# Patient Record
Sex: FEMALE | Race: BLACK OR AFRICAN AMERICAN | NOT HISPANIC OR LATINO | Employment: PART TIME | URBAN - METROPOLITAN AREA
[De-identification: names, ages, dates, MRNs, and addresses within clinical notes are randomized per-mention and may not be internally consistent; named-entity substitution may affect disease eponyms.]

---

## 2017-01-23 ENCOUNTER — APPOINTMENT (EMERGENCY)
Dept: RADIOLOGY | Facility: HOSPITAL | Age: 27
End: 2017-01-23

## 2017-01-23 ENCOUNTER — HOSPITAL ENCOUNTER (EMERGENCY)
Facility: HOSPITAL | Age: 27
Discharge: HOME | End: 2017-01-23
Attending: EMERGENCY MEDICINE | Admitting: EMERGENCY MEDICINE

## 2017-01-23 VITALS
BODY MASS INDEX: 42.68 KG/M2 | HEART RATE: 121 BPM | OXYGEN SATURATION: 98 % | WEIGHT: 217.37 LBS | SYSTOLIC BLOOD PRESSURE: 128 MMHG | TEMPERATURE: 98.3 F | RESPIRATION RATE: 18 BRPM | DIASTOLIC BLOOD PRESSURE: 74 MMHG | HEIGHT: 60 IN

## 2017-01-23 DIAGNOSIS — R07.9 CHEST PAIN, UNSPECIFIED TYPE: Primary | ICD-10-CM

## 2017-01-23 DIAGNOSIS — K62.5 BRIGHT RED BLOOD PER RECTUM: ICD-10-CM

## 2017-01-23 DIAGNOSIS — R00.0 SINUS TACHYCARDIA: ICD-10-CM

## 2017-01-23 LAB
ALBUMIN SERPL BCP-MCNC: 4.3 G/DL (ref 3.5–5)
ALP SERPL-CCNC: 93 U/L (ref 46–116)
ALT SERPL W P-5'-P-CCNC: 18 U/L (ref 12–78)
ANION GAP SERPL CALCULATED.3IONS-SCNC: 10 MMOL/L (ref 4–13)
AST SERPL W P-5'-P-CCNC: 28 U/L (ref 5–45)
BASOPHILS # BLD AUTO: 0.02 THOUSANDS/ΜL (ref 0–0.1)
BASOPHILS NFR BLD AUTO: 0 % (ref 0–1)
BILIRUB DIRECT SERPL-MCNC: 0.01 MG/DL (ref 0–0.2)
BILIRUB SERPL-MCNC: 0.3 MG/DL (ref 0.2–1)
BUN SERPL-MCNC: 12 MG/DL (ref 5–25)
CALCIUM SERPL-MCNC: 9.5 MG/DL (ref 8.3–10.1)
CHLORIDE SERPL-SCNC: 101 MMOL/L (ref 100–108)
CLARITY, POC: CLEAR
CO2 SERPL-SCNC: 27 MMOL/L (ref 21–32)
COLOR, POC: YELLOW
CREAT SERPL-MCNC: 0.65 MG/DL (ref 0.6–1.3)
DEPRECATED D DIMER PPP: 280 NG/ML (FEU) (ref 0–424)
EOSINOPHIL # BLD AUTO: 0.08 THOUSAND/ΜL (ref 0–0.61)
EOSINOPHIL NFR BLD AUTO: 1 % (ref 0–6)
ERYTHROCYTE [DISTWIDTH] IN BLOOD BY AUTOMATED COUNT: 13.6 % (ref 11.6–15.1)
EXT BILIRUBIN, UA: NEGATIVE
EXT BLOOD URINE: ABNORMAL
EXT GLUCOSE, UA: NEGATIVE
EXT KETONES: NEGATIVE
EXT NITRITE, UA: NEGATIVE
EXT PH, UA: 6
EXT PROTEIN, UA: NEGATIVE
EXT SPECIFIC GRAVITY, UA: 1.02
EXT UROBILINOGEN: 0.2
GFR SERPL CREATININE-BSD FRML MDRD: >60 ML/MIN/1.73SQ M
GLUCOSE SERPL-MCNC: 62 MG/DL (ref 65–140)
HCG UR QL: NEGATIVE
HCT VFR BLD AUTO: 38.6 % (ref 34.8–46.1)
HGB BLD-MCNC: 12.4 G/DL (ref 11.5–15.4)
LIPASE SERPL-CCNC: 214 U/L (ref 73–393)
LYMPHOCYTES # BLD AUTO: 1.46 THOUSANDS/ΜL (ref 0.6–4.47)
LYMPHOCYTES NFR BLD AUTO: 13 % (ref 14–44)
MAGNESIUM SERPL-MCNC: 1.7 MG/DL (ref 1.6–2.6)
MCH RBC QN AUTO: 28.4 PG (ref 26.8–34.3)
MCHC RBC AUTO-ENTMCNC: 32.1 G/DL (ref 31.4–37.4)
MCV RBC AUTO: 88 FL (ref 82–98)
MONOCYTES # BLD AUTO: 0.62 THOUSAND/ΜL (ref 0.17–1.22)
MONOCYTES NFR BLD AUTO: 5 % (ref 4–12)
NEUTROPHILS # BLD AUTO: 9.45 THOUSANDS/ΜL (ref 1.85–7.62)
NEUTS SEG NFR BLD AUTO: 81 % (ref 43–75)
NRBC BLD AUTO-RTO: 0 /100 WBCS
PLATELET # BLD AUTO: 325 THOUSANDS/UL (ref 149–390)
PMV BLD AUTO: 11.1 FL (ref 8.9–12.7)
POTASSIUM SERPL-SCNC: 4.4 MMOL/L (ref 3.5–5.3)
PROT SERPL-MCNC: 8.6 G/DL (ref 6.4–8.2)
RBC # BLD AUTO: 4.37 MILLION/UL (ref 3.81–5.12)
SODIUM SERPL-SCNC: 138 MMOL/L (ref 136–145)
TROPONIN I SERPL-MCNC: <0.02 NG/ML
TSH SERPL DL<=0.05 MIU/L-ACNC: 1.69 UIU/ML (ref 0.36–3.74)
WBC # BLD AUTO: 11.68 THOUSAND/UL (ref 4.31–10.16)
WBC # BLD EST: NEGATIVE 10*3/UL

## 2017-01-23 PROCEDURE — 93005 ELECTROCARDIOGRAM TRACING: CPT | Performed by: EMERGENCY MEDICINE

## 2017-01-23 PROCEDURE — C9113 INJ PANTOPRAZOLE SODIUM, VIA: HCPCS | Performed by: EMERGENCY MEDICINE

## 2017-01-23 PROCEDURE — 85025 COMPLETE CBC W/AUTO DIFF WBC: CPT | Performed by: EMERGENCY MEDICINE

## 2017-01-23 PROCEDURE — 96375 TX/PRO/DX INJ NEW DRUG ADDON: CPT

## 2017-01-23 PROCEDURE — 99285 EMERGENCY DEPT VISIT HI MDM: CPT

## 2017-01-23 PROCEDURE — 84484 ASSAY OF TROPONIN QUANT: CPT | Performed by: EMERGENCY MEDICINE

## 2017-01-23 PROCEDURE — 83690 ASSAY OF LIPASE: CPT | Performed by: EMERGENCY MEDICINE

## 2017-01-23 PROCEDURE — 80048 BASIC METABOLIC PNL TOTAL CA: CPT | Performed by: EMERGENCY MEDICINE

## 2017-01-23 PROCEDURE — 83735 ASSAY OF MAGNESIUM: CPT | Performed by: EMERGENCY MEDICINE

## 2017-01-23 PROCEDURE — 81025 URINE PREGNANCY TEST: CPT | Performed by: EMERGENCY MEDICINE

## 2017-01-23 PROCEDURE — 85379 FIBRIN DEGRADATION QUANT: CPT | Performed by: EMERGENCY MEDICINE

## 2017-01-23 PROCEDURE — 96374 THER/PROPH/DIAG INJ IV PUSH: CPT

## 2017-01-23 PROCEDURE — 81002 URINALYSIS NONAUTO W/O SCOPE: CPT | Performed by: EMERGENCY MEDICINE

## 2017-01-23 PROCEDURE — 71020 HB CHEST X-RAY 2VW FRONTAL&LATL: CPT

## 2017-01-23 PROCEDURE — 96361 HYDRATE IV INFUSION ADD-ON: CPT

## 2017-01-23 PROCEDURE — 36415 COLL VENOUS BLD VENIPUNCTURE: CPT | Performed by: EMERGENCY MEDICINE

## 2017-01-23 PROCEDURE — 80076 HEPATIC FUNCTION PANEL: CPT | Performed by: EMERGENCY MEDICINE

## 2017-01-23 PROCEDURE — 84443 ASSAY THYROID STIM HORMONE: CPT | Performed by: EMERGENCY MEDICINE

## 2017-01-23 RX ORDER — KETOROLAC TROMETHAMINE 30 MG/ML
30 INJECTION, SOLUTION INTRAMUSCULAR; INTRAVENOUS ONCE
Status: COMPLETED | OUTPATIENT
Start: 2017-01-23 | End: 2017-01-23

## 2017-01-23 RX ORDER — ONDANSETRON 2 MG/ML
4 INJECTION INTRAMUSCULAR; INTRAVENOUS ONCE
Status: COMPLETED | OUTPATIENT
Start: 2017-01-23 | End: 2017-01-23

## 2017-01-23 RX ORDER — PANTOPRAZOLE SODIUM 40 MG/1
40 INJECTION, POWDER, FOR SOLUTION INTRAVENOUS ONCE
Status: COMPLETED | OUTPATIENT
Start: 2017-01-23 | End: 2017-01-23

## 2017-01-23 RX ORDER — ACETAMINOPHEN 325 MG/1
650 TABLET ORAL ONCE
Status: COMPLETED | OUTPATIENT
Start: 2017-01-23 | End: 2017-01-23

## 2017-01-23 RX ORDER — OMEPRAZOLE 40 MG/1
40 CAPSULE, DELAYED RELEASE ORAL DAILY
Qty: 30 CAPSULE | Refills: 0 | Status: SHIPPED | OUTPATIENT
Start: 2017-01-23 | End: 2017-07-05

## 2017-01-23 RX ADMIN — SODIUM CHLORIDE 1000 ML: 0.9 INJECTION, SOLUTION INTRAVENOUS at 19:36

## 2017-01-23 RX ADMIN — ONDANSETRON 4 MG: 2 INJECTION INTRAMUSCULAR; INTRAVENOUS at 19:40

## 2017-01-23 RX ADMIN — ACETAMINOPHEN 650 MG: 325 TABLET ORAL at 22:00

## 2017-01-23 RX ADMIN — KETOROLAC TROMETHAMINE 30 MG: 30 INJECTION, SOLUTION INTRAMUSCULAR at 19:40

## 2017-01-23 RX ADMIN — PANTOPRAZOLE SODIUM 40 MG: 40 INJECTION, POWDER, FOR SOLUTION INTRAVENOUS at 19:43

## 2017-01-24 LAB
ATRIAL RATE: 123 BPM
P AXIS: 49 DEGREES
PR INTERVAL: 160 MS
QRS AXIS: 39 DEGREES
QRSD INTERVAL: 72 MS
QT INTERVAL: 286 MS
QTC INTERVAL: 409 MS
T WAVE AXIS: 45 DEGREES
VENTRICULAR RATE: 123 BPM

## 2017-07-05 ENCOUNTER — LAB CONVERSION - ENCOUNTER (OUTPATIENT)
Dept: OTHER | Facility: OTHER | Age: 27
End: 2017-07-05

## 2017-07-05 ENCOUNTER — HOSPITAL ENCOUNTER (EMERGENCY)
Facility: HOSPITAL | Age: 27
Discharge: HOME | End: 2017-07-05
Attending: EMERGENCY MEDICINE | Admitting: EMERGENCY MEDICINE
Payer: COMMERCIAL

## 2017-07-05 VITALS
TEMPERATURE: 98.7 F | HEART RATE: 103 BPM | BODY MASS INDEX: 37.15 KG/M2 | RESPIRATION RATE: 18 BRPM | DIASTOLIC BLOOD PRESSURE: 83 MMHG | WEIGHT: 189.2 LBS | OXYGEN SATURATION: 98 % | HEIGHT: 60 IN | SYSTOLIC BLOOD PRESSURE: 127 MMHG

## 2017-07-05 DIAGNOSIS — B34.9 VIRAL SYNDROME: Primary | ICD-10-CM

## 2017-07-05 LAB
ANION GAP SERPL CALCULATED.3IONS-SCNC: 11 MMOL/L (ref 4–13)
BASOPHILS # BLD AUTO: 0.03 THOUSANDS/ΜL (ref 0–0.1)
BASOPHILS NFR BLD AUTO: 0 % (ref 0–1)
BUN SERPL-MCNC: 15 MG/DL (ref 5–25)
CALCIUM SERPL-MCNC: 9.6 MG/DL (ref 8.3–10.1)
CHLORIDE SERPL-SCNC: 104 MMOL/L (ref 100–108)
CO2 SERPL-SCNC: 27 MMOL/L (ref 21–32)
CREAT SERPL-MCNC: 0.8 MG/DL (ref 0.6–1.3)
EOSINOPHIL # BLD AUTO: 0.05 THOUSAND/ΜL (ref 0–0.61)
EOSINOPHIL NFR BLD AUTO: 1 % (ref 0–6)
ERYTHROCYTE [DISTWIDTH] IN BLOOD BY AUTOMATED COUNT: 13.8 % (ref 11.6–15.1)
GFR SERPL CREATININE-BSD FRML MDRD: >60 ML/MIN/1.73SQ M
GLUCOSE SERPL-MCNC: 98 MG/DL (ref 65–140)
HCG SERPL QL: NEGATIVE
HCT VFR BLD AUTO: 38.4 % (ref 34.8–46.1)
HGB BLD-MCNC: 12.4 G/DL (ref 11.5–15.4)
LYMPHOCYTES # BLD AUTO: 2.22 THOUSANDS/ΜL (ref 0.6–4.47)
LYMPHOCYTES NFR BLD AUTO: 25 % (ref 14–44)
MCH RBC QN AUTO: 28.6 PG (ref 26.8–34.3)
MCHC RBC AUTO-ENTMCNC: 32.3 G/DL (ref 31.4–37.4)
MCV RBC AUTO: 89 FL (ref 82–98)
MONOCYTES # BLD AUTO: 0.55 THOUSAND/ΜL (ref 0.17–1.22)
MONOCYTES NFR BLD AUTO: 6 % (ref 4–12)
NEUTROPHILS # BLD AUTO: 6.04 THOUSANDS/ΜL (ref 1.85–7.62)
NEUTS SEG NFR BLD AUTO: 68 % (ref 43–75)
NRBC BLD AUTO-RTO: 0 /100 WBCS
PLATELET # BLD AUTO: 305 THOUSANDS/UL (ref 149–390)
PMV BLD AUTO: 9.7 FL (ref 8.9–12.7)
POTASSIUM SERPL-SCNC: 4 MMOL/L (ref 3.5–5.3)
RBC # BLD AUTO: 4.33 MILLION/UL (ref 3.81–5.12)
SODIUM SERPL-SCNC: 142 MMOL/L (ref 136–145)
WBC # BLD AUTO: 8.92 THOUSAND/UL (ref 4.31–10.16)

## 2017-07-05 PROCEDURE — 84703 CHORIONIC GONADOTROPIN ASSAY: CPT | Performed by: EMERGENCY MEDICINE

## 2017-07-05 PROCEDURE — 80048 BASIC METABOLIC PNL TOTAL CA: CPT | Performed by: EMERGENCY MEDICINE

## 2017-07-05 PROCEDURE — 85025 COMPLETE CBC W/AUTO DIFF WBC: CPT | Performed by: EMERGENCY MEDICINE

## 2017-07-05 PROCEDURE — 36415 COLL VENOUS BLD VENIPUNCTURE: CPT | Performed by: EMERGENCY MEDICINE

## 2017-07-05 PROCEDURE — 86308 HETEROPHILE ANTIBODY SCREEN: CPT | Performed by: EMERGENCY MEDICINE

## 2017-07-05 PROCEDURE — 99283 EMERGENCY DEPT VISIT LOW MDM: CPT

## 2017-07-05 RX ORDER — MULTIVITAMIN
1 TABLET ORAL DAILY
COMMUNITY

## 2017-07-06 LAB — HETEROPH AB SER QL: NEGATIVE

## 2017-10-22 ENCOUNTER — HOSPITAL ENCOUNTER (EMERGENCY)
Facility: OTHER | Age: 27
Discharge: HOME | End: 2017-10-22
Attending: EMERGENCY MEDICINE
Payer: MEDICAID

## 2017-10-22 VITALS
BODY MASS INDEX: 37.3 KG/M2 | RESPIRATION RATE: 18 BRPM | DIASTOLIC BLOOD PRESSURE: 84 MMHG | HEIGHT: 60 IN | WEIGHT: 190 LBS | HEART RATE: 100 BPM | OXYGEN SATURATION: 97 % | SYSTOLIC BLOOD PRESSURE: 129 MMHG | TEMPERATURE: 98 F

## 2017-10-22 DIAGNOSIS — H92.01 RIGHT EAR PAIN: Primary | ICD-10-CM

## 2017-10-22 DIAGNOSIS — J06.9 UPPER RESPIRATORY TRACT INFECTION, UNSPECIFIED TYPE: ICD-10-CM

## 2017-10-22 LAB
B-HCG UR QL: NEGATIVE
CTP QC/QA: YES

## 2017-10-22 PROCEDURE — 96372 THER/PROPH/DIAG INJ SC/IM: CPT

## 2017-10-22 PROCEDURE — 63600175 PHARM REV CODE 636 W HCPCS: Performed by: EMERGENCY MEDICINE

## 2017-10-22 PROCEDURE — 81025 URINE PREGNANCY TEST: CPT | Performed by: EMERGENCY MEDICINE

## 2017-10-22 PROCEDURE — 99283 EMERGENCY DEPT VISIT LOW MDM: CPT | Mod: 25

## 2017-10-22 RX ORDER — NEOMYCIN SULFATE, POLYMYXIN B SULFATE AND HYDROCORTISONE 10; 3.5; 1 MG/ML; MG/ML; [USP'U]/ML
3 SUSPENSION/ DROPS AURICULAR (OTIC) 3 TIMES DAILY
Qty: 10 ML | Status: SHIPPED | OUTPATIENT
Start: 2017-10-22 | End: 2019-05-16

## 2017-10-22 RX ORDER — DEXAMETHASONE SODIUM PHOSPHATE 4 MG/ML
8 INJECTION, SOLUTION INTRA-ARTICULAR; INTRALESIONAL; INTRAMUSCULAR; INTRAVENOUS; SOFT TISSUE
Status: COMPLETED | OUTPATIENT
Start: 2017-10-22 | End: 2017-10-22

## 2017-10-22 RX ADMIN — DEXAMETHASONE SODIUM PHOSPHATE 8 MG: 4 INJECTION, SOLUTION INTRAMUSCULAR; INTRAVENOUS at 03:10

## 2017-10-22 NOTE — ED PROVIDER NOTES
"Encounter Date: 10/22/2017       History     Chief Complaint   Patient presents with    Otalgia     Pt states," My right ear has been hurting for two weeks."     Chief complaint: Right ear pain  27-year-old had intermittent pain to her right ear for the last 2 weeks.  She also reports URI symptoms including nasal congestion, sneezing, postnasal drip and a nonproductive cough.  No fever.  She had clear drainage coming from her right ear yesterday.  She sees multiple over-the-counter medicines without relief.  She admits to using Q-tips in her ear and a regular basis.  No shortness of breath or wheezing.          Review of patient's allergies indicates:   Allergen Reactions    Grass pollen-june grass standard      Past Medical History:   Diagnosis Date    Hay fever     Oral herpes      Past Surgical History:   Procedure Laterality Date    DILATION AND CURETTAGE OF UTERUS       Family History   Problem Relation Age of Onset    Breast cancer Maternal Grandmother     Ovarian cancer Neg Hx     Colon cancer Neg Hx      Social History   Substance Use Topics    Smoking status: Never Smoker    Smokeless tobacco: Never Used    Alcohol use No     Review of Systems   Constitutional: Negative for fever.   HENT: Positive for congestion, ear discharge, ear pain, postnasal drip, rhinorrhea, sneezing and sore throat.    Respiratory: Positive for cough. Negative for shortness of breath.    Musculoskeletal: Negative for neck stiffness.   Neurological: Negative for headaches.       Physical Exam     Initial Vitals [10/22/17 1501]   BP Pulse Resp Temp SpO2   (!) 139/90 110 20 98.1 °F (36.7 °C) 97 %      MAP       106.33         Physical Exam    Nursing note and vitals reviewed.  Constitutional: No distress.   HENT:   Right Ear: Tympanic membrane normal. There is drainage (SLIGHT, CLEAR).   Left Ear: Tympanic membrane normal.   Mouth/Throat: Oropharynx is clear and moist.   Eyes: Conjunctivae are normal.   Neck: Normal range of " motion. Neck supple.   Cardiovascular: Normal rate.   Pulmonary/Chest: Breath sounds normal.   Neurological: She is alert and oriented to person, place, and time. She has normal strength.   Psychiatric: She has a normal mood and affect.         ED Course   Procedures  Labs Reviewed   POCT URINE PREGNANCY             Medical Decision Making:   Initial Assessment:   27-year-old who presents with right ear pain and URI symptoms.  On exam patient has very scant clear drainage from the right ear without swelling to the canal.  Tympanic membrane is normal.  Lungs are clear  ED Management:  Patient was advised to use Zyrtec-D and Flonase over-the-counter for the URI type symptoms.  She'll be treated Decadron IM here and discharged on Corticosporin otic.                   ED Course      Clinical Impression:   The primary encounter diagnosis was Right ear pain. A diagnosis of Upper respiratory tract infection, unspecified type was also pertinent to this visit.                           Sona Madrigal MD  10/22/17 1525

## 2017-11-16 ENCOUNTER — HOSPITAL ENCOUNTER (EMERGENCY)
Facility: OTHER | Age: 27
Discharge: HOME | End: 2017-11-16
Attending: EMERGENCY MEDICINE
Payer: MEDICAID

## 2017-11-16 VITALS
HEIGHT: 60 IN | WEIGHT: 200 LBS | OXYGEN SATURATION: 98 % | BODY MASS INDEX: 39.27 KG/M2 | DIASTOLIC BLOOD PRESSURE: 55 MMHG | SYSTOLIC BLOOD PRESSURE: 115 MMHG | TEMPERATURE: 98 F | HEART RATE: 97 BPM | RESPIRATION RATE: 16 BRPM

## 2017-11-16 DIAGNOSIS — H92.03 OTALGIA OF BOTH EARS: Primary | ICD-10-CM

## 2017-11-16 LAB
B-HCG UR QL: NEGATIVE
CTP QC/QA: YES

## 2017-11-16 PROCEDURE — 99283 EMERGENCY DEPT VISIT LOW MDM: CPT | Mod: 25

## 2017-11-16 PROCEDURE — 81025 URINE PREGNANCY TEST: CPT | Performed by: EMERGENCY MEDICINE

## 2017-11-16 RX ORDER — CETIRIZINE HYDROCHLORIDE, PSEUDOEPHEDRINE HYDROCHLORIDE 5; 120 MG/1; MG/1
1 TABLET, FILM COATED, EXTENDED RELEASE ORAL DAILY
Qty: 10 TABLET | Refills: 0 | Status: SHIPPED | OUTPATIENT
Start: 2017-11-16 | End: 2017-11-26

## 2017-11-17 NOTE — ED PROVIDER NOTES
"Encounter Date: 11/16/2017       History     Chief Complaint   Patient presents with    Otalgia     bilat ear pain/drainage "for a while", no relief with rx ear gtts      No  was used.   Otalgia   This is a chronic (Ace lupeap reports having earaches with intermittent drainage for the past 2 [place] 3 months.  Patient has been treated with antibiotic eardrops for the same condition without relief.) problem. There is pain in both ears. The problem occurs intermittently. The problem has been waxing and waning. The pain is at a severity of 3/10. Associated symptoms include ear discharge (Intermittent). Pertinent negatives include no headaches, no hearing loss, no rhinorrhea, no sore throat, no abdominal pain, no diarrhea, no vomiting, no neck pain, no cough and no rash.     Review of patient's allergies indicates:   Allergen Reactions    Grass pollen-joey grass standard      Past Medical History:   Diagnosis Date    Hay fever     Oral herpes      Past Surgical History:   Procedure Laterality Date    DILATION AND CURETTAGE OF UTERUS       Family History   Problem Relation Age of Onset    Breast cancer Maternal Grandmother     Ovarian cancer Neg Hx     Colon cancer Neg Hx      Social History   Substance Use Topics    Smoking status: Never Smoker    Smokeless tobacco: Never Used    Alcohol use No     Review of Systems   Constitutional: Negative.  Negative for fever.   HENT: Positive for congestion, ear discharge (Intermittent) and ear pain. Negative for hearing loss, rhinorrhea and sore throat.    Eyes: Negative.    Respiratory: Negative.  Negative for cough and shortness of breath.    Cardiovascular: Negative.  Negative for chest pain.   Gastrointestinal: Negative.  Negative for abdominal pain, diarrhea, nausea and vomiting.   Genitourinary: Negative.  Negative for dysuria.   Musculoskeletal: Negative.  Negative for back pain and neck pain.   Skin: Negative.  Negative for rash. "   Allergic/Immunologic: Negative.    Neurological: Negative.  Negative for weakness and headaches.   Hematological: Does not bruise/bleed easily.   Psychiatric/Behavioral: Negative.    All other systems reviewed and are negative.      Physical Exam     Initial Vitals [11/16/17 1650]   BP Pulse Resp Temp SpO2   (!) 115/55 97 16 97.5 °F (36.4 °C) 98 %      MAP       75         Physical Exam    Nursing note and vitals reviewed.  Constitutional: She appears well-developed and well-nourished. She is not diaphoretic.  Non-toxic appearance. She does not appear ill. No distress.   HENT:   Head: Normocephalic and atraumatic.   Right Ear: Hearing, tympanic membrane, external ear and ear canal normal.   Left Ear: Hearing, tympanic membrane, external ear and ear canal normal.   Nose: Mucosal edema present.   Mouth/Throat: Uvula is midline, oropharynx is clear and moist and mucous membranes are normal. No oropharyngeal exudate, posterior oropharyngeal edema, posterior oropharyngeal erythema or tonsillar abscesses.   Eyes: Conjunctivae are normal. Right eye exhibits no discharge. Left eye exhibits no discharge.   Neck: Normal range of motion.   Cardiovascular: Normal rate, regular rhythm, normal heart sounds and intact distal pulses. Exam reveals no gallop and no friction rub.    No murmur heard.  Pulmonary/Chest: Breath sounds normal. No respiratory distress. She has no wheezes. She has no rhonchi. She has no rales. She exhibits no tenderness.   Musculoskeletal: Normal range of motion.   Neurological: She is alert and oriented to person, place, and time.   Skin: Skin is warm and dry. No rash noted.   Psychiatric: She has a normal mood and affect. Her behavior is normal. Judgment and thought content normal.         ED Course   Procedures  Labs Reviewed   POCT URINE PREGNANCY             Medical Decision Making:   Initial Assessment:   Otalgia  Differential Diagnosis:   Foreign-body, otitis, ear effusion  ED Management:  The  patient will be discharged home on Zyrtec-D due to the fact that she does have mild nasal congestion.  The patient is also instructed to follow with illness to ENT for further evaluation.  The patient is instructed to return to the ER as needed if symptoms worsen or fail to improve.  The patient verbalized an understanding of discharge instructions and treatment plan.              Attending Attestation:     Physician Attestation Statement for NP/PA:   I discussed this assessment and plan of this patient with the NP/PA, but I did not personally examine the patient. The face to face encounter was performed by the NP/PA.                  ED Course      Clinical Impression:   The encounter diagnosis was Otalgia of both ears.                           Toussaint Battley III, FESTUS  11/16/17 1816       Sona Madrigal MD  11/19/17 2164

## 2018-03-05 ENCOUNTER — HOSPITAL ENCOUNTER (EMERGENCY)
Facility: OTHER | Age: 28
Discharge: HOME | End: 2018-03-05
Attending: EMERGENCY MEDICINE
Payer: MEDICAID

## 2018-03-05 VITALS
TEMPERATURE: 98 F | HEIGHT: 60 IN | DIASTOLIC BLOOD PRESSURE: 87 MMHG | WEIGHT: 185 LBS | BODY MASS INDEX: 36.32 KG/M2 | RESPIRATION RATE: 18 BRPM | SYSTOLIC BLOOD PRESSURE: 125 MMHG | HEART RATE: 87 BPM | OXYGEN SATURATION: 99 %
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
## 2018-03-05 LAB
B-HCG UR QL: NEGATIVE
CTP QC/QA: YES

## 2018-03-05 PROCEDURE — 99283 EMERGENCY DEPT VISIT LOW MDM: CPT | Mod: 25

## 2018-03-05 PROCEDURE — 81025 URINE PREGNANCY TEST: CPT | Performed by: EMERGENCY MEDICINE

## 2018-03-05 RX ORDER — PREDNISONE 10 MG/1
10 TABLET ORAL DAILY
Qty: 5 TABLET | Refills: 0 | Status: SHIPPED | OUTPATIENT
Start: 2018-03-05 | End: 2018-03-10

## 2018-03-06 NOTE — ED PROVIDER NOTES
"Encounter Date: 3/5/2018       History     Chief Complaint   Patient presents with    Rash     pt has a slight rash on her right hand that itches x2 dyas. States, " I think it is a ringworm."     The history is provided by the patient.   Rash    This is a new problem. The current episode started today. The problem has been unchanged. The problem is associated with an insect bite/sting. The rash is present on the left hand. The pain is at a severity of 2/10. Associated symptoms include itching. She has tried nothing for the symptoms.     Review of patient's allergies indicates:   Allergen Reactions    Grass pollen- grass standard      Past Medical History:   Diagnosis Date    Hay fever     Oral herpes      Past Surgical History:   Procedure Laterality Date     SECTION      DILATION AND CURETTAGE OF UTERUS       Family History   Problem Relation Age of Onset    Breast cancer Maternal Grandmother     Ovarian cancer Neg Hx     Colon cancer Neg Hx      Social History   Substance Use Topics    Smoking status: Never Smoker    Smokeless tobacco: Never Used    Alcohol use No     Review of Systems   Constitutional: Negative.    HENT: Negative.    Eyes: Negative.    Respiratory: Negative.    Cardiovascular: Negative.    Gastrointestinal: Negative.    Endocrine: Negative.    Genitourinary: Negative.    Musculoskeletal: Negative.    Skin: Positive for itching and rash.   Allergic/Immunologic: Negative.    Neurological: Negative.    Hematological: Negative.    Psychiatric/Behavioral: Negative.    All other systems reviewed and are negative.      Physical Exam     Initial Vitals [18 2312]   BP Pulse Resp Temp SpO2   121/89 89 17 98.1 °F (36.7 °C) 97 %      MAP       99.67         Physical Exam    Nursing note and vitals reviewed.  Constitutional: Vital signs are normal. She appears well-developed. She is active and cooperative.   HENT:   Head: Normocephalic and atraumatic.   Eyes: Conjunctivae, EOM " and lids are normal. Pupils are equal, round, and reactive to light.   Neck: Trachea normal and full passive range of motion without pain. Neck supple. No thyroid mass present.   Cardiovascular: Normal rate, regular rhythm, S1 normal, S2 normal, normal heart sounds, intact distal pulses and normal pulses.   Abdominal: Soft. Normal appearance, normal aorta and bowel sounds are normal.   Musculoskeletal: Normal range of motion.   Lymphadenopathy:     She has no axillary adenopathy.   Neurological: She is alert and oriented to person, place, and time.   Skin: Skin is warm, dry and intact.        Psychiatric: She has a normal mood and affect. Her speech is normal and behavior is normal. Judgment and thought content normal. Cognition and memory are normal.         ED Course   Procedures  Labs Reviewed   POCT URINE PREGNANCY                                  Clinical Impression:   The encounter diagnosis was Insect bite, initial encounter.                           Sergio Chamberlain MD  03/05/18 8020

## 2018-05-21 ENCOUNTER — HOSPITAL ENCOUNTER (EMERGENCY)
Facility: OTHER | Age: 28
Discharge: HOME | End: 2018-05-21
Attending: EMERGENCY MEDICINE
Payer: MEDICAID

## 2018-05-21 VITALS
WEIGHT: 180 LBS | BODY MASS INDEX: 35.34 KG/M2 | TEMPERATURE: 98 F | DIASTOLIC BLOOD PRESSURE: 75 MMHG | OXYGEN SATURATION: 100 % | HEIGHT: 60 IN | HEART RATE: 105 BPM | RESPIRATION RATE: 14 BRPM | SYSTOLIC BLOOD PRESSURE: 116 MMHG

## 2018-05-21 DIAGNOSIS — R21 SKIN RASH: Primary | ICD-10-CM

## 2018-05-21 LAB
B-HCG UR QL: NEGATIVE
CTP QC/QA: YES

## 2018-05-21 PROCEDURE — 81025 URINE PREGNANCY TEST: CPT | Performed by: EMERGENCY MEDICINE

## 2018-05-21 PROCEDURE — 99283 EMERGENCY DEPT VISIT LOW MDM: CPT | Mod: 25

## 2018-05-21 PROCEDURE — 25000003 PHARM REV CODE 250: Performed by: PHYSICIAN ASSISTANT

## 2018-05-21 RX ORDER — HYDROXYZINE PAMOATE 25 MG/1
25 CAPSULE ORAL
Status: COMPLETED | OUTPATIENT
Start: 2018-05-21 | End: 2018-05-21

## 2018-05-21 RX ORDER — HYDROXYZINE HYDROCHLORIDE 25 MG/1
25 TABLET, FILM COATED ORAL EVERY 6 HOURS
Qty: 15 TABLET | Refills: 0 | Status: SHIPPED | OUTPATIENT
Start: 2018-05-21 | End: 2019-05-16

## 2018-05-21 RX ORDER — HYDROCORTISONE 1 %
CREAM (GRAM) TOPICAL
Qty: 30 G | Refills: 0 | Status: SHIPPED | OUTPATIENT
Start: 2018-05-21 | End: 2019-09-10

## 2018-05-21 RX ADMIN — HYDROXYZINE PAMOATE 25 MG: 25 CAPSULE ORAL at 10:05

## 2018-05-22 NOTE — ED PROVIDER NOTES
Encounter Date: 2018       History     Chief Complaint   Patient presents with    Rash     on and off for about a month, to left upper arm X a week, but it keeps popping up in different spots on arms, itches really bad.     Patient is a 27 y.o. female presenting to the emergency department with complaints of a rash. The patient states that she initially noticed it started 1 month ago on her right hand. She states that she was seen by dermatology and told that she was likely bitten by something. She states that since then, the area has spread up her right arm and now to her left arm. She denies pain but states that it is very itchy, more so over the past 1 week. She denies other new exposures. She states she has been applying an unknown topical steroid cream from her grandmother that does help, but states that she does not know the cause and she does not want to continue to self treat. She denies taking any oral medication for her symptoms. She denies involvement of her oral mucosa. This is the extent of the patient's complaints at this time.         The history is provided by the patient.     Review of patient's allergies indicates:   Allergen Reactions    Grass pollen-joey grass standard      Past Medical History:   Diagnosis Date    Hay fever     Oral herpes      Past Surgical History:   Procedure Laterality Date     SECTION      DILATION AND CURETTAGE OF UTERUS       Family History   Problem Relation Age of Onset    Breast cancer Maternal Grandmother     Ovarian cancer Neg Hx     Colon cancer Neg Hx      Social History   Substance Use Topics    Smoking status: Never Smoker    Smokeless tobacco: Never Used    Alcohol use No     Review of Systems   Constitutional: Negative for activity change, appetite change, chills, fatigue and fever.   HENT: Negative for congestion, rhinorrhea and sore throat.    Eyes: Negative for photophobia and visual disturbance.   Respiratory: Negative for cough,  shortness of breath and wheezing.    Cardiovascular: Negative for chest pain.   Gastrointestinal: Negative for abdominal pain, diarrhea, nausea and vomiting.   Genitourinary: Negative for dysuria, hematuria and urgency.   Musculoskeletal: Negative for back pain, myalgias and neck pain.   Skin: Positive for rash. Negative for color change and wound.   Neurological: Negative for weakness and headaches.   Psychiatric/Behavioral: Negative for agitation and confusion.       Physical Exam     Initial Vitals [05/21/18 2115]   BP Pulse Resp Temp SpO2   (!) 141/62 97 14 98.1 °F (36.7 °C) 99 %      MAP       88.33         Physical Exam    Nursing note and vitals reviewed.  Constitutional: She appears well-developed and well-nourished. She is not diaphoretic. She is cooperative.  Non-toxic appearance. She does not have a sickly appearance. She does not appear ill. No distress.   Well appearing,  female, unaccompanied in the ED. Speaking in clear and full sentences, moving all extremities without difficulty. No acute distress.    HENT:   Head: Normocephalic and atraumatic.   Right Ear: External ear normal.   Left Ear: External ear normal.   Nose: Nose normal.   Mouth/Throat: Oropharynx is clear and moist.   Eyes: Conjunctivae and EOM are normal.   Neck: Normal range of motion. Neck supple.   Musculoskeletal: Normal range of motion.   Neurological: She is alert and oriented to person, place, and time.   Skin: Skin is warm.   Fine papular rash noted to the dorsal aspect of the bilateral upper extremities. No associated erythema, skin sloughing, ulceration, purulence, or drainage. No involvement of the mucosal membrane.    Psychiatric: She has a normal mood and affect. Her behavior is normal. Judgment and thought content normal.         ED Course   Procedures  Labs Reviewed   POCT URINE PREGNANCY             Medical Decision Making:   Initial Assessment:   Urgent evaluation of a 27 y.o. female presenting to the  emergency department complaining of rash to the bilateral upper extremities. Patient is afebrile, nontoxic appearing and hemodynamically stable. Physical exam reveals evidence of a fine papular rash noted to the bilateral upper extremities with no skin sloughing, erythema, or drainage. No involvement of the mucosal membrane.  Clinical Tests:   Lab Tests: Ordered and Reviewed  The following lab test(s) were unremarkable: UPT  ED Management:  Signs and symptoms are not consistent with acute bacterial infection, no indication for antibiotic treatment.  No concern for SJS.  Will plan to treat with Atarax as well as topical steroids.  I had a discussion with the patient and recommended that she obtain follow-up with Dermatology for further evaluation. The patient was instructed to follow up with a primary care provider in 2 days or to return to the emergency department for worsening symptoms. The treatment plan was discussed with the patient who demonstrated understanding and comfort with plan. The patient's history, physical exam, and plan of care was discussed with and agreed upon with my supervising physician.    Other:   I have discussed this case with another health care provider.       <> Summary of the Discussion: Dr. Ramachandran  This note was created using [employer] Fluency Direct. There may be typographical errors secondary to dictation.                       Clinical Impression:     1. Skin rash         Disposition:   Disposition: Discharged  Condition: Stable                        Ml Tilley PA-C  05/21/18 2575

## 2018-05-22 NOTE — ED TRIAGE NOTES
"Pt presents to the ED w/ c/o rash to bilateral arms. X 1 week. Pt states "it started as a small bump and spread to both arms. Pt states that rash "itches really bad". Pt states "I put cream on my arm that I got from my grandmother" with slight relief. Pt denies SOB and fever.  Pt is AAOX4. Pt is in NAD.   "

## 2018-09-16 ENCOUNTER — NURSE TRIAGE (OUTPATIENT)
Dept: ADMINISTRATIVE | Facility: CLINIC | Age: 28
End: 2018-09-16

## 2018-09-17 ENCOUNTER — HOSPITAL ENCOUNTER (EMERGENCY)
Facility: OTHER | Age: 28
Discharge: HOME | End: 2018-09-17
Attending: EMERGENCY MEDICINE
Payer: MEDICAID

## 2018-09-17 VITALS
HEART RATE: 98 BPM | TEMPERATURE: 99 F | DIASTOLIC BLOOD PRESSURE: 76 MMHG | SYSTOLIC BLOOD PRESSURE: 130 MMHG | RESPIRATION RATE: 17 BRPM | HEIGHT: 60 IN | WEIGHT: 190 LBS | BODY MASS INDEX: 37.3 KG/M2 | OXYGEN SATURATION: 98 %

## 2018-09-17 DIAGNOSIS — L30.9 DERMATITIS: Primary | ICD-10-CM

## 2018-09-17 LAB
B-HCG UR QL: NEGATIVE
CTP QC/QA: YES

## 2018-09-17 PROCEDURE — 81025 URINE PREGNANCY TEST: CPT | Performed by: EMERGENCY MEDICINE

## 2018-09-17 PROCEDURE — 99283 EMERGENCY DEPT VISIT LOW MDM: CPT

## 2018-09-17 NOTE — ED NOTES
Patient Identifiers for Brock Alfaro checked and correct  LOC: The patient is awake, alert and aware of environment with an appropriate affect, the patient is oriented x 3 and speaking appropriate.  APPEARANCE: Patient resting comfortably and in no acute distress. The patient is clean and well groomed. The patient's clothing is properly fastened.  SKIN: The skin is warm and dry. The patient has normal skin turgor and moist mucus membranes. Bumps to anus.   Musculoskeletal :  Normal range of motion noted. Moves all extremities well, No swelling or tenderness noted  RESPIRATORY: Airway is open and patent, respirations are spontaneous, patient has a normal effort and rate. Breath sounds are clear & equal, bilaterally.  CARDIAC: Patient has a normal rate and rhythm, no peripheral edema noted, capillary refill < 3 seconds.   PULSES: 2+ radial & pedal pulses, symmetrical in all extremities.  NEUROLOGIC: PERRL, Pupils 3mm and reacts briskly to light. Motor strength 5/5 all extremities.  The pt's facial expression is symmetrical, patient moving all extremities, normal sensation in all extremities when touched with a finger.The patient is awake, alert and cooperative with a calm affect, patient is aware of environment.      Will continue to monitor

## 2018-09-17 NOTE — ED PROVIDER NOTES
"Encounter Date: 2018       History     Chief Complaint   Patient presents with    Rash     "bumpy, red" rash around anus noted last night. denies itching or discomfort.     Patient is 28 year old female who presents with complaints of painless pumps to the right side of her buttocks that she noticed for the first time yesterday. She reports that these bumps are painless with no itching, bleeding, purulent. She reports no recent sexual activty. She reports a history of oral fever blisters but denies a history of genital herpes in the past. She has no associated fever, chills, nausea, vomiting, chest pain or SOB. She is currently unaccompanied in the ER.           Review of patient's allergies indicates:   Allergen Reactions    Grass pollen- grass standard      Past Medical History:   Diagnosis Date    Hay fever     Oral herpes      Past Surgical History:   Procedure Laterality Date     SECTION       SECTION N/A 2014    Performed by Merrill Kirby MD at [facility]    DILATION AND CURETTAGE OF UTERUS       Family History   Problem Relation Age of Onset    Breast cancer Maternal Grandmother     Ovarian cancer Neg Hx     Colon cancer Neg Hx      Social History     Tobacco Use    Smoking status: Never Smoker    Smokeless tobacco: Never Used   Substance Use Topics    Alcohol use: No    Drug use: No     Review of Systems   Constitutional: Negative for fever.   HENT: Negative for sore throat.    Respiratory: Negative for shortness of breath.    Cardiovascular: Negative for chest pain.   Gastrointestinal: Negative for nausea.   Genitourinary: Negative for dysuria.   Musculoskeletal: Negative for back pain.   Skin: Negative for rash.        Bumps to lower right buttocks.    Neurological: Negative for weakness.   Hematological: Does not bruise/bleed easily.       Physical Exam     Initial Vitals [18 1316]   BP Pulse Resp Temp SpO2   122/81 106 16 98.8 °F (37.1 °C) 99 %      MAP  "      --         Physical Exam    Nursing note and vitals reviewed.  Constitutional: She appears well-developed and well-nourished. She is not diaphoretic. No distress.   Healthy appearing female in NAD or apparent pain. She makes good eye contact, speaks in clear full sentences and ambulates with ease.    HENT:   Head: Normocephalic and atraumatic.   Eyes: Conjunctivae and EOM are normal. Pupils are equal, round, and reactive to light. Right eye exhibits no discharge. Left eye exhibits no discharge. No scleral icterus.   Neck: Normal range of motion.   Cardiovascular: Normal rate, regular rhythm, normal heart sounds and intact distal pulses. Exam reveals no gallop and no friction rub.    No murmur heard.  Pulmonary/Chest: Breath sounds normal. She has no wheezes. She has no rhonchi. She has no rales.   Abdominal: Soft. Bowel sounds are normal. There is no tenderness. There is no rebound and no guarding.   Genitourinary:         Musculoskeletal: Normal range of motion. She exhibits no edema or tenderness.   Lymphadenopathy:     She has no cervical adenopathy.   Neurological: She is alert and oriented to person, place, and time. She has normal strength. No cranial nerve deficit or sensory deficit.   Skin: Skin is warm. Capillary refill takes less than 2 seconds. No rash and no abscess noted. No erythema.   Psychiatric: She has a normal mood and affect. Her behavior is normal. Thought content normal.         ED Course   Procedures  Labs Reviewed   POCT URINE PREGNANCY          Imaging Results    None          Medical Decision Making:   ED Management:  Urgent evaluation a 28-year-old female who presents with complaints of nonspecific rash who lower gluteal region as outlined above.  She is afebrile, nontoxic appearing, hemodynamically stable. Physical exam outlined above and reveals erythematous papules in nonspecific dermatomal pattern.  These lesions do not appear to be vesicular in nature and do not appear to have  bacterial etiology.  I do not feel antibiotic or antiviral treatment is warranted at this time.  I do have strong suspicion that this dermatitis is related to friction between glue and I educate patient supportive symptom management and encourage her to follow up with her primary provider in the outpatient setting.  She is educated on ED return precautions and verbalizes understanding.  She is amenable to plan.  She is stable for discharge.                      Clinical Impression:   The encounter diagnosis was Dermatitis.                             Jumana Pineda PA-C  09/17/18 [id number]

## 2018-09-17 NOTE — ED TRIAGE NOTES
Pt c/o bumps that she noticed to her rectum since yesterday.She denies any pain or itching. Denies anal sex.  She denies any bumps to the vagina.

## 2018-09-17 NOTE — TELEPHONE ENCOUNTER
"Pt called re red rash on bottom of R buttock spreading to thigh and inner thigh. No itch. No pain. Pt denies sexual activity     Reason for Disposition   Mild widespread rash   Hives suspected    Answer Assessment - Initial Assessment Questions  1. APPEARANCE of RASH: "Describe the rash. What color it is?" (e.g., spots, blisters, raised areas, skin peeling, scaly)     Raised bumps - mosquito bites, raised whelps, no itch, no pain.   2. SIZE: "How big are the spots?"    Smaller than dime - little mosquito bite   3. LOCATION: "Where is the rash located?"     As above   4. ONSET: "When did the rash begin?"     930pm   5. FEVER: "Do you have a fever?" If so, ask: "What is your temperature, how was it measured, and when did it start?"     afeb   6. ITCHING: "Does the rash itch?" If so, ask: "How bad is the itch?" (Scale 1-10; or mild, moderate, severe)     No   7. CAUSE: "What do you think is causing the rash?"     Taking valtrex for fever blisters   Heat rash  8. MEDICATION FACTORS: "Have you started any new medications within the last 2 weeks?" (e.g., antibiotics)      Was taking advil for sinus   9. OTHER SYMPTOMS: "Do you have any other symptoms?" (e.g., dizziness, headache, sore throat, joint pain)     No   10. PREGNANCY: "Is there any chance you are pregnant?" "When was your last menstrual period?"     No    Protocols used:  RASH - WIDESPREAD AND CAUSE UNKNOWN[facility]. Offered home care. Call back with questions     "

## 2018-12-16 ENCOUNTER — HOSPITAL ENCOUNTER (EMERGENCY)
Facility: OTHER | Age: 28
Discharge: HOME | End: 2018-12-16
Attending: EMERGENCY MEDICINE
Payer: MEDICAID

## 2018-12-16 VITALS
HEIGHT: 60 IN | DIASTOLIC BLOOD PRESSURE: 95 MMHG | RESPIRATION RATE: 16 BRPM | TEMPERATURE: 98 F | SYSTOLIC BLOOD PRESSURE: 118 MMHG | OXYGEN SATURATION: 100 % | WEIGHT: 192 LBS | BODY MASS INDEX: 37.69 KG/M2 | HEART RATE: 96 BPM

## 2018-12-16 DIAGNOSIS — K62.5 RECTAL BLEEDING: Primary | ICD-10-CM

## 2018-12-16 LAB
B-HCG UR QL: NEGATIVE
BASOPHILS # BLD AUTO: 0.02 K/UL
BASOPHILS NFR BLD: 0.3 %
CTP QC/QA: YES
DIFFERENTIAL METHOD: NORMAL
EOSINOPHIL # BLD AUTO: 0.1 K/UL
EOSINOPHIL NFR BLD: 0.8 %
ERYTHROCYTE [DISTWIDTH] IN BLOOD BY AUTOMATED COUNT: 13.1 %
HCT VFR BLD AUTO: 38.9 %
HGB BLD-MCNC: 12.8 G/DL
LYMPHOCYTES # BLD AUTO: 1.5 K/UL
LYMPHOCYTES NFR BLD: 23.5 %
MCH RBC QN AUTO: 29.8 PG
MCHC RBC AUTO-ENTMCNC: 32.9 G/DL
MCV RBC AUTO: 91 FL
MONOCYTES # BLD AUTO: 0.4 K/UL
MONOCYTES NFR BLD: 6.2 %
NEUTROPHILS # BLD AUTO: 4.3 K/UL
NEUTROPHILS NFR BLD: 69 %
PLATELET # BLD AUTO: 296 K/UL
PMV BLD AUTO: 9.2 FL
RBC # BLD AUTO: 4.29 M/UL
WBC # BLD AUTO: 6.29 K/UL

## 2018-12-16 PROCEDURE — 81025 URINE PREGNANCY TEST: CPT | Performed by: EMERGENCY MEDICINE

## 2018-12-16 PROCEDURE — 99283 EMERGENCY DEPT VISIT LOW MDM: CPT

## 2018-12-16 PROCEDURE — 85025 COMPLETE CBC W/AUTO DIFF WBC: CPT

## 2018-12-16 RX ORDER — DOCUSATE SODIUM 100 MG/1
100 CAPSULE, LIQUID FILLED ORAL 2 TIMES DAILY
Qty: 30 CAPSULE | Refills: 0 | Status: SHIPPED | OUTPATIENT
Start: 2018-12-16 | End: 2019-05-16

## 2018-12-16 NOTE — ED PROVIDER NOTES
Encounter Date: 2018    SCRIBE #1 NOTE: Yesi PAK, vida scribing for, and in the presence of, Dr. Peoples.       History     Chief Complaint   Patient presents with    Rectal Bleeding     Pt c/o 1 episode of bright red blood after having a BM this morning.  Pt denies PMH of hemorrhoids. Pt does reports mildly straining while have the BM this morning.    Rash     Pt c/o rash along the right side of gluteal fold which she noticed last night & has worsened during the night.     Time seen by provider: 9:48 AM    This is a 28 y.o. female who presents with complaint of rectal bleeding this morning. There was bright red blood in the toilet after she had a bowel movement. She reports mildly straining with the bowel movement this morning. She has had rectal bleeding on tissue due to straining with bowel movements in the past, but denies passing this amount of blood before. She also reports left upper buttock rash that she noticed today. She is not on her menstrual cycle. She denies history of hemorrhoids or anal fissures.  She is not constipated usually having 1-2 cm per day. She notes taking ibuprofen and valtrex for fever blister two days ago, but no regular NSAID use      The history is provided by the patient.     Review of patient's allergies indicates:   Allergen Reactions    Grass pollen-joey grass standard      Past Medical History:   Diagnosis Date    Hay fever     Oral herpes      Past Surgical History:   Procedure Laterality Date     SECTION       SECTION N/A 2014    Performed by [facility] at [facility]    DILATION AND CURETTAGE OF UTERUS       Family History   Problem Relation Age of Onset    Breast cancer Maternal Grandmother     Ovarian cancer Neg Hx     Colon cancer Neg Hx      Social History     Tobacco Use    Smoking status: Never Smoker    Smokeless tobacco: Never Used   Substance Use Topics    Alcohol use: No    Drug use: No     Review of Systems    Constitutional: Negative for fever.   HENT: Negative for sore throat.    Respiratory: Negative for shortness of breath.    Cardiovascular: Negative for chest pain.   Gastrointestinal: Positive for anal bleeding. Negative for abdominal pain, blood in stool, constipation, diarrhea, nausea and vomiting.   Genitourinary: Negative for dysuria, menstrual problem and vaginal bleeding.   Musculoskeletal: Negative for back pain.   Skin: Positive for rash.   Neurological: Negative for weakness.   Hematological: Does not bruise/bleed easily.       Physical Exam     Initial Vitals [12/16/18 0853]   BP Pulse Resp Temp SpO2   129/77 110 18 98.8 °F (37.1 °C) 99 %      MAP       --         Physical Exam    Nursing note and vitals reviewed.  Constitutional: She appears well-developed and well-nourished. She is not diaphoretic. No distress.   HENT:   Head: Normocephalic and atraumatic.   Eyes: EOM are normal. No scleral icterus.   Neck: Normal range of motion. Neck supple.   Cardiovascular: Normal rate, regular rhythm and normal heart sounds. Exam reveals no gallop and no friction rub.    No murmur heard.  Pulmonary/Chest: Breath sounds normal. No respiratory distress. She has no wheezes. She has no rhonchi. She has no rales.   Abdominal: Soft. There is no tenderness. There is no rebound and no guarding.   Genitourinary: Rectal exam shows no external hemorrhoid and no fissure.   Genitourinary Comments: No palpable internal hemorrhoid or anal fissure   Musculoskeletal: Normal range of motion. She exhibits no edema or tenderness.   Lymphadenopathy:     She has no cervical adenopathy.   Neurological: She is alert and oriented to person, place, and time.   Skin: Skin is warm and dry. No rash noted.   No sign of cellulitis or HSV in area of patient's left upper buttock pain.   Psychiatric: She has a normal mood and affect. Her behavior is normal. Judgment and thought content normal.         ED Course   Procedures  Labs Reviewed   CBC W/  AUTO DIFFERENTIAL   POCT URINE PREGNANCY          Imaging Results    None          Medical Decision Making:   Initial Assessment:       28-year-old female presents after episode of rectal bleeding after stool earlier today.  She has had blood with wiping before but never this amount of blood before.  No known history of hemorrhoids or fissure, and she denies any constipation, making BM 1 to 2 times a day.  She does admit to poor hydration at baseline and straining a little to make BM today.  No associated abdominal pain or other complaints.   Rectal exam with no sign of internal or external hemorrhoid or anal fissure, no active bleeding.  She was observed in the ED with no subsequent episodes of bleeding.  She reports history of anemia so CBC checked with hemoglobin 12.8.   I suspect blood was due to straining today, possible small internal hemorrhoid or fissure that I was not able to appreciate on rectal exam.  Very low suspicion for upper or lower GI bleed since patient only has had bleeding with normal BMs. Since she has some straining hard stools, will Rx stool softeners and advised increase p.o. hydration and fiber.  She will return to the ED for any worsening or more frequent episodes, and follow up with GI or CRS if persistent.      Clinical Tests:   Lab Tests: Ordered and Reviewed            Scribe Attestation:   Scribe #1: I performed the above scribed service and the documentation accurately describes the services I performed. I attest to the accuracy of the note.    Attending Attestation:           Physician Attestation for Scribe:  Physician Attestation Statement for Scribe #1: I, Dr. Peoples, reviewed documentation, as scribed by Yesi Freed in my presence, and it is both accurate and complete.                    Clinical Impression:     1. Rectal bleeding                                 Rene Peoples MD  12/16/18 8051

## 2018-12-16 NOTE — ED TRIAGE NOTES
Pt presents to ED with c/o rectal bleeding after straining to have a BM this morning, states there was bright red blood in toilet after BM. Pt denies previous Hx of hemorrhoids. Pt also reports rash to right side of gluteal fold noticed last night and has worsened over night. Pt anxious. Pt denies SOB, N/V/D, dizziness, fatigue, fever, chills.

## 2019-01-12 ENCOUNTER — HOSPITAL ENCOUNTER (EMERGENCY)
Facility: OTHER | Age: 29
Discharge: HOME | End: 2019-01-12
Attending: EMERGENCY MEDICINE
Payer: MEDICAID

## 2019-01-12 VITALS
SYSTOLIC BLOOD PRESSURE: 120 MMHG | DIASTOLIC BLOOD PRESSURE: 73 MMHG | HEIGHT: 60 IN | TEMPERATURE: 98 F | WEIGHT: 192 LBS | BODY MASS INDEX: 37.69 KG/M2 | HEART RATE: 102 BPM | OXYGEN SATURATION: 97 % | RESPIRATION RATE: 24 BRPM
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
## 2019-01-12 DIAGNOSIS — S00.03XA CONTUSION OF SCALP, INITIAL ENCOUNTER: ICD-10-CM

## 2019-01-12 DIAGNOSIS — T14.90XA TRAUMA: ICD-10-CM

## 2019-01-12 LAB
B-HCG UR QL: NEGATIVE
CTP QC/QA: YES

## 2019-01-12 PROCEDURE — 99285 EMERGENCY DEPT VISIT HI MDM: CPT | Mod: 25

## 2019-01-12 PROCEDURE — 63600175 PHARM REV CODE 636 W HCPCS: Performed by: EMERGENCY MEDICINE

## 2019-01-12 PROCEDURE — 25000003 PHARM REV CODE 250: Performed by: EMERGENCY MEDICINE

## 2019-01-12 PROCEDURE — 90471 IMMUNIZATION ADMIN: CPT | Performed by: EMERGENCY MEDICINE

## 2019-01-12 PROCEDURE — 90715 TDAP VACCINE 7 YRS/> IM: CPT | Performed by: EMERGENCY MEDICINE

## 2019-01-12 PROCEDURE — 81025 URINE PREGNANCY TEST: CPT | Performed by: EMERGENCY MEDICINE

## 2019-01-12 RX ORDER — ACETAMINOPHEN 500 MG
1000 TABLET ORAL
Status: COMPLETED | OUTPATIENT
Start: 2019-01-12 | End: 2019-01-12

## 2019-01-12 RX ADMIN — ACETAMINOPHEN 1000 MG: 500 TABLET ORAL at 09:01

## 2019-01-12 RX ADMIN — CLOSTRIDIUM TETANI TOXOID ANTIGEN (FORMALDEHYDE INACTIVATED), CORYNEBACTERIUM DIPHTHERIAE TOXOID ANTIGEN (FORMALDEHYDE INACTIVATED), BORDETELLA PERTUSSIS TOXOID ANTIGEN (GLUTARALDEHYDE INACTIVATED), BORDETELLA PERTUSSIS FILAMENTOUS HEMAGGLUTININ ANTIGEN (FORMALDEHYDE INACTIVATED), BORDETELLA PERTUSSIS PERTACTIN ANTIGEN, AND BORDETELLA PERTUSSIS FIMBRIAE 2/3 ANTIGEN 0.5 ML: 5; 2; 2.5; 5; 3; 5 INJECTION, SUSPENSION INTRAMUSCULAR at 09:01
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
## 2019-01-17 DIAGNOSIS — M25.552 ACUTE PAIN OF LEFT HIP: ICD-10-CM

## 2019-01-17 DIAGNOSIS — M54.9 ACUTE MIDLINE BACK PAIN, UNSPECIFIED BACK LOCATION: ICD-10-CM
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
## 2019-01-17 DIAGNOSIS — M25.512 ACUTE SHOULDER PAIN DUE TO TRAUMA, LEFT: ICD-10-CM

## 2019-01-17 DIAGNOSIS — G89.11 ACUTE SHOULDER PAIN DUE TO TRAUMA, LEFT: ICD-10-CM

## 2019-01-21 ENCOUNTER — HOSPITAL ENCOUNTER (OUTPATIENT)
Dept: RADIOLOGY | Facility: OTHER | Age: 29
Discharge: HOME | End: 2019-01-21
Attending: INTERNAL MEDICINE
Payer: MEDICAID

## 2019-01-21 DIAGNOSIS — M54.9 ACUTE MIDLINE BACK PAIN, UNSPECIFIED BACK LOCATION: ICD-10-CM
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
## 2019-01-21 DIAGNOSIS — M25.512 ACUTE PAIN OF LEFT SHOULDER: ICD-10-CM

## 2019-01-21 PROCEDURE — 73030 X-RAY EXAM OF SHOULDER: CPT | Mod: TC,FY,LT

## 2019-01-21 PROCEDURE — 73030 X-RAY EXAM OF SHOULDER: CPT | Mod: 26,LT,, | Performed by: RADIOLOGY

## 2019-01-21 PROCEDURE — 73060 X-RAY EXAM OF HUMERUS: CPT | Mod: TC,FY,LT

## 2019-01-21 PROCEDURE — 73552 X-RAY EXAM OF FEMUR 2/>: CPT | Mod: TC,FY,LT

## 2019-01-21 PROCEDURE — 73030 XR SHOULDER COMPLETE 2 OR MORE VIEWS LEFT: ICD-10-PCS | Mod: 26,LT,, | Performed by: RADIOLOGY

## 2019-01-21 PROCEDURE — 73502 XR HIP 2 VIEW LEFT: ICD-10-PCS | Mod: 26,LT,, | Performed by: RADIOLOGY

## 2019-01-21 PROCEDURE — 73552 X-RAY EXAM OF FEMUR 2/>: CPT | Mod: 26,LT,, | Performed by: RADIOLOGY

## 2019-01-21 PROCEDURE — 72110 X-RAY EXAM L-2 SPINE 4/>VWS: CPT | Mod: TC,FY

## 2019-01-21 PROCEDURE — 72110 X-RAY EXAM L-2 SPINE 4/>VWS: CPT | Mod: 26,,, | Performed by: RADIOLOGY

## 2019-01-21 PROCEDURE — 73060 XR HUMERUS 2 VIEW LEFT: ICD-10-PCS | Mod: 26,LT,, | Performed by: RADIOLOGY

## 2019-01-21 PROCEDURE — 73090 X-RAY EXAM OF FOREARM: CPT | Mod: 26,LT,, | Performed by: RADIOLOGY

## 2019-01-21 PROCEDURE — 73502 X-RAY EXAM HIP UNI 2-3 VIEWS: CPT | Mod: 26,LT,, | Performed by: RADIOLOGY

## 2019-01-21 PROCEDURE — 73502 X-RAY EXAM HIP UNI 2-3 VIEWS: CPT | Mod: TC,FY,LT

## 2019-01-21 PROCEDURE — 72070 X-RAY EXAM THORAC SPINE 2VWS: CPT | Mod: 26,,, | Performed by: RADIOLOGY

## 2019-01-21 PROCEDURE — 73090 X-RAY EXAM OF FOREARM: CPT | Mod: TC,FY,LT

## 2019-01-21 PROCEDURE — 73090 XR FOREARM LEFT: ICD-10-PCS | Mod: 26,LT,, | Performed by: RADIOLOGY

## 2019-01-21 PROCEDURE — 73060 X-RAY EXAM OF HUMERUS: CPT | Mod: 26,LT,, | Performed by: RADIOLOGY

## 2019-01-21 PROCEDURE — 73552 XR FEMUR 2 VIEW LEFT: ICD-10-PCS | Mod: 26,LT,, | Performed by: RADIOLOGY

## 2019-01-21 PROCEDURE — 72110 XR LUMBAR SPINE COMPLETE 5 VIEW: ICD-10-PCS | Mod: 26,,, | Performed by: RADIOLOGY

## 2019-01-21 PROCEDURE — 72070 XR THORACIC SPINE AP LATERAL: ICD-10-PCS | Mod: 26,,, | Performed by: RADIOLOGY

## 2019-01-21 PROCEDURE — 72070 X-RAY EXAM THORAC SPINE 2VWS: CPT | Mod: TC,FY

## 2019-03-18 ENCOUNTER — HOSPITAL ENCOUNTER (EMERGENCY)
Facility: OTHER | Age: 29
Discharge: HOME | End: 2019-03-18
Attending: EMERGENCY MEDICINE
Payer: MEDICAID

## 2019-03-18 VITALS
DIASTOLIC BLOOD PRESSURE: 71 MMHG | TEMPERATURE: 98 F | SYSTOLIC BLOOD PRESSURE: 116 MMHG | OXYGEN SATURATION: 99 % | WEIGHT: 195.13 LBS | BODY MASS INDEX: 39.34 KG/M2 | HEIGHT: 59 IN | HEART RATE: 92 BPM | RESPIRATION RATE: 18 BRPM

## 2019-03-18 DIAGNOSIS — N89.8 VAGINAL IRRITATION: Primary | ICD-10-CM

## 2019-03-18 LAB
B-HCG UR QL: NEGATIVE
BACTERIA #/AREA URNS HPF: ABNORMAL /HPF
BACTERIA GENITAL QL WET PREP: ABNORMAL
BILIRUB UR QL STRIP: NEGATIVE
CLARITY UR: ABNORMAL
CLUE CELLS VAG QL WET PREP: ABNORMAL
COLOR UR: YELLOW
CTP QC/QA: YES
FILAMENT FUNGI VAG WET PREP-#/AREA: ABNORMAL
GLUCOSE UR QL STRIP: NEGATIVE
HGB UR QL STRIP: ABNORMAL
KETONES UR QL STRIP: NEGATIVE
LEUKOCYTE ESTERASE UR QL STRIP: NEGATIVE
MICROSCOPIC COMMENT: ABNORMAL
NITRITE UR QL STRIP: NEGATIVE
PH UR STRIP: 6 [PH] (ref 5–8)
PROT UR QL STRIP: NEGATIVE
RBC #/AREA URNS HPF: 2 /HPF (ref 0–4)
SP GR UR STRIP: >=1.03 (ref 1–1.03)
SPECIMEN SOURCE: ABNORMAL
T VAGINALIS GENITAL QL WET PREP: ABNORMAL
URN SPEC COLLECT METH UR: ABNORMAL
UROBILINOGEN UR STRIP-ACNC: NEGATIVE EU/DL
WBC #/AREA VAG WET PREP: ABNORMAL
YEAST GENITAL QL WET PREP: ABNORMAL

## 2019-03-18 PROCEDURE — 99284 EMERGENCY DEPT VISIT MOD MDM: CPT

## 2019-03-18 PROCEDURE — 87491 CHLMYD TRACH DNA AMP PROBE: CPT

## 2019-03-18 PROCEDURE — 81025 URINE PREGNANCY TEST: CPT | Performed by: EMERGENCY MEDICINE

## 2019-03-18 PROCEDURE — 87210 SMEAR WET MOUNT SALINE/INK: CPT

## 2019-03-18 PROCEDURE — 81000 URINALYSIS NONAUTO W/SCOPE: CPT

## 2019-03-19 LAB
C TRACH DNA SPEC QL NAA+PROBE: NOT DETECTED
N GONORRHOEA DNA SPEC QL NAA+PROBE: NOT DETECTED

## 2019-03-19 NOTE — ED PROVIDER NOTES
Encounter Date: 3/18/2019    SCRIBE #1 NOTE: I, Yesi Freed, am scribing for, and in the presence of, Dr. Gutierrez.       History     Chief Complaint   Patient presents with    vaginal irritation     since before , says she used the wrong soap powder     Time seen by provider: 10:06 PM    This is a 28 y.o. female who presents with complaint of vaginal irritation for 10 days. The irritation began 1 day prior to her LMP on 3/08. The irritation resolved while on her cycle, but recurred afterwards. She notes using Gain detergent for the fist time prior to onset of irritation. She has applied OTC cream and taken Epson salt baths with no relief. She also reports itching throat and nausea. She has no other complaints.      The history is provided by the patient.     Review of patient's allergies indicates:   Allergen Reactions    Grass pollen- grass standard      Past Medical History:   Diagnosis Date    Hay fever     Oral herpes      Past Surgical History:   Procedure Laterality Date     SECTION       SECTION N/A 2014    Performed by [facility] at [facility]D [facility]    DILATION AND CURETTAGE OF UTERUS       Family History   Problem Relation Age of Onset    Breast cancer Maternal Grandmother     Ovarian cancer Neg Hx     Colon cancer Neg Hx      Social History     Tobacco Use    Smoking status: Never Smoker    Smokeless tobacco: Never Used   Substance Use Topics    Alcohol use: No    Drug use: No     Review of Systems   Constitutional: Negative for fever.   HENT: Positive for sore throat (itching). Negative for trouble swallowing.    Respiratory: Negative for shortness of breath.    Cardiovascular: Negative for chest pain.   Gastrointestinal: Positive for nausea.   Genitourinary: Positive for vaginal pain (irritation). Negative for dysuria.   Musculoskeletal: Negative for back pain.   Skin: Negative for rash.   Neurological: Negative for weakness.   Hematological: Does not  bruise/bleed easily.       Physical Exam     Initial Vitals [03/18/19 2129]   BP Pulse Resp Temp SpO2   123/77 102 14 98.5 °F (36.9 °C) 97 %      MAP       --         Physical Exam    Nursing note and vitals reviewed.  Constitutional: She appears well-developed and well-nourished. She is not diaphoretic. No distress.   HENT:   Head: Normocephalic and atraumatic.   Mouth/Throat: Oropharynx is clear and moist.   Eyes: EOM are normal. No scleral icterus.   Neck: Normal range of motion. Neck supple.   Cardiovascular: Normal rate, regular rhythm and normal heart sounds. Exam reveals no gallop and no friction rub.    No murmur heard.  Pulmonary/Chest: Breath sounds normal. No respiratory distress. She has no wheezes. She has no rhonchi. She has no rales.   Abdominal: Soft. There is no tenderness.   Genitourinary: Vagina normal. There is no rash or lesion on the right labia. There is no rash or lesion on the left labia. Cervix exhibits no motion tenderness and no discharge. No vaginal discharge found.   Musculoskeletal: Normal range of motion. She exhibits no edema or tenderness.   Neurological: She is alert and oriented to person, place, and time.   Skin: Skin is warm and dry.   Psychiatric: She has a normal mood and affect. Her behavior is normal. Judgment and thought content normal.         ED Course   Procedures  Labs Reviewed   URINALYSIS, REFLEX TO URINE CULTURE - Abnormal; Notable for the following components:       Result Value    Appearance, UA Hazy (*)     Specific Gravity, UA >=1.030 (*)     Occult Blood UA 1+ (*)     All other components within normal limits    Narrative:     Preferred Collection Type->Urine, Clean Catch   VAGINAL SCREEN - Abnormal; Notable for the following components:    WBC - Vaginal Screen Rare (*)     Bacteria - Vaginal Screen Few (*)     All other components within normal limits   URINALYSIS MICROSCOPIC - Abnormal; Notable for the following components:    Bacteria, UA Moderate (*)     All  other components within normal limits    Narrative:     Preferred Collection Type->Urine, Clean Catch   C. TRACHOMATIS/N. GONORRHOEAE BY AMP DNA    Narrative:     Resulting Location->[facility]   POCT URINE PREGNANCY          Imaging Results    None          Medical Decision Making:   History:   Old Medical Records: I decided to obtain old medical records.  Initial Assessment:   Urgent evaluation of a 28-year-old female here today with vaginal pain.  Exam with pain around the clitoris with no evidence of excoriation, irritation or discharge.   Differential Diagnosis:   Vaginitis, yeast infection, BV  Clinical Tests:   Lab Tests: Ordered and Reviewed  ED Management:  - vaginal screen with rare bacteria, no yeast, no clue cells.     - recommended avoiding detergents or vaginal  as it can cause irritation.  Follow up with GYN if symptoms get worse            Scribe Attestation:   Scribe #1: I performed the above scribed service and the documentation accurately describes the services I performed. I attest to the accuracy of the note.    Attending Attestation:           Physician Attestation for Scribe:  Physician Attestation Statement for Scribe #1: I, Dr. Gutierrez, reviewed documentation, as scribed by Yesi Freed in my presence, and it is both accurate and complete.     Comments: I, Dr. Chelsea Gutierrez, personally performed the services described in this documentation. All medical record entries made by the scribe were at my direction and in my presence.  I have reviewed the chart and agree that the record reflects my personal performance and is accurate and complete. Chelsea Gutierrez MD.                 Clinical Impression:     1. Vaginal irritation          Disposition:   Disposition: Discharged  Condition: Stable                        Chelsea Gutierrez MD  03/21/19 3010

## 2019-04-29 ENCOUNTER — HOSPITAL ENCOUNTER (EMERGENCY)
Facility: OTHER | Age: 29
Discharge: HOME | End: 2019-04-29
Attending: EMERGENCY MEDICINE
Payer: MEDICAID

## 2019-04-29 VITALS
RESPIRATION RATE: 18 BRPM | HEART RATE: 99 BPM | TEMPERATURE: 98 F | HEIGHT: 60 IN | SYSTOLIC BLOOD PRESSURE: 132 MMHG | DIASTOLIC BLOOD PRESSURE: 84 MMHG | BODY MASS INDEX: 39.27 KG/M2 | WEIGHT: 200 LBS | OXYGEN SATURATION: 98 %

## 2019-04-29 DIAGNOSIS — N76.1 SUBACUTE VAGINITIS: ICD-10-CM

## 2019-04-29 DIAGNOSIS — J02.9 ACUTE VIRAL PHARYNGITIS: Primary | ICD-10-CM

## 2019-04-29 LAB
BACTERIA GENITAL QL WET PREP: ABNORMAL
CLUE CELLS VAG QL WET PREP: ABNORMAL
DEPRECATED S PYO AG THROAT QL EIA: NEGATIVE
FILAMENT FUNGI VAG WET PREP-#/AREA: ABNORMAL
SPECIMEN SOURCE: ABNORMAL
T VAGINALIS GENITAL QL WET PREP: ABNORMAL
WBC #/AREA VAG WET PREP: ABNORMAL
YEAST GENITAL QL WET PREP: ABNORMAL

## 2019-04-29 PROCEDURE — 87081 CULTURE SCREEN ONLY: CPT

## 2019-04-29 PROCEDURE — 87491 CHLMYD TRACH DNA AMP PROBE: CPT

## 2019-04-29 PROCEDURE — 87210 SMEAR WET MOUNT SALINE/INK: CPT

## 2019-04-29 PROCEDURE — 87880 STREP A ASSAY W/OPTIC: CPT

## 2019-04-29 PROCEDURE — 99284 EMERGENCY DEPT VISIT MOD MDM: CPT | Mod: 25

## 2019-04-29 RX ORDER — FLUCONAZOLE 150 MG/1
150 TABLET ORAL DAILY
Qty: 1 TABLET | Refills: 0 | Status: SHIPPED | OUTPATIENT
Start: 2019-04-29 | End: 2019-04-30

## 2019-04-30 NOTE — ED TRIAGE NOTES
"Pt reports to ED with c/o sore throat x4days, pt reports bilateral ear pain. Pt denies cough, fever.  Pt c/o of vaginal pain [id number].  Pt denies discharge, itching, burning.  Pt reports "I just finished my period I'm not sure if that has anything to do with it." Pt reports self treating with over the counter yeast creams.  Pt reports going to OBGYN and states they couldn't do anything "I'm still hurting."   "

## 2019-04-30 NOTE — ED PROVIDER NOTES
"Encounter Date: 2019    SCRIBE #1 NOTE: I, Oly Lawler, am scribing for, and in the presence of, Dr. Tejeda.       History     Chief Complaint   Patient presents with    Sore Throat     x4 days pta, denies cough or congestion. also c/o vaginal irrirtation, denies urinary symptoms.      Time seen by provider: 9:08 PM    This is a 28 y.o. female who presents with multiple complaints. Pt reports bilateral sore throat for four days, progressively worsening. She states that it initially felt like a "ticking" sensation to the throat. She reports associated bilateral ear aches. She has taken throat lozenges, drank hot tea without relief. She has a four year old, but no sick contacts. She reports no fever, chills, trouble swallowing, ear discharge, nasal congestion, or cough.    Pt also c/o vaginal pain and irritation for one month. Pt states that she was seen in this ED on 3/18 with same complaints, constant since. Pt states that she took seven day course of Metronidazole without relief, which she had old prescription for which she never filled. Pt tried Epsom salt baths with lavender without relief. Pt was seen by OB/GYN. She reports no urinary sx, abdominal pain, or back pain.    The history is provided by the patient.     Review of patient's allergies indicates:   Allergen Reactions    Grass pollen- grass standard     Msg [glutamic acid hcl]      Past Medical History:   Diagnosis Date    Hay fever     Oral herpes      Past Surgical History:   Procedure Laterality Date     SECTION       SECTION N/A 2014    [facility] at [facility]D [facility]    DILATION AND CURETTAGE OF UTERUS       Family History   Problem Relation Age of Onset    Breast cancer Maternal Grandmother     Ovarian cancer Neg Hx     Colon cancer Neg Hx      Social History     Tobacco Use    Smoking status: Never Smoker    Smokeless tobacco: Never Used   Substance Use Topics    Alcohol use: No    Drug " use: No     Review of Systems   Constitutional: Negative for chills and fever.   HENT: Positive for ear pain and sore throat. Negative for congestion, ear discharge and rhinorrhea.    Respiratory: Negative for cough and shortness of breath.    Cardiovascular: Negative for chest pain.   Gastrointestinal: Negative for abdominal pain, diarrhea, nausea and vomiting.   Endocrine: Negative for polyuria.   Genitourinary: Positive for vaginal pain. Negative for decreased urine volume, difficulty urinating, dysuria and frequency.   Musculoskeletal: Negative for back pain.   Skin: Negative for rash.   Allergic/Immunologic: Negative for immunocompromised state.   Neurological: Negative for dizziness, weakness and light-headedness.   Hematological: Does not bruise/bleed easily.   Psychiatric/Behavioral: Negative for confusion.       Physical Exam     Initial Vitals [04/29/19 2015]   BP Pulse Resp Temp SpO2   132/84 99 18 98.4 °F (36.9 °C) 98 %      MAP       --         Physical Exam    Nursing note and vitals reviewed.  Constitutional: She appears well-developed and well-nourished. She is not diaphoretic. No distress.   HENT:   Head: Normocephalic and atraumatic.   Right Ear: External ear normal.   Left Ear: External ear normal.   Mouth/Throat: No oropharyngeal exudate, posterior oropharyngeal edema, posterior oropharyngeal erythema or tonsillar abscesses.   Eyes: Right eye exhibits no discharge. Left eye exhibits no discharge.   Neck: Normal range of motion. Neck supple.   Cardiovascular: Normal rate, regular rhythm and normal heart sounds. Exam reveals no gallop and no friction rub.    No murmur heard.  Pulmonary/Chest: Breath sounds normal. No respiratory distress. She has no wheezes. She has no rhonchi. She has no rales.   Abdominal: Soft. Bowel sounds are normal. She exhibits no distension. There is no tenderness. There is no rebound and no guarding.   Genitourinary:   Genitourinary Comments: Pelvic exam reveals: Trace  amounts of clumpy white discharge. No CMT or adnexal tenderness. Minimal erythema of the labia on the right, no lesions. No palpable mass.   Chaperoned by RN.   Musculoskeletal: Normal range of motion. She exhibits no edema or tenderness.   Lymphadenopathy:     She has no cervical adenopathy.   Neurological: She is alert and oriented to person, place, and time. She has normal strength. No sensory deficit.   Skin: Skin is warm and dry. No rash noted. No erythema.   Psychiatric: She has a normal mood and affect. Her behavior is normal.         ED Course   Procedures  Labs Reviewed   VAGINAL SCREEN - Abnormal; Notable for the following components:       Result Value    WBC - Vaginal Screen Occasional (*)     Bacteria - Vaginal Screen Many (*)     All other components within normal limits   THROAT SCREEN, RAPID   C. TRACHOMATIS/N. GONORRHOEAE BY AMP DNA   CULTURE, STREP A,  THROAT             Medical Decision Making:   Clinical Tests:   Lab Tests: Ordered and Reviewed    Additional MDM:   Comments: 28-year-old female presents with complaint of both sore throat x3 days as well as over 1 month of vaginal discomfort.    Sore throat - head and neck exam unremarkable. Rapid strep obtained and negative. Patient counseled supportive care for home.    Vaginal irritation - pelvic exam remarkable only for minimal erythema to the labia on the right as well as scant amount of white thick discharge most consistent with a yeast infection.  Wet prep was positive for many bacteria but no yeast.  However, given her exam she will be treated with fluconazole.  Prescription for fluconazole given.  Patient struck to follow up with her gyn within the next 3-5 days if symptoms do not resolve..          Scribe Attestation:   Scribe #1: I performed the above scribed service and the documentation accurately describes the services I performed. I attest to the accuracy of the note.    Attending Attestation:           Physician Attestation for  Scribe:  Physician Attestation Statement for Scribe #1: I, Dr. Tejeda, reviewed documentation, as scribed by Oly Lawler in my presence, and it is both accurate and complete.                    Clinical Impression:     1. Acute viral pharyngitis    2. Subacute vaginitis                                 Carina Tejeda MD  04/29/19 0149

## 2019-05-01 LAB
C TRACH DNA SPEC QL NAA+PROBE: NOT DETECTED
N GONORRHOEA DNA SPEC QL NAA+PROBE: NOT DETECTED

## 2019-05-02 LAB — BACTERIA THROAT CULT: NORMAL

## 2019-05-15 ENCOUNTER — HOSPITAL ENCOUNTER (EMERGENCY)
Facility: HOSPITAL | Age: 29
Discharge: HOME | End: 2019-05-15
Attending: EMERGENCY MEDICINE
Payer: MEDICAID

## 2019-05-15 VITALS
TEMPERATURE: 98 F | BODY MASS INDEX: 40.52 KG/M2 | RESPIRATION RATE: 20 BRPM | WEIGHT: 201 LBS | DIASTOLIC BLOOD PRESSURE: 71 MMHG | OXYGEN SATURATION: 10 % | HEART RATE: 89 BPM | HEIGHT: 59 IN | SYSTOLIC BLOOD PRESSURE: 118 MMHG

## 2019-05-15 DIAGNOSIS — K14.0 GLOSSITIS: Primary | ICD-10-CM

## 2019-05-15 LAB
B-HCG UR QL: NEGATIVE
CTP QC/QA: YES

## 2019-05-15 PROCEDURE — 99282 EMERGENCY DEPT VISIT SF MDM: CPT | Mod: ER

## 2019-05-15 PROCEDURE — 81025 URINE PREGNANCY TEST: CPT | Mod: ER | Performed by: EMERGENCY MEDICINE

## 2019-05-16 ENCOUNTER — HOSPITAL ENCOUNTER (EMERGENCY)
Facility: HOSPITAL | Age: 29
Discharge: HOME | End: 2019-05-16
Attending: EMERGENCY MEDICINE
Payer: MEDICAID

## 2019-05-16 VITALS
RESPIRATION RATE: 18 BRPM | OXYGEN SATURATION: 100 % | TEMPERATURE: 99 F | HEART RATE: 99 BPM | SYSTOLIC BLOOD PRESSURE: 124 MMHG | DIASTOLIC BLOOD PRESSURE: 73 MMHG

## 2019-05-16 DIAGNOSIS — R55 SYNCOPE: Primary | ICD-10-CM

## 2019-05-16 DIAGNOSIS — R07.9 CHEST PAIN, UNSPECIFIED TYPE: ICD-10-CM

## 2019-05-16 DIAGNOSIS — R11.2 INTRACTABLE VOMITING WITH NAUSEA, UNSPECIFIED VOMITING TYPE: ICD-10-CM

## 2019-05-16 LAB
ALBUMIN SERPL BCP-MCNC: 4.1 G/DL (ref 3.5–5.2)
ALP SERPL-CCNC: 88 U/L (ref 55–135)
ALT SERPL W/O P-5'-P-CCNC: 16 U/L (ref 10–44)
ANION GAP SERPL CALC-SCNC: 14 MMOL/L (ref 8–16)
AST SERPL-CCNC: 28 U/L (ref 10–40)
B-HCG UR QL: NEGATIVE
BASOPHILS # BLD AUTO: 0.05 K/UL (ref 0–0.2)
BASOPHILS NFR BLD: 0.5 % (ref 0–1.9)
BILIRUB SERPL-MCNC: 0.3 MG/DL (ref 0.1–1)
BILIRUB UR QL STRIP: NEGATIVE
BUN SERPL-MCNC: 9 MG/DL (ref 6–20)
CALCIUM SERPL-MCNC: 9.4 MG/DL (ref 8.7–10.5)
CHLORIDE SERPL-SCNC: 107 MMOL/L (ref 95–110)
CLARITY UR REFRACT.AUTO: ABNORMAL
CO2 SERPL-SCNC: 18 MMOL/L (ref 23–29)
COLOR UR AUTO: YELLOW
CREAT SERPL-MCNC: 0.8 MG/DL (ref 0.5–1.4)
CTP QC/QA: YES
D DIMER PPP IA.FEU-MCNC: 1.03 MG/L FEU
D DIMER PPP IA.FEU-MCNC: NORMAL MG/L FEU
DIFFERENTIAL METHOD: ABNORMAL
EOSINOPHIL # BLD AUTO: 0 K/UL (ref 0–0.5)
EOSINOPHIL NFR BLD: 0 % (ref 0–8)
ERYTHROCYTE [DISTWIDTH] IN BLOOD BY AUTOMATED COUNT: 14.7 % (ref 11.5–14.5)
EST. GFR  (AFRICAN AMERICAN): >60 ML/MIN/1.73 M^2
EST. GFR  (NON AFRICAN AMERICAN): >60 ML/MIN/1.73 M^2
GLUCOSE SERPL-MCNC: 109 MG/DL (ref 70–110)
GLUCOSE UR QL STRIP: NEGATIVE
HCG INTACT+B SERPL-ACNC: <1.2 MIU/ML
HCT VFR BLD AUTO: 41.1 % (ref 37–48.5)
HGB BLD-MCNC: 12.8 G/DL (ref 12–16)
HGB UR QL STRIP: NEGATIVE
HYALINE CASTS UR QL AUTO: 2 /LPF
IMM GRANULOCYTES # BLD AUTO: 0.06 K/UL (ref 0–0.04)
IMM GRANULOCYTES NFR BLD AUTO: 0.6 % (ref 0–0.5)
KETONES UR QL STRIP: ABNORMAL
LEUKOCYTE ESTERASE UR QL STRIP: NEGATIVE
LIPASE SERPL-CCNC: 21 U/L (ref 4–60)
LYMPHOCYTES # BLD AUTO: 0.7 K/UL (ref 1–4.8)
LYMPHOCYTES NFR BLD: 7.1 % (ref 18–48)
MCH RBC QN AUTO: 28.6 PG (ref 27–31)
MCHC RBC AUTO-ENTMCNC: 31.1 G/DL (ref 32–36)
MCV RBC AUTO: 92 FL (ref 82–98)
MICROSCOPIC COMMENT: ABNORMAL
MONOCYTES # BLD AUTO: 0.2 K/UL (ref 0.3–1)
MONOCYTES NFR BLD: 2.4 % (ref 4–15)
NEUTROPHILS # BLD AUTO: 8.8 K/UL (ref 1.8–7.7)
NEUTROPHILS NFR BLD: 89.4 % (ref 38–73)
NITRITE UR QL STRIP: NEGATIVE
NRBC BLD-RTO: 0 /100 WBC
PH UR STRIP: 6 [PH] (ref 5–8)
PLATELET # BLD AUTO: 254 K/UL (ref 150–350)
PMV BLD AUTO: 11.3 FL (ref 9.2–12.9)
POTASSIUM SERPL-SCNC: 4.9 MMOL/L (ref 3.5–5.1)
PROT SERPL-MCNC: 8 G/DL (ref 6–8.4)
PROT UR QL STRIP: NEGATIVE
RBC # BLD AUTO: 4.47 M/UL (ref 4–5.4)
RBC #/AREA URNS AUTO: 16 /HPF (ref 0–4)
SODIUM SERPL-SCNC: 139 MMOL/L (ref 136–145)
SP GR UR STRIP: 1.02 (ref 1–1.03)
SQUAMOUS #/AREA URNS AUTO: 11 /HPF
TROPONIN I SERPL DL<=0.01 NG/ML-MCNC: <0.006 NG/ML (ref 0–0.03)
TSH SERPL DL<=0.005 MIU/L-ACNC: 0.5 UIU/ML (ref 0.4–4)
URN SPEC COLLECT METH UR: ABNORMAL
WBC # BLD AUTO: 9.83 K/UL (ref 3.9–12.7)
WBC #/AREA URNS AUTO: 3 /HPF (ref 0–5)

## 2019-05-16 PROCEDURE — 81001 URINALYSIS AUTO W/SCOPE: CPT

## 2019-05-16 PROCEDURE — 85025 COMPLETE CBC W/AUTO DIFF WBC: CPT

## 2019-05-16 PROCEDURE — 84443 ASSAY THYROID STIM HORMONE: CPT

## 2019-05-16 PROCEDURE — 99284 EMERGENCY DEPT VISIT MOD MDM: CPT | Mod: ,,, | Performed by: EMERGENCY MEDICINE

## 2019-05-16 PROCEDURE — 96361 HYDRATE IV INFUSION ADD-ON: CPT

## 2019-05-16 PROCEDURE — 84702 CHORIONIC GONADOTROPIN TEST: CPT

## 2019-05-16 PROCEDURE — 93010 ELECTROCARDIOGRAM REPORT: CPT | Mod: ,,, | Performed by: INTERNAL MEDICINE

## 2019-05-16 PROCEDURE — 85379 FIBRIN DEGRADATION QUANT: CPT

## 2019-05-16 PROCEDURE — 25500020 PHARM REV CODE 255: Performed by: EMERGENCY MEDICINE

## 2019-05-16 PROCEDURE — 93010 EKG 12-LEAD: ICD-10-PCS | Mod: ,,, | Performed by: INTERNAL MEDICINE

## 2019-05-16 PROCEDURE — 63600175 PHARM REV CODE 636 W HCPCS: Performed by: EMERGENCY MEDICINE

## 2019-05-16 PROCEDURE — 25000003 PHARM REV CODE 250: Performed by: EMERGENCY MEDICINE

## 2019-05-16 PROCEDURE — 84484 ASSAY OF TROPONIN QUANT: CPT

## 2019-05-16 PROCEDURE — 83690 ASSAY OF LIPASE: CPT

## 2019-05-16 PROCEDURE — 80053 COMPREHEN METABOLIC PANEL: CPT

## 2019-05-16 PROCEDURE — 99284 PR EMERGENCY DEPT VISIT,LEVEL IV: ICD-10-PCS | Mod: ,,, | Performed by: EMERGENCY MEDICINE

## 2019-05-16 PROCEDURE — 93005 ELECTROCARDIOGRAM TRACING: CPT

## 2019-05-16 PROCEDURE — 96374 THER/PROPH/DIAG INJ IV PUSH: CPT

## 2019-05-16 PROCEDURE — 81025 URINE PREGNANCY TEST: CPT | Performed by: EMERGENCY MEDICINE

## 2019-05-16 PROCEDURE — 99285 EMERGENCY DEPT VISIT HI MDM: CPT | Mod: 25

## 2019-05-16 RX ORDER — ONDANSETRON 2 MG/ML
4 INJECTION INTRAMUSCULAR; INTRAVENOUS ONCE
Status: COMPLETED | OUTPATIENT
Start: 2019-05-16 | End: 2019-05-16

## 2019-05-16 RX ORDER — ONDANSETRON 4 MG/1
4 TABLET, FILM COATED ORAL EVERY 6 HOURS PRN
Qty: 12 TABLET | Refills: 0 | Status: SHIPPED | OUTPATIENT
Start: 2019-05-16 | End: 2019-05-19

## 2019-05-16 RX ADMIN — ONDANSETRON 4 MG: 2 INJECTION INTRAMUSCULAR; INTRAVENOUS at 03:05

## 2019-05-16 RX ADMIN — SODIUM CHLORIDE 1000 ML: 0.9 INJECTION, SOLUTION INTRAVENOUS at 03:05

## 2019-05-16 RX ADMIN — IOHEXOL 100 ML: 350 INJECTION, SOLUTION INTRAVENOUS at 07:05

## 2019-05-16 NOTE — ED PROVIDER NOTES
"Encounter Date: 5/15/2019    SCRIBE #1 NOTE: I, oJaquim Conte, am scribing for, and in the presence of,  Dr. Chamberlain. I have scribed the following portions of the note - Other sections scribed: HPI, ROS, PE.       History     Chief Complaint   Patient presents with    tongue irratation     pt c/o "rash to mouth" since last pm     CC:   Inflammation to the Tongue  HPI:  This is a 28 y.o. female who presents to the ED with a chief complaint of inflammation and pain to the tongue that began yesterday.  Pt states that she has been eating sour foods.  Pt endorses sore throat.  She has had sick contact with her child who has a sore throat.  She denies trouble breathing, coughing, or SOB.      The history is provided by the patient.     Review of patient's allergies indicates:   Allergen Reactions    Grass pollen- grass standard     Msg [glutamic acid hcl]      Past Medical History:   Diagnosis Date    Hay fever     Oral herpes      Past Surgical History:   Procedure Laterality Date     SECTION       SECTION N/A 2014    Performed by Merrill Kirby MD at [facility] L&D OR    DILATION AND CURETTAGE OF UTERUS       Family History   Problem Relation Age of Onset    Breast cancer Maternal Grandmother     Ovarian cancer Neg Hx     Colon cancer Neg Hx      Social History     Tobacco Use    Smoking status: Never Smoker    Smokeless tobacco: Never Used   Substance Use Topics    Alcohol use: No    Drug use: No     Review of Systems   Constitutional: Negative.  Negative for chills and fever.   HENT: Positive for sore throat. Negative for trouble swallowing and voice change.    Eyes: Negative.    Respiratory: Negative.  Negative for shortness of breath.    Cardiovascular: Negative.  Negative for chest pain.   Gastrointestinal: Negative.  Negative for nausea and vomiting.   Endocrine: Negative.    Genitourinary: Negative.  Negative for dysuria.   Musculoskeletal: Negative.  Negative for myalgias. "   Skin: Negative.  Negative for rash.   Allergic/Immunologic: Negative.    Neurological: Negative.  Negative for headaches.   Hematological: Negative.  Negative for adenopathy.   Psychiatric/Behavioral: Negative.  Negative for behavioral problems.   All other systems reviewed and are negative.      Physical Exam     Initial Vitals [05/15/19 2056]   BP Pulse Resp Temp SpO2   113/66 96 18 98.1 °F (36.7 °C) 99 %      MAP       --         Physical Exam    Nursing note and vitals reviewed.  Constitutional: She appears well-developed and well-nourished.   HENT:   Head: Normocephalic and atraumatic.   Right Ear: External ear normal.   Left Ear: External ear normal.   Nose: Nose normal.   Mouth/Throat: Uvula is midline, oropharynx is clear and moist and mucous membranes are normal. No oropharyngeal exudate, posterior oropharyngeal edema, posterior oropharyngeal erythema or tonsillar abscesses.   Eyes: Conjunctivae are normal.   Neck: Normal range of motion. Neck supple.   Cardiovascular: Normal rate and intact distal pulses.   Pulmonary/Chest: Effort normal. No respiratory distress.   Abdominal: Soft. There is no tenderness.   Musculoskeletal: Normal range of motion.   Neurological: She is alert and oriented to person, place, and time.   Skin: Skin is warm and dry. Capillary refill takes less than 2 seconds. No rash noted.   Psychiatric: She has a normal mood and affect. Her behavior is normal.         ED Course   Procedures  Labs Reviewed   POCT URINE PREGNANCY          Imaging Results    None          Medical Decision Making:   Clinical Tests:   Lab Tests: Ordered and Reviewed            Scribe Attestation:   Scribe #1: I performed the above scribed service and the documentation accurately describes the services I performed. I attest to the accuracy of the note.    This document was produced by a scribe under my direction and in my presence. I agree with the content of the note and have made any necessary edits.      Sergio Chamberlain MD    05/15/2019 10:22 PM           Clinical Impression:     1. Glossitis                                  Sergio Chamberlain MD  05/15/19 1265

## 2019-05-16 NOTE — PROVIDER PROGRESS NOTES - EMERGENCY DEPT.
Encounter Date: 5/16/2019    ED Physician Progress Notes         EKG - STEMI Decision  Initial Reading: No STEMI present.    Sinus tach at rate of 103    I,Caleb Yanes, am scribing for, and in the presence of, Dr. Vera. I performed the above scribed service and the documentation accurately describes the services I performed. I attest to the accuracy of the note.

## 2019-05-16 NOTE — ED PROVIDER NOTES
"SCRIBE #1 NOTE: I, Son Farzana, am scribing for, and in the presence of,  Dr. Vera . I have scribed the following portions of the note - Other sections scribed: LISSET ROS .       CC: Panic Attack (EMS reports that patient had anxiety attack and "passed out" in coffee shop. Patient did not hit head. )      History provided by: Patient     HPI: Brock Alfaro is a 28 y.o. year old female who presents to the ED complaining of chest pain.   Pt with history of hay fever and oral herpes presenting with 12/10 sharp non radiating left-sided chest pain that began 3 hours ago while at work. She states that the pain occurs intermittently with no alleviating or excerebrating factors. She reports of similar pain in the past but "was not diagnosis with anything." Patient states that yesterday she began to feel sick where she to had multiple episodes of non bloody emesis. However, she continues to vomit today with mild blood. She also notes that she had an episode where she had "passed out" after vomiting. She also reports of epigastric abdominal pain earlier today while at work. [facility] 2019. She reports of vaginal discharge, but notes that she is currently being treated for a yeast infection. She was prescribed diflucan and is compliant. She denies fever, trouble swallowing, cough, SOB, or any recent illnesses.   While ago while at work standing up she felt lightheaded and she lost consciousness.  No urine or stool incontinence no tongue biting.  No confusion when she woke up.  She has been reporting lightheadedness with standing up      Past Medical History:   Diagnosis Date    Hay fever     Oral herpes      Past Surgical History:   Procedure Laterality Date     SECTION       SECTION N/A 2014    Performed by Merrill Kirby MD at [facility] L&D OR    DILATION AND CURETTAGE OF UTERUS       Family History   Problem Relation Age of Onset    Breast cancer Maternal Grandmother     Ovarian cancer Neg Hx     " Colon cancer Neg Hx      No current facility-administered medications on file prior to encounter.      Current Outpatient Medications on File Prior to Encounter   Medication Sig Dispense Refill    docusate sodium (COLACE) 100 MG capsule Take 1 capsule (100 mg total) by mouth 2 (two) times daily. 30 capsule 0    fluticasone (FLONASE) 50 mcg/actuation nasal spray fluticasone 50 mcg/actuation nasal spray,suspension   Spray 2 sprays every day by intranasal route.      hydrocortisone 1 % cream Apply to affected area 2 times daily 30 g 0    hydrOXYzine HCl (ATARAX) 25 MG tablet Take 1 tablet (25 mg total) by mouth every 6 (six) hours. 15 tablet 0    neomycin-polymyxin-hydrocortisone (CORTISPORIN) 3.5-10,000-1 mg/mL-unit/mL-% otic suspension Place 3 drops into the right ear 3 (three) times daily. 10 mL ml    sennosides (SENNA) 8.6 mg Cap Take 1 capsule by mouth nightly. 30 each 0    valACYclovir (VALTREX) 1000 MG tablet Take 1 tablet (1,000 mg total) by mouth every 12 (twelve) hours. for 7 days 14 tablet 0     Grass pollen-june grass standard and Msg [glutamic acid hcl]  Social History     Socioeconomic History    Marital status: Single     Spouse name: Not on file    Number of children: Not on file    Years of education: Not on file    Highest education level: Not on file   Occupational History    Not on file   Social Needs    Financial resource strain: Not on file    Food insecurity:     Worry: Not on file     Inability: Not on file    Transportation needs:     Medical: Not on file     Non-medical: Not on file   Tobacco Use    Smoking status: Never Smoker    Smokeless tobacco: Never Used   Substance and Sexual Activity    Alcohol use: No    Drug use: No    Sexual activity: Yes     Partners: Male     Birth control/protection: None   Lifestyle    Physical activity:     Days per week: Not on file     Minutes per session: Not on file    Stress: Not on file   Relationships    Social connections:      Talks on phone: Not on file     Gets together: Not on file     Attends Rastafarian service: Not on file     Active member of club or organization: Not on file     Attends meetings of clubs or organizations: Not on file     Relationship status: Not on file   Other Topics Concern    Not on file   Social History Narrative    Not on file       ROS:     Constitutional : neg  HEENT neg  Resp neg  Cardiac  positive for lightheadedness and syncope  GI positive for vomiting   neg  Neuro neg  Heme/Immune: neg  Endo neg  Skin neg    PHYSICAL EXAM:  Vitals:    05/16/19 1415   BP: 124/72   Pulse: 102   Resp: 16   Temp: 98.4 °F (36.9 °C)         PHYSICAL EXAM:   general: comfortable, in no acute distress  VS: triage VS reviewed  HEENT: NC/AT, PERRL, EOMI  Neck: trachea midline  CV: RRR, no m/r/g, no LE pitting edema  Resp: CTAB  ABD:  ND, + normal BS, NT  Renal: No CVAT  Neuro: AAO x 3, 5/5 muscle strength in upper and lower extremities, sensation grossly intact, face symmetric, speech normal  Ext: no deformity, +2 symmetrical peripheral pulses          DATA & INTERVENTIONS:    LABS reviewed:  Labs Reviewed - No data to display    RADIOLOGY reviewed:  Imaging Results    None         MEDICATIONS/FLUIDS:  Medications - No data to display      MDM:  Brock Alfaro is a 28 y.o. year old female who presents to the ED complaining of vomiting, chest pain, epigastric pain, syncope, lightheadedness  Orthostatic vitals was heart rate 95 to 107 from lying to standing  IV fluids, Zofran    DDX includes but not limited to:   dehydration versus ACS versus PE versus pancreatitis versus cholecystitis.    Labs ordered and interpreted:   CMP with decreased bicarbonate at 18, anion gap of 14  CBC with normal white count hemoglobin and platelets  Normal lipase normal troponin  Normal TSH  Elevated D-dimer at 1.14    CXR (ordered and independently reviewed):  No infiltrate  CT PE  EKG (independantly reviewed):  Heart rate 103, sinus  tachycardia, no ischemic changes    4:30 PM: feels well, nausea resolved.    Patient was signed-out to Dr. Mccormick    at the change of shift with plan for:  Patient presented with vomiting chest pain epigastric pain lightheadedness and syncope.  Mildly tachycardic on arrival.  Vomited once in intake.  Received IV fluids and Zofran.  EKG nonischemic.  Troponin negative. Elevated D-dimer, however, blood was clotted  Pending repeat a D-dimer, UA, urine pregnancy test, and reassessment.            Chasidy Vera MD  05/16/19 2031

## 2019-05-16 NOTE — ED TRIAGE NOTES
"Vomiting onset last night, epigastric pain, no diarrhea, white vaginal discharge pt states " I have a yeast infection clearing up." pt reports while at work I was told I "passed out" today,I had some dizziness earlier, sharp left sided chest pain today, no SOB.  "

## 2019-05-16 NOTE — ED NOTES
"Patient identifiers for Brock Alfaro 28 y.o. female checked and correct.  Chief Complaint   Patient presents with    Panic Attack     EMS reports that patient had anxiety attack and "passed out" in coffee shop. Patient did not hit head.      Past Medical History:   Diagnosis Date    Hay fever     Oral herpes      Allergies reported:   Review of patient's allergies indicates:   Allergen Reactions    Grass pollen-june grass standard     Msg [glutamic acid hcl]          LOC: Patient is awake, alert, and aware of environment with an appropriate affect. Patient is oriented x 3 and speaking appropriately.  APPEARANCE: Patient resting comfortably and in no acute distress. Patient is clean and well groomed, patient's clothing is properly fastened.  SKIN: The skin is pale, warm and dry. Patient has normal skin turgor and moist mucus membranes.   MUSKULOSKELETAL: Patient is moving all extremities well.  RESPIRATORY: Airway is open and patent. Respirations are spontaneous and non-labored with normal effort and rate.  ABDOMEN: No distention noted, obese abd, Soft, tenderness to epigastric area upon palpation.    "

## 2019-06-02 ENCOUNTER — HOSPITAL ENCOUNTER (EMERGENCY)
Facility: OTHER | Age: 29
Discharge: HOME | End: 2019-06-02
Attending: EMERGENCY MEDICINE
Payer: MEDICAID

## 2019-06-02 VITALS
DIASTOLIC BLOOD PRESSURE: 76 MMHG | RESPIRATION RATE: 16 BRPM | OXYGEN SATURATION: 99 % | HEART RATE: 100 BPM | BODY MASS INDEX: 39.27 KG/M2 | HEIGHT: 60 IN | SYSTOLIC BLOOD PRESSURE: 128 MMHG | TEMPERATURE: 98 F | WEIGHT: 200 LBS

## 2019-06-02 DIAGNOSIS — N94.6 MENSTRUAL CRAMPS: Primary | ICD-10-CM

## 2019-06-02 LAB
B-HCG UR QL: NEGATIVE
CTP QC/QA: YES

## 2019-06-02 PROCEDURE — 81025 URINE PREGNANCY TEST: CPT | Performed by: EMERGENCY MEDICINE

## 2019-06-02 PROCEDURE — 99282 EMERGENCY DEPT VISIT SF MDM: CPT

## 2019-06-02 NOTE — DISCHARGE INSTRUCTIONS
1) PLEASE FOLLOW UP WITH YOUR PRIMARY CARE PROVIDER IN 3 DAYS IF YOU ARE NOT SIGNIFICANTLY IMPROVED  YOUR PREGNANCY TEST TODAY IS NEGATIVE. YOUR BLEEDING TODAY LIKELY REPRESENTS YOUR PEROID  2) PLEASE TAKE YOUR MEDICATIONS AS PRESCRIBED  3) RETURN TO THE ED FOR WORSENING SYMPTOMS

## 2019-06-02 NOTE — ED PROVIDER NOTES
"I, Oly Lawler, scribed for, and in the presence of, Dr. Dawkins. I performed the scribed service and the documentation accurately describes the services I performed. I attest to the accuracy of the note.     CHIEF COMPLAINT  Chief Complaint   Patient presents with    Vaginal Bleeding     Pt states LMP was May 4, reports she started bleeding today, the amount of a menstrual period.  Also reports she had a positive pregnancy test today.  States this would be her second pregnancy, "I have a daughter at home, and I bled for 4 months when I was pregnant with her."  Denies any pain.       HPI  Time seen by provider: 4:14 PM    Brock Alfaro is a 28 y.o. female who presents with vaginal bleeding today. Pt states that she was due for her menstrual cycle on 5/30, but has been late. She states that her LNMP was on 5/4. She took a home UPT today, states that she started bleeding about four hour after what should thought looked like a positive test. She reports having had four months of vaginal bleeding during pregnancy with daughter. She does not want to be pregnant, and is fearful that she may be. She states that her appetite is "decent." Pt denies any fever, N/V, abdominal pain, dysuria, vaginal pain, and vaginal discharge. She has no hx of STI. Pt has one monogamous partner at the moment. She does admit to be under increased stress lately.    This is the extent of the patient's complaints at this time.       PAST MEDICAL HISTORY  Past Medical History:   Diagnosis Date    Hay fever     Oral herpes        CURRENT MEDICATIONS    No current facility-administered medications for this encounter.     Current Outpatient Medications:     hydrocortisone 1 % cream, Apply to affected area 2 times daily, Disp: 30 g, Rfl: 0    valACYclovir (VALTREX) 1000 MG tablet, Take 1 tablet (1,000 mg total) by mouth every 12 (twelve) hours. for 7 days, Disp: 14 tablet, Rfl: 0    ALLERGIES    Review of patient's allergies indicates: "   Allergen Reactions    Grass pollen-joey grass standard     Msg [glutamic acid hcl]        SURGICAL HISTORY    Past Surgical History:   Procedure Laterality Date     SECTION       SECTION N/A 2014    [facility]    DILATION AND CURETTAGE OF UTERUS         SOCIAL HISTORY    Social History     Socioeconomic History    Marital status: Single     Spouse name: Not on file    Number of children: Not on file    Years of education: Not on file    Highest education level: Not on file   Occupational History    Not on file   Social Needs    Financial resource strain: Not on file    Food insecurity:     Worry: Not on file     Inability: Not on file    Transportation needs:     Medical: Not on file     Non-medical: Not on file   Tobacco Use    Smoking status: Never Smoker    Smokeless tobacco: Never Used   Substance and Sexual Activity    Alcohol use: Yes     Comment: rare    Drug use: No    Sexual activity: Yes     Partners: Male     Birth control/protection: None   Lifestyle    Physical activity:     Days per week: Not on file     Minutes per session: Not on file    Stress: Not on file   Relationships    Social connections:     Talks on phone: Not on file     Gets together: Not on file     Attends Islam service: Not on file     Active member of club or organization: Not on file     Attends meetings of clubs or organizations: Not on file     Relationship status: Not on file   Other Topics Concern    Not on file   Social History Narrative    Not on file       FAMILY HISTORY    Family History   Problem Relation Age of Onset    Breast cancer Maternal Grandmother     Ovarian cancer Neg Hx     Colon cancer Neg Hx        REVIEW OF SYSTEMS   Constitutional:  No fever or weakness.   Eyes:  No redness, pain, or discharge.   HENT:  No ear pain, no sudden onset headache, no throat pain.  Respiratory:  No wheezing, cough, or shortness of breath.    Cardiovascular:  No chest pain or palpitations.  GI:  No abdominal pain, nausea, vomiting, or diarrhea.   : No dysuria or discharge. No vaginal pain. Positive for vaginal bleeding.  Musculoskeletal:  No injury; full range of motion.   Skin:  No rash, abscess, or laceration.  Psychiatric: No suicidal ideations, homicidal ideations, auditory or visual hallucinations  Neurologic:  No focal weakness or sensory changes.   All Systems otherwise negative except as noted in the Review of Systems and History of Present Illness.    PHYSICAL EXAM    VITAL SIGNS: /76 (BP Location: Left arm, Patient Position: Sitting)   Pulse 100   Temp 98.3 °F (36.8 °C) (Oral)   Resp 16   Ht 5' (1.524 m)   Wt 90.7 kg (200 lb)   LMP 05/04/2019   SpO2 99%   BMI 39.06 kg/m²    Constitutional:  No acute distress.  Well developed, well nourished, alert & oriented x 3, non-toxic appearance.   HENT:  Normocephalic, atraumatic.  Normal ears, nose, and throat.  Eyes:  PERRL, EOMI, conjunctiva normal.  Neck: Normal range of motion, no tenderness, supple.  Respiratory:  Nonlabored breathing with normal breath sounds; no respiratory distress.  Cardiovascular:  RRR with no pulse deficit.  GI:  Soft, nontender, no rebound or guarding. Jumps up and down without difficulty.   Musculoskeletal: Normal ROM, no tenderness, injury, edema.  Integument:  Warm, dry skin without infection or injury.  Neurologic:  Normal motor, sensation with no focal deficit.  Psychiatric:  Affect normal, Judgment normal, Mood normal. No SI, HI and not gravely disabled.    LABS  Pertinent labs reviewed. (See chart for details)   Labs Reviewed   PREGNANCY TEST, URINE RAPID       PROCEDURES    Procedures    Medications - No data to display    ED COURSE & MEDICAL DECISION MAKING      Pertinent & Imaging studies reviewed. (See chart for details)    Differential Diagnosis: vaginal bleeding and NEGATIVE pregnancy test- likely her menstrual cycle (LMP 5/4)            Discontinued Medications    No medications on file       New Prescriptions    No medications on file         OVERALL IMPRESSION:   27 yo  here with fear of positive pregnancy test because she thought her period was due for May 30th, had bleeding today and was concerned she was miscarrying. UPT here is negative for pregnancy. Explained this is likely her period coming. Given close follow up and return precautions.      FINAL DIAGNOSIS  1. Menstrual cramps        DISPOSITION  Patient discharged in stable condition.    I discussed with patient and/or family/caretaker that this evaluation in the ED does not suggest any emergent or life threatening condition medical condition requiring admission or immediate intervention beyond what was provided in the ED.  Regardless, an unremarkable evaluation in the ED does not preclude the development or presence of a serious or life threatening condition. As such, patient was instructed to return immediately for any worsening or change in current symptoms.     Veronica Dawkins MD  Emergency Medicine  [facility]  2019 4:11 PM    I have reviewed the notes, assessments, and/or procedures performed by Oly lowe, and agree with documentation of this patient    Please pardon typos or dictation errors, as this note was transcribed using M*Modal fluency direct dictation software.         Veronica Dawkins MD  [phone]

## 2019-07-14 ENCOUNTER — HOSPITAL ENCOUNTER (EMERGENCY)
Facility: OTHER | Age: 29
Discharge: HOME | End: 2019-07-14
Attending: EMERGENCY MEDICINE
Payer: COMMERCIAL

## 2019-07-14 VITALS
BODY MASS INDEX: 39.27 KG/M2 | SYSTOLIC BLOOD PRESSURE: 126 MMHG | HEIGHT: 60 IN | TEMPERATURE: 98 F | HEART RATE: 97 BPM | RESPIRATION RATE: 18 BRPM | OXYGEN SATURATION: 100 % | DIASTOLIC BLOOD PRESSURE: 81 MMHG | WEIGHT: 200 LBS

## 2019-07-14 DIAGNOSIS — S29.011A MUSCLE STRAIN OF CHEST WALL, INITIAL ENCOUNTER: ICD-10-CM

## 2019-07-14 DIAGNOSIS — R07.9 CHEST PAIN: ICD-10-CM

## 2019-07-14 DIAGNOSIS — R06.02 SHORTNESS OF BREATH: Primary | ICD-10-CM

## 2019-07-14 LAB
ALBUMIN SERPL BCP-MCNC: 4.3 G/DL (ref 3.5–5.2)
ALP SERPL-CCNC: 74 U/L (ref 55–135)
ALT SERPL W/O P-5'-P-CCNC: 11 U/L (ref 10–44)
ANION GAP SERPL CALC-SCNC: 11 MMOL/L (ref 8–16)
AST SERPL-CCNC: 15 U/L (ref 10–40)
B-HCG UR QL: NEGATIVE
BASOPHILS # BLD AUTO: 0.04 K/UL (ref 0–0.2)
BASOPHILS NFR BLD: 0.4 % (ref 0–1.9)
BILIRUB SERPL-MCNC: 0.4 MG/DL (ref 0.1–1)
BUN SERPL-MCNC: 11 MG/DL (ref 6–20)
CALCIUM SERPL-MCNC: 9.9 MG/DL (ref 8.7–10.5)
CHLORIDE SERPL-SCNC: 104 MMOL/L (ref 95–110)
CO2 SERPL-SCNC: 24 MMOL/L (ref 23–29)
CREAT SERPL-MCNC: 0.8 MG/DL (ref 0.5–1.4)
CTP QC/QA: YES
D DIMER PPP IA.FEU-MCNC: 0.48 MG/L FEU
DIFFERENTIAL METHOD: NORMAL
EOSINOPHIL # BLD AUTO: 0.1 K/UL (ref 0–0.5)
EOSINOPHIL NFR BLD: 1.1 % (ref 0–8)
ERYTHROCYTE [DISTWIDTH] IN BLOOD BY AUTOMATED COUNT: 14.2 % (ref 11.5–14.5)
EST. GFR  (AFRICAN AMERICAN): >60 ML/MIN/1.73 M^2
EST. GFR  (NON AFRICAN AMERICAN): >60 ML/MIN/1.73 M^2
GLUCOSE SERPL-MCNC: 86 MG/DL (ref 70–110)
HCT VFR BLD AUTO: 43 % (ref 37–48.5)
HGB BLD-MCNC: 13.9 G/DL (ref 12–16)
IMM GRANULOCYTES # BLD AUTO: 0.04 K/UL (ref 0–0.04)
IMM GRANULOCYTES NFR BLD AUTO: 0.4 % (ref 0–0.5)
LYMPHOCYTES # BLD AUTO: 2.9 K/UL (ref 1–4.8)
LYMPHOCYTES NFR BLD: 28.6 % (ref 18–48)
MCH RBC QN AUTO: 28.8 PG (ref 27–31)
MCHC RBC AUTO-ENTMCNC: 32.3 G/DL (ref 32–36)
MCV RBC AUTO: 89 FL (ref 82–98)
MONOCYTES # BLD AUTO: 0.7 K/UL (ref 0.3–1)
MONOCYTES NFR BLD: 7.2 % (ref 4–15)
NEUTROPHILS # BLD AUTO: 6.3 K/UL (ref 1.8–7.7)
NEUTROPHILS NFR BLD: 62.3 % (ref 38–73)
NRBC BLD-RTO: 0 /100 WBC
PLATELET # BLD AUTO: 317 K/UL (ref 150–350)
PMV BLD AUTO: 9.4 FL (ref 9.2–12.9)
POTASSIUM SERPL-SCNC: 3.9 MMOL/L (ref 3.5–5.1)
PROT SERPL-MCNC: 8 G/DL (ref 6–8.4)
RBC # BLD AUTO: 4.82 M/UL (ref 4–5.4)
SODIUM SERPL-SCNC: 139 MMOL/L (ref 136–145)
TROPONIN I SERPL DL<=0.01 NG/ML-MCNC: <0.006 NG/ML (ref 0–0.03)
WBC # BLD AUTO: 10.06 K/UL (ref 3.9–12.7)

## 2019-07-14 PROCEDURE — 99285 EMERGENCY DEPT VISIT HI MDM: CPT | Mod: 25

## 2019-07-14 PROCEDURE — 84484 ASSAY OF TROPONIN QUANT: CPT

## 2019-07-14 PROCEDURE — 81025 URINE PREGNANCY TEST: CPT | Performed by: EMERGENCY MEDICINE

## 2019-07-14 PROCEDURE — 96374 THER/PROPH/DIAG INJ IV PUSH: CPT

## 2019-07-14 PROCEDURE — 85379 FIBRIN DEGRADATION QUANT: CPT

## 2019-07-14 PROCEDURE — 63600175 PHARM REV CODE 636 W HCPCS: Performed by: PHYSICIAN ASSISTANT

## 2019-07-14 PROCEDURE — 93005 ELECTROCARDIOGRAM TRACING: CPT

## 2019-07-14 PROCEDURE — 85025 COMPLETE CBC W/AUTO DIFF WBC: CPT

## 2019-07-14 PROCEDURE — 93010 ELECTROCARDIOGRAM REPORT: CPT | Mod: ,,, | Performed by: INTERNAL MEDICINE

## 2019-07-14 PROCEDURE — 80053 COMPREHEN METABOLIC PANEL: CPT

## 2019-07-14 PROCEDURE — 93010 EKG 12-LEAD: ICD-10-PCS | Mod: ,,, | Performed by: INTERNAL MEDICINE

## 2019-07-14 RX ORDER — KETOROLAC TROMETHAMINE 30 MG/ML
15 INJECTION, SOLUTION INTRAMUSCULAR; INTRAVENOUS
Status: COMPLETED | OUTPATIENT
Start: 2019-07-14 | End: 2019-07-14

## 2019-07-14 RX ORDER — METHOCARBAMOL 500 MG/1
1000 TABLET, FILM COATED ORAL 3 TIMES DAILY PRN
Qty: 15 TABLET | Refills: 0 | Status: SHIPPED | OUTPATIENT
Start: 2019-07-14 | End: 2019-09-10

## 2019-07-14 RX ADMIN — KETOROLAC TROMETHAMINE 15 MG: 30 INJECTION, SOLUTION INTRAMUSCULAR; INTRAVENOUS at 07:07

## 2019-07-14 NOTE — ED PROVIDER NOTES
Encounter Date: 2019       History     Chief Complaint   Patient presents with    Shortness of Breath     chest pain and SOB x 3 days. reports cough and L arm tingling.      This is a 27 y/o F with c/o 3 days of shortness of breath, L sided chest pain and L arm tingling and discomfort that radiates up her neck at times.  Patient denies PE/DVT risk factors.  She feels the symptoms have worsened over the past day.  Mother reports increased anxiety recently.     The history is provided by the patient.     Review of patient's allergies indicates:   Allergen Reactions    Unclassified drug Anaphylaxis     toothpaste    Grass pollen- grass standard     Msg [glutamic acid hcl]      Past Medical History:   Diagnosis Date    Hay fever     Oral herpes      Past Surgical History:   Procedure Laterality Date     SECTION       SECTION N/A 2014    Performed by Merr[facility] at [facility] L[facility]D OR    DILATION AND CURETTAGE OF UTERUS       Family History   Problem Relation Age of Onset    Breast cancer Maternal Grandmother     Ovarian cancer Neg Hx     Colon cancer Neg Hx      Social History     Tobacco Use    Smoking status: Never Smoker    Smokeless tobacco: Never Used   Substance Use Topics    Alcohol use: Yes     Comment: rare    Drug use: No     Review of Systems   Reason unable to perform ROS: Patient eloped prior to completion        Physical Exam     Initial Vitals [19 1702]   BP Pulse Resp Temp SpO2   130/86 108 18 98.2 °F (36.8 °C) 99 %      MAP       --         Physical Exam    Constitutional: She appears well-developed and well-nourished.   Pulmonary/Chest:   NO distress         ED Course   Procedures  Labs Reviewed   CBC W/ AUTO DIFFERENTIAL   COMPREHENSIVE METABOLIC PANEL   TROPONIN I   D DIMER, QUANTITATIVE   POCT URINE PREGNANCY          Imaging Results          X-Ray Chest PA And Lateral (No Result on File)  Result time 19 18:29:25                 Medical Decision  Making:   Initial Assessment:   27 y/o F who I briefly evaluated in the lobby while ED busy with many critical patients.  I explained to the patient we would try to expedite labs and CXR, arranged for UPT to be done and asked radiology tech to get CXR done while patient waiting for a room.  A few minutes later, radiology tech was not able to find patient, I called the patient's cell phone and left a message.                        Clinical Impression:       ICD-10-CM ICD-9-CM   1. Shortness of breath R06.02 786.05   2. Chest pain R07.9 786.50                                Sona Longo MD  07/14/19 [id number]

## 2019-07-15 NOTE — ED NOTES
Pt to Room 13 with c/o left anterior chest pain that began 3 days ago. Pt states the chest pain is accompanied by intermittent SOB. Pt rates pain as 10/10, described as sharp and radiating to left arm and leg. Pt denies any nausea, vomiting, diarrhea, constipation, headache, weakness, or fever. Pt is AAO x 4. Respirations even and unlabored, lung sounds clear and equal bilaterally. Abdomen soft and non-distended, no guarding or tenderness noted. BS present x 4. Skin warm and dry to touch, no swelling noted. Pt has full ROM and able to ambulate independently. VSS. No acute distress noted. Cardiac monitor, pulse ox and BP cuff applied to patient. Bed in lowest position, call bell within reach and side rails up x 2. Will continue to monitor closely.

## 2019-07-15 NOTE — ED PROVIDER NOTES
Encounter Date: 2019       History     Chief Complaint   Patient presents with    Shortness of Breath     chest pain and SOB x 3 days. reports cough and L arm tingling.      Patient is a 28-year-old female who presents to the ED for shortness of breath, left-sided chest pain and left arm tingling and discomfort for the past 3 days.  She states the pain intermittently radiates to the left side of her neck.  She also reports a mild cough and history of postnasal drip.  She states her symptoms have worsened over the past day.  Patient states she has been feeling anxious lately.  She denies PE or DVT risk factors.  She denies fever, nausea, emesis or headache. She states she takes Flonase intermittently.  She denies injury.        Review of patient's allergies indicates:   Allergen Reactions    Unclassified drug Anaphylaxis     toothpaste    Grass pollen- grass standard     Msg [glutamic acid hcl]      Past Medical History:   Diagnosis Date    Hay fever     Oral herpes      Past Surgical History:   Procedure Laterality Date     SECTION       SECTION N/A 2014    [facility] L[facility]D [facility]    DILATION AND CURETTAGE OF UTERUS       Family History   Problem Relation Age of Onset    Breast cancer Maternal Grandmother     Ovarian cancer Neg Hx     Colon cancer Neg Hx      Social History     Tobacco Use    Smoking status: Never Smoker    Smokeless tobacco: Never Used   Substance Use Topics    Alcohol use: Yes     Comment: rare    Drug use: No     Review of Systems   Constitutional: Negative for chills and fever.   HENT: Positive for congestion. Negative for sore throat.    Respiratory: Positive for cough and shortness of breath.    Cardiovascular: Positive for chest pain. Negative for palpitations.   Gastrointestinal: Negative for abdominal pain, diarrhea, nausea and vomiting.   Genitourinary: Negative for dysuria.   Musculoskeletal: Positive for neck pain.  Negative for arthralgias.   Neurological: Negative for dizziness and headaches.       Physical Exam     Initial Vitals [07/14/19 1702]   BP Pulse Resp Temp SpO2   130/86 108 18 98.2 °F (36.8 °C) 99 %      MAP       --         Physical Exam    Constitutional: Vital signs are normal. She is cooperative. No distress.   Speaking in clear in full sentences   HENT:   Head: Normocephalic and atraumatic.   Eyes: EOM are normal. Pupils are equal, round, and reactive to light.   Neck: Normal range of motion. Neck supple.   Left lateral neck tenderness to palpation   Cardiovascular: Normal rate, regular rhythm and intact distal pulses.   Pulmonary/Chest: Breath sounds normal. She has no wheezes. She has no rhonchi. She has no rales.       Abdominal: Soft. Bowel sounds are normal. There is no tenderness.   Musculoskeletal: Normal range of motion. She exhibits no edema.   Chest pain and left shoulder pain reproduced with movement.  No bony deformity or bony tenderness.  Left trapezius muscle pain and tightness noted   Neurological: She is alert and oriented to person, place, and time. GCS eye subscore is 4. GCS verbal subscore is 5. GCS motor subscore is 6.   Skin: Skin is warm and dry. No rash noted.   Psychiatric:   She appears anxious         ED Course   Procedures  Labs Reviewed   CBC W/ AUTO DIFFERENTIAL   COMPREHENSIVE METABOLIC PANEL   TROPONIN I   D DIMER, QUANTITATIVE   POCT URINE PREGNANCY     EKG Readings: (Independently Interpreted)   Sinus tachycardia rate of 103 beats per minute. No STEMI.  No ischemic changes.       Imaging Results          X-Ray Chest PA And Lateral (Final result)  Result time 07/14/19 19:37:57    Final result by Mervin Dumont MD (07/14/19 19:37:57)                 Impression:      No acute process.      Electronically signed by: Mervin Dumont MD  Date:    07/14/2019  Time:    19:37             Narrative:    EXAMINATION:  XR CHEST PA AND LATERAL    CLINICAL HISTORY:  Chest pain,  unspecified    TECHNIQUE:  PA and lateral views of the chest were performed.    COMPARISON:  CT scan of the chest dated 05/16/2019 and chest x-ray dated 05/16/2019.    FINDINGS:  The trachea is unremarkable.  The cardiomediastinal silhouette is within normal limits.  The hilar structures are unremarkable.  The hemidiaphragms are within normal limits.  There is no evidence of free air beneath the hemidiaphragms.  There are no pleural effusions.  There is no evidence of a pneumothorax.  There is no evidence of pneumomediastinum.  No airspace opacity is present.  The osseous structures are unremarkable.                                 Medical Decision Making:   Initial Assessment:   Urgent evaluation of a 28 y.o. Female presenting to the emergency department complaining of left-sided chest pain, left neck pain and left arm tingling Patient is afebrile, nontoxic appearing and hemodynamically stable.  No risk factors for DVT but patient is mildly tachycardic.  No risk factors for ACS.  Patient's grandmother told her she is having a heart attack.  Patient is anxious about this.    Patient was initially evaluated by Dr. Culver in [facility], chest x-ray, D-dimer, troponin lip basic labs ordered.  ED Management:  Patient given Toradol.  CBC, CMP, troponin and D-dimer are all normal.  Chest x-ray has no acute cardiopulmonary process.  Pain is likely musculoskeletal.  Advised on symptomatic care given muscle relaxer.  Urged to follow up with primary care.  Other:   I have discussed this case with another health care provider.                      Clinical Impression:     1. Shortness of breath    2. Chest pain    3. Muscle strain of chest wall, initial encounter                               Chauncey Platt PA-C  07/14/19 2030

## 2019-07-15 NOTE — ED NOTES
Pt provided discharge instructions, follow up information and medication education. Pt verbalizes understanding. Pt stable and ambulatory upon discharge. No acute distress noted.

## 2019-08-08 ENCOUNTER — TELEPHONE (OUTPATIENT)
Dept: INTERNAL MEDICINE | Facility: CLINIC | Age: 29
End: 2019-08-08

## 2019-08-08 NOTE — TELEPHONE ENCOUNTER
Spoke with patient and offered an appointment for tomorrow. Patient refused due to other obligations. Patient offered other appointments for the next available times. Patient refused due to wanting a sooner appointment. Patient booking to be seen for an annual check up. However patient stated she is having chest pain and pains in her neck. Patient informed to go to ER or urgent care per Dr. Davila.

## 2019-09-10 ENCOUNTER — OFFICE VISIT (OUTPATIENT)
Dept: URGENT CARE | Facility: CLINIC | Age: 29
End: 2019-09-10
Payer: COMMERCIAL

## 2019-09-10 VITALS
OXYGEN SATURATION: 98 % | SYSTOLIC BLOOD PRESSURE: 112 MMHG | TEMPERATURE: 98 F | DIASTOLIC BLOOD PRESSURE: 77 MMHG | BODY MASS INDEX: 38.09 KG/M2 | WEIGHT: 194 LBS | RESPIRATION RATE: 16 BRPM | HEIGHT: 60 IN | HEART RATE: 94 BPM

## 2019-09-10 DIAGNOSIS — L30.9 ECZEMA, UNSPECIFIED TYPE: Primary | ICD-10-CM

## 2019-09-10 PROCEDURE — 99214 OFFICE O/P EST MOD 30 MIN: CPT | Mod: 25,S$GLB,, | Performed by: EMERGENCY MEDICINE

## 2019-09-10 PROCEDURE — 96372 PR INJECTION,THERAP/PROPH/DIAG2ST, IM OR SUBCUT: ICD-10-PCS | Mod: S$GLB,,, | Performed by: EMERGENCY MEDICINE

## 2019-09-10 PROCEDURE — 3008F BODY MASS INDEX DOCD: CPT | Mod: CPTII,S$GLB,, | Performed by: EMERGENCY MEDICINE

## 2019-09-10 PROCEDURE — 99214 PR OFFICE/OUTPT VISIT, EST, LEVL IV, 30-39 MIN: ICD-10-PCS | Mod: 25,S$GLB,, | Performed by: EMERGENCY MEDICINE

## 2019-09-10 PROCEDURE — 3008F PR BODY MASS INDEX (BMI) DOCUMENTED: ICD-10-PCS | Mod: CPTII,S$GLB,, | Performed by: EMERGENCY MEDICINE

## 2019-09-10 PROCEDURE — 96372 THER/PROPH/DIAG INJ SC/IM: CPT | Mod: S$GLB,,, | Performed by: EMERGENCY MEDICINE

## 2019-09-10 RX ORDER — BETAMETHASONE SODIUM PHOSPHATE AND BETAMETHASONE ACETATE 3; 3 MG/ML; MG/ML
9 INJECTION, SUSPENSION INTRA-ARTICULAR; INTRALESIONAL; INTRAMUSCULAR; SOFT TISSUE
Status: COMPLETED | OUTPATIENT
Start: 2019-09-10 | End: 2019-09-10

## 2019-09-10 RX ADMIN — BETAMETHASONE SODIUM PHOSPHATE AND BETAMETHASONE ACETATE 9 MG: 3; 3 INJECTION, SUSPENSION INTRA-ARTICULAR; INTRALESIONAL; INTRAMUSCULAR; SOFT TISSUE at 06:09

## 2019-09-10 NOTE — PROGRESS NOTES
Subjective:       Patient ID: Brock Alfaro is a 29 y.o. female.    Vitals:  height is 5' (1.524 m) and weight is 88 kg (194 lb). Her tympanic temperature is 98.2 °F (36.8 °C). Her blood pressure is 112/77 and her pulse is 94. Her respiration is 16 and oxygen saturation is 98%.     Chief Complaint: Rash    Patient presents rash located on bilateral arm & chest that occurred over a month ago.  She has a history of eczema.  Last flare was when she was a child.  She has tried mupirocin for her rash, and states no relief.  States is not pregnant.    Rash   This is a new problem. The current episode started more than 1 month ago. The problem has been gradually worsening since onset. The affected locations include the left arm, right arm and chest. The rash is characterized by itchiness, pain, burning and scaling. She was exposed to nothing. Pertinent negatives include no anorexia, congestion, cough, diarrhea, eye pain, facial edema, fatigue, fever, joint pain, nail changes, rhinorrhea, shortness of breath, sore throat or vomiting. Past treatments include nothing. Her past medical history is significant for eczema. There is no history of allergies, asthma or varicella.       Constitution: Negative for chills, fatigue and fever.   HENT: Negative for facial swelling, congestion and sore throat.    Neck: Negative for painful lymph nodes.   Eyes: Negative for eye itching, eye pain and eyelid swelling.   Respiratory: Negative for cough and shortness of breath.    Gastrointestinal: Negative for vomiting and diarrhea.   Musculoskeletal: Negative for joint pain and joint swelling.   Skin: Positive for rash. Negative for color change, pale, wound, abrasion, laceration, lesion, skin thickening/induration, puncture wound, erythema, bruising, abscess, avulsion and hives.   Allergic/Immunologic: Positive for itching. Negative for environmental allergies, immunocompromised state and hives.   Hematologic/Lymphatic: Negative for  swollen lymph nodes.       Objective:      Physical Exam   Constitutional: She is oriented to person, place, and time.   Overweight   HENT:   Head: Normocephalic and atraumatic.   Cardiovascular: Normal rate, regular rhythm and normal heart sounds.   Pulmonary/Chest: Breath sounds normal.   Musculoskeletal: Normal range of motion.   Neurological: She is alert and oriented to person, place, and time.   Skin: No erythema.   Dark blotchy skin patches bilat elbow antecubital creases and anterior upper chest, no vesicles/pustules/drainage, itchy   Psychiatric: She has a normal mood and affect. Her behavior is normal.       Assessment:       1. Eczema, unspecified type        Plan:         Eczema, unspecified type  -     Ambulatory referral to Dermatology  -     betamethasone acetate-betamethasone sodium phosphate injection 9 mg        Merrill Reeves MD  Go to the Emergency Department for any problems  Call your PCP for follow up next available.

## 2019-09-10 NOTE — PATIENT INSTRUCTIONS
Call your PCP tomorrow for Dermatology referrals.    Merrill Reeves MD  Go to the Emergency Department for any problems  Call your PCP for follow up next available.    Atopic Dermatitis (Adult)  Atopic dermatitis is a dry, itchy, red rash. Its also called eczema. The rash is chronic, or ongoing. It can come and go over time. The disease is often passed down in families. It causes a problem with the skin barrier that makes the skin more sensitive to the environment and other factors. The increased skin sensitivity causes an itch, which causes scratching. Scratching can worsen the itching or also break the skin. This can put the skin at risk of infection.  The condition is most common in people with asthma, hay fever, hives, or dry or sensitive skin. The rash may be caused by extreme heat or heavy sweating. Skin irritants can cause the rash to flare up. These can include wool or silk clothing, grease, oils, some medicines, and harsh soaps and detergents. Emotional stress can also be a trigger.  Treatment is done to relieve the itching and inflammation of the skin.  Home care  Follow these tips to care for your condition:  · Keep the areas of rash clean by bathing at least every other day. Use lukewarm water to bathe. Dont use hot water, which can dry out the skin.  · Dont use soaps with strong detergents. Use mild soaps made for sensitive skin.  · Apply a cream or ointment to damp skin right after bathing.  · Avoid things that irritate your skin. Wear absorbent, soft fabrics next to the skin rather than rough or scratchy materials.  · Use mild laundry soap free of scents and perfumes. Make sure to rinse all the soap out of your clothes.  · Treat any skin infection as directed.  · Use oral diphenhydramine to help reduce itching. This is an antihistamine you can buy at drug and grocery stores. It can make you sleepy, so use lower doses during the daytime. Or you can use loratadine. This is an antihistamine that will  not make you sleepy. Do not use diphenhydramine if you have glaucoma or have trouble urinating due to an enlarged prostate.  Follow-up care  See your healthcare provider, or as advised. If your symptoms dont get better or if they get worse in the next 7 days, make an appointment with your healthcare provider.  When to seek medical advice  Call your healthcare provider right away  if any of these occur:  · Increasing area of redness or pain in the skin  · Yellow crusts or wet drainage from the rash  · Fever of 100.4°F (38°C) or higher, or as directed by your healthcare provider  Date Last Reviewed: 9/1/2016  © 0889-1292 [employer]. [address] [place] [address], [place] [address]. All rights reserved. This information is not intended as a substitute for professional medical care. Always follow your healthcare professional's instructions.

## 2019-09-13 ENCOUNTER — TELEPHONE (OUTPATIENT)
Dept: URGENT CARE | Facility: CLINIC | Age: 29
End: 2019-09-13

## 2019-09-25 ENCOUNTER — PATIENT MESSAGE (OUTPATIENT)
Dept: INTERNAL MEDICINE | Facility: CLINIC | Age: 29
End: 2019-09-25

## 2019-09-27 ENCOUNTER — OFFICE VISIT (OUTPATIENT)
Dept: INTERNAL MEDICINE | Facility: CLINIC | Age: 29
End: 2019-09-27
Payer: COMMERCIAL

## 2019-09-27 VITALS
TEMPERATURE: 99 F | BODY MASS INDEX: 37.49 KG/M2 | HEIGHT: 60 IN | WEIGHT: 190.94 LBS | RESPIRATION RATE: 18 BRPM | DIASTOLIC BLOOD PRESSURE: 60 MMHG | SYSTOLIC BLOOD PRESSURE: 100 MMHG | HEART RATE: 103 BPM

## 2019-09-27 DIAGNOSIS — R63.1 EXCESSIVE THIRST: ICD-10-CM

## 2019-09-27 DIAGNOSIS — F43.9 STRESS: ICD-10-CM

## 2019-09-27 DIAGNOSIS — R53.83 FATIGUE, UNSPECIFIED TYPE: ICD-10-CM

## 2019-09-27 DIAGNOSIS — R21 SKIN RASH: ICD-10-CM

## 2019-09-27 DIAGNOSIS — M25.50 ARTHRALGIA, UNSPECIFIED JOINT: Primary | ICD-10-CM

## 2019-09-27 PROCEDURE — 3008F BODY MASS INDEX DOCD: CPT | Mod: CPTII,S$GLB,, | Performed by: INTERNAL MEDICINE

## 2019-09-27 PROCEDURE — 99999 PR PBB SHADOW E&M-EST. PATIENT-LVL III: ICD-10-PCS | Mod: PBBFAC,,, | Performed by: INTERNAL MEDICINE

## 2019-09-27 PROCEDURE — 99203 OFFICE O/P NEW LOW 30 MIN: CPT | Mod: S$GLB,,, | Performed by: INTERNAL MEDICINE

## 2019-09-27 PROCEDURE — 99999 PR PBB SHADOW E&M-EST. PATIENT-LVL III: CPT | Mod: PBBFAC,,, | Performed by: INTERNAL MEDICINE

## 2019-09-27 PROCEDURE — 3008F PR BODY MASS INDEX (BMI) DOCUMENTED: ICD-10-PCS | Mod: CPTII,S$GLB,, | Performed by: INTERNAL MEDICINE

## 2019-09-27 PROCEDURE — 99203 PR OFFICE/OUTPT VISIT, NEW, LEVL III, 30-44 MIN: ICD-10-PCS | Mod: S$GLB,,, | Performed by: INTERNAL MEDICINE

## 2019-09-27 RX ORDER — CLINDAMYCIN HYDROCHLORIDE 150 MG/1
150 CAPSULE ORAL
COMMUNITY
End: 2020-04-10

## 2019-09-27 RX ORDER — METRONIDAZOLE 500 MG/1
TABLET ORAL
COMMUNITY
End: 2020-04-10

## 2019-09-27 RX ORDER — VALACYCLOVIR HYDROCHLORIDE 1 G/1
1000 TABLET, FILM COATED ORAL DAILY PRN
COMMUNITY
End: 2021-08-27

## 2019-09-27 RX ORDER — IBUPROFEN 800 MG/1
800 TABLET ORAL
COMMUNITY
End: 2020-09-26

## 2019-09-27 RX ORDER — FLUTICASONE PROPIONATE 50 MCG
SPRAY, SUSPENSION (ML) NASAL
COMMUNITY
End: 2021-08-27

## 2019-09-27 NOTE — LETTER
October 13, 2019      Merrill Reeves MD  [address]  [place] [address]           [place] [facility]                                                                                                                                                       [address] [facility].  [place] [address]  Phone: [phone]  Fax: [phone]          Patient: Brock Alfaro   MR Number: [id number]   YOB: 1990   Date of Visit: 9/27/2019       Dear Dr. Merrill Reeves:    Thank you for referring Brock Alfaro to me for evaluation. Attached you will find relevant portions of my assessment and plan of care.    If you have questions, please do not hesitate to call me. I look forward to following Brock Alfaro along with you.    Sincerely,    Jignesh Medina MD    Enclosure  CC:  No Recipients    If you would like to receive this communication electronically, please contact [email or web address] or [phone] to request more information on "[employer]" Link access.    For providers and/or their staff who would like to refer a patient to [facility], please contact us through our one-stop-shop provider referral line, [facility], at [phone].    If you feel you have received this communication in error or would no longer like to receive these types of communications, please e-mail [email or web address]

## 2019-09-28 ENCOUNTER — HOSPITAL ENCOUNTER (OUTPATIENT)
Dept: RADIOLOGY | Facility: HOSPITAL | Age: 29
Discharge: HOME | End: 2019-09-28
Attending: INTERNAL MEDICINE
Payer: COMMERCIAL

## 2019-09-28 DIAGNOSIS — M25.50 ARTHRALGIA, UNSPECIFIED JOINT: ICD-10-CM

## 2019-09-28 PROCEDURE — 73130 XR HAND COMPLETE 3 VIEWS BILATERAL: ICD-10-PCS | Mod: 26,RT,, | Performed by: RADIOLOGY

## 2019-09-28 PROCEDURE — 73130 X-RAY EXAM OF HAND: CPT | Mod: 50,TC,PO

## 2019-09-28 PROCEDURE — 73130 X-RAY EXAM OF HAND: CPT | Mod: 26,RT,, | Performed by: RADIOLOGY

## 2019-09-28 PROCEDURE — 73130 X-RAY EXAM OF HAND: CPT | Mod: 26,59,LT, | Performed by: RADIOLOGY

## 2019-09-30 ENCOUNTER — LAB VISIT (OUTPATIENT)
Dept: LAB | Facility: OTHER | Age: 29
End: 2019-09-30
Payer: COMMERCIAL

## 2019-09-30 ENCOUNTER — PATIENT MESSAGE (OUTPATIENT)
Dept: INTERNAL MEDICINE | Facility: CLINIC | Age: 29
End: 2019-09-30

## 2019-09-30 DIAGNOSIS — R63.1 EXCESSIVE THIRST: ICD-10-CM

## 2019-09-30 DIAGNOSIS — R53.83 FATIGUE, UNSPECIFIED TYPE: ICD-10-CM

## 2019-09-30 DIAGNOSIS — R21 SKIN RASH: ICD-10-CM

## 2019-09-30 DIAGNOSIS — M25.50 ARTHRALGIA, UNSPECIFIED JOINT: ICD-10-CM

## 2019-09-30 LAB
ALBUMIN SERPL BCP-MCNC: 4 G/DL (ref 3.5–5.2)
ALP SERPL-CCNC: 55 U/L (ref 55–135)
ALT SERPL W/O P-5'-P-CCNC: 15 U/L (ref 10–44)
ANION GAP SERPL CALC-SCNC: 8 MMOL/L (ref 8–16)
AST SERPL-CCNC: 14 U/L (ref 10–40)
BASOPHILS # BLD AUTO: 0.03 K/UL (ref 0–0.2)
BASOPHILS NFR BLD: 0.3 % (ref 0–1.9)
BILIRUB SERPL-MCNC: 0.3 MG/DL (ref 0.1–1)
BUN SERPL-MCNC: 14 MG/DL (ref 6–20)
CALCIUM SERPL-MCNC: 9.3 MG/DL (ref 8.7–10.5)
CHLORIDE SERPL-SCNC: 105 MMOL/L (ref 95–110)
CHOLEST SERPL-MCNC: 179 MG/DL (ref 120–199)
CHOLEST/HDLC SERPL: 3.6 {RATIO} (ref 2–5)
CO2 SERPL-SCNC: 26 MMOL/L (ref 23–29)
CREAT SERPL-MCNC: 0.8 MG/DL (ref 0.5–1.4)
CRP SERPL-MCNC: 1.2 MG/L (ref 0–8.2)
DIFFERENTIAL METHOD: ABNORMAL
EOSINOPHIL # BLD AUTO: 0.1 K/UL (ref 0–0.5)
EOSINOPHIL NFR BLD: 0.7 % (ref 0–8)
ERYTHROCYTE [DISTWIDTH] IN BLOOD BY AUTOMATED COUNT: 13.8 % (ref 11.5–14.5)
ERYTHROCYTE [SEDIMENTATION RATE] IN BLOOD: 8 MM/HR (ref 0–20)
EST. GFR  (AFRICAN AMERICAN): >60 ML/MIN/1.73 M^2
EST. GFR  (NON AFRICAN AMERICAN): >60 ML/MIN/1.73 M^2
ESTIMATED AVG GLUCOSE: 97 MG/DL (ref 68–131)
GLUCOSE SERPL-MCNC: 87 MG/DL (ref 70–110)
HBA1C MFR BLD HPLC: 5 % (ref 4–5.6)
HCT VFR BLD AUTO: 35.6 % (ref 37–48.5)
HDLC SERPL-MCNC: 50 MG/DL (ref 40–75)
HDLC SERPL: 27.9 % (ref 20–50)
HGB BLD-MCNC: 11.6 G/DL (ref 12–16)
IMM GRANULOCYTES # BLD AUTO: 0.03 K/UL (ref 0–0.04)
IMM GRANULOCYTES NFR BLD AUTO: 0.3 % (ref 0–0.5)
LDLC SERPL CALC-MCNC: 120.2 MG/DL (ref 63–159)
LYMPHOCYTES # BLD AUTO: 1.4 K/UL (ref 1–4.8)
LYMPHOCYTES NFR BLD: 12.4 % (ref 18–48)
MCH RBC QN AUTO: 28.9 PG (ref 27–31)
MCHC RBC AUTO-ENTMCNC: 32.6 G/DL (ref 32–36)
MCV RBC AUTO: 89 FL (ref 82–98)
MONOCYTES # BLD AUTO: 0.8 K/UL (ref 0.3–1)
MONOCYTES NFR BLD: 7.3 % (ref 4–15)
NEUTROPHILS # BLD AUTO: 8.8 K/UL (ref 1.8–7.7)
NEUTROPHILS NFR BLD: 79 % (ref 38–73)
NONHDLC SERPL-MCNC: 129 MG/DL
NRBC BLD-RTO: 0 /100 WBC
PLATELET # BLD AUTO: 289 K/UL (ref 150–350)
PMV BLD AUTO: 10.2 FL (ref 9.2–12.9)
POTASSIUM SERPL-SCNC: 4.4 MMOL/L (ref 3.5–5.1)
PROT SERPL-MCNC: 7.2 G/DL (ref 6–8.4)
RBC # BLD AUTO: 4.02 M/UL (ref 4–5.4)
RHEUMATOID FACT SERPL-ACNC: <10 IU/ML (ref 0–15)
SODIUM SERPL-SCNC: 139 MMOL/L (ref 136–145)
TRIGL SERPL-MCNC: 44 MG/DL (ref 30–150)
TSH SERPL DL<=0.005 MIU/L-ACNC: 2.52 UIU/ML (ref 0.4–4)
WBC # BLD AUTO: 11.16 K/UL (ref 3.9–12.7)

## 2019-09-30 PROCEDURE — 80061 LIPID PANEL: CPT

## 2019-09-30 PROCEDURE — 86038 ANTINUCLEAR ANTIBODIES: CPT

## 2019-09-30 PROCEDURE — 84443 ASSAY THYROID STIM HORMONE: CPT

## 2019-09-30 PROCEDURE — 83036 HEMOGLOBIN GLYCOSYLATED A1C: CPT

## 2019-09-30 PROCEDURE — 86431 RHEUMATOID FACTOR QUANT: CPT

## 2019-09-30 PROCEDURE — 36415 COLL VENOUS BLD VENIPUNCTURE: CPT

## 2019-09-30 PROCEDURE — 86140 C-REACTIVE PROTEIN: CPT

## 2019-09-30 PROCEDURE — 85651 RBC SED RATE NONAUTOMATED: CPT

## 2019-09-30 PROCEDURE — 86039 ANTINUCLEAR ANTIBODIES (ANA): CPT

## 2019-09-30 PROCEDURE — 86235 NUCLEAR ANTIGEN ANTIBODY: CPT | Mod: 59

## 2019-09-30 PROCEDURE — 80053 COMPREHEN METABOLIC PANEL: CPT

## 2019-09-30 PROCEDURE — 85025 COMPLETE CBC W/AUTO DIFF WBC: CPT

## 2019-10-01 ENCOUNTER — TELEPHONE (OUTPATIENT)
Dept: INTERNAL MEDICINE | Facility: CLINIC | Age: 29
End: 2019-10-01

## 2019-10-01 DIAGNOSIS — M25.50 ARTHRALGIA, UNSPECIFIED JOINT: Primary | ICD-10-CM

## 2019-10-01 DIAGNOSIS — R76.8 ANA POSITIVE: ICD-10-CM

## 2019-10-01 LAB
ANA SER QL IF: POSITIVE
ANA TITR SER IF: NORMAL {TITER}

## 2019-10-01 NOTE — TELEPHONE ENCOUNTER
The REINALDO test was positive.  The rheumatoid factor test was negative.  In view of the patient's multiple joint pains I  will recommend consultation with Rheumatology for further evaluation.

## 2019-10-02 LAB
ANTI SM ANTIBODY: 0.45 EU (ref 0–19.99)
ANTI SM/RNP ANTIBODY: 1.26 EU (ref 0–19.99)
ANTI-SM INTERPRETATION: NEGATIVE
ANTI-SM/RNP INTERPRETATION: NEGATIVE
ANTI-SSA ANTIBODY: 0.66 EU (ref 0–19.99)
ANTI-SSA INTERPRETATION: NEGATIVE
ANTI-SSB ANTIBODY: 0.64 EU (ref 0–19.99)
ANTI-SSB INTERPRETATION: NEGATIVE
DSDNA AB SER-ACNC: NORMAL [IU]/ML

## 2019-10-03 ENCOUNTER — TELEPHONE (OUTPATIENT)
Dept: DERMATOLOGY | Facility: CLINIC | Age: 29
End: 2019-10-03

## 2019-10-03 NOTE — TELEPHONE ENCOUNTER
----- Message from Nicolasa Layton sent at 10/3/2019 10:45 AM CDT -----  Contact: Patient   Patient Requesting Sooner Appointment.     Reason for sooner appt.: Patient states that she would like for her appt to be on 11/19 w/ any provider to line up w/ her rheumatology appt. Please contact pt to assist w/ scheduling.     When is the first available appointment?  Pt currently scheduled for 10/28                                                                                                                                                                                        Communication Preference: [phone]

## 2019-10-17 ENCOUNTER — HOSPITAL ENCOUNTER (EMERGENCY)
Facility: OTHER | Age: 29
Discharge: HOME | End: 2019-10-17
Attending: EMERGENCY MEDICINE
Payer: COMMERCIAL

## 2019-10-17 VITALS
TEMPERATURE: 98 F | HEART RATE: 69 BPM | SYSTOLIC BLOOD PRESSURE: 112 MMHG | RESPIRATION RATE: 18 BRPM | BODY MASS INDEX: 36.12 KG/M2 | WEIGHT: 184 LBS | HEIGHT: 60 IN | DIASTOLIC BLOOD PRESSURE: 69 MMHG | OXYGEN SATURATION: 99 %

## 2019-10-17 DIAGNOSIS — R11.2 NON-INTRACTABLE VOMITING WITH NAUSEA, UNSPECIFIED VOMITING TYPE: ICD-10-CM

## 2019-10-17 DIAGNOSIS — R07.89 STERNAL PAIN: ICD-10-CM

## 2019-10-17 DIAGNOSIS — R55 SYNCOPE, UNSPECIFIED SYNCOPE TYPE: Primary | ICD-10-CM

## 2019-10-17 DIAGNOSIS — R55 VASOVAGAL REACTION: ICD-10-CM

## 2019-10-17 LAB
ANION GAP SERPL CALC-SCNC: 11 MMOL/L (ref 8–16)
B-HCG UR QL: NEGATIVE
BASOPHILS # BLD AUTO: 0.02 K/UL (ref 0–0.2)
BASOPHILS NFR BLD: 0.3 % (ref 0–1.9)
BUN SERPL-MCNC: 8 MG/DL (ref 6–20)
CALCIUM SERPL-MCNC: 9.6 MG/DL (ref 8.7–10.5)
CHLORIDE SERPL-SCNC: 105 MMOL/L (ref 95–110)
CO2 SERPL-SCNC: 25 MMOL/L (ref 23–29)
CREAT SERPL-MCNC: 0.8 MG/DL (ref 0.5–1.4)
CTP QC/QA: YES
DIFFERENTIAL METHOD: ABNORMAL
EOSINOPHIL # BLD AUTO: 0 K/UL (ref 0–0.5)
EOSINOPHIL NFR BLD: 0.6 % (ref 0–8)
ERYTHROCYTE [DISTWIDTH] IN BLOOD BY AUTOMATED COUNT: 13.9 % (ref 11.5–14.5)
EST. GFR  (AFRICAN AMERICAN): >60 ML/MIN/1.73 M^2
EST. GFR  (NON AFRICAN AMERICAN): >60 ML/MIN/1.73 M^2
GLUCOSE SERPL-MCNC: 94 MG/DL (ref 70–110)
HCT VFR BLD AUTO: 36.9 % (ref 37–48.5)
HGB BLD-MCNC: 12 G/DL (ref 12–16)
IMM GRANULOCYTES # BLD AUTO: 0.04 K/UL (ref 0–0.04)
IMM GRANULOCYTES NFR BLD AUTO: 0.6 % (ref 0–0.5)
LYMPHOCYTES # BLD AUTO: 1.1 K/UL (ref 1–4.8)
LYMPHOCYTES NFR BLD: 17 % (ref 18–48)
MCH RBC QN AUTO: 28.9 PG (ref 27–31)
MCHC RBC AUTO-ENTMCNC: 32.5 G/DL (ref 32–36)
MCV RBC AUTO: 89 FL (ref 82–98)
MONOCYTES # BLD AUTO: 0.4 K/UL (ref 0.3–1)
MONOCYTES NFR BLD: 6.1 % (ref 4–15)
NEUTROPHILS # BLD AUTO: 5.1 K/UL (ref 1.8–7.7)
NEUTROPHILS NFR BLD: 75.4 % (ref 38–73)
NRBC BLD-RTO: 0 /100 WBC
PLATELET # BLD AUTO: 283 K/UL (ref 150–350)
PMV BLD AUTO: 9.8 FL (ref 9.2–12.9)
POTASSIUM SERPL-SCNC: 4.1 MMOL/L (ref 3.5–5.1)
RBC # BLD AUTO: 4.15 M/UL (ref 4–5.4)
SODIUM SERPL-SCNC: 141 MMOL/L (ref 136–145)
TROPONIN I SERPL DL<=0.01 NG/ML-MCNC: <0.006 NG/ML (ref 0–0.03)
WBC # BLD AUTO: 6.71 K/UL (ref 3.9–12.7)

## 2019-10-17 PROCEDURE — 99285 EMERGENCY DEPT VISIT HI MDM: CPT | Mod: 25

## 2019-10-17 PROCEDURE — 85025 COMPLETE CBC W/AUTO DIFF WBC: CPT

## 2019-10-17 PROCEDURE — 93005 ELECTROCARDIOGRAM TRACING: CPT

## 2019-10-17 PROCEDURE — 96361 HYDRATE IV INFUSION ADD-ON: CPT

## 2019-10-17 PROCEDURE — 84484 ASSAY OF TROPONIN QUANT: CPT

## 2019-10-17 PROCEDURE — 81025 URINE PREGNANCY TEST: CPT | Performed by: EMERGENCY MEDICINE

## 2019-10-17 PROCEDURE — 96374 THER/PROPH/DIAG INJ IV PUSH: CPT

## 2019-10-17 PROCEDURE — 93010 ELECTROCARDIOGRAM REPORT: CPT | Mod: ,,, | Performed by: INTERNAL MEDICINE

## 2019-10-17 PROCEDURE — 80048 BASIC METABOLIC PNL TOTAL CA: CPT

## 2019-10-17 PROCEDURE — 63600175 PHARM REV CODE 636 W HCPCS: Performed by: PHYSICIAN ASSISTANT

## 2019-10-17 PROCEDURE — 93010 EKG 12-LEAD: ICD-10-PCS | Mod: ,,, | Performed by: INTERNAL MEDICINE

## 2019-10-17 PROCEDURE — 96375 TX/PRO/DX INJ NEW DRUG ADDON: CPT

## 2019-10-17 PROCEDURE — 36415 COLL VENOUS BLD VENIPUNCTURE: CPT

## 2019-10-17 RX ORDER — ONDANSETRON 2 MG/ML
4 INJECTION INTRAMUSCULAR; INTRAVENOUS
Status: COMPLETED | OUTPATIENT
Start: 2019-10-17 | End: 2019-10-17

## 2019-10-17 RX ORDER — ONDANSETRON 4 MG/1
4 TABLET, ORALLY DISINTEGRATING ORAL EVERY 6 HOURS PRN
Qty: 12 TABLET | Refills: 0 | OUTPATIENT
Start: 2019-10-17 | End: 2022-01-08

## 2019-10-17 RX ORDER — HYDROCODONE BITARTRATE AND ACETAMINOPHEN 7.5; 325 MG/1; MG/1
1 TABLET ORAL EVERY 6 HOURS PRN
COMMUNITY
End: 2021-08-27

## 2019-10-17 RX ORDER — KETOROLAC TROMETHAMINE 30 MG/ML
15 INJECTION, SOLUTION INTRAMUSCULAR; INTRAVENOUS
Status: COMPLETED | OUTPATIENT
Start: 2019-10-17 | End: 2019-10-17

## 2019-10-17 RX ADMIN — KETOROLAC TROMETHAMINE 15 MG: 30 INJECTION, SOLUTION INTRAMUSCULAR; INTRAVENOUS at 10:10

## 2019-10-17 RX ADMIN — SODIUM CHLORIDE 1000 ML: 0.9 INJECTION, SOLUTION INTRAVENOUS at 10:10

## 2019-10-17 RX ADMIN — ONDANSETRON 4 MG: 2 INJECTION INTRAMUSCULAR; INTRAVENOUS at 10:10

## 2019-10-17 NOTE — ED PROVIDER NOTES
Encounter Date: 10/17/2019       History     Chief Complaint   Patient presents with    Loss of Consciousness     EMS reports pt w/ syncopal episode after vomiting this AM.      Patient is a 29-year-old female who presents to the emergency department via EMS for evaluation of a syncopal event.  Patient reports waking up this morning and experiencing pain from her root canal procedure a couple of days ago.  She states she took a Percocet 10 this morning and then brought her daughter to school.  She states she did not eat breakfast.  She states while in the car she began to feel shaky .  She states she remembers walking her daughter into school and then becoming very nauseous and vomiting.  She states she then or was waking up in the ambulance.  Per EMS, a bystander performed CPR on the patient briefly after they called 911.  They reported she was unresponsive.  Once  personal showed up, CPR was discontinued.  Patient was alert and awake and answering questions appropriately.  When EMS arrived, they reported she was anxious and tachypneic.  Patient had no signs of trauma from fall.  Patient is currently reporting nausea, shakiness, sternal pain and a chronic headache daily headache for the past few weeks.  Patient denies preceding chest pain or shortness of breath prior to syncopal event.  She does report prior syncopal events in which she was diagnosed with anxiety.    The history is provided by the patient.     Review of patient's allergies indicates:   Allergen Reactions    Unclassified drug Anaphylaxis     toothpaste    Grass pollen- grass standard     Msg [glutamic acid hcl]      Past Medical History:   Diagnosis Date    Hay fever     Oral herpes      Past Surgical History:   Procedure Laterality Date     SECTION      DILATION AND CURETTAGE OF UTERUS       Family History   Problem Relation Age of Onset    Breast cancer Maternal Grandmother     Ovarian cancer Neg Hx     Colon  cancer Neg Hx      Social History     Tobacco Use    Smoking status: Never Smoker    Smokeless tobacco: Never Used   Substance Use Topics    Alcohol use: Yes     Comment: rare    Drug use: No     Review of Systems   Constitutional: Negative for chills and fever.   HENT: Negative for congestion and sore throat.    Eyes: Negative for photophobia and visual disturbance.   Respiratory: Negative for shortness of breath.    Cardiovascular:        Sternal pain (after CPR was performed)   Gastrointestinal: Positive for nausea and vomiting. Negative for abdominal pain and diarrhea.   Genitourinary: Negative for dysuria.   Musculoskeletal: Negative for back pain.   Skin: Negative for rash.   Allergic/Immunologic: Negative for immunocompromised state.   Neurological: Positive for syncope and headaches.       Physical Exam     Initial Vitals [10/17/19 0907]   BP Pulse Resp Temp SpO2   116/71 87 18 97.9 °F (36.6 °C) 96 %      MAP       --         Physical Exam    Constitutional: Vital signs are normal. She is cooperative. No distress.   HENT:   Head: Normocephalic and atraumatic.   Mouth/Throat: Oropharynx is clear and moist.   Eyes: Conjunctivae and EOM are normal. Pupils are equal, round, and reactive to light.   Neck: Normal range of motion. Neck supple.   No carotid bruits   Cardiovascular: Normal rate, regular rhythm, normal heart sounds and intact distal pulses.   Pulmonary/Chest: Breath sounds normal. No respiratory distress. She has no wheezes. She has no rhonchi. She has no rales. She exhibits tenderness (Lower sternal pain and over the xiphoid process).   Abdominal: Soft. Bowel sounds are normal. There is no tenderness.   Musculoskeletal: Normal range of motion. She exhibits no edema.   No cervical, thoracic, or lumbar midline spinal tenderness   Neurological: She is alert and oriented to person, place, and time. No cranial nerve deficit. GCS score is 15. GCS eye subscore is 4. GCS verbal subscore is 5. GCS motor  subscore is 6.   Skin: Skin is warm and dry. No rash noted.         ED Course   Procedures  Labs Reviewed   CBC W/ AUTO DIFFERENTIAL - Abnormal; Notable for the following components:       Result Value    Hematocrit 36.9 (*)     Immature Granulocytes 0.6 (*)     Gran% 75.4 (*)     Lymph% 17.0 (*)     All other components within normal limits   BASIC METABOLIC PANEL   TROPONIN I   POCT URINE PREGNANCY     EKG Readings: (Independently Interpreted)   Normal sinus rhythm at a rate of 73 beats per minute. No STEMI.  No ischemic changes.        Imaging Results          X-Ray Chest PA And Lateral (Final result)  Result time 10/17/19 10:03:32    Final result by Darwin Reyes MD (10/17/19 10:03:32)                 Impression:      No evidence of acute cardiopulmonary disease.      Electronically signed by: Darwin Reyes MD  Date:    10/17/2019  Time:    10:03             Narrative:    EXAMINATION:  XR CHEST PA AND LATERAL    CLINICAL HISTORY:  Other chest pain    TECHNIQUE:  PA and lateral views of the chest were performed.    COMPARISON:  07/14/2019    FINDINGS:  Multiple overlying cardiac monitoring leads. The cardiomediastinal silhouette is normal in size and midline. Pulmonary vascularity appears within normal limits.    The lungs appear clear without confluent pulmonary parenchymal opacity. No pleural fluid.    Osseous structures appear intact.                                 Medical Decision Making:   Initial Assessment:   Emergent evaluation of a 29 y.o. female presenting to the emergency department via EMS complaining of syncopal event after vomiting. Patient is afebrile, nontoxic appearing and hemodynamically stable.  Patient was given CPR briefly by bystander who believed she was unresponsive.  Once emergency personnel arrived, patient was awake, alert and oriented.  History and physical are suggestive of vasovagal event.   Patient's only current complaints or mild nausea, shakiness, and headache. No signs of  head trauma on exam.  EKG reveals no ischemic  ED Management:  Blood work reveals no acute metabolic disturbance.  No severe anemia.  Troponin is negative.  Chest x-ray reveals no osseous abnormality.  After patient received IV fluids and antiemetic, she is feeling better.  She had normal vital signs and was discharged in stable condition.  Patient had no other complaints today and was stable at discharge.  Other:   I have discussed this case with another health care provider.                      Clinical Impression:     1. Syncope, unspecified syncope type    2. Sternal pain    3. Vasovagal reaction    4. Non-intractable vomiting with nausea, unspecified vomiting type                               Chauncey Platt PA-C  10/17/19 1214       Chauncey Platt PA-C  10/17/19 1217

## 2019-11-11 ENCOUNTER — HOSPITAL ENCOUNTER (EMERGENCY)
Facility: HOSPITAL | Age: 29
Discharge: HOME | End: 2019-11-12
Attending: EMERGENCY MEDICINE
Payer: MEDICAID

## 2019-11-11 DIAGNOSIS — R40.20 LOC (LOSS OF CONSCIOUSNESS): ICD-10-CM

## 2019-11-11 DIAGNOSIS — R55 SYNCOPE: Primary | ICD-10-CM

## 2019-11-11 DIAGNOSIS — R07.9 CHEST PAIN: ICD-10-CM

## 2019-11-11 LAB
ABO + RH BLD: NORMAL
ALBUMIN SERPL BCP-MCNC: 4.2 G/DL (ref 3.5–5.2)
ALP SERPL-CCNC: 76 U/L (ref 55–135)
ALT SERPL W/O P-5'-P-CCNC: 12 U/L (ref 10–44)
ANION GAP SERPL CALC-SCNC: 8 MMOL/L (ref 8–16)
APTT BLDCRRT: 28.5 SEC (ref 21–32)
AST SERPL-CCNC: 13 U/L (ref 10–40)
B-HCG UR QL: NEGATIVE
BACTERIA #/AREA URNS HPF: NORMAL /HPF
BASOPHILS # BLD AUTO: 0.02 K/UL (ref 0–0.2)
BASOPHILS NFR BLD: 0.2 % (ref 0–1.9)
BILIRUB SERPL-MCNC: 0.5 MG/DL (ref 0.1–1)
BILIRUB UR QL STRIP: NEGATIVE
BLD GP AB SCN CELLS X3 SERPL QL: NORMAL
BNP SERPL-MCNC: <10 PG/ML (ref 0–99)
BUN SERPL-MCNC: 8 MG/DL (ref 6–20)
CALCIUM SERPL-MCNC: 9.3 MG/DL (ref 8.7–10.5)
CHLORIDE SERPL-SCNC: 105 MMOL/L (ref 95–110)
CLARITY UR: CLEAR
CO2 SERPL-SCNC: 25 MMOL/L (ref 23–29)
COLOR UR: YELLOW
CREAT SERPL-MCNC: 0.8 MG/DL (ref 0.5–1.4)
CTP QC/QA: YES
D DIMER PPP IA.FEU-MCNC: 0.44 MG/L FEU
DIFFERENTIAL METHOD: ABNORMAL
EOSINOPHIL # BLD AUTO: 0 K/UL (ref 0–0.5)
EOSINOPHIL NFR BLD: 0.4 % (ref 0–8)
ERYTHROCYTE [DISTWIDTH] IN BLOOD BY AUTOMATED COUNT: 14 % (ref 11.5–14.5)
EST. GFR  (AFRICAN AMERICAN): >60 ML/MIN/1.73 M^2
EST. GFR  (NON AFRICAN AMERICAN): >60 ML/MIN/1.73 M^2
GLUCOSE SERPL-MCNC: 105 MG/DL (ref 70–110)
GLUCOSE UR QL STRIP: NEGATIVE
HCT VFR BLD AUTO: 37.6 % (ref 37–48.5)
HGB BLD-MCNC: 12 G/DL (ref 12–16)
HGB UR QL STRIP: ABNORMAL
IMM GRANULOCYTES # BLD AUTO: 0.03 K/UL (ref 0–0.04)
IMM GRANULOCYTES NFR BLD AUTO: 0.3 % (ref 0–0.5)
INR PPP: 1.1 (ref 0.8–1.2)
KETONES UR QL STRIP: NEGATIVE
LEUKOCYTE ESTERASE UR QL STRIP: NEGATIVE
LYMPHOCYTES # BLD AUTO: 0.9 K/UL (ref 1–4.8)
LYMPHOCYTES NFR BLD: 8.6 % (ref 18–48)
MAGNESIUM SERPL-MCNC: 1.8 MG/DL (ref 1.6–2.6)
MCH RBC QN AUTO: 29.1 PG (ref 27–31)
MCHC RBC AUTO-ENTMCNC: 31.9 G/DL (ref 32–36)
MCV RBC AUTO: 91 FL (ref 82–98)
MICROSCOPIC COMMENT: NORMAL
MONOCYTES # BLD AUTO: 0.6 K/UL (ref 0.3–1)
MONOCYTES NFR BLD: 5.7 % (ref 4–15)
NEUTROPHILS # BLD AUTO: 8.5 K/UL (ref 1.8–7.7)
NEUTROPHILS NFR BLD: 84.8 % (ref 38–73)
NITRITE UR QL STRIP: NEGATIVE
NRBC BLD-RTO: 0 /100 WBC
PH UR STRIP: 7 [PH] (ref 5–8)
PLATELET # BLD AUTO: 286 K/UL (ref 150–350)
PMV BLD AUTO: 9.6 FL (ref 9.2–12.9)
POTASSIUM SERPL-SCNC: 3.7 MMOL/L (ref 3.5–5.1)
PROT SERPL-MCNC: 7.3 G/DL (ref 6–8.4)
PROT UR QL STRIP: NEGATIVE
PROTHROMBIN TIME: 11 SEC (ref 9–12.5)
RBC # BLD AUTO: 4.12 M/UL (ref 4–5.4)
RBC #/AREA URNS HPF: 2 /HPF (ref 0–4)
SODIUM SERPL-SCNC: 138 MMOL/L (ref 136–145)
SP GR UR STRIP: 1.02 (ref 1–1.03)
SQUAMOUS #/AREA URNS HPF: 3 /HPF
TROPONIN I SERPL DL<=0.01 NG/ML-MCNC: <0.006 NG/ML (ref 0–0.03)
URN SPEC COLLECT METH UR: ABNORMAL
UROBILINOGEN UR STRIP-ACNC: NEGATIVE EU/DL
WBC # BLD AUTO: 10.02 K/UL (ref 3.9–12.7)

## 2019-11-11 PROCEDURE — 63600175 PHARM REV CODE 636 W HCPCS: Performed by: EMERGENCY MEDICINE

## 2019-11-11 PROCEDURE — 96375 TX/PRO/DX INJ NEW DRUG ADDON: CPT

## 2019-11-11 PROCEDURE — 93005 ELECTROCARDIOGRAM TRACING: CPT

## 2019-11-11 PROCEDURE — 81000 URINALYSIS NONAUTO W/SCOPE: CPT

## 2019-11-11 PROCEDURE — 85379 FIBRIN DEGRADATION QUANT: CPT

## 2019-11-11 PROCEDURE — 85610 PROTHROMBIN TIME: CPT

## 2019-11-11 PROCEDURE — 99285 EMERGENCY DEPT VISIT HI MDM: CPT | Mod: 25

## 2019-11-11 PROCEDURE — 85025 COMPLETE CBC W/AUTO DIFF WBC: CPT

## 2019-11-11 PROCEDURE — 83735 ASSAY OF MAGNESIUM: CPT

## 2019-11-11 PROCEDURE — 84484 ASSAY OF TROPONIN QUANT: CPT

## 2019-11-11 PROCEDURE — 83880 ASSAY OF NATRIURETIC PEPTIDE: CPT

## 2019-11-11 PROCEDURE — 96374 THER/PROPH/DIAG INJ IV PUSH: CPT

## 2019-11-11 PROCEDURE — 86850 RBC ANTIBODY SCREEN: CPT

## 2019-11-11 PROCEDURE — 93010 EKG 12-LEAD: ICD-10-PCS | Mod: ,,, | Performed by: INTERNAL MEDICINE

## 2019-11-11 PROCEDURE — 96361 HYDRATE IV INFUSION ADD-ON: CPT

## 2019-11-11 PROCEDURE — 93010 ELECTROCARDIOGRAM REPORT: CPT | Mod: ,,, | Performed by: INTERNAL MEDICINE

## 2019-11-11 PROCEDURE — 85730 THROMBOPLASTIN TIME PARTIAL: CPT

## 2019-11-11 PROCEDURE — 80053 COMPREHEN METABOLIC PANEL: CPT

## 2019-11-11 PROCEDURE — 81025 URINE PREGNANCY TEST: CPT | Performed by: EMERGENCY MEDICINE

## 2019-11-11 RX ORDER — BUTALBITAL, ACETAMINOPHEN AND CAFFEINE 50; 325; 40 MG/1; MG/1; MG/1
1 TABLET ORAL EVERY 4 HOURS PRN
Qty: 13 TABLET | Refills: 0 | Status: SHIPPED | OUTPATIENT
Start: 2019-11-11 | End: 2019-12-11

## 2019-11-11 RX ORDER — ONDANSETRON 4 MG/1
4 TABLET, FILM COATED ORAL EVERY 6 HOURS
Qty: 12 TABLET | Refills: 0 | Status: SHIPPED | OUTPATIENT
Start: 2019-11-11 | End: 2021-08-27

## 2019-11-11 RX ORDER — ONDANSETRON 2 MG/ML
4 INJECTION INTRAMUSCULAR; INTRAVENOUS
Status: COMPLETED | OUTPATIENT
Start: 2019-11-11 | End: 2019-11-11

## 2019-11-11 RX ORDER — DIPHENHYDRAMINE HYDROCHLORIDE 50 MG/ML
12.5 INJECTION INTRAMUSCULAR; INTRAVENOUS
Status: COMPLETED | OUTPATIENT
Start: 2019-11-11 | End: 2019-11-11

## 2019-11-11 RX ORDER — PREDNISONE 5 MG/1
10 TABLET ORAL
Status: COMPLETED | OUTPATIENT
Start: 2019-11-11 | End: 2019-11-11

## 2019-11-11 RX ORDER — METOCLOPRAMIDE HYDROCHLORIDE 5 MG/ML
10 INJECTION INTRAMUSCULAR; INTRAVENOUS
Status: COMPLETED | OUTPATIENT
Start: 2019-11-11 | End: 2019-11-11

## 2019-11-11 RX ADMIN — PREDNISONE 10 MG: 5 TABLET ORAL at 09:11

## 2019-11-11 RX ADMIN — ONDANSETRON HYDROCHLORIDE 4 MG: 2 SOLUTION INTRAMUSCULAR; INTRAVENOUS at 11:11

## 2019-11-11 RX ADMIN — SODIUM CHLORIDE 1000 ML: 0.9 INJECTION, SOLUTION INTRAVENOUS at 09:11

## 2019-11-11 RX ADMIN — METOCLOPRAMIDE 10 MG: 5 INJECTION, SOLUTION INTRAMUSCULAR; INTRAVENOUS at 09:11

## 2019-11-11 RX ADMIN — DIPHENHYDRAMINE HYDROCHLORIDE 12.5 MG: 50 INJECTION INTRAMUSCULAR; INTRAVENOUS at 09:11

## 2019-11-12 VITALS
TEMPERATURE: 99 F | WEIGHT: 183 LBS | HEIGHT: 60 IN | RESPIRATION RATE: 18 BRPM | OXYGEN SATURATION: 98 % | BODY MASS INDEX: 35.93 KG/M2 | DIASTOLIC BLOOD PRESSURE: 61 MMHG | SYSTOLIC BLOOD PRESSURE: 108 MMHG | HEART RATE: 84 BPM

## 2019-11-12 LAB — TROPONIN I SERPL DL<=0.01 NG/ML-MCNC: 0.01 NG/ML (ref 0–0.03)

## 2019-11-12 NOTE — ED TRIAGE NOTES
"Pt presents to ED via EMS with c/o headache 10/10 and chest pain 2/10 that began at 0530 this morning. Per EMS pt had a witnessed syncope episode with (-) trauma to head. Pt states that "I do not remember passing out but this has happened before at my daughters school after having my wisdom tooth pulled."  "

## 2019-11-12 NOTE — ED PROVIDER NOTES
"Encounter Date: 2019    SCRIBE #1 NOTE: I, Faby Montero, am scribing for, and in the presence of,  Jovany Kirby MD. I have scribed the following portions of the note - Other sections scribed: LISSET, JEFFREY.       History     Chief Complaint   Patient presents with    Loss of Consciousness     pt states she was at work, did not feel well but did not fell sick. states felt like pre menstrual symptoms. then just "fell out". on arrival a & o.         29 year old female patient presents to the ED complaining of a severe (10/10) headache status post syncope that occurred 1 hour prior to arrival. She reports that she felt lightheaded and weak before collapsing, but does not remember passing out. She also complains of mild (2/10) chest pain that began this morning. She denies any fever, chills, rhinorrhea, congestion, burning on urination, or increased urine frequency. She reports that her menstrual period had just ended and that for the past 2 months she has been experiencing similar headaches and nausea at the end of her cycle, similar to her premenstrual syndrome. She compares the symptoms to her previous pregnancy. She also reports a lighter flow. She reports one prior episode of syncope. She denies any recent travel. She denies smoking, alcohol use, or drug use. She reports a past surgical history of . She states that she is currently taking Valtrex.  Patient has had minimal food and will fluids today.    The history is provided by the patient.     Review of patient's allergies indicates:   Allergen Reactions    Unclassified drug Anaphylaxis     toothpaste    Grass pollen- grass standard     Msg [glutamic acid hcl]      Past Medical History:   Diagnosis Date    Hay fever     Oral herpes      Past Surgical History:   Procedure Laterality Date     SECTION      DILATION AND CURETTAGE OF UTERUS       Family History   Problem Relation Age of Onset    Breast cancer Maternal Grandmother     " Ovarian cancer Neg Hx     Colon cancer Neg Hx      Social History     Tobacco Use    Smoking status: Never Smoker    Smokeless tobacco: Never Used   Substance Use Topics    Alcohol use: Yes     Comment: rare    Drug use: No     Review of Systems   Constitutional: Negative for chills, diaphoresis and fever.   HENT: Negative for congestion, ear pain, rhinorrhea and sore throat.    Eyes: Negative for visual disturbance.   Respiratory: Negative for cough and shortness of breath.    Cardiovascular: Positive for chest pain.   Gastrointestinal: Positive for nausea. Negative for abdominal pain, diarrhea and vomiting.   Genitourinary: Negative for dysuria and frequency.   Musculoskeletal: Negative for arthralgias and myalgias.   Skin: Negative for rash.   Neurological: Positive for dizziness (Prior to syncope), syncope, weakness (Prior to syncope) and headaches.   All other systems reviewed and are negative.      Physical Exam     Initial Vitals [11/11/19 1958]   BP Pulse Resp Temp SpO2   128/76 88 20 98.9 °F (37.2 °C) 97 %      MAP       --         Physical Exam    Nursing note and vitals reviewed.  Constitutional: She appears well-developed and well-nourished.   HENT:   Head: Normocephalic and atraumatic.   Eyes: EOM are normal. Pupils are equal, round, and reactive to light.   Neck: Normal range of motion.   Cardiovascular: Normal rate and regular rhythm.   Pulmonary/Chest: Breath sounds normal. No respiratory distress. She has no wheezes.   Abdominal: Soft. Bowel sounds are normal.   Musculoskeletal: Normal range of motion.   Neurological: She is alert and oriented to person, place, and time. She has normal strength. GCS score is 15. GCS eye subscore is 4. GCS verbal subscore is 5. GCS motor subscore is 6.   Skin: Skin is warm and dry. No erythema.   Psychiatric: She has a normal mood and affect. Thought content normal.         ED Course   Procedures  Labs Reviewed   CBC W/ AUTO DIFFERENTIAL - Abnormal; Notable for  the following components:       Result Value    Mean Corpuscular Hemoglobin Conc 31.9 (*)     Gran # (ANC) 8.5 (*)     Lymph # 0.9 (*)     Gran% 84.8 (*)     Lymph% 8.6 (*)     All other components within normal limits   URINALYSIS, REFLEX TO URINE CULTURE - Abnormal; Notable for the following components:    Occult Blood UA 1+ (*)     All other components within normal limits    Narrative:     Preferred Collection Type->Urine, Clean Catch   APTT   B-TYPE NATRIURETIC PEPTIDE   COMPREHENSIVE METABOLIC PANEL   D DIMER, QUANTITATIVE   MAGNESIUM   PROTIME-INR   TROPONIN I   URINALYSIS MICROSCOPIC    Narrative:     Preferred Collection Type->Urine, Clean Catch   TROPONIN I   POCT URINE PREGNANCY   TYPE & SCREEN     EKG Readings: (Independently Interpreted)   EKG done 1952 showing normal sinus rhythm rate of 79.  No ST elevation T-wave abnormality.  Normal axis and QRS.  Normal EKG.     ECG Results          EKG 12-lead (In process)  Result time 11/12/19 06:17:56    In process by Interface, Lab In [facility] (11/12/19 06:17:56)                 Narrative:    Test Reason : R55,    Vent. Rate : 079 BPM     Atrial Rate : 079 BPM     P-R Int : 170 ms          QRS Dur : 080 ms      QT Int : 358 ms       P-R-T Axes : 057 049 053 degrees     QTc Int : 410 ms    Normal sinus rhythm  Normal ECG  When compared with ECG of 17-OCT-2019 09:25,  No significant change was found    Referred By: [email or web address]   SELF           Confirmed By:                   In process by Interface, Lab In [facility] (11/12/19 06:17:43)                 Narrative:    Test Reason : R55,    Vent. Rate : 079 BPM     Atrial Rate : 079 BPM     P-R Int : 170 ms          QRS Dur : 080 ms      QT Int : 358 ms       P-R-T Axes : 057 049 053 degrees     QTc Int : 410 ms    Normal sinus rhythm  Normal ECG  When compared with ECG of 17-OCT-2019 09:25,  No significant change was found    Referred By: AAAREFERR   SELF           Confirmed By:                   In process by Interface,  Lab In [facility] (11/12/19 06:14:57)                 Narrative:    Test Reason : R55,    Vent. Rate : 079 BPM     Atrial Rate : 079 BPM     P-R Int : 170 ms          QRS Dur : 080 ms      QT Int : 358 ms       P-R-T Axes : 057 049 053 degrees     QTc Int : 410 ms    Normal sinus rhythm  Normal ECG  When compared with ECG of 17-OCT-2019 09:25,  No significant change was found    Referred By: AAAREFERR   SELF           Confirmed By:                             Imaging Results          X-Ray Chest PA And Lateral (Final result)  Result time 11/11/19 20:49:36    Final result by Mati Gray MD (11/11/19 20:49:36)                 Impression:      No acute cardiopulmonary process.      Electronically signed by: Mati Gray MD  Date:    11/11/2019  Time:    20:49             Narrative:    EXAMINATION:  XR CHEST PA AND LATERAL    CLINICAL HISTORY:  Chest pain, unspecified    TECHNIQUE:  PA and lateral views of the chest were performed.    COMPARISON:  October 17, 2018.    FINDINGS:  There is no consolidation, effusion, or pneumothorax.    Cardiomediastinal silhouette is unremarkable.    Regional osseous structures are unremarkable.                                 Medical Decision Making:   Initial Assessment:   29-year-old female presenting secondary to vasovagal syncope but is likely related to her menstrual cycle.  Labs, imaging, EKG ordered on the patient.  Exam is nonfocal reassuring.    Dx:   Seizure: no witnessed seizure activity, incontinence or h/o seizures to suggest seizure today  Hypoxia and hypoglycemia less likely in this patient with normal sats and BG of 105  Cardiac issue: electrical (dysarrhythmias, brugada, WPW, long QT).  Less likely given no evidence of drop attack, no palpitations, no EKG findings to predispose to arrhythmias. No CP, no STEMI on EKG; NSTEMI unlikely to cause brief cardiogenic shock in absence of other significant findings, so likely does not need full cardiac rule out with  troponins and stress.  Mechanical: outflow obstruction like HOCM or aortic stenosis, tamponade-less likely as no murmur, non-exertional, no hypertrophy on EKG.   Vessels: PE, dissection, AAA.  Clinical picture inconsistent, no abd pain, no pulsatile mass, symmetric pulses, no risk factors of PE  Volume issue: dehydration from vomiting/diarrhea/decreased PO, sepsis, GI bleed, ruptured ectopic or bleeding AAA. no signs of recent illness to suggest infection, no e/o anemia by history or PE, neg preg test neg so unlikely ectopic, orthostatics normal the patient received some fluid  Neuro: No HA, no personal or family h/o cerebral aneurysm, nml neuro exam, so this is unlikely ICH or sentinel bleed  Also: vasovagal, drugs/meds, autonomic insufficiency    [place] Syncope Rule    1. History of CHF? no   2. Hematocrit <30? no   3. Normal ECG? yes   4. History of dyspnea? no   5. SBP <90 at triage? No    Labs reassuring on the patient.  First troponin negative. D-dimer negative.  Urinalysis negative. Urine pregnancy negative.  Imaging reassuring.  Getting 2nd troponin and if negative patient to be discharged home.    11:05 PM  Discussed all the results with the patient she would like to go home after 2nd troponin. Patient's heart score is 1. Don't believe this is pna, pneumothorax, arrythmia, dissection, esophageal rupture. D dimer negative for PE    Patient signed out to oncoming physician for repeat assessment and repeat troponin.  Discharge paperwork written for the patient.  The 2nd troponin is negative patient should be able to go home.  Zofran and Fioricet for patient.    Repeat troponin showed nothing acute.      I discussed with the patient the diagnosis, treatment plan, indications for return to the emergency department, and for expected follow-up. The patient verbalized an understanding. The patient is asked if there are any questions or concerns. We discuss the case, until all issues are addressed to the  patient's satisfaction. Patient understands and is agreeable to the plan.         Clinical Tests:   Lab Tests: Reviewed and Ordered  Radiological Study: Ordered and Reviewed  Medical Tests: Ordered and Reviewed            Scribe Attestation:   Scribe #1: I performed the above scribed service and the documentation accurately describes the services I performed. I attest to the accuracy of the note.                          Clinical Impression:       ICD-10-CM ICD-9-CM   1. Syncope R55 780.2   2. Chest pain R07.9 786.50   3. LOC (loss of consciousness) R40.20 780.09            Scribe Attestation: I, Jovany Kirby, personally performed the services described in this documentation. All medical record entries made by the scribe were at my direction and in my presence. I have reviewed the chart and agree that the record reflects my personal performance and is accurate and complete.                 Jovany Kirby MD  11/12/19 1592       Jovany Kirby MD  11/22/19 1229

## 2020-01-09 ENCOUNTER — HOSPITAL ENCOUNTER (EMERGENCY)
Facility: OTHER | Age: 30
Discharge: HOME | End: 2020-01-09
Attending: EMERGENCY MEDICINE
Payer: MEDICAID

## 2020-01-09 VITALS
SYSTOLIC BLOOD PRESSURE: 129 MMHG | RESPIRATION RATE: 19 BRPM | WEIGHT: 165 LBS | DIASTOLIC BLOOD PRESSURE: 77 MMHG | HEIGHT: 60 IN | OXYGEN SATURATION: 98 % | TEMPERATURE: 98 F | BODY MASS INDEX: 32.39 KG/M2 | HEART RATE: 91 BPM

## 2020-01-09 DIAGNOSIS — N76.0 ACUTE VAGINITIS: Primary | ICD-10-CM

## 2020-01-09 LAB
BACTERIA #/AREA URNS HPF: ABNORMAL /HPF
BACTERIA GENITAL QL WET PREP: NORMAL
BILIRUB UR QL STRIP: NEGATIVE
CLARITY UR: CLEAR
CLUE CELLS VAG QL WET PREP: NORMAL
COLOR UR: YELLOW
FILAMENT FUNGI VAG WET PREP-#/AREA: NORMAL
GLUCOSE UR QL STRIP: NEGATIVE
HGB UR QL STRIP: ABNORMAL
KETONES UR QL STRIP: NEGATIVE
LEUKOCYTE ESTERASE UR QL STRIP: NEGATIVE
MICROSCOPIC COMMENT: ABNORMAL
NITRITE UR QL STRIP: NEGATIVE
PH UR STRIP: 6 [PH] (ref 5–8)
PROT UR QL STRIP: NEGATIVE
RBC #/AREA URNS HPF: 2 /HPF (ref 0–4)
SP GR UR STRIP: 1.02 (ref 1–1.03)
SPECIMEN SOURCE: NORMAL
SQUAMOUS #/AREA URNS HPF: 30 /HPF
T VAGINALIS GENITAL QL WET PREP: NORMAL
URN SPEC COLLECT METH UR: ABNORMAL
UROBILINOGEN UR STRIP-ACNC: NEGATIVE EU/DL
WBC #/AREA URNS HPF: 1 /HPF (ref 0–5)
WBC #/AREA VAG WET PREP: NORMAL
WBC CLUMPS URNS QL MICRO: ABNORMAL
YEAST GENITAL QL WET PREP: NORMAL

## 2020-01-09 PROCEDURE — 87210 SMEAR WET MOUNT SALINE/INK: CPT

## 2020-01-09 PROCEDURE — 87491 CHLMYD TRACH DNA AMP PROBE: CPT

## 2020-01-09 PROCEDURE — 99284 EMERGENCY DEPT VISIT MOD MDM: CPT | Mod: 25

## 2020-01-09 PROCEDURE — 81000 URINALYSIS NONAUTO W/SCOPE: CPT

## 2020-01-09 NOTE — ED TRIAGE NOTES
Patient presents to ER with c/o vaginal irration after using a new bath product.  Patient denies vaginal itching and discharge.  Patient denies dysuria and pelvic pain.

## 2020-01-10 NOTE — ED PROVIDER NOTES
"Encounter Date: 2020       History     Chief Complaint   Patient presents with    "vaginal irritation"     since yesterday.  States she has been using new bath products.  Denies any abnormal discharge.     Patient is a 29 y.o. female presenting to the emergency department for evaluation of vaginal irritation.  The patient states that yesterday she used some new soap from [employer].  She states that she had vaginal irritation since then.  She states she is concerned it is from the soap.  She denies any discharge, but states that it irritates her.  She denies any lesions. She denies any prior episode.  She states she typically uses Dove.  No medication use thus far. She just finished her menstural cycle. This is the extent of the patient's complaints at this time.       The history is provided by the patient.     Review of patient's allergies indicates:   Allergen Reactions    Unclassified drug Anaphylaxis     toothpaste    Grass pollen- grass standard     Msg [glutamic acid hcl]      Past Medical History:   Diagnosis Date    Hay fever     Oral herpes      Past Surgical History:   Procedure Laterality Date     SECTION      DILATION AND CURETTAGE OF UTERUS       Family History   Problem Relation Age of Onset    Breast cancer Maternal Grandmother     Ovarian cancer Neg Hx     Colon cancer Neg Hx      Social History     Tobacco Use    Smoking status: Never Smoker    Smokeless tobacco: Never Used   Substance Use Topics    Alcohol use: Yes     Comment: rare    Drug use: No     Review of Systems   Constitutional: Negative for activity change, appetite change, chills, fatigue and fever.   HENT: Negative for congestion, rhinorrhea and sore throat.    Eyes: Negative for photophobia and visual disturbance.   Respiratory: Negative for cough, shortness of breath and wheezing.    Cardiovascular: Negative for chest pain.   Gastrointestinal: Negative for abdominal pain, diarrhea, nausea and " vomiting.   Genitourinary: Negative for dysuria, hematuria and urgency.        Vaginal irritation   Musculoskeletal: Negative for back pain, myalgias and neck pain.   Skin: Negative for color change and wound.   Neurological: Negative for weakness and headaches.   Psychiatric/Behavioral: Negative for agitation and confusion.       Physical Exam     Initial Vitals [01/09/20 1657]   BP Pulse Resp Temp SpO2   139/78 103 16 98.4 °F (36.9 °C) 99 %      MAP       --         Physical Exam    Nursing note and vitals reviewed.  Constitutional: Vital signs are normal. She appears well-developed and well-nourished. She is not diaphoretic.  Non-toxic appearance. She does not have a sickly appearance. She does not appear ill. No distress.   Well-appearing,  female accompanied the emergency room.  Speaking clearly full sentences.  No acute distress.   HENT:   Head: Normocephalic and atraumatic.   Right Ear: External ear normal.   Left Ear: External ear normal.   Nose: Nose normal.   Mouth/Throat: Oropharynx is clear and moist.   Eyes: Conjunctivae and EOM are normal.   Neck: Normal range of motion. Neck supple.   Genitourinary:   Genitourinary Comments: Chaperone present.  Normal appearance of the external genitalia, no skin lesions, erythema or masses. Pink vaginal mucosa, minimal white discharge in the vaginal vault. Cervix pink with no erythema or discharge noted. Cervical os is closed. No CMT, adnexal tenderness.    Musculoskeletal: Normal range of motion.   Neurological: She is alert and oriented to person, place, and time.   Skin: Skin is warm.   Psychiatric: She has a normal mood and affect. Her behavior is normal. Judgment and thought content normal.         ED Course   Procedures  Labs Reviewed   URINALYSIS, REFLEX TO URINE CULTURE - Abnormal; Notable for the following components:       Result Value    Occult Blood UA 1+ (*)     All other components within normal limits    Narrative:     Preferred  Collection Type->Urine, Clean Catch   URINALYSIS MICROSCOPIC - Abnormal; Notable for the following components:    Bacteria Few (*)     All other components within normal limits    Narrative:     Preferred Collection Type->Urine, Clean Catch   C. TRACHOMATIS/N. GONORRHOEAE BY AMP DNA   VAGINAL SCREEN   POCT URINE PREGNANCY             Medical Decision Making:   Initial Assessment:     Urgent evaluation of a 29 y.o. female presenting to the emergency department complaining of vaginal irritation. Patient is afebrile, nontoxic appearing and hemodynamically stable. Physical exam reveals minimal white discharge in the vaginal vault, no significant erythema or lesions.  Will plan for UA, UPT, vaginal screen, chlamydia gonorrhea and reassess.      Clinical Tests:   Lab Tests: Ordered and Reviewed  ED Management:    UA does not have any signs of urinary tract infection.  Vaginal screen is unremarkable.  Chlamydia gonorrhea cultures are sent.  At this point, do not see any signs of an infection requiring antibiotic treatment.  Patient is counseled on home care and treatment including symptom control.  Urged to follow up with gyn given resources.  Discharged in stable condition.The patient was instructed to follow up with a primary care provider in 2 days or to return to the emergency department for worsening symptoms. The treatment plan was discussed with the patient who demonstrated understanding and comfort with plan.      This note was created using [employer] Fluency Direct. There may be typographical errors secondary to dictation.                      ED Course as of Jan 09 2042   Thu Jan 09, 2020   1730 Brock Alfaro, 29 y.o.  presented to the ED with c/o vaginal irritation after using new bath wash. Reports normal discharge after being on menstrual cycle.     Patient seen and medically screened by the Provider in Triage due to ED crowding.  Appropriate tests and/or medications ordered.  A medical screening exam has  been performed.  The care will be assumed by myself or another provider when treatment space becomes available.  I am not assuming care of this patient at this time. 5:30 PM. FLORI SIMMS        [FC]      ED Course User Index  [FC] Quin Martinez PA-C                Clinical Impression:     1. Acute vaginitis           Disposition:   Disposition: Discharged  Condition: Stable                     Ml Tilley PA-C  01/09/20 2042

## 2020-01-11 LAB
C TRACH DNA SPEC QL NAA+PROBE: NOT DETECTED
N GONORRHOEA DNA SPEC QL NAA+PROBE: NOT DETECTED

## 2020-04-22 ENCOUNTER — PATIENT MESSAGE (OUTPATIENT)
Dept: ADMINISTRATIVE | Facility: OTHER | Age: 30
End: 2020-04-22

## 2020-09-26 ENCOUNTER — HOSPITAL ENCOUNTER (EMERGENCY)
Facility: HOSPITAL | Age: 30
Discharge: HOME | End: 2020-09-26
Attending: EMERGENCY MEDICINE
Payer: MEDICAID

## 2020-09-26 VITALS
OXYGEN SATURATION: 99 % | SYSTOLIC BLOOD PRESSURE: 121 MMHG | HEART RATE: 71 BPM | TEMPERATURE: 98 F | RESPIRATION RATE: 18 BRPM | WEIGHT: 190 LBS | BODY MASS INDEX: 38.3 KG/M2 | HEIGHT: 59 IN | DIASTOLIC BLOOD PRESSURE: 69 MMHG

## 2020-09-26 DIAGNOSIS — M79.672 LEFT FOOT PAIN: ICD-10-CM

## 2020-09-26 DIAGNOSIS — S90.122A CONTUSION OF LESSER TOE OF LEFT FOOT WITHOUT DAMAGE TO NAIL, INITIAL ENCOUNTER: Primary | ICD-10-CM

## 2020-09-26 PROCEDURE — 25000003 PHARM REV CODE 250: Performed by: PHYSICIAN ASSISTANT

## 2020-09-26 PROCEDURE — 99283 EMERGENCY DEPT VISIT LOW MDM: CPT | Mod: 25

## 2020-09-26 RX ORDER — IBUPROFEN 600 MG/1
600 TABLET ORAL EVERY 6 HOURS PRN
Qty: 20 TABLET | Refills: 0 | OUTPATIENT
Start: 2020-09-26 | End: 2020-10-02

## 2020-09-26 RX ORDER — ACETAMINOPHEN 500 MG
1000 TABLET ORAL
Status: COMPLETED | OUTPATIENT
Start: 2020-09-26 | End: 2020-09-26

## 2020-09-26 RX ADMIN — ACETAMINOPHEN 1000 MG: 500 TABLET ORAL at 11:09

## 2020-09-26 NOTE — ED PROVIDER NOTES
Encounter Date: 2020    SCRIBE #1 NOTE: I, Josh Sánchez, am scribing for, and in the presence of,  Sergio Su PA-C. I have scribed the following portions of the note - Other sections scribed: HPI, ROS, PE.       History     Chief Complaint   Patient presents with    Toe Pain     pt comes in via EMS from work - states cabinet fell on her L toes and top of L foot. denies ankle pain. able to ambulate. states the pain is throbbing. no swelling or deformity noted.      Brock Alfaro is a 30 y.o. female who presents to the ED with complaints of pain to second toe of left foot.  Patient reports she opened a cabinet at work and a piece of metal that was leaning against the cabinet fell on her foot. Endorses pain when wiggling toes. Denies any fall or LOC.  No pain anywhere other than toes on left foot. Has not taken anything for pain.  Last normal menstrual period ended yesterday.     The history is provided by the patient. No  was used.     Review of patient's allergies indicates:   Allergen Reactions    Unclassified drug Anaphylaxis     toothpaste    Grass pollen- grass standard     Msg [glutamic acid hcl]      Past Medical History:   Diagnosis Date    Hay fever     Oral herpes      Past Surgical History:   Procedure Laterality Date     SECTION      DILATION AND CURETTAGE OF UTERUS       Family History   Problem Relation Age of Onset    Breast cancer Maternal Grandmother     Ovarian cancer Neg Hx     Colon cancer Neg Hx      Social History     Tobacco Use    Smoking status: Never Smoker    Smokeless tobacco: Never Used   Substance Use Topics    Alcohol use: Yes     Comment: rare    Drug use: No     Review of Systems   Constitutional: Negative for fever.   HENT: Negative for sore throat.    Respiratory: Negative for shortness of breath.    Cardiovascular: Negative for chest pain.   Gastrointestinal: Negative for nausea.   Genitourinary: Negative for dysuria.    Musculoskeletal: Negative for back pain.        Positive for pain to second toe of left foot.    Skin: Negative for rash.   Neurological: Negative for weakness.   Hematological: Does not bruise/bleed easily.       Physical Exam     Initial Vitals [09/26/20 1008]   BP Pulse Resp Temp SpO2   123/70 77 16 98.5 °F (36.9 °C) 97 %      MAP       --         Physical Exam    Nursing note and vitals reviewed.  Constitutional: She appears well-developed and well-nourished. She is not diaphoretic. No distress.   HENT:   Head: Normocephalic and atraumatic.   Eyes: Conjunctivae and EOM are normal. Pupils are equal, round, and reactive to light. No scleral icterus.   Neck: Normal range of motion. Neck supple.   Cardiovascular: Normal rate, regular rhythm, normal heart sounds and intact distal pulses. Exam reveals no gallop and no friction rub.    No murmur heard.  Pulmonary/Chest: Breath sounds normal. No respiratory distress. She has no wheezes. She has no rhonchi. She has no rales.   Abdominal: Soft. Bowel sounds are normal. She exhibits no distension. There is no abdominal tenderness. There is no rebound and no guarding.   Musculoskeletal:        Left foot: Tenderness present. No swelling or deformity.        Feet:       Comments: No gross deformities, bruising, swelling, erythema, warmth noted.  Distal sensation intact.  2+ dorsalis pedis pulse present.   Neurological: She is alert and oriented to person, place, and time. She has normal strength. No cranial nerve deficit.   Skin: Skin is warm and dry. No rash noted.   Psychiatric: She has a normal mood and affect.         ED Course   Procedures  Labs Reviewed - No data to display       Imaging Results          X-Ray Foot Complete Left (Final result)  Result time 09/26/20 10:56:25    Final result by Brian Pedersen MD (09/26/20 10:56:25)                 Impression:      As above.      Electronically signed by: Brian Pedersen MD  Date:    09/26/2020  Time:    10:56              Narrative:    EXAMINATION:  XR FOOT COMPLETE 3 VIEW LEFT    CLINICAL HISTORY:  .  Pain in left foot    TECHNIQUE:  AP, lateral and oblique views of the left foot were performed.    COMPARISON:  None    FINDINGS:  There is no radiographic evidence of acute osseous, articular, or soft tissue abnormality.  There is prominent hallux valgus deformity.  Joint spaces are preserved.  No erosive changes demonstrated.                                 Medical Decision Making:   Clinical Tests:   Lab Tests: Ordered and Reviewed  Radiological Study: Reviewed and Ordered  ED Management:  Hemodynamically stable.  Nontoxic and in no acute distress.  Patient is overall well-appearing, pleasant, conversational.  X-ray read reports no acute osseous, articular or soft tissue abnormality.  History and physical findings consistent with contusion.  Will discharge home with rice instructions and PCP follow-up as needed.  Patient verbalizes understanding and is agreeable with plan.  Strict ED return precautions given for any worsening or additional concerning symptoms.            Scribe Attestation:   Scribe #1: I performed the above scribed service and the documentation accurately describes the services I performed. I attest to the accuracy of the note.                      Clinical Impression:     ICD-10-CM ICD-9-CM   1. Contusion of lesser toe of left foot without damage to nail, initial encounter  S90.122A 924.3   2. Left foot pain  M79.672 729.5                      Disposition:   Disposition: Discharged  Condition: Stable     ED Disposition Condition    Discharge Stable        ED Prescriptions     Medication Sig Dispense Start Date End Date Auth. Provider    ibuprofen (ADVIL,MOTRIN) 600 MG tablet Take 1 tablet (600 mg total) by mouth every 6 (six) hours as needed. 20 tablet 9/26/2020  Sergio Su PA-C        Follow-up Information     Follow up With Specialties Details Why Contact Info    [facility] [place]  [place]   Schedule an appointment as soon as possible for a visit   [address]  [place] [address]  [phone]      [facility] Emergency Medicine Go to  If symptoms worsen [address] [place] [address] [phone]                      I, LAVERN MORSE, personally performed the services described in this documentation. All medical record entries made by the scribe were at my direction and in my presence. I have reviewed the chart and agree that the record reflects my personal performance and is accurate and complete.                   Lavern Morse PA-C  09/26/20 1522

## 2020-09-26 NOTE — DISCHARGE INSTRUCTIONS
Please take new medications as directed and follow discharge instructions provided.  Please make sure to follow-up with PCP as needed to discuss today's emergency department visit and for further evaluation and management.  Please return emergency department immediately if your symptoms worsen or you develop any additional concerning symptoms.

## 2020-09-26 NOTE — Clinical Note
"Brock Freemanmily Alfaro was seen and treated in our emergency department on 9/26/2020.  She may return to work on 09/29/2020.       If you have any questions or concerns, please don't hesitate to call.      Sergio Su PA-C"

## 2020-10-02 ENCOUNTER — HOSPITAL ENCOUNTER (EMERGENCY)
Facility: OTHER | Age: 30
Discharge: HOME | End: 2020-10-02
Attending: EMERGENCY MEDICINE
Payer: MEDICAID

## 2020-10-02 VITALS
TEMPERATURE: 98 F | SYSTOLIC BLOOD PRESSURE: 150 MMHG | WEIGHT: 187 LBS | DIASTOLIC BLOOD PRESSURE: 88 MMHG | RESPIRATION RATE: 18 BRPM | BODY MASS INDEX: 37.7 KG/M2 | HEART RATE: 73 BPM | OXYGEN SATURATION: 99 % | HEIGHT: 59 IN

## 2020-10-02 DIAGNOSIS — S90.511A ABRASION OF RIGHT ANKLE, INITIAL ENCOUNTER: Primary | ICD-10-CM

## 2020-10-02 DIAGNOSIS — M25.579 ANKLE PAIN: ICD-10-CM

## 2020-10-02 DIAGNOSIS — S90.01XA CONTUSION OF RIGHT ANKLE, INITIAL ENCOUNTER: ICD-10-CM

## 2020-10-02 LAB
B-HCG UR QL: NEGATIVE
CTP QC/QA: YES

## 2020-10-02 PROCEDURE — 99284 EMERGENCY DEPT VISIT MOD MDM: CPT | Mod: 25

## 2020-10-02 PROCEDURE — 96374 THER/PROPH/DIAG INJ IV PUSH: CPT

## 2020-10-02 PROCEDURE — 96375 TX/PRO/DX INJ NEW DRUG ADDON: CPT

## 2020-10-02 PROCEDURE — 81025 URINE PREGNANCY TEST: CPT | Performed by: EMERGENCY MEDICINE

## 2020-10-02 PROCEDURE — 63600175 PHARM REV CODE 636 W HCPCS: Performed by: EMERGENCY MEDICINE

## 2020-10-02 RX ORDER — MORPHINE SULFATE 4 MG/ML
4 INJECTION, SOLUTION INTRAMUSCULAR; INTRAVENOUS
Status: COMPLETED | OUTPATIENT
Start: 2020-10-02 | End: 2020-10-02

## 2020-10-02 RX ORDER — KETOROLAC TROMETHAMINE 30 MG/ML
15 INJECTION, SOLUTION INTRAMUSCULAR; INTRAVENOUS
Status: COMPLETED | OUTPATIENT
Start: 2020-10-02 | End: 2020-10-02

## 2020-10-02 RX ORDER — ONDANSETRON 2 MG/ML
4 INJECTION INTRAMUSCULAR; INTRAVENOUS
Status: COMPLETED | OUTPATIENT
Start: 2020-10-02 | End: 2020-10-02

## 2020-10-02 RX ORDER — IBUPROFEN 600 MG/1
600 TABLET ORAL EVERY 6 HOURS PRN
Qty: 20 TABLET | Refills: 0 | Status: SHIPPED | OUTPATIENT
Start: 2020-10-02 | End: 2021-08-27

## 2020-10-02 RX ADMIN — ONDANSETRON 4 MG: 2 INJECTION INTRAMUSCULAR; INTRAVENOUS at 09:10

## 2020-10-02 RX ADMIN — KETOROLAC TROMETHAMINE 15 MG: 30 INJECTION, SOLUTION INTRAMUSCULAR at 09:10

## 2020-10-02 RX ADMIN — MORPHINE SULFATE 4 MG: 4 INJECTION, SOLUTION INTRAMUSCULAR; INTRAVENOUS at 09:10

## 2020-10-02 NOTE — ED TRIAGE NOTES
Pt to emergency department c/o r ankle pain. She states she was at work, trying to pull up a water meter, when she dropped it on her r foot. She denies falling down or hitting her head. She denies any n/v/d, fever or chills. Pt is awake and alert, resp pattern even and non labored.

## 2020-10-02 NOTE — ED PROVIDER NOTES
Encounter Date: 10/2/2020    SCRIBE #1 NOTE: I, Yesi Freed, am scribing for, and in the presence of, Dr. Longo.       History     Chief Complaint   Patient presents with    Ankle Injury     Pt was checking water meters and popped the water meter up flipping the top onto her right ankle. Unable to bear weight. Pt received pain meds per EMS      Time seen by provider: 9:15 AM    This is a 30 y.o. female who presents via EMS with complaint of right ankle pain. She was checking water meters about 1.5 hours ago and was standing behind a water meter as she lifted it up. It slipped backwards, fell towards her, and struck her right ankle. She is unable to bear weight or ambulate. The pain radiates up her leg, but she denies knee pain. She is able to move her small toe, but is hesitant to try moving her big toe secondary to pain. No other injury. She was given fentanyl via EMS. This is the extent of the patient's complaints at this time.    The history is provided by the patient.     Review of patient's allergies indicates:   Allergen Reactions    Unclassified drug Anaphylaxis     toothpaste    Grass pollen- grass standard     Msg [glutamic acid hcl]      Past Medical History:   Diagnosis Date    Hay fever     Oral herpes      Past Surgical History:   Procedure Laterality Date     SECTION      DILATION AND CURETTAGE OF UTERUS       Family History   Problem Relation Age of Onset    Breast cancer Maternal Grandmother     Ovarian cancer Neg Hx     Colon cancer Neg Hx      Social History     Tobacco Use    Smoking status: Never Smoker    Smokeless tobacco: Never Used   Substance Use Topics    Alcohol use: Yes     Comment: rare    Drug use: No     Review of Systems   Constitutional: Negative for fever.   HENT: Negative for sore throat.    Respiratory: Negative for shortness of breath.    Cardiovascular: Negative for chest pain.   Gastrointestinal: Negative for nausea.   Genitourinary: Negative for  dysuria.   Musculoskeletal: Positive for arthralgias (right ankle) and gait problem. Negative for back pain.        Negative for knee pain.   Skin: Negative for color change, rash and wound.   Neurological: Negative for weakness and numbness.   Hematological: Does not bruise/bleed easily.   All other systems reviewed and are negative.      Physical Exam     Initial Vitals [10/02/20 0910]   BP Pulse Resp Temp SpO2   116/67 60 18 98.1 °F (36.7 °C) (!) 93 %      MAP       --         Physical Exam    Nursing note and vitals reviewed.  Constitutional: She appears well-developed and well-nourished. She is not diaphoretic. No distress.   HENT:   Head: Normocephalic and atraumatic.   Eyes: EOM are normal.   Neck: Normal range of motion.   Cardiovascular:   Pulses:       Dorsalis pedis pulses are 2+ on the right side.        Posterior tibial pulses are 2+ on the right side.   Pulmonary/Chest: No respiratory distress.   Musculoskeletal:      Comments: Diffuse tenderness right ankle with mild swelling. Neurovascularly intact distally.   Neurological: She is alert and oriented to person, place, and time.   Skin: Skin is warm and dry. Capillary refill takes less than 2 seconds.   Abrasion overlying anterior aspect of right ankle.         ED Course   Procedures  Labs Reviewed   POCT URINE PREGNANCY          Imaging Results          X-Ray Ankle Complete Right (Final result)  Result time 10/02/20 09:45:17    Final result by Anastasia Garcia MD (10/02/20 09:45:17)                 Impression:      No acute osseous abnormality seen.      Electronically signed by: Anastasia Garcia  Date:    10/02/2020  Time:    09:45             Narrative:    EXAMINATION:  XR ANKLE COMPLETE 3 VIEW RIGHT    CLINICAL HISTORY:  Pain in unspecified ankle and joints of unspecified foot    TECHNIQUE:  AP, lateral, and oblique images of the right ankle were performed.    COMPARISON:  None    FINDINGS:  No acute fracture or dislocation seen.  No soft tissue  edema or radiopaque retained foreign body.                              X-Rays:   Independently Interpreted Readings:   Other Readings:  Right Ankle: No fracture. No dislocation.    Medical Decision Making:   History:   Old Medical Records: I decided to obtain old medical records.  Initial Assessment:   30 y.o. female presents with right ankle injury with water meter cover. Plan pain meds, X-ray, elevation, ice.  Differential Diagnosis:   Ankle abrasion, contusion, sprain, fracture  Independently Interpreted Test(s):   I have ordered and independently interpreted X-rays - see prior notes.  Clinical Tests:   Lab Tests: Ordered and Reviewed  Radiological Study: Ordered and Reviewed            Scribe Attestation:   Scribe #1: I performed the above scribed service and the documentation accurately describes the services I performed. I attest to the accuracy of the note.    Attending Attestation:           Physician Attestation for Scribe:  Physician Attestation Statement for Scribe #1: I, Dr. Longo, reviewed documentation, as scribed by Yesi Freed in my presence, and it is both accurate and complete.                           Clinical Impression:     ICD-10-CM ICD-9-CM   1. Abrasion of right ankle, initial encounter  S90.511A 916.0   2. Ankle pain  M25.579 719.47   3. Contusion of right ankle, initial encounter  S90.01XA 924.21                          ED Disposition Condition    Discharge Stable        ED Prescriptions     Medication Sig Dispense Start Date End Date Auth. Provider    ibuprofen (ADVIL,MOTRIN) 600 MG tablet Take 1 tablet (600 mg total) by mouth every 6 (six) hours as needed for Pain. Take with food 20 tablet 10/2/2020  Sona Longo MD        Follow-up Information     Follow up With Specialties Details Why Contact Info    [facility]  Schedule an appointment as soon as possible for a visit   [address]  [facility] [address]  [phone]      [facility]     [address] [facility] [address]  [phone]                                         Sona Longo MD  10/03/20 0912

## 2020-10-02 NOTE — ED NOTES
"The pt refused the HIV and Hep C test. She states, " I just got tested for those with my OBGYN".   "

## 2021-01-16 ENCOUNTER — HOSPITAL ENCOUNTER (EMERGENCY)
Facility: OTHER | Age: 31
Discharge: HOME | End: 2021-01-16
Attending: EMERGENCY MEDICINE
Payer: MEDICAID

## 2021-01-16 VITALS
OXYGEN SATURATION: 100 % | SYSTOLIC BLOOD PRESSURE: 131 MMHG | HEIGHT: 60 IN | TEMPERATURE: 99 F | RESPIRATION RATE: 18 BRPM | BODY MASS INDEX: 36.32 KG/M2 | HEART RATE: 90 BPM | WEIGHT: 185 LBS | DIASTOLIC BLOOD PRESSURE: 85 MMHG

## 2021-01-16 DIAGNOSIS — N76.0 ACUTE VAGINITIS: ICD-10-CM

## 2021-01-16 DIAGNOSIS — B96.89 BACTERIAL VAGINOSIS: Primary | ICD-10-CM

## 2021-01-16 DIAGNOSIS — N76.0 BACTERIAL VAGINOSIS: Primary | ICD-10-CM

## 2021-01-16 LAB

## 2021-01-16 PROCEDURE — 81025 URINE PREGNANCY TEST: CPT | Performed by: EMERGENCY MEDICINE

## 2021-01-16 PROCEDURE — 99284 EMERGENCY DEPT VISIT MOD MDM: CPT

## 2021-01-16 PROCEDURE — 87210 SMEAR WET MOUNT SALINE/INK: CPT

## 2021-01-16 PROCEDURE — 87491 CHLMYD TRACH DNA AMP PROBE: CPT

## 2021-01-16 PROCEDURE — 81003 URINALYSIS AUTO W/O SCOPE: CPT

## 2021-01-16 PROCEDURE — 25000003 PHARM REV CODE 250: Performed by: PHYSICIAN ASSISTANT

## 2021-01-16 PROCEDURE — 87591 N.GONORRHOEAE DNA AMP PROB: CPT

## 2021-01-16 RX ORDER — FLUCONAZOLE 150 MG/1
150 TABLET ORAL DAILY
Qty: 1 TABLET | Refills: 0 | Status: SHIPPED | OUTPATIENT
Start: 2021-01-19 | End: 2021-01-20

## 2021-01-16 RX ORDER — METRONIDAZOLE 7.5 MG/G
1 GEL VAGINAL NIGHTLY
Qty: 5 APPLICATOR | Refills: 0 | Status: SHIPPED | OUTPATIENT
Start: 2021-01-16 | End: 2021-01-16 | Stop reason: SDUPTHER

## 2021-01-16 RX ORDER — METRONIDAZOLE 7.5 MG/G
1 GEL VAGINAL NIGHTLY
Qty: 5 APPLICATOR | Refills: 0 | Status: SHIPPED | OUTPATIENT
Start: 2021-01-16 | End: 2021-01-21

## 2021-01-16 RX ORDER — FLUCONAZOLE 150 MG/1
150 TABLET ORAL
Status: COMPLETED | OUTPATIENT
Start: 2021-01-16 | End: 2021-01-16

## 2021-01-16 RX ADMIN — FLUCONAZOLE 150 MG: 150 TABLET ORAL at 12:01

## 2021-01-20 LAB
C TRACH DNA SPEC QL NAA+PROBE: NOT DETECTED
N GONORRHOEA DNA SPEC QL NAA+PROBE: NOT DETECTED

## 2021-02-20 ENCOUNTER — TELEPHONE (OUTPATIENT)
Dept: URGENT CARE | Facility: CLINIC | Age: 31
End: 2021-02-20

## 2021-02-20 DIAGNOSIS — K04.7 DENTAL ABSCESS: Primary | ICD-10-CM

## 2021-02-20 RX ORDER — PENICILLIN V POTASSIUM 500 MG/1
500 TABLET, FILM COATED ORAL EVERY 6 HOURS
Qty: 40 TABLET | Refills: 0 | Status: SHIPPED | OUTPATIENT
Start: 2021-02-20 | End: 2021-03-02

## 2021-04-16 ENCOUNTER — PATIENT MESSAGE (OUTPATIENT)
Dept: RESEARCH | Facility: HOSPITAL | Age: 31
End: 2021-04-16

## 2021-06-25 ENCOUNTER — HOSPITAL ENCOUNTER (EMERGENCY)
Facility: OTHER | Age: 31
Discharge: HOME | End: 2021-06-25
Attending: EMERGENCY MEDICINE
Payer: MEDICAID

## 2021-06-25 VITALS
DIASTOLIC BLOOD PRESSURE: 72 MMHG | TEMPERATURE: 98 F | OXYGEN SATURATION: 98 % | HEART RATE: 77 BPM | SYSTOLIC BLOOD PRESSURE: 122 MMHG | RESPIRATION RATE: 18 BRPM | HEIGHT: 60 IN | BODY MASS INDEX: 38.48 KG/M2 | WEIGHT: 196 LBS

## 2021-06-25 DIAGNOSIS — R07.9 CHEST PAIN: ICD-10-CM

## 2021-06-25 DIAGNOSIS — R55 SYNCOPE, UNSPECIFIED SYNCOPE TYPE: Primary | ICD-10-CM

## 2021-06-25 LAB
ALBUMIN SERPL BCP-MCNC: 3.7 G/DL (ref 3.5–5.2)
ALP SERPL-CCNC: 78 U/L (ref 55–135)
ALT SERPL W/O P-5'-P-CCNC: 16 U/L (ref 10–44)
ANION GAP SERPL CALC-SCNC: 9 MMOL/L (ref 8–16)
AST SERPL-CCNC: 18 U/L (ref 10–40)
B-HCG UR QL: NEGATIVE
BASOPHILS # BLD AUTO: 0.03 K/UL (ref 0–0.2)
BASOPHILS NFR BLD: 0.5 % (ref 0–1.9)
BILIRUB SERPL-MCNC: 0.4 MG/DL (ref 0.1–1)
BNP SERPL-MCNC: <10 PG/ML (ref 0–99)
BUN SERPL-MCNC: 14 MG/DL (ref 6–20)
CALCIUM SERPL-MCNC: 9.5 MG/DL (ref 8.7–10.5)
CHLORIDE SERPL-SCNC: 106 MMOL/L (ref 95–110)
CO2 SERPL-SCNC: 22 MMOL/L (ref 23–29)
CREAT SERPL-MCNC: 0.8 MG/DL (ref 0.5–1.4)
CTP QC/QA: YES
DIFFERENTIAL METHOD: ABNORMAL
EOSINOPHIL # BLD AUTO: 0.1 K/UL (ref 0–0.5)
EOSINOPHIL NFR BLD: 1.5 % (ref 0–8)
ERYTHROCYTE [DISTWIDTH] IN BLOOD BY AUTOMATED COUNT: 14.1 % (ref 11.5–14.5)
EST. GFR  (AFRICAN AMERICAN): >60 ML/MIN/1.73 M^2
EST. GFR  (NON AFRICAN AMERICAN): >60 ML/MIN/1.73 M^2
GLUCOSE SERPL-MCNC: 81 MG/DL (ref 70–110)
HCT VFR BLD AUTO: 36.5 % (ref 37–48.5)
HGB BLD-MCNC: 11.7 G/DL (ref 12–16)
IMM GRANULOCYTES # BLD AUTO: 0.02 K/UL (ref 0–0.04)
IMM GRANULOCYTES NFR BLD AUTO: 0.3 % (ref 0–0.5)
LYMPHOCYTES # BLD AUTO: 1.7 K/UL (ref 1–4.8)
LYMPHOCYTES NFR BLD: 28 % (ref 18–48)
MCH RBC QN AUTO: 28.1 PG (ref 27–31)
MCHC RBC AUTO-ENTMCNC: 32.1 G/DL (ref 32–36)
MCV RBC AUTO: 88 FL (ref 82–98)
MONOCYTES # BLD AUTO: 0.6 K/UL (ref 0.3–1)
MONOCYTES NFR BLD: 9.2 % (ref 4–15)
NEUTROPHILS # BLD AUTO: 3.7 K/UL (ref 1.8–7.7)
NEUTROPHILS NFR BLD: 60.5 % (ref 38–73)
NRBC BLD-RTO: 0 /100 WBC
PLATELET # BLD AUTO: 312 K/UL (ref 150–450)
PMV BLD AUTO: 9.4 FL (ref 9.2–12.9)
POTASSIUM SERPL-SCNC: 3.9 MMOL/L (ref 3.5–5.1)
PROT SERPL-MCNC: 7.4 G/DL (ref 6–8.4)
RBC # BLD AUTO: 4.16 M/UL (ref 4–5.4)
SODIUM SERPL-SCNC: 137 MMOL/L (ref 136–145)
TROPONIN I SERPL DL<=0.01 NG/ML-MCNC: <0.006 NG/ML (ref 0–0.03)
TROPONIN I SERPL DL<=0.01 NG/ML-MCNC: <0.006 NG/ML (ref 0–0.03)
WBC # BLD AUTO: 6.07 K/UL (ref 3.9–12.7)

## 2021-06-25 PROCEDURE — 80053 COMPREHEN METABOLIC PANEL: CPT | Performed by: EMERGENCY MEDICINE

## 2021-06-25 PROCEDURE — 81025 URINE PREGNANCY TEST: CPT | Performed by: EMERGENCY MEDICINE

## 2021-06-25 PROCEDURE — 84484 ASSAY OF TROPONIN QUANT: CPT | Mod: 91 | Performed by: EMERGENCY MEDICINE

## 2021-06-25 PROCEDURE — 83880 ASSAY OF NATRIURETIC PEPTIDE: CPT | Performed by: EMERGENCY MEDICINE

## 2021-06-25 PROCEDURE — 93005 ELECTROCARDIOGRAM TRACING: CPT

## 2021-06-25 PROCEDURE — 99285 EMERGENCY DEPT VISIT HI MDM: CPT | Mod: 25

## 2021-06-25 PROCEDURE — 93010 ELECTROCARDIOGRAM REPORT: CPT | Mod: ,,, | Performed by: INTERNAL MEDICINE

## 2021-06-25 PROCEDURE — 93010 EKG 12-LEAD: ICD-10-PCS | Mod: ,,, | Performed by: INTERNAL MEDICINE

## 2021-06-25 PROCEDURE — 85025 COMPLETE CBC W/AUTO DIFF WBC: CPT | Performed by: EMERGENCY MEDICINE

## 2021-06-25 RX ORDER — HYDROXYZINE PAMOATE 25 MG/1
25 CAPSULE ORAL EVERY 6 HOURS PRN
Qty: 25 CAPSULE | Refills: 0 | Status: SHIPPED | OUTPATIENT
Start: 2021-06-25 | End: 2021-08-27

## 2021-08-27 ENCOUNTER — INITIAL PRENATAL (OUTPATIENT)
Dept: OBSTETRICS AND GYNECOLOGY | Facility: CLINIC | Age: 31
End: 2021-08-27
Payer: MEDICAID

## 2021-08-27 VITALS
SYSTOLIC BLOOD PRESSURE: 122 MMHG | DIASTOLIC BLOOD PRESSURE: 70 MMHG | WEIGHT: 219.56 LBS | BODY MASS INDEX: 42.88 KG/M2

## 2021-08-27 DIAGNOSIS — O02.1 EMBRYONIC DEMISE: Primary | ICD-10-CM

## 2021-08-27 DIAGNOSIS — O34.219 PREVIOUS CESAREAN SECTION COMPLICATING PREGNANCY, ANTEPARTUM CONDITION OR COMPLICATION: ICD-10-CM

## 2021-08-27 DIAGNOSIS — O99.211 OBESITY AFFECTING PREGNANCY IN FIRST TRIMESTER: ICD-10-CM

## 2021-08-27 PROCEDURE — 99213 OFFICE O/P EST LOW 20 MIN: CPT | Mod: PBBFAC,TH | Performed by: OBSTETRICS & GYNECOLOGY

## 2021-08-27 PROCEDURE — 99204 PR OFFICE/OUTPT VISIT, NEW, LEVL IV, 45-59 MIN: ICD-10-PCS | Mod: TH,S$PBB,, | Performed by: OBSTETRICS & GYNECOLOGY

## 2021-08-27 PROCEDURE — 99204 OFFICE O/P NEW MOD 45 MIN: CPT | Mod: TH,S$PBB,, | Performed by: OBSTETRICS & GYNECOLOGY

## 2021-08-27 PROCEDURE — 99999 PR PBB SHADOW E&M-EST. PATIENT-LVL III: ICD-10-PCS | Mod: PBBFAC,,, | Performed by: OBSTETRICS & GYNECOLOGY

## 2021-08-27 PROCEDURE — 99999 PR PBB SHADOW E&M-EST. PATIENT-LVL III: CPT | Mod: PBBFAC,,, | Performed by: OBSTETRICS & GYNECOLOGY

## 2021-11-30 ENCOUNTER — HOSPITAL ENCOUNTER (EMERGENCY)
Facility: HOSPITAL | Age: 31
Discharge: HOME | End: 2021-11-30
Attending: EMERGENCY MEDICINE
Payer: MEDICAID

## 2021-11-30 ENCOUNTER — TELEPHONE (OUTPATIENT)
Dept: OBSTETRICS AND GYNECOLOGY | Facility: CLINIC | Age: 31
End: 2021-11-30
Payer: MEDICAID

## 2021-11-30 VITALS
HEART RATE: 103 BPM | TEMPERATURE: 99 F | RESPIRATION RATE: 16 BRPM | SYSTOLIC BLOOD PRESSURE: 121 MMHG | BODY MASS INDEX: 43.78 KG/M2 | DIASTOLIC BLOOD PRESSURE: 69 MMHG | WEIGHT: 223 LBS | HEIGHT: 60 IN | OXYGEN SATURATION: 100 %

## 2021-11-30 DIAGNOSIS — Z34.90 PREGNANCY, UNSPECIFIED GESTATIONAL AGE: ICD-10-CM

## 2021-11-30 DIAGNOSIS — J01.00 ACUTE NON-RECURRENT MAXILLARY SINUSITIS: Primary | ICD-10-CM

## 2021-11-30 DIAGNOSIS — Z20.822 LAB TEST NEGATIVE FOR COVID-19 VIRUS: ICD-10-CM

## 2021-11-30 LAB
B-HCG UR QL: POSITIVE
CTP QC/QA: YES
POC MOLECULAR INFLUENZA A AGN: NEGATIVE
POC MOLECULAR INFLUENZA B AGN: NEGATIVE
SARS-COV-2 RDRP RESP QL NAA+PROBE: NEGATIVE

## 2021-11-30 PROCEDURE — 81025 URINE PREGNANCY TEST: CPT | Performed by: NURSE PRACTITIONER

## 2021-11-30 PROCEDURE — U0002 COVID-19 LAB TEST NON-CDC: HCPCS | Performed by: NURSE PRACTITIONER

## 2021-11-30 PROCEDURE — 99284 EMERGENCY DEPT VISIT MOD MDM: CPT | Mod: CS,,, | Performed by: NURSE PRACTITIONER

## 2021-11-30 PROCEDURE — 99283 EMERGENCY DEPT VISIT LOW MDM: CPT | Mod: 25

## 2021-11-30 PROCEDURE — 99284 PR EMERGENCY DEPT VISIT,LEVEL IV: ICD-10-PCS | Mod: CS,,, | Performed by: NURSE PRACTITIONER

## 2021-11-30 PROCEDURE — 87502 INFLUENZA DNA AMP PROBE: CPT

## 2021-11-30 RX ORDER — AMOXICILLIN AND CLAVULANATE POTASSIUM 875; 125 MG/1; MG/1
1 TABLET, FILM COATED ORAL EVERY 12 HOURS
Qty: 10 TABLET | Refills: 0 | Status: SHIPPED | OUTPATIENT
Start: 2021-11-30 | End: 2021-12-05

## 2022-01-08 ENCOUNTER — HOSPITAL ENCOUNTER (EMERGENCY)
Facility: HOSPITAL | Age: 32
Discharge: HOME | End: 2022-01-08
Attending: EMERGENCY MEDICINE
Payer: MEDICAID

## 2022-01-08 VITALS
BODY MASS INDEX: 43.34 KG/M2 | SYSTOLIC BLOOD PRESSURE: 127 MMHG | HEART RATE: 76 BPM | TEMPERATURE: 99 F | OXYGEN SATURATION: 95 % | RESPIRATION RATE: 18 BRPM | DIASTOLIC BLOOD PRESSURE: 86 MMHG | HEIGHT: 59 IN | WEIGHT: 215 LBS

## 2022-01-08 DIAGNOSIS — O20.0 THREATENED MISCARRIAGE IN EARLY PREGNANCY: Primary | ICD-10-CM

## 2022-01-08 DIAGNOSIS — R10.2 SUPRAPUBIC ABDOMINAL PAIN: ICD-10-CM

## 2022-01-08 DIAGNOSIS — O46.90 VAGINAL BLEEDING IN PREGNANCY: ICD-10-CM

## 2022-01-08 LAB
ABO GROUP BLD: NORMAL
ALBUMIN SERPL BCP-MCNC: 3.8 G/DL (ref 3.5–5.2)
ALP SERPL-CCNC: 72 U/L (ref 55–135)
ALT SERPL W/O P-5'-P-CCNC: 98 U/L (ref 10–44)
ANION GAP SERPL CALC-SCNC: 12 MMOL/L (ref 8–16)
AST SERPL-CCNC: 37 U/L (ref 10–40)
B-HCG UR QL: POSITIVE
BACTERIA #/AREA URNS HPF: NORMAL /HPF
BACTERIA GENITAL QL WET PREP: ABNORMAL
BASOPHILS # BLD AUTO: 0.02 K/UL (ref 0–0.2)
BASOPHILS NFR BLD: 0.2 % (ref 0–1.9)
BILIRUB SERPL-MCNC: 0.2 MG/DL (ref 0.1–1)
BILIRUB UR QL STRIP: NEGATIVE
BUN SERPL-MCNC: 8 MG/DL (ref 6–20)
CALCIUM SERPL-MCNC: 9.8 MG/DL (ref 8.7–10.5)
CHLORIDE SERPL-SCNC: 104 MMOL/L (ref 95–110)
CLARITY UR: CLEAR
CLUE CELLS VAG QL WET PREP: ABNORMAL
CO2 SERPL-SCNC: 23 MMOL/L (ref 23–29)
COLOR UR: YELLOW
CREAT SERPL-MCNC: 0.7 MG/DL (ref 0.5–1.4)
CTP QC/QA: YES
DIFFERENTIAL METHOD: ABNORMAL
EOSINOPHIL # BLD AUTO: 0.1 K/UL (ref 0–0.5)
EOSINOPHIL NFR BLD: 0.7 % (ref 0–8)
ERYTHROCYTE [DISTWIDTH] IN BLOOD BY AUTOMATED COUNT: 13.5 % (ref 11.5–14.5)
EST. GFR  (AFRICAN AMERICAN): >60 ML/MIN/1.73 M^2
EST. GFR  (NON AFRICAN AMERICAN): >60 ML/MIN/1.73 M^2
FILAMENT FUNGI VAG WET PREP-#/AREA: ABNORMAL
GLUCOSE SERPL-MCNC: 79 MG/DL (ref 70–110)
GLUCOSE UR QL STRIP: NEGATIVE
HCG INTACT+B SERPL-ACNC: NORMAL MIU/ML
HCT VFR BLD AUTO: 39.5 % (ref 37–48.5)
HGB BLD-MCNC: 12.9 G/DL (ref 12–16)
HGB UR QL STRIP: ABNORMAL
IMM GRANULOCYTES # BLD AUTO: 0.03 K/UL (ref 0–0.04)
IMM GRANULOCYTES NFR BLD AUTO: 0.3 % (ref 0–0.5)
KETONES UR QL STRIP: NEGATIVE
LEUKOCYTE ESTERASE UR QL STRIP: NEGATIVE
LYMPHOCYTES # BLD AUTO: 2 K/UL (ref 1–4.8)
LYMPHOCYTES NFR BLD: 20 % (ref 18–48)
MCH RBC QN AUTO: 29.5 PG (ref 27–31)
MCHC RBC AUTO-ENTMCNC: 32.7 G/DL (ref 32–36)
MCV RBC AUTO: 90 FL (ref 82–98)
MICROSCOPIC COMMENT: NORMAL
MONOCYTES # BLD AUTO: 0.6 K/UL (ref 0.3–1)
MONOCYTES NFR BLD: 5.5 % (ref 4–15)
NEUTROPHILS # BLD AUTO: 7.5 K/UL (ref 1.8–7.7)
NEUTROPHILS NFR BLD: 73.3 % (ref 38–73)
NITRITE UR QL STRIP: NEGATIVE
NRBC BLD-RTO: 0 /100 WBC
PH UR STRIP: 6 [PH] (ref 5–8)
PLATELET # BLD AUTO: 292 K/UL (ref 150–450)
PMV BLD AUTO: 9.9 FL (ref 9.2–12.9)
POTASSIUM SERPL-SCNC: 4.1 MMOL/L (ref 3.5–5.1)
PROT SERPL-MCNC: 8.1 G/DL (ref 6–8.4)
PROT UR QL STRIP: NEGATIVE
RBC # BLD AUTO: 4.38 M/UL (ref 4–5.4)
RBC #/AREA URNS HPF: 2 /HPF (ref 0–4)
RH BLD: NORMAL
SODIUM SERPL-SCNC: 139 MMOL/L (ref 136–145)
SP GR UR STRIP: 1.02 (ref 1–1.03)
SPECIMEN SOURCE: ABNORMAL
SQUAMOUS #/AREA URNS HPF: 4 /HPF
T VAGINALIS GENITAL QL WET PREP: ABNORMAL
URN SPEC COLLECT METH UR: ABNORMAL
UROBILINOGEN UR STRIP-ACNC: NEGATIVE EU/DL
WBC # BLD AUTO: 10.2 K/UL (ref 3.9–12.7)
WBC #/AREA URNS HPF: 3 /HPF (ref 0–5)
WBC #/AREA VAG WET PREP: ABNORMAL
YEAST GENITAL QL WET PREP: ABNORMAL

## 2022-01-08 PROCEDURE — 96360 HYDRATION IV INFUSION INIT: CPT

## 2022-01-08 PROCEDURE — 84702 CHORIONIC GONADOTROPIN TEST: CPT | Performed by: NURSE PRACTITIONER

## 2022-01-08 PROCEDURE — 85025 COMPLETE CBC W/AUTO DIFF WBC: CPT | Performed by: NURSE PRACTITIONER

## 2022-01-08 PROCEDURE — 25000003 PHARM REV CODE 250: Performed by: NURSE PRACTITIONER

## 2022-01-08 PROCEDURE — 80053 COMPREHEN METABOLIC PANEL: CPT | Performed by: NURSE PRACTITIONER

## 2022-01-08 PROCEDURE — 86850 RBC ANTIBODY SCREEN: CPT | Performed by: NURSE PRACTITIONER

## 2022-01-08 PROCEDURE — 86900 BLOOD TYPING SEROLOGIC ABO: CPT | Performed by: NURSE PRACTITIONER

## 2022-01-08 PROCEDURE — 87210 SMEAR WET MOUNT SALINE/INK: CPT | Performed by: NURSE PRACTITIONER

## 2022-01-08 PROCEDURE — 85461 HEMOGLOBIN FETAL: CPT | Performed by: NURSE PRACTITIONER

## 2022-01-08 PROCEDURE — 81000 URINALYSIS NONAUTO W/SCOPE: CPT | Performed by: NURSE PRACTITIONER

## 2022-01-08 PROCEDURE — 86901 BLOOD TYPING SEROLOGIC RH(D): CPT | Performed by: NURSE PRACTITIONER

## 2022-01-08 PROCEDURE — 81025 URINE PREGNANCY TEST: CPT | Performed by: NURSE PRACTITIONER

## 2022-01-08 PROCEDURE — 99284 EMERGENCY DEPT VISIT MOD MDM: CPT | Mod: 25

## 2022-01-08 RX ORDER — ONDANSETRON 8 MG/1
8 TABLET, ORALLY DISINTEGRATING ORAL
Status: COMPLETED | OUTPATIENT
Start: 2022-01-08 | End: 2022-01-08

## 2022-01-08 RX ORDER — ONDANSETRON 2 MG/ML
4 INJECTION INTRAMUSCULAR; INTRAVENOUS
Status: DISCONTINUED | OUTPATIENT
Start: 2022-01-08 | End: 2022-01-08

## 2022-01-08 RX ORDER — ACETAMINOPHEN 325 MG/1
650 TABLET ORAL
Status: COMPLETED | OUTPATIENT
Start: 2022-01-08 | End: 2022-01-08

## 2022-01-08 RX ADMIN — ONDANSETRON 8 MG: 8 TABLET, ORALLY DISINTEGRATING ORAL at 07:01

## 2022-01-08 RX ADMIN — SODIUM CHLORIDE 1000 ML: 0.9 INJECTION, SOLUTION INTRAVENOUS at 07:01

## 2022-01-08 RX ADMIN — ACETAMINOPHEN 650 MG: 325 TABLET ORAL at 07:01

## 2022-01-09 NOTE — ED PROVIDER NOTES
Encounter Date: 2022    SCRIBE #1 NOTE: I, Sergio Mcmahon, am scribing for, and in the presence of,  FESTUS Charlton. I have scribed the following portions of the note - Other sections scribed: HPI, ROS.       History     Chief Complaint   Patient presents with    Threatened Miscarriage     Patient reports spotting blood from vagina during 11th week of pregnancy. Patient also reports lower abdominal cramping. Symptoms started about 1 hour pta.      31 y.o. (11 weeks pregnant, A2) female, with no pertinent past medical history presents to the ED with concerns for threatened miscarriage. Pt states that she has been experiencing lower abdominal cramping, vomiting, and diarrhea since the beginning of her pregnancy. Pt states that the vomiting has resolved due to her taking Zofran for 3 days. Pt notes that today she began spotting. Pt states that using pads is not necessary due to the low volume of blood. Pt states that she was told she does not have a UTI last week upon ED visit. No other exacerbating or alleviating factors. Patient denies urinary frequency, hematuria, dysuria, or other associated symptoms.       The history is provided by the patient. No  was used.     Review of patient's allergies indicates:   Allergen Reactions    Unclassified drug Anaphylaxis     toothpaste    Grass pollen- grass standard     Msg [glutamic acid hcl]      Past Medical History:   Diagnosis Date    Hay fever     Oral herpes      Past Surgical History:   Procedure Laterality Date     SECTION      DILATION AND CURETTAGE OF UTERUS       Family History   Problem Relation Age of Onset    Breast cancer Maternal Grandmother     Ovarian cancer Neg Hx     Colon cancer Neg Hx      Social History     Tobacco Use    Smoking status: Never Smoker    Smokeless tobacco: Never Used   Substance Use Topics    Alcohol use: Yes     Comment: rare    Drug use: No     Review of Systems    Constitutional: Negative for chills and fever.   HENT: Negative for congestion, rhinorrhea and sore throat.    Eyes: Negative for visual disturbance.   Respiratory: Negative for cough and shortness of breath.    Cardiovascular: Negative for chest pain.   Gastrointestinal: Positive for abdominal pain (lower mid), diarrhea and vomiting. Negative for nausea.        (+) abdominal cramping   Genitourinary: Positive for vaginal bleeding (spotting when whiping). Negative for dysuria, frequency and hematuria.   Musculoskeletal: Negative for back pain, neck pain and neck stiffness.   Skin: Negative for rash and wound.   Neurological: Negative for dizziness, weakness and headaches.   Psychiatric/Behavioral: Negative for confusion.       Physical Exam     Initial Vitals [01/08/22 1711]   BP Pulse Resp Temp SpO2   127/86 101 18 99.1 °F (37.3 °C) 95 %      MAP       --         Physical Exam    Nursing note and vitals reviewed.  Constitutional: She appears well-developed and well-nourished. She is not diaphoretic. She is cooperative.  Non-toxic appearance. She does not have a sickly appearance. She does not appear ill. No distress.   HENT:   Head: Normocephalic and atraumatic.   Right Ear: External ear normal.   Left Ear: External ear normal.   Nose: Nose normal.   Mouth/Throat: Mucous membranes are normal. No trismus in the jaw.   Eyes: Conjunctivae and EOM are normal.   Neck: Phonation normal. Neck supple. No tracheal deviation present.   Normal range of motion.  Cardiovascular: Normal rate, regular rhythm and intact distal pulses.   Pulses:       Radial pulses are 2+ on the right side and 2+ on the left side.   Pulmonary/Chest: Effort normal and breath sounds normal. No accessory muscle usage or stridor. No tachypnea and no bradypnea. No respiratory distress. She has no wheezes. She has no rhonchi. She has no rales. She exhibits no tenderness.   Abdominal: She exhibits no distension. There is abdominal tenderness (mild,  suprapubic) in the suprapubic area. There is no rebound and no guarding.   Genitourinary:    Pelvic exam was performed with patient supine.   There is no rash, tenderness or lesion on the right labia. There is no rash, tenderness or lesion on the left labia. Cervix exhibits no motion tenderness, no discharge and no friability.    Vaginal bleeding present.      No vaginal erythema or tenderness.   There is bleeding in the vagina. No erythema or tenderness in the vagina.    Genitourinary Comments: Scant amount of old brown blood in the vaginal vault.  Cervix is closed.  No cervical discharge.     Musculoskeletal:         General: Normal range of motion.      Cervical back: Normal range of motion and neck supple.     Neurological: She is alert and oriented to person, place, and time. She has normal strength. No sensory deficit. Coordination and gait normal. GCS score is 15. GCS eye subscore is 4. GCS verbal subscore is 5. GCS motor subscore is 6.   Skin: Skin is warm, dry and intact. Capillary refill takes less than 2 seconds. No bruising and no rash noted. No cyanosis or erythema. Nails show no clubbing.   Psychiatric: She has a normal mood and affect. Her behavior is normal. Judgment and thought content normal.         ED Course   Procedures  Labs Reviewed   URINALYSIS, REFLEX TO URINE CULTURE - Abnormal; Notable for the following components:       Result Value    Occult Blood UA 1+ (*)     All other components within normal limits    Narrative:     Specimen Source->Urine   VAGINAL SCREEN - Abnormal; Notable for the following components:    WBC - Vaginal Screen Rare (*)     Bacteria - Vaginal Screen Few (*)     All other components within normal limits   POCT URINE PREGNANCY - Abnormal; Notable for the following components:    POC Preg Test, Ur Positive (*)     All other components within normal limits   URINALYSIS MICROSCOPIC    Narrative:     Specimen Source->Urine   CBC W/ AUTO DIFFERENTIAL   COMPREHENSIVE  METABOLIC PANEL   HCG, QUANTITATIVE   GROUP & RH   TYPE & SCREEN   ALOK - [facility] (FETAL BLEED SCREEN)   PREPARE RH IMMUNE GLOBULIN          Imaging Results    None          Medications   sodium chloride 0.9% bolus 1,000 mL (1,000 mLs Intravenous New Bag 1/8/22 1928)   rho(D) immune globulin injection 300 mcg (has no administration in time range)   acetaminophen tablet 650 mg (650 mg Oral Given 1/8/22 1915)   ondansetron disintegrating tablet 8 mg (8 mg Oral Given 1/8/22 1914)     Medical Decision Making:   History:   Old Medical Records: I decided to obtain old medical records.  Old Records Summarized: records from clinic visits.       <> Summary of Records: ABO Rh B negative on labs 12/01/2021.       APC / Resident Notes:   This is an evaluation of a 31 y.o. female that presents to the Emergency Department for vaginal spotting in pregnancy.  Patient with history of previous miscarriage, most recently August 2021. Physical Exam shows a non-toxic, afebrile, and well appearing female.  Her abdomen is soft with some mild tenderness low mid suprapubic abdomen.  No tenderness to palpation left or right lower quadrants.  Vaginal exam with some scant blood in the vault.  No active bleeding. Vital signs are reassuring. If available, previous records reviewed. RESULTS:  UPT is positive.  Vaginal screen with rare WBCs and few bacteria.    Differential diagnosis:  UTI, pyelonephritis, ectopic pregnancy, TOA, ovarian torsion, threatened miscarriage.     At the time of sign-out, ultrasound is still pending.  Care be transferred to ARNOL To DNP pending ED workup, reassessment and final disposition of the patient. 7:41 PM   JOHN Guerrier, FNP-C     Scribe Attestation:   Scribe #1: I performed the above scribed service and the documentation accurately describes the services I performed. I attest to the accuracy of the note.        ED Course as of 01/10/22 0554   Sat Jan 08, 2022 1934 Preg Test, Ur(!): Positive [VC]   1934  Urinalysis, Reflex to Urine Culture Urine, Clean Catch(!)  Neg for uti. [VC]   1934 Vaginal Screen Vagina(!)  Normal vag screen. [VC]   1934 BP: 127/86 [VC]   1934 Temp: 99.1 °F (37.3 °C) [VC]   1934 Temp src: Oral [VC]   1934 Pulse: 101 [VC]   1934 Resp: 18 [VC]   1934 SpO2: 95 % [VC]   1949 CBC W/ AUTO DIFFERENTIAL(!)  Normal cbc. [VC]   2019 Comp. Metabolic Panel(!)  Normal cmp. [VC]   2041 HCG Quant: [id number]  Correlates to 11 wks gestation.; [VC]   2130 Awaiting ultrasound results and admin of rhogam. [VC]   2151 US OB <14 Wks TransAbd & TransVag, Single Gestation (XPD)  Early live intrauterine pregnancy with crown-rump length measurements corresponding to a gestational age of 10 weeks 4 days with an estimated ultrasound due date of 07/31/2022.     Left corpus luteum.  Left simple ovarian cyst. [VC]   2157 Vianey Marte, [place],  tech states that pt is B+ blood type.  No need for rhogam. [VC]      ED Course User Index  [VC] Emiliano To DNP         US demonstrates live IUP.  Pt d/c'ed home in good condition to f/u with obgyn.      Clinical Impression:   Final diagnoses:  [O46.90] Vaginal bleeding in pregnancy  [O20.0] Threatened miscarriage in early pregnancy (Primary)  [R10.2] Suprapubic abdominal pain             AMADOU PAK, APRN, FNP-C, personally performed the services described in this documentation. All medical record entries made by the scribe were at my direction and in my presence. I have reviewed the chart and agree that the record reflects my personal performance and is accurate and complete.       Emiliano To DNP  01/10/22 0554

## 2022-01-09 NOTE — DISCHARGE INSTRUCTIONS
§ Please return to the Emergency Department for any new or worsening symptoms including: fever, chest pain, shortness of breath, loss of consciousness, dizziness, weakness, worsening bleeding or any other concerns.     § Schedule an appointment for follow up with OB/GYN as soon as possible for a recheck of your symptoms. If you do not have one, contact the one listed on your discharge paperwork or call the [facility] Appointment Desk at [phone] to schedule an appointment.     If you are not able to reach your OBGYN in 48-72 hours, please return to ED for repeat assessment.

## 2022-01-10 ENCOUNTER — ROUTINE PRENATAL (OUTPATIENT)
Dept: OBSTETRICS AND GYNECOLOGY | Facility: CLINIC | Age: 32
End: 2022-01-10
Payer: MEDICAID

## 2022-01-10 VITALS
WEIGHT: 224.88 LBS | BODY MASS INDEX: 45.42 KG/M2 | DIASTOLIC BLOOD PRESSURE: 60 MMHG | SYSTOLIC BLOOD PRESSURE: 112 MMHG

## 2022-01-10 DIAGNOSIS — Z3A.10 10 WEEKS GESTATION OF PREGNANCY: Primary | ICD-10-CM

## 2022-01-10 DIAGNOSIS — O09.291 HISTORY OF SPONTANEOUS ABORTION, CURRENTLY PREGNANT, FIRST TRIMESTER: ICD-10-CM

## 2022-01-10 DIAGNOSIS — O34.219 PREVIOUS CESAREAN SECTION COMPLICATING PREGNANCY, ANTEPARTUM CONDITION OR COMPLICATION: ICD-10-CM

## 2022-01-10 PROCEDURE — 87491 CHLMYD TRACH DNA AMP PROBE: CPT | Performed by: OBSTETRICS & GYNECOLOGY

## 2022-01-10 PROCEDURE — 99999 PR PBB SHADOW E&M-EST. PATIENT-LVL III: CPT | Mod: PBBFAC,,, | Performed by: OBSTETRICS & GYNECOLOGY

## 2022-01-10 PROCEDURE — 87086 URINE CULTURE/COLONY COUNT: CPT | Performed by: OBSTETRICS & GYNECOLOGY

## 2022-01-10 PROCEDURE — 99204 OFFICE O/P NEW MOD 45 MIN: CPT | Mod: TH,S$PBB,, | Performed by: OBSTETRICS & GYNECOLOGY

## 2022-01-10 PROCEDURE — 99999 PR PBB SHADOW E&M-EST. PATIENT-LVL III: ICD-10-PCS | Mod: PBBFAC,,, | Performed by: OBSTETRICS & GYNECOLOGY

## 2022-01-10 PROCEDURE — 87591 N.GONORRHOEAE DNA AMP PROB: CPT | Performed by: OBSTETRICS & GYNECOLOGY

## 2022-01-10 PROCEDURE — 99204 PR OFFICE/OUTPT VISIT, NEW, LEVL IV, 45-59 MIN: ICD-10-PCS | Mod: TH,S$PBB,, | Performed by: OBSTETRICS & GYNECOLOGY

## 2022-01-10 PROCEDURE — 99213 OFFICE O/P EST LOW 20 MIN: CPT | Mod: PBBFAC,TH | Performed by: OBSTETRICS & GYNECOLOGY

## 2022-01-10 RX ORDER — POLYETHYLENE GLYCOL 3350 17 G/17G
17 POWDER, FOR SOLUTION ORAL DAILY
COMMUNITY
Start: 2022-01-03 | End: 2022-02-08

## 2022-01-10 RX ORDER — ONDANSETRON 8 MG/1
8 TABLET, ORALLY DISINTEGRATING ORAL EVERY 6 HOURS PRN
COMMUNITY
Start: 2022-01-05 | End: 2022-02-08 | Stop reason: DRUGHIGH

## 2022-01-10 RX ORDER — PROMETHAZINE HYDROCHLORIDE 12.5 MG/1
12.5 TABLET ORAL EVERY 6 HOURS PRN
COMMUNITY
Start: 2021-12-13 | End: 2022-04-08

## 2022-01-10 NOTE — PROGRESS NOTES
DEZ here for initial prenatal care.  Pt with a previous miscarriage on 08/27/2021.  Pt was seen in ER on 01/08/2022 for spotting and an ultrasound was done showing a viable fetus with EGA: 10 weeks 4days, JULIA: 07/31/2022. Pt is no longer spotting. gemma

## 2022-01-10 NOTE — PROGRESS NOTES
10w6d  Here with her  and daughter for her initial prenatal care  Was in our ED on 2022 with spotting.  Ultrasound performed showing normal fetus at 10w4d with EDC 2022  Still with some spotting today    Past Medical History:   Diagnosis Date    Hay fever     Oral herpes        Past Surgical History:   Procedure Laterality Date     SECTION      DILATION AND CURETTAGE OF UTERUS         Family History   Problem Relation Age of Onset    Breast cancer Maternal Grandmother     Ovarian cancer Neg Hx     Colon cancer Neg Hx        Social History     Socioeconomic History    Marital status: Single   Tobacco Use    Smoking status: Never Smoker    Smokeless tobacco: Never Used   Substance and Sexual Activity    Alcohol use: Yes     Comment: rare    Drug use: No    Sexual activity: Yes     Partners: Male     Birth control/protection: None   Social History Narrative    Together for [employer]    He works for [employer]    She works for a non-profit organization.       Current Outpatient Medications   Medication Sig Dispense Refill    ondansetron (ZOFRAN-ODT) 8 MG TbDL Take 8 mg by mouth every 6 (six) hours as needed.      polyethylene glycol (GLYCOLAX) 17 gram/dose powder Take 17 g by mouth once daily.      prenatal vit27,calcium-iron-FA (VINATE ONE) 60 mg iron-1 mg Tab Take 1 tablet by mouth.      promethazine (PHENERGAN) 12.5 MG Tab Take 12.5 mg by mouth every 6 (six) hours as needed.       No current facility-administered medications for this visit.       Review of patient's allergies indicates:   Allergen Reactions    Unclassified drug Anaphylaxis     toothpaste    Grass pollen- grass standard     Msg [glutamic acid hcl]      Review of Systems   Constitutional: Positive for fatigue. Negative for fever, chills, appetite change and unexpected weight change.   HENT: Positive for congestion. Negative for hearing loss, ear pain, sore throat, rhinorrhea, sneezing,  mouth sores, neck pain, neck stiffness, voice change and postnasal drip.   Eyes: Negative for photophobia, itching and visual disturbance.   Respiratory: Negative for cough, chest tightness, shortness of breath and wheezing.   Cardiovascular: Negative for chest pain, palpitations and leg swelling.   Gastrointestinal: Positive for nausea, vomiting, abdominal pain and constipation. Negative for diarrhea, blood in stool and abdominal distention.   Genitourinary: Positive for vaginal discharge and pelvic pain. Negative for dysuria, urgency, frequency, hematuria, flank pain, decreased urine volume, vaginal bleeding, difficulty urinating, genital sores, vaginal pain, menstrual problem and dyspareunia.   Musculoskeletal: Positive for back pain. Negative for myalgias and joint swelling.   Skin: Negative for rash and wound.   Neurological: Positive for headaches. Negative for dizziness, tremors, syncope, speech difficulty, weakness, light-headedness and numbness.   Hematological: Negative for adenopathy. Does not bruise/bleed easily.   Psychiatric/Behavioral: Negative for suicidal ideas, hallucinations, confusion, sleep disturbance, dysphoric mood, decreased concentration and agitation. The patient is not nervous/anxious and is not hyperactive.       Exam with minimal dark discharge and close cervix.  Non-tender uterus    *A quick transabdominal ultrasound performed showing normal active fetus at appropriate size.  Patient and family reassured    Prenatal profile  GC, CT  Prenatal vitamins  MFM ultrasound  Watch spotting  Back in 4 weeks.

## 2022-01-12 ENCOUNTER — PATIENT MESSAGE (OUTPATIENT)
Dept: ADMINISTRATIVE | Facility: OTHER | Age: 32
End: 2022-01-12
Payer: MEDICAID

## 2022-01-12 ENCOUNTER — LAB VISIT (OUTPATIENT)
Dept: LAB | Facility: HOSPITAL | Age: 32
End: 2022-01-12
Attending: OBSTETRICS & GYNECOLOGY
Payer: MEDICAID

## 2022-01-12 DIAGNOSIS — Z3A.10 10 WEEKS GESTATION OF PREGNANCY: ICD-10-CM

## 2022-01-12 LAB
ABO + RH BLD: NORMAL
BACTERIA UR CULT: NORMAL
BASOPHILS # BLD AUTO: 0.02 K/UL (ref 0–0.2)
BASOPHILS NFR BLD: 0.3 % (ref 0–1.9)
BLD GP AB SCN CELLS X3 SERPL QL: NORMAL
DIFFERENTIAL METHOD: ABNORMAL
EOSINOPHIL # BLD AUTO: 0.1 K/UL (ref 0–0.5)
EOSINOPHIL NFR BLD: 0.8 % (ref 0–8)
ERYTHROCYTE [DISTWIDTH] IN BLOOD BY AUTOMATED COUNT: 13.2 % (ref 11.5–14.5)
ESTIMATED AVG GLUCOSE: 94 MG/DL (ref 68–131)
HBA1C MFR BLD: 4.9 % (ref 4–5.6)
HCT VFR BLD AUTO: 36.4 % (ref 37–48.5)
HGB BLD-MCNC: 12 G/DL (ref 12–16)
IMM GRANULOCYTES # BLD AUTO: 0.02 K/UL (ref 0–0.04)
IMM GRANULOCYTES NFR BLD AUTO: 0.3 % (ref 0–0.5)
LYMPHOCYTES # BLD AUTO: 1.5 K/UL (ref 1–4.8)
LYMPHOCYTES NFR BLD: 18.5 % (ref 18–48)
MCH RBC QN AUTO: 29.4 PG (ref 27–31)
MCHC RBC AUTO-ENTMCNC: 33 G/DL (ref 32–36)
MCV RBC AUTO: 89 FL (ref 82–98)
MONOCYTES # BLD AUTO: 0.6 K/UL (ref 0.3–1)
MONOCYTES NFR BLD: 7 % (ref 4–15)
NEUTROPHILS # BLD AUTO: 5.7 K/UL (ref 1.8–7.7)
NEUTROPHILS NFR BLD: 73.1 % (ref 38–73)
NRBC BLD-RTO: 0 /100 WBC
PLATELET # BLD AUTO: 275 K/UL (ref 150–450)
PMV BLD AUTO: 9.3 FL (ref 9.2–12.9)
RBC # BLD AUTO: 4.08 M/UL (ref 4–5.4)
RUBV IGG SER-ACNC: 13.1 IU/ML
RUBV IGG SER-IMP: REACTIVE
WBC # BLD AUTO: 7.83 K/UL (ref 3.9–12.7)

## 2022-01-12 PROCEDURE — 83020 HEMOGLOBIN ELECTROPHORESIS: CPT | Mod: 91 | Performed by: OBSTETRICS & GYNECOLOGY

## 2022-01-12 PROCEDURE — 83036 HEMOGLOBIN GLYCOSYLATED A1C: CPT | Performed by: OBSTETRICS & GYNECOLOGY

## 2022-01-12 PROCEDURE — 86803 HEPATITIS C AB TEST: CPT | Performed by: OBSTETRICS & GYNECOLOGY

## 2022-01-12 PROCEDURE — 86592 SYPHILIS TEST NON-TREP QUAL: CPT | Performed by: OBSTETRICS & GYNECOLOGY

## 2022-01-12 PROCEDURE — 86850 RBC ANTIBODY SCREEN: CPT | Performed by: OBSTETRICS & GYNECOLOGY

## 2022-01-12 PROCEDURE — 36415 COLL VENOUS BLD VENIPUNCTURE: CPT | Performed by: OBSTETRICS & GYNECOLOGY

## 2022-01-12 PROCEDURE — 86762 RUBELLA ANTIBODY: CPT | Performed by: OBSTETRICS & GYNECOLOGY

## 2022-01-12 PROCEDURE — 87340 HEPATITIS B SURFACE AG IA: CPT | Performed by: OBSTETRICS & GYNECOLOGY

## 2022-01-12 PROCEDURE — 87389 HIV-1 AG W/HIV-1&-2 AB AG IA: CPT | Performed by: OBSTETRICS & GYNECOLOGY

## 2022-01-12 PROCEDURE — 85025 COMPLETE CBC W/AUTO DIFF WBC: CPT | Performed by: OBSTETRICS & GYNECOLOGY

## 2022-01-13 LAB
HBV SURFACE AG SERPL QL IA: NEGATIVE
HCV AB SERPL QL IA: NEGATIVE
HIV 1+2 AB+HIV1 P24 AG SERPL QL IA: NEGATIVE
RPR SER QL: NORMAL

## 2022-01-14 ENCOUNTER — PATIENT MESSAGE (OUTPATIENT)
Dept: OBSTETRICS AND GYNECOLOGY | Facility: CLINIC | Age: 32
End: 2022-01-14
Payer: MEDICAID

## 2022-01-14 ENCOUNTER — HOSPITAL ENCOUNTER (EMERGENCY)
Facility: HOSPITAL | Age: 32
Discharge: HOME | End: 2022-01-14
Attending: EMERGENCY MEDICINE
Payer: MEDICAID

## 2022-01-14 VITALS
OXYGEN SATURATION: 98 % | HEIGHT: 59 IN | DIASTOLIC BLOOD PRESSURE: 60 MMHG | TEMPERATURE: 99 F | WEIGHT: 219 LBS | HEART RATE: 110 BPM | RESPIRATION RATE: 18 BRPM | BODY MASS INDEX: 44.15 KG/M2 | SYSTOLIC BLOOD PRESSURE: 118 MMHG

## 2022-01-14 DIAGNOSIS — O21.0 HYPEREMESIS GRAVIDARUM: Primary | ICD-10-CM

## 2022-01-14 LAB
ANION GAP SERPL CALC-SCNC: 8 MMOL/L (ref 8–16)
BILIRUB UR QL STRIP: NEGATIVE
BUN SERPL-MCNC: 4 MG/DL (ref 6–20)
C TRACH DNA SPEC QL NAA+PROBE: NOT DETECTED
CALCIUM SERPL-MCNC: 9.8 MG/DL (ref 8.7–10.5)
CHLORIDE SERPL-SCNC: 105 MMOL/L (ref 95–110)
CLARITY UR REFRACT.AUTO: CLEAR
CO2 SERPL-SCNC: 22 MMOL/L (ref 23–29)
COLOR UR AUTO: YELLOW
CREAT SERPL-MCNC: 0.6 MG/DL (ref 0.5–1.4)
EST. GFR  (AFRICAN AMERICAN): >60 ML/MIN/1.73 M^2
EST. GFR  (NON AFRICAN AMERICAN): >60 ML/MIN/1.73 M^2
GLUCOSE SERPL-MCNC: 85 MG/DL (ref 70–110)
GLUCOSE UR QL STRIP: NEGATIVE
HB ELECTROPHORESIS INTERP CANCEL: NORMAL
HB ELECTROPHORESIS INTERPRETATION: NORMAL
HGB A MFR BLD ELPH: 97.4 % (ref 95.8–98)
HGB A2 MFR BLD: 2.6 % (ref 2–3.3)
HGB A2+XXX MFR BLD ELPH: NORMAL %
HGB F MFR BLD: 0 % (ref 0–0.9)
HGB UR QL STRIP: ABNORMAL
HGB XXX MFR BLD ELPH: NORMAL %
HPLC HB VARIANT: NORMAL
KETONES UR QL STRIP: NEGATIVE
LEUKOCYTE ESTERASE UR QL STRIP: NEGATIVE
MICROSCOPIC COMMENT: NORMAL
N GONORRHOEA DNA SPEC QL NAA+PROBE: NOT DETECTED
NITRITE UR QL STRIP: NEGATIVE
PH UR STRIP: 5 [PH] (ref 5–8)
POTASSIUM SERPL-SCNC: 4.2 MMOL/L (ref 3.5–5.1)
PROT UR QL STRIP: NEGATIVE
RBC #/AREA URNS AUTO: 3 /HPF (ref 0–4)
SODIUM SERPL-SCNC: 135 MMOL/L (ref 136–145)
SP GR UR STRIP: 1.02 (ref 1–1.03)
SQUAMOUS #/AREA URNS AUTO: 2 /HPF
URN SPEC COLLECT METH UR: ABNORMAL
WBC #/AREA URNS AUTO: 2 /HPF (ref 0–5)

## 2022-01-14 PROCEDURE — 63600175 PHARM REV CODE 636 W HCPCS: Performed by: EMERGENCY MEDICINE

## 2022-01-14 PROCEDURE — 80048 BASIC METABOLIC PNL TOTAL CA: CPT | Performed by: EMERGENCY MEDICINE

## 2022-01-14 PROCEDURE — 96375 TX/PRO/DX INJ NEW DRUG ADDON: CPT

## 2022-01-14 PROCEDURE — 99284 EMERGENCY DEPT VISIT MOD MDM: CPT | Mod: 25

## 2022-01-14 PROCEDURE — 99284 PR EMERGENCY DEPT VISIT,LEVEL IV: ICD-10-PCS | Mod: ,,, | Performed by: EMERGENCY MEDICINE

## 2022-01-14 PROCEDURE — 81001 URINALYSIS AUTO W/SCOPE: CPT | Performed by: EMERGENCY MEDICINE

## 2022-01-14 PROCEDURE — 25000003 PHARM REV CODE 250: Performed by: EMERGENCY MEDICINE

## 2022-01-14 PROCEDURE — 96361 HYDRATE IV INFUSION ADD-ON: CPT

## 2022-01-14 PROCEDURE — 99284 EMERGENCY DEPT VISIT MOD MDM: CPT | Mod: ,,, | Performed by: EMERGENCY MEDICINE

## 2022-01-14 PROCEDURE — 96374 THER/PROPH/DIAG INJ IV PUSH: CPT

## 2022-01-14 RX ORDER — DIPHENHYDRAMINE HYDROCHLORIDE 50 MG/ML
25 INJECTION INTRAMUSCULAR; INTRAVENOUS
Status: COMPLETED | OUTPATIENT
Start: 2022-01-14 | End: 2022-01-14

## 2022-01-14 RX ORDER — ONDANSETRON 2 MG/ML
8 INJECTION INTRAMUSCULAR; INTRAVENOUS
Status: COMPLETED | OUTPATIENT
Start: 2022-01-14 | End: 2022-01-14

## 2022-01-14 RX ORDER — FAMOTIDINE 10 MG/ML
20 INJECTION INTRAVENOUS
Status: COMPLETED | OUTPATIENT
Start: 2022-01-14 | End: 2022-01-14

## 2022-01-14 RX ADMIN — FAMOTIDINE 20 MG: 10 INJECTION, SOLUTION INTRAVENOUS at 12:01

## 2022-01-14 RX ADMIN — SODIUM CHLORIDE, SODIUM LACTATE, POTASSIUM CHLORIDE, AND CALCIUM CHLORIDE 1000 ML: .6; .31; .03; .02 INJECTION, SOLUTION INTRAVENOUS at 12:01

## 2022-01-14 RX ADMIN — ONDANSETRON 8 MG: 2 INJECTION INTRAMUSCULAR; INTRAVENOUS at 12:01

## 2022-01-14 RX ADMIN — DIPHENHYDRAMINE HYDROCHLORIDE 25 MG: 50 INJECTION INTRAMUSCULAR; INTRAVENOUS at 12:01

## 2022-01-14 NOTE — ED PROVIDER NOTES
Encounter Date: 2022    SCRIBE #1 NOTE: I, Jose Gleason, am scribing for, and in the presence of,  Riki Hamlin DO. I have scribed the following portions of the note - Other sections scribed: HPI, ROS, PE.       History     Chief Complaint   Patient presents with    Emesis During Pregnancy     Pt reports being 12 weeks pregnant and has had consistent nausea with emesis. Pt denies vaginal spotting or any other pregnancy complications.      Time patient was seen by the provider: 11:54 AM    The patient is a 31 y.o.  female with no significant PMHx who presents to the ED with a complaint of emesis. Patient is 12 weeks pregnant, and states she has had chronic nausea and vomiting since the beginning of her pregnancy. She states her OB has given her 4mg and 8mg of Zofran with no improvement. She was at work today, but was unable to work because she was constantly vomiting, so she left and came here. Her emesis has consisted stomach bile and food. She has also been unable to sleep, and she has been taking an OTC sleep medicine with no improvement. She cannot recall what the sleep medicine was called.    The history is provided by the patient and medical records. No  was used.   General Illness   The current episode started several weeks ago. The problem occurs frequently. The problem has been unchanged. Nothing relieves the symptoms. Nothing aggravates the symptoms. Associated symptoms include nausea and vomiting. Pertinent negatives include no fever, no photophobia, no abdominal pain, no congestion, no headaches, no sore throat, no shortness of breath and no rash.     Review of patient's allergies indicates:   Allergen Reactions    Unclassified drug Anaphylaxis     toothpaste    Grass pollen- grass standard     Msg [glutamic acid hcl]      Past Medical History:   Diagnosis Date    Hay fever     Oral herpes      Past Surgical History:   Procedure Laterality Date      SECTION      DILATION AND CURETTAGE OF UTERUS       Family History   Problem Relation Age of Onset    Breast cancer Maternal Grandmother     Ovarian cancer Neg Hx     Colon cancer Neg Hx      Social History     Tobacco Use    Smoking status: Never Smoker    Smokeless tobacco: Never Used   Substance Use Topics    Alcohol use: Yes     Comment: rare    Drug use: No     Review of Systems   Constitutional: Positive for fatigue. Negative for chills and fever.   HENT: Negative for congestion and sore throat.    Eyes: Negative for photophobia and visual disturbance.   Respiratory: Negative for chest tightness and shortness of breath.    Cardiovascular: Negative for chest pain and leg swelling.   Gastrointestinal: Positive for nausea and vomiting. Negative for abdominal pain.   Genitourinary: Negative for difficulty urinating, dyspareunia and dysuria.   Musculoskeletal: Negative for arthralgias and back pain.   Skin: Negative for color change, pallor and rash.   Neurological: Negative for dizziness, weakness, light-headedness and headaches.   Hematological: Does not bruise/bleed easily.       Physical Exam     Initial Vitals   BP Pulse Resp Temp SpO2   01/14/22 1145 01/14/22 1145 01/14/22 1145 01/14/22 1155 01/14/22 1145   118/60 110 18 99 °F (37.2 °C) 98 %      MAP       --                Physical Exam    Nursing note and vitals reviewed.  Constitutional: She appears well-developed and well-nourished. No distress.   HENT:   Head: Normocephalic and atraumatic.   Left Ear: Hearing normal.   Nose: Nose normal.   Mouth/Throat: Uvula is midline and oropharynx is clear and moist.   Eyes: Conjunctivae, EOM and lids are normal.   Neck: Trachea normal. Neck supple.   Normal range of motion.  Cardiovascular: Normal rate and regular rhythm.   Abdominal: Normal appearance.   Musculoskeletal:      Cervical back: Normal range of motion and neck supple.     Lymphadenopathy:     She has no cervical adenopathy.   Neurological: She  is alert. GCS eye subscore is 4. GCS verbal subscore is 5. GCS motor subscore is 6.   Skin: Skin is warm and dry.   Psychiatric: She has a normal mood and affect. Her speech is normal and behavior is normal.         ED Course   Procedures  Labs Reviewed   URINALYSIS - Abnormal; Notable for the following components:       Result Value    Occult Blood UA 1+ (*)     All other components within normal limits   BASIC METABOLIC PANEL - Abnormal; Notable for the following components:    Sodium 135 (*)     CO2 22 (*)     BUN 4 (*)     All other components within normal limits   URINALYSIS MICROSCOPIC          Imaging Results    None          Medications   lactated ringers bolus 1,000 mL (1,000 mLs Intravenous New Bag 1/14/22 1228)   diphenhydrAMINE injection 25 mg (25 mg Intravenous Given 1/14/22 1236)   ondansetron injection 8 mg (8 mg Intravenous Given 1/14/22 1233)   famotidine (PF) injection 20 mg (20 mg Intravenous Given 1/14/22 1236)     Medical Decision Making:   History:   Old Medical Records: I decided to obtain old medical records.  Differential Diagnosis:   Differential diagnosis includes but is not limited to electrolyte abnormality.  Clinical Tests:   Lab Tests: Ordered and Reviewed  The following lab test(s) were unremarkable: CBC, Urinalysis and BMP       <> Summary of Lab: All lab work is unremarkable there is no ketonuria  ED Management:  Patient was seen and examined.  She will be given Pepcid Benadryl as well as Zofran intravenously as and a L of lactated Ringer's.  She has no peritonitis or tenderness based on her physical exam.  Clinically does not appear to be dehydrated.    1434-went over the diagnostic review the patient.  She feels better after the fluids and medication.  I will have her follow-up with her obstetrician.  No change in medications at this point.          Scribe Attestation:   Scribe #1: I performed the above scribed service and the documentation accurately describes the services I  performed. I attest to the accuracy of the note.    Attending Attestation:           Physician Attestation for Scribe:      Comments: I, Dr. Hamlin, personally performed the services described in this documentation. All medical record entries made by the scribe were at my direction and in my presence.  I have reviewed the chart and agree that the record reflects my personal performance and is accurate and complete. Riki Hamlin, DO  12:09 PM 01/14/2022                   Clinical Impression:   Final diagnoses:  [O21.0] Hyperemesis gravidarum (Primary)          ED Disposition Condition    Discharge Stable        ED Prescriptions     None        Follow-up Information     Follow up With Specialties Details Why Contact Info    Armani Britton MD Obstetrics and Gynecology, Obstetrics and Gynecology Schedule an appointment as soon as possible for a visit   [address]  [address] [place] [id number] [phone]             Riki Hamlin, DO  01/14/22 4909

## 2022-01-14 NOTE — Clinical Note
"Brock Freemanmily Alfaro was seen and treated in our emergency department on 1/14/2022.  She may return to work on 01/16/2022.       If you have any questions or concerns, please don't hesitate to call.      Riki Hamlin, DO"

## 2022-01-17 ENCOUNTER — HOSPITAL ENCOUNTER (EMERGENCY)
Facility: HOSPITAL | Age: 32
Discharge: HOME | End: 2022-01-17
Attending: EMERGENCY MEDICINE
Payer: MEDICAID

## 2022-01-17 VITALS
WEIGHT: 224 LBS | HEART RATE: 90 BPM | TEMPERATURE: 98 F | RESPIRATION RATE: 18 BRPM | BODY MASS INDEX: 45.24 KG/M2 | OXYGEN SATURATION: 97 % | DIASTOLIC BLOOD PRESSURE: 82 MMHG | SYSTOLIC BLOOD PRESSURE: 139 MMHG

## 2022-01-17 DIAGNOSIS — R06.02 SHORTNESS OF BREATH: ICD-10-CM

## 2022-01-17 DIAGNOSIS — U07.1 COVID-19: ICD-10-CM

## 2022-01-17 DIAGNOSIS — O98.511 COVID-19 AFFECTING PREGNANCY IN FIRST TRIMESTER: Primary | ICD-10-CM

## 2022-01-17 DIAGNOSIS — U07.1 COVID-19 AFFECTING PREGNANCY IN FIRST TRIMESTER: Primary | ICD-10-CM

## 2022-01-17 DIAGNOSIS — R06.02 SOB (SHORTNESS OF BREATH): ICD-10-CM

## 2022-01-17 DIAGNOSIS — E86.0 DEHYDRATION: ICD-10-CM

## 2022-01-17 LAB
ALBUMIN SERPL BCP-MCNC: 2.9 G/DL (ref 3.5–5.2)
ALP SERPL-CCNC: 69 U/L (ref 55–135)
ALT SERPL W/O P-5'-P-CCNC: 38 U/L (ref 10–44)
ANION GAP SERPL CALC-SCNC: 13 MMOL/L (ref 8–16)
AST SERPL-CCNC: 20 U/L (ref 10–40)
BACTERIA #/AREA URNS HPF: NORMAL /HPF
BASOPHILS # BLD AUTO: 0.02 K/UL (ref 0–0.2)
BASOPHILS NFR BLD: 0.2 % (ref 0–1.9)
BILIRUB SERPL-MCNC: 0.3 MG/DL (ref 0.1–1)
BILIRUB UR QL STRIP: NEGATIVE
BUN SERPL-MCNC: 4 MG/DL (ref 6–20)
CALCIUM SERPL-MCNC: 8.6 MG/DL (ref 8.7–10.5)
CHLORIDE SERPL-SCNC: 103 MMOL/L (ref 95–110)
CLARITY UR: ABNORMAL
CO2 SERPL-SCNC: 21 MMOL/L (ref 23–29)
COLOR UR: YELLOW
CREAT SERPL-MCNC: 0.6 MG/DL (ref 0.5–1.4)
DIFFERENTIAL METHOD: ABNORMAL
EOSINOPHIL # BLD AUTO: 0 K/UL (ref 0–0.5)
EOSINOPHIL NFR BLD: 0.1 % (ref 0–8)
ERYTHROCYTE [DISTWIDTH] IN BLOOD BY AUTOMATED COUNT: 13.2 % (ref 11.5–14.5)
EST. GFR  (AFRICAN AMERICAN): >60 ML/MIN/1.73 M^2
EST. GFR  (NON AFRICAN AMERICAN): >60 ML/MIN/1.73 M^2
GLUCOSE SERPL-MCNC: 81 MG/DL (ref 70–110)
GLUCOSE UR QL STRIP: NEGATIVE
HCG INTACT+B SERPL-ACNC: NORMAL MIU/ML
HCT VFR BLD AUTO: 38.7 % (ref 37–48.5)
HGB BLD-MCNC: 12.7 G/DL (ref 12–16)
HGB UR QL STRIP: NEGATIVE
HYALINE CASTS #/AREA URNS LPF: 0 /LPF
IMM GRANULOCYTES # BLD AUTO: 0.04 K/UL (ref 0–0.04)
IMM GRANULOCYTES NFR BLD AUTO: 0.4 % (ref 0–0.5)
KETONES UR QL STRIP: ABNORMAL
LEUKOCYTE ESTERASE UR QL STRIP: NEGATIVE
LIPASE SERPL-CCNC: 21 U/L (ref 4–60)
LYMPHOCYTES # BLD AUTO: 0.7 K/UL (ref 1–4.8)
LYMPHOCYTES NFR BLD: 7.5 % (ref 18–48)
MCH RBC QN AUTO: 29.3 PG (ref 27–31)
MCHC RBC AUTO-ENTMCNC: 32.8 G/DL (ref 32–36)
MCV RBC AUTO: 89 FL (ref 82–98)
MICROSCOPIC COMMENT: NORMAL
MONOCYTES # BLD AUTO: 0.9 K/UL (ref 0.3–1)
MONOCYTES NFR BLD: 9.9 % (ref 4–15)
NEUTROPHILS # BLD AUTO: 7.5 K/UL (ref 1.8–7.7)
NEUTROPHILS NFR BLD: 81.9 % (ref 38–73)
NITRITE UR QL STRIP: NEGATIVE
NRBC BLD-RTO: 0 /100 WBC
PH UR STRIP: 6 [PH] (ref 5–8)
PLATELET # BLD AUTO: 254 K/UL (ref 150–450)
PMV BLD AUTO: 9.6 FL (ref 9.2–12.9)
POTASSIUM SERPL-SCNC: 3.7 MMOL/L (ref 3.5–5.1)
PROT SERPL-MCNC: 6.1 G/DL (ref 6–8.4)
PROT UR QL STRIP: ABNORMAL
RBC # BLD AUTO: 4.34 M/UL (ref 4–5.4)
RBC #/AREA URNS HPF: 0 /HPF (ref 0–4)
SODIUM SERPL-SCNC: 137 MMOL/L (ref 136–145)
SP GR UR STRIP: 1.03 (ref 1–1.03)
SQUAMOUS #/AREA URNS HPF: 9 /HPF
URN SPEC COLLECT METH UR: ABNORMAL
UROBILINOGEN UR STRIP-ACNC: ABNORMAL EU/DL
WBC # BLD AUTO: 9.2 K/UL (ref 3.9–12.7)
WBC #/AREA URNS HPF: 5 /HPF (ref 0–5)

## 2022-01-17 PROCEDURE — 83690 ASSAY OF LIPASE: CPT | Performed by: NURSE PRACTITIONER

## 2022-01-17 PROCEDURE — 81000 URINALYSIS NONAUTO W/SCOPE: CPT | Performed by: NURSE PRACTITIONER

## 2022-01-17 PROCEDURE — 93005 ELECTROCARDIOGRAM TRACING: CPT

## 2022-01-17 PROCEDURE — 84702 CHORIONIC GONADOTROPIN TEST: CPT | Performed by: NURSE PRACTITIONER

## 2022-01-17 PROCEDURE — 25000003 PHARM REV CODE 250: Performed by: NURSE PRACTITIONER

## 2022-01-17 PROCEDURE — 63600175 PHARM REV CODE 636 W HCPCS: Performed by: NURSE PRACTITIONER

## 2022-01-17 PROCEDURE — 36000 PLACE NEEDLE IN VEIN: CPT

## 2022-01-17 PROCEDURE — 85025 COMPLETE CBC W/AUTO DIFF WBC: CPT | Performed by: NURSE PRACTITIONER

## 2022-01-17 PROCEDURE — 80053 COMPREHEN METABOLIC PANEL: CPT | Performed by: NURSE PRACTITIONER

## 2022-01-17 PROCEDURE — 93010 ELECTROCARDIOGRAM REPORT: CPT | Mod: ,,, | Performed by: INTERNAL MEDICINE

## 2022-01-17 PROCEDURE — 93010 EKG 12-LEAD: ICD-10-PCS | Mod: ,,, | Performed by: INTERNAL MEDICINE

## 2022-01-17 PROCEDURE — 99285 EMERGENCY DEPT VISIT HI MDM: CPT | Mod: 25

## 2022-01-17 RX ORDER — ACETAMINOPHEN 500 MG
1000 TABLET ORAL
Status: COMPLETED | OUTPATIENT
Start: 2022-01-17 | End: 2022-01-17

## 2022-01-17 RX ORDER — ONDANSETRON 2 MG/ML
8 INJECTION INTRAMUSCULAR; INTRAVENOUS
Status: COMPLETED | OUTPATIENT
Start: 2022-01-17 | End: 2022-01-17

## 2022-01-17 RX ORDER — ACETAMINOPHEN 500 MG
500 TABLET ORAL EVERY 4 HOURS PRN
Qty: 30 TABLET | Refills: 0 | Status: SHIPPED | OUTPATIENT
Start: 2022-01-17 | End: 2024-01-12 | Stop reason: ALTCHOICE

## 2022-01-17 RX ORDER — ONDANSETRON 4 MG/1
4 TABLET, ORALLY DISINTEGRATING ORAL EVERY 6 HOURS PRN
Qty: 20 TABLET | Refills: 0 | Status: SHIPPED | OUTPATIENT
Start: 2022-01-17 | End: 2022-04-08

## 2022-01-17 RX ADMIN — ACETAMINOPHEN 1000 MG: 500 TABLET ORAL at 03:01

## 2022-01-17 RX ADMIN — ONDANSETRON 8 MG: 2 INJECTION INTRAMUSCULAR; INTRAVENOUS at 01:01

## 2022-01-17 RX ADMIN — SODIUM CHLORIDE, SODIUM LACTATE, POTASSIUM CHLORIDE, AND CALCIUM CHLORIDE 2000 ML: .6; .31; .03; .02 INJECTION, SOLUTION INTRAVENOUS at 12:01

## 2022-01-17 NOTE — ED PROVIDER NOTES
"Encounter Date: 2022    SCRIBE #1 NOTE: I, Tia Diana, am scribing for, and in the presence of,  Patel Wolfe NP. I have scribed the following portions of the note - Other sections scribed: HPI, ROS.       History     Chief Complaint   Patient presents with    COVID +     Pt reporting COVID + on Friday. Pt states symptoms are worsening and she feels weaker. Pt reporting SOB. Pt reports she is 12 weeks pregnant. PT DROVE SELF TO ED.     This 12 weeks pregnant 31 y.o female, with a medical history of Hay fever and Oral herpes, presents to the ED c/o generalized body aches. Pt reports that she initially began to experience a sore throat and congestion last Thursday (22) and tested positive for COVID-19 the following day. She states that she has since also been experiencing generalized body aches, a headache, diaphoresis, cough, shortness of breath, decreased appetite, nausea, emesis (has hyperemesis gravidarum) and abdominal cramping (right sided; described as "deep cramps like charley horses"). She notes use of Tylenol for treatment. Pt is not vaccinated for COVID-19. Of note, she reports that she was last evaluated by her OBGYN on 1/10/22, noting that she began to experience vaginal bleeding 1 week prior to the visit. She states that she had an ultrasound preformed at the visit and was informed that her cervix was closed. She adds that no issues were found with the baby. The bleeding is presently persisting. There are no other associated symptoms at this time.    The history is provided by the patient.     Review of patient's allergies indicates:   Allergen Reactions    Unclassified drug Anaphylaxis     toothpaste    Grass pollen- grass standard     Msg [glutamic acid hcl]      Past Medical History:   Diagnosis Date    Hay fever     Oral herpes      Past Surgical History:   Procedure Laterality Date     SECTION      DILATION AND CURETTAGE OF UTERUS       Family History   Problem " Relation Age of Onset    Breast cancer Maternal Grandmother     Ovarian cancer Neg Hx     Colon cancer Neg Hx      Social History     Tobacco Use    Smoking status: Never Smoker    Smokeless tobacco: Never Used   Substance Use Topics    Alcohol use: Yes     Comment: rare    Drug use: No     Review of Systems   Constitutional: Positive for appetite change (decreased) and diaphoresis. Negative for fever.   HENT: Positive for congestion and sore throat.    Eyes: Negative for visual disturbance.   Respiratory: Positive for cough. Negative for shortness of breath.    Cardiovascular: Negative for chest pain.   Gastrointestinal: Positive for abdominal pain (right sided), nausea and vomiting.   Genitourinary: Positive for vaginal bleeding. Negative for dysuria.   Musculoskeletal: Positive for myalgias (generalized). Negative for back pain.   Skin: Negative for rash.   Neurological: Positive for headaches. Negative for weakness.       Physical Exam     Initial Vitals [01/17/22 1127]   BP Pulse Resp Temp SpO2   (!) 129/90 (!) 118 18 98.7 °F (37.1 °C) 96 %      MAP       --         Physical Exam    Nursing note and vitals reviewed.  Constitutional: She appears well-developed and well-nourished. She is not diaphoretic. She is cooperative.  Non-toxic appearance. She does not have a sickly appearance. She appears ill. No distress.   HENT:   Head: Normocephalic and atraumatic.   Right Ear: External ear normal.   Left Ear: External ear normal.   Nose: Nose normal.   Eyes: Conjunctivae and EOM are normal. Right eye exhibits no discharge. Left eye exhibits no discharge.   Neck: Neck supple. No tracheal deviation present.   Normal range of motion.  Cardiovascular: Normal rate.   Pulmonary/Chest: No stridor. No respiratory distress.   Abdominal: Abdomen is soft. She exhibits no distension. There is no abdominal tenderness.   Musculoskeletal:         General: No tenderness. Normal range of motion.      Cervical back: Normal range  of motion and neck supple.     Neurological: She is alert and oriented to person, place, and time. She has normal strength. No cranial nerve deficit or sensory deficit.   Skin: Skin is warm and dry.   Psychiatric: She has a normal mood and affect. Her behavior is normal. Judgment and thought content normal.         ED Course   Procedures  Labs Reviewed   CBC W/ AUTO DIFFERENTIAL - Abnormal; Notable for the following components:       Result Value    Lymph # 0.7 (*)     Gran % 81.9 (*)     Lymph % 7.5 (*)     All other components within normal limits    Narrative:     Release to patient->Immediate   URINALYSIS, REFLEX TO URINE CULTURE - Abnormal; Notable for the following components:    Appearance, UA Hazy (*)     Protein, UA 1+ (*)     Ketones, UA 3+ (*)     Urobilinogen, UA 4.0-6.0 (*)     All other components within normal limits    Narrative:     Specimen Source->Urine   COMPREHENSIVE METABOLIC PANEL - Abnormal; Notable for the following components:    CO2 21 (*)     BUN 4 (*)     Calcium 8.6 (*)     Albumin 2.9 (*)     All other components within normal limits   HCG, QUANTITATIVE    Narrative:     Release to patient->Immediate   URINALYSIS MICROSCOPIC    Narrative:     Specimen Source->Urine   LIPASE        ECG Results          EKG 12-lead (Final result)  Result time 01/17/22 14:20:25    Final result by Interface, Lab In [facility] (01/17/22 14:20:25)                 Narrative:    Test Reason : R06.02,    Vent. Rate : 113 BPM     Atrial Rate : 113 BPM     P-R Int : 150 ms          QRS Dur : 084 ms      QT Int : 308 ms       P-R-T Axes : 044 006 037 degrees     QTc Int : 422 ms    Sinus tachycardia  Minimal voltage criteria for LVH, may be normal variant  Borderline Abnormal ECG  When compared with ECG of 25-JUN-2021 11:11,  Significant changes have occurred  Confirmed by Sergio Toledo MD ([id number]) on 1/17/2022 2:20:16 PM    Referred By: AMOL   SELF           Confirmed By:Sergio Toledo MD                             Imaging Results          US OB <14 Wks, TransAbd, Single Gestation (Final result)  Result time 01/17/22 14:07:36   Procedure changed from US OB <14 Wks TransAbd & TransVag, Single Gestation (XPD)     Final result by Kamaljit Bermeo DO (01/17/22 14:07:36)                 Impression:      Single live intrauterine pregnancy with estimated gestational age of 11 weeks and 6 days, estimated due date of 08/02/2022.      Electronically signed by: Kamaljit Bermeo  Date:    01/17/2022  Time:    14:07             Narrative:    EXAMINATION:  US OB <14 WEEKS TRANSABDOM, SINGLE GESTATION    CLINICAL HISTORY:  vaginal bleeding preg;    TECHNIQUE:  Transabdominal sonography of the pelvis was performed, followed by transvaginal sonography to better evaluate the uterus and ovaries.    COMPARISON:  01/08/2022.    FINDINGS:  Last menstrual period: Not provided.    Uterus: 16.4 x 7.1 x 8.9 cm.    Intrauterine gestation(s): There is a single intrauterine gestation.  Mean sac diameter measures 57 mm.  There is a yolk sac.    Crown-rump length (CRL): 51 mm    Cardiac activity: 142 bpm    Subchorionic hemorrhage: None.    Right ovary: Normal.    Left ovary: Normal.    Miscellaneous: No Free Fluid.                               X-Ray Chest AP Portable (Final result)  Result time 01/17/22 14:24:48    Final result by August Jackson MD (01/17/22 14:24:48)                 Impression:      No radiographic evidence of pneumonia or other source of cough/shortness of breath on this limited single view, noting that early/mild viral pneumonia may be radiographically occult.      Electronically signed by: August Jackson MD  Date:    01/17/2022  Time:    14:24             Narrative:    EXAMINATION:  XR CHEST AP PORTABLE    CLINICAL HISTORY:  COVID-19    TECHNIQUE:  Single frontal view of the chest was performed.    COMPARISON:  Chest radiograph 06/25/2021 and CT thorax 05/16/2019    FINDINGS:  Study is just now submitted for interpretation  secondary to technical difficulties with PACS.    Patient is somewhat rotated.  Resolution is limited by body habitus with underpenetration.  Monitoring leads and clothing artifact overlie the chest.    Cardiomediastinal silhouette is midline and within normal limits for age.  Similar nonspecific elevation of the right hemidiaphragm.  Pulmonary vasculature and hilar contours are within normal limits.  The lungs are otherwise well expanded without large consolidation, pleural effusion or pneumothorax.  No acute osseous process seen.  PA and lateral views can be obtained.                                 Medications   lactated ringers bolus 2,000 mL (0 mLs Intravenous Stopped 22 1610)   ondansetron injection 8 mg (8 mg Intravenous Given 22 1309)   acetaminophen tablet 1,000 mg (1,000 mg Oral Given 22 1514)     Medical Decision Making:   History:   Old Medical Records: I decided to obtain old medical records.  Differential Diagnosis:   COVID-19, hypoxia, viral syndrome, pneumonia, bronchitis, spontaneous , dehydration, others  Clinical Tests:   Lab Tests: Ordered and Reviewed  Radiological Study: Ordered and Reviewed  ED Management:  HPI and physical exam as above.      Patient with recent COVID-19 diagnosis.  Labs reassuring.  Ultrasound shows IUP measuring 11 weeks and 6 days.  No concerning findings on ultrasound.  Chest x-ray shows shows no evidence of pneumonia or other complication.  Respiratory effort is normal.  Mucous membranes are moist and the patient is tolerating P.O. without difficulty.  Patient is afebrile throughout the ED clears.  Patient is nontoxic, alert, active, and appears very well at this time just prior to discharge.  Room air oxygen saturation 97%.  I have given specific return precautions to the patient regarding dyspnea.      The results and physical exam findings were reviewed with the patient.  I advised the patient to remain home and self-isolate from others. The  patient should wear a surgical mask at all times when near others.  Strict ED return precautions given especially concerning worsening shortness of breath/dyspnea.  Advised patient to follow-up with her OBGYN. All questions regarding diagnosis and plan were answered to the patient's fullest possible satisfaction. Patient expressed understanding of diagnosis, discharge instructions, and return precautions.            Scribe Attestation:   Scribe #1: I performed the above scribed service and the documentation accurately describes the services I performed. I attest to the accuracy of the note.                 Clinical Impression:   Final diagnoses:  [R06.02] SOB (shortness of breath)  [R06.02] Shortness of breath  [U07.1] COVID-19  [O98.511, U07.1] COVID-19 affecting pregnancy in first trimester (Primary)  [E86.0] Dehydration          ED Disposition Condition    Discharge Stable        ED Prescriptions     Medication Sig Dispense Start Date End Date Auth. Provider    acetaminophen (TYLENOL) 500 MG tablet Take 1 tablet (500 mg total) by mouth every 4 (four) hours as needed (Fever). 30 tablet 1/17/2022  Patel Wolfe NP    ondansetron (ZOFRAN-ODT) 4 MG TbDL Take 1 tablet (4 mg total) by mouth every 6 (six) hours as needed (Nausea). 20 tablet 1/17/2022  Patel Wolfe NP        Follow-up Information     Follow up With Specialties Details Why Contact Info    Armani Britton MD Obstetrics and Gynecology, Obstetrics and Gynecology Schedule an appointment as soon as possible for a visit in 3 days For further evaluation [address]  [address]  [facility] [address] [phone]      [facility] Emergency Medicine Go to  If symptoms worsen, As needed [address] [facility] [address] [phone]           I, Patel Wolfe NP, personally performed the services described in this documentation. All medical record entries made by the scribe were at my direction and in my presence. I have reviewed  the chart and agree that the record reflects my personal performance and is accurate and complete.     Patel Wolfe NP  01/24/22 0849

## 2022-01-17 NOTE — ED TRIAGE NOTES
Pt. Reports she is 12 weeks pregnant and has Covid. Pt. Reports she has been having gen body aches, chest congestion, cough, headaches and fevers at home, for the past few days. Pt. reports she has been taking 325 mg of tylenol for her symptoms. Pt. Reports she does not have an OB-GYN.

## 2022-01-18 NOTE — DISCHARGE INSTRUCTIONS

## 2022-02-08 ENCOUNTER — LAB VISIT (OUTPATIENT)
Dept: LAB | Facility: HOSPITAL | Age: 32
End: 2022-02-08
Attending: OBSTETRICS & GYNECOLOGY
Payer: MEDICAID

## 2022-02-08 ENCOUNTER — ROUTINE PRENATAL (OUTPATIENT)
Dept: OBSTETRICS AND GYNECOLOGY | Facility: CLINIC | Age: 32
End: 2022-02-08
Payer: MEDICAID

## 2022-02-08 VITALS
BODY MASS INDEX: 43.59 KG/M2 | DIASTOLIC BLOOD PRESSURE: 72 MMHG | SYSTOLIC BLOOD PRESSURE: 120 MMHG | WEIGHT: 215.81 LBS

## 2022-02-08 DIAGNOSIS — O34.219 PREVIOUS CESAREAN SECTION COMPLICATING PREGNANCY, ANTEPARTUM CONDITION OR COMPLICATION: ICD-10-CM

## 2022-02-08 DIAGNOSIS — Z3A.15 15 WEEKS GESTATION OF PREGNANCY: Primary | ICD-10-CM

## 2022-02-08 DIAGNOSIS — Z3A.15 15 WEEKS GESTATION OF PREGNANCY: ICD-10-CM

## 2022-02-08 PROCEDURE — 99999 PR PBB SHADOW E&M-EST. PATIENT-LVL III: CPT | Mod: PBBFAC,,, | Performed by: OBSTETRICS & GYNECOLOGY

## 2022-02-08 PROCEDURE — 99213 OFFICE O/P EST LOW 20 MIN: CPT | Mod: TH,S$PBB,, | Performed by: OBSTETRICS & GYNECOLOGY

## 2022-02-08 PROCEDURE — 36415 COLL VENOUS BLD VENIPUNCTURE: CPT | Performed by: OBSTETRICS & GYNECOLOGY

## 2022-02-08 PROCEDURE — 81511 FTL CGEN ABNOR FOUR ANAL: CPT | Performed by: OBSTETRICS & GYNECOLOGY

## 2022-02-08 PROCEDURE — 99999 PR PBB SHADOW E&M-EST. PATIENT-LVL III: ICD-10-PCS | Mod: PBBFAC,,, | Performed by: OBSTETRICS & GYNECOLOGY

## 2022-02-08 PROCEDURE — 99213 OFFICE O/P EST LOW 20 MIN: CPT | Mod: PBBFAC,TH | Performed by: OBSTETRICS & GYNECOLOGY

## 2022-02-08 PROCEDURE — 99213 PR OFFICE/OUTPT VISIT, EST, LEVL III, 20-29 MIN: ICD-10-PCS | Mod: TH,S$PBB,, | Performed by: OBSTETRICS & GYNECOLOGY

## 2022-02-11 LAB
# FETUSES US: NORMAL
2ND TRIMESTER 4 SCREEN PNL SERPL: NEGATIVE
2ND TRIMESTER 4 SCREEN SERPL-IMP: NORMAL
AFP MOM SERPL: 1.11
AFP SERPL-MCNC: 30.5 NG/ML
AGE AT DELIVERY: 33
B-HCG MOM SERPL: 1.54
B-HCG SERPL-ACNC: 81.6 IU/ML
FET TS 21 RISK FROM MAT AGE: NORMAL
GA (DAYS): 0 D
GA (WEEKS): 15 WK
GA METHOD: NORMAL
IDDM PATIENT QL: NORMAL
INHIBIN A MOM SERPL: 1.53
INHIBIN A SERPL-MCNC: 199.7 PG/ML
SMOKING STATUS FTND: NO
TS 18 RISK FETUS: NORMAL
TS 21 RISK FETUS: NORMAL
U ESTRIOL MOM SERPL: 0.93
U ESTRIOL SERPL-MCNC: 0.54 NG/ML

## 2022-02-13 ENCOUNTER — PATIENT MESSAGE (OUTPATIENT)
Dept: OBSTETRICS AND GYNECOLOGY | Facility: CLINIC | Age: 32
End: 2022-02-13
Payer: MEDICAID

## 2022-02-13 DIAGNOSIS — A60.04 HERPES SIMPLEX VULVOVAGINITIS: Primary | ICD-10-CM

## 2022-02-14 ENCOUNTER — PATIENT MESSAGE (OUTPATIENT)
Dept: OBSTETRICS AND GYNECOLOGY | Facility: CLINIC | Age: 32
End: 2022-02-14
Payer: MEDICAID

## 2022-02-14 RX ORDER — VALACYCLOVIR HYDROCHLORIDE 500 MG/1
1000 TABLET, FILM COATED ORAL DAILY
Qty: 30 TABLET | Refills: 1 | Status: SHIPPED | OUTPATIENT
Start: 2022-02-14 | End: 2022-08-31 | Stop reason: SDUPTHER

## 2022-02-14 RX ORDER — NAPROXEN SODIUM 220 MG/1
81 TABLET, FILM COATED ORAL DAILY
Qty: 120 TABLET | Refills: 3 | Status: SHIPPED | OUTPATIENT
Start: 2022-02-14 | End: 2024-01-03

## 2022-03-11 ENCOUNTER — ROUTINE PRENATAL (OUTPATIENT)
Dept: OBSTETRICS AND GYNECOLOGY | Facility: CLINIC | Age: 32
End: 2022-03-11
Payer: MEDICAID

## 2022-03-11 ENCOUNTER — TELEPHONE (OUTPATIENT)
Dept: MATERNAL FETAL MEDICINE | Facility: CLINIC | Age: 32
End: 2022-03-11
Payer: MEDICAID

## 2022-03-11 VITALS
SYSTOLIC BLOOD PRESSURE: 130 MMHG | DIASTOLIC BLOOD PRESSURE: 70 MMHG | BODY MASS INDEX: 44.08 KG/M2 | WEIGHT: 218.25 LBS

## 2022-03-11 DIAGNOSIS — Z3A.19 19 WEEKS GESTATION OF PREGNANCY: Primary | ICD-10-CM

## 2022-03-11 DIAGNOSIS — O99.212 OBESITY AFFECTING PREGNANCY IN SECOND TRIMESTER: ICD-10-CM

## 2022-03-11 DIAGNOSIS — O34.219 PREVIOUS CESAREAN SECTION COMPLICATING PREGNANCY, ANTEPARTUM CONDITION OR COMPLICATION: ICD-10-CM

## 2022-03-11 PROCEDURE — 99213 OFFICE O/P EST LOW 20 MIN: CPT | Mod: PBBFAC,TH | Performed by: OBSTETRICS & GYNECOLOGY

## 2022-03-11 PROCEDURE — 99213 OFFICE O/P EST LOW 20 MIN: CPT | Mod: TH,S$PBB,, | Performed by: OBSTETRICS & GYNECOLOGY

## 2022-03-11 PROCEDURE — 99999 PR PBB SHADOW E&M-EST. PATIENT-LVL III: CPT | Mod: PBBFAC,,, | Performed by: OBSTETRICS & GYNECOLOGY

## 2022-03-11 PROCEDURE — 99999 PR PBB SHADOW E&M-EST. PATIENT-LVL III: ICD-10-PCS | Mod: PBBFAC,,, | Performed by: OBSTETRICS & GYNECOLOGY

## 2022-03-11 PROCEDURE — 99213 PR OFFICE/OUTPT VISIT, EST, LEVL III, 20-29 MIN: ICD-10-PCS | Mod: TH,S$PBB,, | Performed by: OBSTETRICS & GYNECOLOGY

## 2022-03-11 NOTE — PROGRESS NOTES
19w3d  Patient comes in today for follow-up prenatal care  No new complaint.  No vaginal bleeding, contractions, or rupture of membranes.  Good fetal movements.    Eating well without significant nausea/vomiting  Not gaining weight  Taking prenatal vitamins, iron supplement, and baby aspirin     Not doing Connected MOM.  Not getting her equipment.    Quad screen neg    A quick transabdominal ultrasound performed showing normal active male fetus.  Discussed Covid and vaccine  Discussed routine care  Discussed RCS with tubal ligation    Back in 4 weeks    Vitals signs, FHTs, urine dip, and PE findings documented, reviewed and available in OB flow chart.   I spent a total of 20 minutes on the day of the visit. This includes face to face time and non-face to face time preparing to see the patient (eg, review of tests and charts), Obtaining and/or reviewing separately obtained history, Documenting clinical information in the electronic or other health record, Independently interpreting results and communicating results to the patient/family/caregiver, or Care coordination.

## 2022-03-14 ENCOUNTER — TELEPHONE (OUTPATIENT)
Dept: MATERNAL FETAL MEDICINE | Facility: CLINIC | Age: 32
End: 2022-03-14
Payer: MEDICAID

## 2022-03-16 ENCOUNTER — PATIENT MESSAGE (OUTPATIENT)
Dept: MATERNAL FETAL MEDICINE | Facility: CLINIC | Age: 32
End: 2022-03-16
Payer: MEDICAID

## 2022-03-17 ENCOUNTER — PROCEDURE VISIT (OUTPATIENT)
Dept: MATERNAL FETAL MEDICINE | Facility: CLINIC | Age: 32
End: 2022-03-17
Payer: MEDICAID

## 2022-03-17 DIAGNOSIS — Z3A.19 19 WEEKS GESTATION OF PREGNANCY: ICD-10-CM

## 2022-03-17 DIAGNOSIS — Z36.2 ENCOUNTER FOR FOLLOW-UP ULTRASOUND OF FETAL ANATOMY: Primary | ICD-10-CM

## 2022-03-17 PROCEDURE — 76811 US OB/GYN EXTENDED PROCEDURE (VIEWPOINT): ICD-10-PCS | Mod: 26,S$PBB,, | Performed by: OBSTETRICS & GYNECOLOGY

## 2022-03-17 PROCEDURE — 76811 OB US DETAILED SNGL FETUS: CPT | Mod: PBBFAC,PO | Performed by: OBSTETRICS & GYNECOLOGY

## 2022-04-01 ENCOUNTER — TELEPHONE (OUTPATIENT)
Dept: OBSTETRICS AND GYNECOLOGY | Facility: CLINIC | Age: 32
End: 2022-04-01
Payer: MEDICAID

## 2022-04-01 DIAGNOSIS — B37.31 CANDIDA VAGINITIS: Primary | ICD-10-CM

## 2022-04-01 RX ORDER — TERCONAZOLE 4 MG/G
1 CREAM VAGINAL NIGHTLY
Qty: 45 G | Refills: 0 | Status: SHIPPED | OUTPATIENT
Start: 2022-04-01 | End: 2022-04-08

## 2022-04-01 NOTE — TELEPHONE ENCOUNTER
Pt has vaginal irritation with no smell, clear white color. Pt notified that Dr. Britton will give her a call Monday. Pt voiced understanding.     ----- Message from Pam Goodrich sent at 4/1/2022  8:33 AM CDT -----  Contact: Pt  Type:  Needs Medical Advice    Who Called: Pt  Symptoms (please be specific): vaginal irritation  How long has patient had these symptoms:  3 days  Pharmacy name and phone #:  [facility]  Would the patient rather a call back or a response via [facility]? Call back  Best Call Back Number: [phone]  Additional Information: Pt asked for a call back to discuss if she needs an appt

## 2022-04-01 NOTE — TELEPHONE ENCOUNTER
Pt advised that Flagyl and monitstat are safe to use during pregnancy.     ----- Message from Jason Yanes MD sent at 4/1/2022  5:26 PM CDT -----  Contact: Pt  Flagyl and monitstat is safe to use during pregnancy.  If sxs does not improve, pt need to come in to see Dr. Britton.  Thanks.    ----- Message -----  From: Diane Sanchez MA  Sent: 4/1/2022   4:09 PM CDT  To: Jason Yanes MD    This is a Dr Britton pt requesting a call from someone before the end of the day to discuss vaginal irritation and wants to know if she can take Flagyl while pregnant.   ----- Message -----  From: Pam Goodrich  Sent: 4/1/2022   8:35 AM CDT  To: Tobi LLANES Staff    Type:  Needs Medical Advice    Who Called: Pt  Symptoms (please be specific): vaginal irritation  How long has patient had these symptoms:  3 days  Pharmacy name and phone #:  [facility] [address]  Would the patient rather a call back or a response via [facility]? Call back  Best Call Back Number: [phone]  Additional Information: Pt asked for a call back to discuss if she needs an appt

## 2022-04-02 NOTE — TELEPHONE ENCOUNTER
----- Message from Diane Sanchez MA sent at 4/1/2022  4:04 PM CDT -----  Contact: Pt  Pt has appt 4/8/2022 but is requesting something be called in for vaginal irritation. Yeast symptoms.  ----- Message -----  From: Pam Goodrich  Sent: 4/1/2022   8:35 AM CDT  To: Tobi LLANES Staff    Type:  Needs Medical Advice    Who Called: Pt  Symptoms (please be specific): vaginal irritation  How long has patient had these symptoms:  3 days  Pharmacy name and phone #:  [facility]  Would the patient rather a call back or a response via [facility]? Call back  Best Call Back Number: [phone]  Additional Information: Pt asked for a call back to discuss if she needs an appt

## 2022-04-04 ENCOUNTER — TELEPHONE (OUTPATIENT)
Dept: OBSTETRICS AND GYNECOLOGY | Facility: CLINIC | Age: 32
End: 2022-04-04
Payer: MEDICAID

## 2022-04-04 NOTE — TELEPHONE ENCOUNTER
Pt notified her medication request has been approved and sent to pharmacy. Voiced understanding.     ----- Message from Armani Britton MD sent at 4/1/2022  8:28 PM CDT -----  Contact: Pt  Terazol cream given  If not better, she needs to come in    Armani Britton MD  ----- Message -----  From: Diane Sanchez MA  Sent: 4/1/2022   4:05 PM CDT  To: Armani Britton MD    Pt has appt 4/8/2022 but is requesting something be called in for vaginal irritation. Yeast symptoms.  ----- Message -----  From: Pam Goodrich  Sent: 4/1/2022   8:35 AM CDT  To: Tobi LLANES Staff    Type:  Needs Medical Advice    Who Called: Pt  Symptoms (please be specific): vaginal irritation  How long has patient had these symptoms:  3 days  Pharmacy name and phone #:  [facility]  Would the patient rather a call back or a response via [facility]? Call back  Best Call Back Number: [phone]  Additional Information: Pt asked for a call back to discuss if she needs an appt

## 2022-04-08 ENCOUNTER — ROUTINE PRENATAL (OUTPATIENT)
Dept: OBSTETRICS AND GYNECOLOGY | Facility: CLINIC | Age: 32
End: 2022-04-08
Payer: MEDICAID

## 2022-04-08 VITALS
BODY MASS INDEX: 44.53 KG/M2 | DIASTOLIC BLOOD PRESSURE: 70 MMHG | SYSTOLIC BLOOD PRESSURE: 122 MMHG | WEIGHT: 220.44 LBS

## 2022-04-08 DIAGNOSIS — O99.212 OBESITY AFFECTING PREGNANCY IN SECOND TRIMESTER: ICD-10-CM

## 2022-04-08 DIAGNOSIS — A60.04 HERPES SIMPLEX VULVOVAGINITIS: ICD-10-CM

## 2022-04-08 DIAGNOSIS — O34.219 PREVIOUS CESAREAN SECTION COMPLICATING PREGNANCY, ANTEPARTUM CONDITION OR COMPLICATION: ICD-10-CM

## 2022-04-08 DIAGNOSIS — Z3A.23 23 WEEKS GESTATION OF PREGNANCY: Primary | ICD-10-CM

## 2022-04-08 PROCEDURE — 99999 PR PBB SHADOW E&M-EST. PATIENT-LVL III: CPT | Mod: PBBFAC,,, | Performed by: OBSTETRICS & GYNECOLOGY

## 2022-04-08 PROCEDURE — 99999 PR PBB SHADOW E&M-EST. PATIENT-LVL III: ICD-10-PCS | Mod: PBBFAC,,, | Performed by: OBSTETRICS & GYNECOLOGY

## 2022-04-08 PROCEDURE — 99213 OFFICE O/P EST LOW 20 MIN: CPT | Mod: TH,S$PBB,, | Performed by: OBSTETRICS & GYNECOLOGY

## 2022-04-08 PROCEDURE — 99213 PR OFFICE/OUTPT VISIT, EST, LEVL III, 20-29 MIN: ICD-10-PCS | Mod: TH,S$PBB,, | Performed by: OBSTETRICS & GYNECOLOGY

## 2022-04-08 PROCEDURE — 99213 OFFICE O/P EST LOW 20 MIN: CPT | Mod: PBBFAC,TH | Performed by: OBSTETRICS & GYNECOLOGY

## 2022-04-08 NOTE — PROGRESS NOTES
23w3d    Patient comes in today for follow-up prenatal care  No new complaint.  No vaginal bleeding, contractions, or rupture of membranes.  Good fetal movements.    Eating well without significant nausea/vomiting  Not gaining weight  Taking prenatal vitamins, iron supplement, and baby aspirin     Not doing Connected MOM.  Not getting her equipment.    Discussed Covid and vaccine  Discussed routine care  Discussed RCS with tubal ligation    Back in 4 weeks    Vitals signs, FHTs, urine dip, and PE findings documented, reviewed and available in OB flow chart.   I spent a total of 20 minutes on the day of the visit. This includes face to face time and non-face to face time preparing to see the patient (eg, review of tests and charts), Obtaining and/or reviewing separately obtained history, Documenting clinical information in the electronic or other health record, Independently interpreting results and communicating results to the patient/family/caregiver, or Care coordination.

## 2022-04-19 ENCOUNTER — PATIENT MESSAGE (OUTPATIENT)
Dept: ADMINISTRATIVE | Facility: OTHER | Age: 32
End: 2022-04-19
Payer: MEDICAID

## 2022-04-25 ENCOUNTER — PATIENT MESSAGE (OUTPATIENT)
Dept: MATERNAL FETAL MEDICINE | Facility: CLINIC | Age: 32
End: 2022-04-25
Payer: MEDICAID

## 2022-04-26 ENCOUNTER — LAB VISIT (OUTPATIENT)
Dept: LAB | Facility: HOSPITAL | Age: 32
End: 2022-04-26
Attending: OBSTETRICS & GYNECOLOGY
Payer: MEDICAID

## 2022-04-26 ENCOUNTER — PROCEDURE VISIT (OUTPATIENT)
Dept: MATERNAL FETAL MEDICINE | Facility: CLINIC | Age: 32
End: 2022-04-26
Payer: MEDICAID

## 2022-04-26 DIAGNOSIS — E66.01 MATERNAL MORBID OBESITY, ANTEPARTUM: ICD-10-CM

## 2022-04-26 DIAGNOSIS — Z3A.26 26 WEEKS GESTATION OF PREGNANCY: ICD-10-CM

## 2022-04-26 DIAGNOSIS — Z36.4 ANTENATAL SCREENING FOR FETAL GROWTH RETARDATION USING ULTRASONICS: ICD-10-CM

## 2022-04-26 DIAGNOSIS — Z36.2 ENCOUNTER FOR FOLLOW-UP ULTRASOUND OF FETAL ANATOMY: ICD-10-CM

## 2022-04-26 DIAGNOSIS — Z3A.23 23 WEEKS GESTATION OF PREGNANCY: ICD-10-CM

## 2022-04-26 DIAGNOSIS — O99.210 MATERNAL MORBID OBESITY, ANTEPARTUM: ICD-10-CM

## 2022-04-26 DIAGNOSIS — Z36.89 ENCOUNTER FOR ULTRASOUND TO ASSESS FETAL GROWTH: Primary | ICD-10-CM

## 2022-04-26 LAB
BASOPHILS # BLD AUTO: 0.02 K/UL (ref 0–0.2)
BASOPHILS NFR BLD: 0.2 % (ref 0–1.9)
DIFFERENTIAL METHOD: ABNORMAL
EOSINOPHIL # BLD AUTO: 0 K/UL (ref 0–0.5)
EOSINOPHIL NFR BLD: 0.5 % (ref 0–8)
ERYTHROCYTE [DISTWIDTH] IN BLOOD BY AUTOMATED COUNT: 13 % (ref 11.5–14.5)
GLUCOSE SERPL-MCNC: 132 MG/DL (ref 70–140)
HCT VFR BLD AUTO: 33.7 % (ref 37–48.5)
HGB BLD-MCNC: 10.8 G/DL (ref 12–16)
IMM GRANULOCYTES # BLD AUTO: 0.04 K/UL (ref 0–0.04)
IMM GRANULOCYTES NFR BLD AUTO: 0.5 % (ref 0–0.5)
LYMPHOCYTES # BLD AUTO: 1.4 K/UL (ref 1–4.8)
LYMPHOCYTES NFR BLD: 17.1 % (ref 18–48)
MCH RBC QN AUTO: 29.6 PG (ref 27–31)
MCHC RBC AUTO-ENTMCNC: 32 G/DL (ref 32–36)
MCV RBC AUTO: 92 FL (ref 82–98)
MONOCYTES # BLD AUTO: 0.4 K/UL (ref 0.3–1)
MONOCYTES NFR BLD: 5.4 % (ref 4–15)
NEUTROPHILS # BLD AUTO: 6.3 K/UL (ref 1.8–7.7)
NEUTROPHILS NFR BLD: 76.3 % (ref 38–73)
NRBC BLD-RTO: 0 /100 WBC
PLATELET # BLD AUTO: 225 K/UL (ref 150–450)
PMV BLD AUTO: 9.4 FL (ref 9.2–12.9)
RBC # BLD AUTO: 3.65 M/UL (ref 4–5.4)
WBC # BLD AUTO: 8.21 K/UL (ref 3.9–12.7)

## 2022-04-26 PROCEDURE — 76816 OB US FOLLOW-UP PER FETUS: CPT | Mod: 26,S$PBB,, | Performed by: OBSTETRICS & GYNECOLOGY

## 2022-04-26 PROCEDURE — 82950 GLUCOSE TEST: CPT | Performed by: OBSTETRICS & GYNECOLOGY

## 2022-04-26 PROCEDURE — 76816 OB US FOLLOW-UP PER FETUS: CPT | Mod: PBBFAC,PO | Performed by: OBSTETRICS & GYNECOLOGY

## 2022-04-26 PROCEDURE — 76816 PR  US,PREGNANT UTERUS,F/U,TRANSABD APP: ICD-10-PCS | Mod: 26,S$PBB,, | Performed by: OBSTETRICS & GYNECOLOGY

## 2022-04-26 PROCEDURE — 36415 COLL VENOUS BLD VENIPUNCTURE: CPT | Performed by: OBSTETRICS & GYNECOLOGY

## 2022-04-26 PROCEDURE — 85025 COMPLETE CBC W/AUTO DIFF WBC: CPT | Performed by: OBSTETRICS & GYNECOLOGY

## 2022-05-06 ENCOUNTER — CLINICAL SUPPORT (OUTPATIENT)
Dept: OBSTETRICS AND GYNECOLOGY | Facility: CLINIC | Age: 32
End: 2022-05-06
Payer: MEDICAID

## 2022-05-06 ENCOUNTER — ROUTINE PRENATAL (OUTPATIENT)
Dept: OBSTETRICS AND GYNECOLOGY | Facility: CLINIC | Age: 32
End: 2022-05-06
Payer: MEDICAID

## 2022-05-06 VITALS
BODY MASS INDEX: 44.53 KG/M2 | WEIGHT: 220.44 LBS | DIASTOLIC BLOOD PRESSURE: 68 MMHG | SYSTOLIC BLOOD PRESSURE: 120 MMHG

## 2022-05-06 DIAGNOSIS — O34.219 PREVIOUS CESAREAN SECTION COMPLICATING PREGNANCY, ANTEPARTUM CONDITION OR COMPLICATION: ICD-10-CM

## 2022-05-06 DIAGNOSIS — O99.212 OBESITY AFFECTING PREGNANCY IN SECOND TRIMESTER: ICD-10-CM

## 2022-05-06 DIAGNOSIS — Z3A.27 27 WEEKS GESTATION OF PREGNANCY: Primary | ICD-10-CM

## 2022-05-06 DIAGNOSIS — Z23 NEED FOR TDAP VACCINATION: Primary | ICD-10-CM

## 2022-05-06 PROCEDURE — 99999 PR PBB SHADOW E&M-EST. PATIENT-LVL I: ICD-10-PCS | Mod: PBBFAC,,,

## 2022-05-06 PROCEDURE — 99213 OFFICE O/P EST LOW 20 MIN: CPT | Mod: PBBFAC,TH | Performed by: OBSTETRICS & GYNECOLOGY

## 2022-05-06 PROCEDURE — 99213 PR OFFICE/OUTPT VISIT, EST, LEVL III, 20-29 MIN: ICD-10-PCS | Mod: TH,S$PBB,, | Performed by: OBSTETRICS & GYNECOLOGY

## 2022-05-06 PROCEDURE — 99999 PR PBB SHADOW E&M-EST. PATIENT-LVL III: CPT | Mod: PBBFAC,,, | Performed by: OBSTETRICS & GYNECOLOGY

## 2022-05-06 PROCEDURE — 99999 PR PBB SHADOW E&M-EST. PATIENT-LVL I: CPT | Mod: PBBFAC,,,

## 2022-05-06 PROCEDURE — 99213 OFFICE O/P EST LOW 20 MIN: CPT | Mod: TH,S$PBB,, | Performed by: OBSTETRICS & GYNECOLOGY

## 2022-05-06 PROCEDURE — 99999 PR PBB SHADOW E&M-EST. PATIENT-LVL III: ICD-10-PCS | Mod: PBBFAC,,, | Performed by: OBSTETRICS & GYNECOLOGY

## 2022-05-06 PROCEDURE — 90715 TDAP VACCINE 7 YRS/> IM: CPT | Mod: PBBFAC

## 2022-05-06 NOTE — PROGRESS NOTES
27w3d    Patient comes in today for follow-up prenatal care  No new complaint.  No vaginal bleeding, contractions, or rupture of membranes.  Good fetal movements.    Eating well without significant nausea/vomiting  Not gaining weight  Taking prenatal vitamins, iron supplement, and baby aspirin     Not doing Connected MOM.  Not getting her equipment.    Discussed Covid and vaccine  Discussed routine care  Discussed RCS with tubal ligation on 7/26/2022.  Scheduled today  Medicaid tubal consent signed    Back in 2 weeks    Vitals signs, FHTs, urine dip, and PE findings documented, reviewed and available in OB flow chart.   I spent a total of 20 minutes on the day of the visit. This includes face to face time and non-face to face time preparing to see the patient (eg, review of tests and charts), Obtaining and/or reviewing separately obtained history, Documenting clinical information in the electronic or other health record, Independently interpreting results and communicating results to the patient/family/caregiver, or Care coordination.

## 2022-05-11 ENCOUNTER — PATIENT MESSAGE (OUTPATIENT)
Dept: MATERNAL FETAL MEDICINE | Facility: CLINIC | Age: 32
End: 2022-05-11
Payer: MEDICAID

## 2022-06-01 ENCOUNTER — HOSPITAL ENCOUNTER (OUTPATIENT)
Facility: HOSPITAL | Age: 32
Discharge: HOME | End: 2022-06-01
Attending: OBSTETRICS & GYNECOLOGY | Admitting: OBSTETRICS & GYNECOLOGY
Payer: MEDICAID

## 2022-06-01 VITALS
RESPIRATION RATE: 18 BRPM | OXYGEN SATURATION: 98 % | TEMPERATURE: 98 F | DIASTOLIC BLOOD PRESSURE: 75 MMHG | SYSTOLIC BLOOD PRESSURE: 130 MMHG | HEART RATE: 86 BPM

## 2022-06-01 DIAGNOSIS — Z34.93 PREGNANCY WITH ONE FETUS IN THIRD TRIMESTER: Primary | ICD-10-CM

## 2022-06-01 LAB
BILIRUB UR QL STRIP: NEGATIVE
CLARITY UR: ABNORMAL
COLOR UR: YELLOW
GLUCOSE UR QL STRIP: NEGATIVE
HGB UR QL STRIP: NEGATIVE
KETONES UR QL STRIP: NEGATIVE
LEUKOCYTE ESTERASE UR QL STRIP: NEGATIVE
NITRITE UR QL STRIP: NEGATIVE
PH UR STRIP: 6 [PH] (ref 5–8)
PROT UR QL STRIP: NEGATIVE
SP GR UR STRIP: 1.02 (ref 1–1.03)
URN SPEC COLLECT METH UR: ABNORMAL
UROBILINOGEN UR STRIP-ACNC: NEGATIVE EU/DL

## 2022-06-01 PROCEDURE — 99213 OFFICE O/P EST LOW 20 MIN: CPT | Mod: TH,,, | Performed by: OBSTETRICS & GYNECOLOGY

## 2022-06-01 PROCEDURE — 59025 FETAL NON-STRESS TEST: CPT

## 2022-06-01 PROCEDURE — 99211 OFF/OP EST MAY X REQ PHY/QHP: CPT | Mod: 25

## 2022-06-01 PROCEDURE — 25000003 PHARM REV CODE 250: Performed by: OBSTETRICS & GYNECOLOGY

## 2022-06-01 PROCEDURE — 81003 URINALYSIS AUTO W/O SCOPE: CPT | Performed by: OBSTETRICS & GYNECOLOGY

## 2022-06-01 PROCEDURE — 99213 PR OFFICE/OUTPT VISIT, EST, LEVL III, 20-29 MIN: ICD-10-PCS | Mod: TH,,, | Performed by: OBSTETRICS & GYNECOLOGY

## 2022-06-01 RX ORDER — ACETAMINOPHEN 325 MG/1
650 TABLET ORAL ONCE
Status: COMPLETED | OUTPATIENT
Start: 2022-06-01 | End: 2022-06-01

## 2022-06-01 RX ADMIN — ACETAMINOPHEN 650 MG: 325 TABLET ORAL at 08:06

## 2022-06-01 NOTE — DISCHARGE INSTRUCTIONS
Home Undelivered Discharge Instructions    After Discharge Orders:    Future Appointments   Date Time Provider Department Center   6/10/2022  8:40 AM OB/GN ROOM 1 ULTRASOUND [facility]   6/10/2022  9:30 AM [facility]   7/1/2022 10:15 AM Armani Britton MD [facility]       Call physician or midwife's office on Keep appointment as scheduled.  for instructions.    Current Discharge Medication List        CONTINUE these medications which have NOT CHANGED    Details   acetaminophen (TYLENOL) 500 MG tablet Take 1 tablet (500 mg total) by mouth every 4 (four) hours as needed (Fever).  Qty: 30 tablet, Refills: 0      aspirin 81 MG Chew Take 1 tablet (81 mg total) by mouth once daily.  Qty: 120 tablet, Refills: 3      prenatal vit27,calcium-iron-FA (VINATE ONE) 60 mg iron-1 mg Tab Take 1 tablet by mouth.      valACYclovir (VALTREX) 500 MG tablet Take 2 tablets (1,000 mg total) by mouth once daily.  Qty: 30 tablet, Refills: 1    Associated Diagnoses: Herpes simplex vulvovaginitis                     Diet:  normal diet as tolerated    Rest: normal activity as tolerated    Other instructions: Do kick counts once a day on your baby. Choose the time of day your baby is most active. Get in a comfortable lying or sitting position and time how long it takes to feel 10 kicks, twists, turns, swishes, or rolls. Call your physician or midwife if there have not been 10 kicks in 2 hours    Call physician or midwife, return to Labor and Delivery, call 911, or go to the nearest Emergency Room if: increased leakage or fluid, contractions more than  6 per  1 hour, decreased fetal movement, persistent low back pain or cramping, bleeding from vaginal area, difficulty urinating, pain with urination, difficulty breathing, new calf pain, persistent headache, or vision change

## 2022-06-01 NOTE — NURSING
Dr. Britton @ bs for eval. Informed of ua results, that she has not had a bowel movement in a week and c/o as above. Pt having episode of the intermittent pain that presents as a contractions but abdomen/fundus is soft upon palpation, no ctxs noted via toco monitoring even after adjusting it three times. SVE done L/TH/CL/HI.

## 2022-06-01 NOTE — NURSING
Dr. Britton @ bs instructing pt on plan of care & to increase her water intake, she states that she does'nt drink any water, to get colace over the counter and take as directed. Keep appointments as scheduled, verbalized understanding of jason wright.

## 2022-06-01 NOTE — TREATMENT PLAN
Pt presents to unit with c/o sharp pain in her lower abdomen and pressure in her upper abdomen since yesterday. Pt also c/o not emptying her bladder all the way and frequent urination. No c/o vaginal bleeding or any leaking of fluids. EFMx2 placed, FHT noted, no ctx on monitor or upon palpation. VSS. PO hydration given to pt. Urine sample collected. POC reviewed with pt, verbalizes understanding. Will continue to monitor pt.

## 2022-06-01 NOTE — DISCHARGE SUMMARY
[facility] [place] - Labor & Delivery  Discharge Note  Short Stay    * No surgery found *    OUTCOME: Patient tolerated treatment/procedure well without complication and is now ready for discharge.    DISPOSITION: Home or Self Care    FINAL DIAGNOSIS:  Pregnancy with one fetus    FOLLOWUP: In clinic    DISCHARGE INSTRUCTIONS:    Discharge Procedure Orders   No dressing needed        TIME SPENT ON DISCHARGE: 15 minutes    32 yo  at 31w1d  Previous  section  Good prenatal care    Presented to our Labor and Delivery unit with frequent abdominal pain  No vaginal bleeding, rupture of membranes  Good fetal movements    On Labor and Delivery, vitals stable  FHT at 140 Category 1  Contraction: none  Cervix: Long and closed.    UA: negative    Abdominal palpation during pain revealed no contraction    Oral hydration  Discussed Carson-Guerrero contractions.  Discussed constipation.  Discussed measures to control constipation  Needs to drink more water and take OTC stool softener  Labor precautions  Back next week for follow-up    Armani Britton MD

## 2022-06-07 ENCOUNTER — PATIENT MESSAGE (OUTPATIENT)
Dept: ADMINISTRATIVE | Facility: OTHER | Age: 32
End: 2022-06-07
Payer: MEDICAID

## 2022-06-09 ENCOUNTER — PATIENT MESSAGE (OUTPATIENT)
Dept: MATERNAL FETAL MEDICINE | Facility: CLINIC | Age: 32
End: 2022-06-09
Payer: MEDICAID

## 2022-06-10 ENCOUNTER — PROCEDURE VISIT (OUTPATIENT)
Dept: MATERNAL FETAL MEDICINE | Facility: CLINIC | Age: 32
End: 2022-06-10
Payer: MEDICAID

## 2022-06-10 ENCOUNTER — PATIENT MESSAGE (OUTPATIENT)
Dept: MATERNAL FETAL MEDICINE | Facility: CLINIC | Age: 32
End: 2022-06-10

## 2022-06-10 ENCOUNTER — ROUTINE PRENATAL (OUTPATIENT)
Dept: OBSTETRICS AND GYNECOLOGY | Facility: CLINIC | Age: 32
End: 2022-06-10
Payer: MEDICAID

## 2022-06-10 VITALS
WEIGHT: 225.31 LBS | DIASTOLIC BLOOD PRESSURE: 64 MMHG | SYSTOLIC BLOOD PRESSURE: 118 MMHG | BODY MASS INDEX: 45.51 KG/M2

## 2022-06-10 DIAGNOSIS — O34.219 PREVIOUS CESAREAN SECTION COMPLICATING PREGNANCY, ANTEPARTUM CONDITION OR COMPLICATION: ICD-10-CM

## 2022-06-10 DIAGNOSIS — Z36.89 ENCOUNTER FOR ULTRASOUND TO ASSESS FETAL GROWTH: ICD-10-CM

## 2022-06-10 DIAGNOSIS — O99.213 OBESITY AFFECTING PREGNANCY IN THIRD TRIMESTER: ICD-10-CM

## 2022-06-10 DIAGNOSIS — Z3A.32 32 WEEKS GESTATION OF PREGNANCY: Primary | ICD-10-CM

## 2022-06-10 DIAGNOSIS — Z36.89 ENCOUNTER FOR ULTRASOUND TO ASSESS FETAL GROWTH: Primary | ICD-10-CM

## 2022-06-10 PROCEDURE — 99213 OFFICE O/P EST LOW 20 MIN: CPT | Mod: PBBFAC,TH | Performed by: OBSTETRICS & GYNECOLOGY

## 2022-06-10 PROCEDURE — 99999 PR PBB SHADOW E&M-EST. PATIENT-LVL III: CPT | Mod: PBBFAC,,, | Performed by: OBSTETRICS & GYNECOLOGY

## 2022-06-10 PROCEDURE — 76819 FETAL BIOPHYS PROFIL W/O NST: CPT | Mod: 26,S$PBB,, | Performed by: OBSTETRICS & GYNECOLOGY

## 2022-06-10 PROCEDURE — 76816 OB US FOLLOW-UP PER FETUS: CPT | Mod: PBBFAC,PO | Performed by: OBSTETRICS & GYNECOLOGY

## 2022-06-10 PROCEDURE — 99213 PR OFFICE/OUTPT VISIT, EST, LEVL III, 20-29 MIN: ICD-10-PCS | Mod: TH,S$PBB,, | Performed by: OBSTETRICS & GYNECOLOGY

## 2022-06-10 PROCEDURE — 99999 PR PBB SHADOW E&M-EST. PATIENT-LVL III: ICD-10-PCS | Mod: PBBFAC,,, | Performed by: OBSTETRICS & GYNECOLOGY

## 2022-06-10 PROCEDURE — 99213 OFFICE O/P EST LOW 20 MIN: CPT | Mod: TH,S$PBB,, | Performed by: OBSTETRICS & GYNECOLOGY

## 2022-06-10 PROCEDURE — 76819 US MFM PROCEDURE (VIEWPOINT): ICD-10-PCS | Mod: 26,S$PBB,, | Performed by: OBSTETRICS & GYNECOLOGY

## 2022-06-10 PROCEDURE — 76816 US MFM PROCEDURE (VIEWPOINT): ICD-10-PCS | Mod: 26,S$PBB,, | Performed by: OBSTETRICS & GYNECOLOGY

## 2022-06-10 NOTE — PROGRESS NOTES
32w3d    Patient comes in today for follow-up prenatal care  No new complaint.  No vaginal bleeding, contractions, or rupture of membranes.  Good fetal movements.    Eating well without significant nausea/vomiting  Not gaining weight  Taking prenatal vitamins, iron supplement, and baby aspirin     Not doing Connected MOM.  Not getting her equipment.    Discussed Covid and vaccine  Discussed routine care  Discussed RCS with tubal ligation on 7/26/2022.  Scheduled previously  Medicaid tubal consent signed previously    Back in 2 weeks for NST and follow-up    Vitals signs, FHTs, urine dip, and PE findings documented, reviewed and available in OB flow chart.   I spent a total of 20 minutes on the day of the visit. This includes face to face time and non-face to face time preparing to see the patient (eg, review of tests and charts), Obtaining and/or reviewing separately obtained history, Documenting clinical information in the electronic or other health record, Independently interpreting results and communicating results to the patient/family/caregiver, or Care coordination.

## 2022-06-16 ENCOUNTER — ROUTINE PRENATAL (OUTPATIENT)
Dept: OBSTETRICS AND GYNECOLOGY | Facility: CLINIC | Age: 32
End: 2022-06-16
Payer: MEDICAID

## 2022-06-16 VITALS
BODY MASS INDEX: 45.37 KG/M2 | DIASTOLIC BLOOD PRESSURE: 72 MMHG | SYSTOLIC BLOOD PRESSURE: 120 MMHG | WEIGHT: 224.63 LBS

## 2022-06-16 DIAGNOSIS — O34.219 PREVIOUS CESAREAN SECTION COMPLICATING PREGNANCY, ANTEPARTUM CONDITION OR COMPLICATION: ICD-10-CM

## 2022-06-16 DIAGNOSIS — O99.213 OBESITY AFFECTING PREGNANCY IN THIRD TRIMESTER: ICD-10-CM

## 2022-06-16 DIAGNOSIS — Z3A.33 33 WEEKS GESTATION OF PREGNANCY: Primary | ICD-10-CM

## 2022-06-16 PROCEDURE — 99213 OFFICE O/P EST LOW 20 MIN: CPT | Mod: TH,S$PBB,25, | Performed by: OBSTETRICS & GYNECOLOGY

## 2022-06-16 PROCEDURE — 99999 PR PBB SHADOW E&M-EST. PATIENT-LVL III: ICD-10-PCS | Mod: PBBFAC,,, | Performed by: OBSTETRICS & GYNECOLOGY

## 2022-06-16 PROCEDURE — 76815 OB US LIMITED FETUS(S): CPT | Mod: 26,S$PBB,, | Performed by: OBSTETRICS & GYNECOLOGY

## 2022-06-16 PROCEDURE — 99213 PR OFFICE/OUTPT VISIT, EST, LEVL III, 20-29 MIN: ICD-10-PCS | Mod: TH,S$PBB,25, | Performed by: OBSTETRICS & GYNECOLOGY

## 2022-06-16 PROCEDURE — 76815 OB US LIMITED FETUS(S): CPT | Mod: PBBFAC | Performed by: OBSTETRICS & GYNECOLOGY

## 2022-06-16 PROCEDURE — 59025 PR FETAL 2N-STRESS TEST: ICD-10-PCS | Mod: 26,S$PBB,, | Performed by: OBSTETRICS & GYNECOLOGY

## 2022-06-16 PROCEDURE — 76815 PR  US,PREGNANT UTERUS,LIMITED, 1/> FETUSES: ICD-10-PCS | Mod: 26,S$PBB,, | Performed by: OBSTETRICS & GYNECOLOGY

## 2022-06-16 PROCEDURE — 99213 OFFICE O/P EST LOW 20 MIN: CPT | Mod: PBBFAC,TH,25 | Performed by: OBSTETRICS & GYNECOLOGY

## 2022-06-16 PROCEDURE — 99999 PR PBB SHADOW E&M-EST. PATIENT-LVL III: CPT | Mod: PBBFAC,,, | Performed by: OBSTETRICS & GYNECOLOGY

## 2022-06-16 PROCEDURE — 59025 FETAL NON-STRESS TEST: CPT | Mod: 26,S$PBB,, | Performed by: OBSTETRICS & GYNECOLOGY

## 2022-06-16 PROCEDURE — 59025 FETAL NON-STRESS TEST: CPT | Mod: PBBFAC | Performed by: OBSTETRICS & GYNECOLOGY

## 2022-06-16 NOTE — PROGRESS NOTES
33w2d    Patient comes in today for follow-up prenatal care  No new complaint.  No vaginal bleeding, contractions, or rupture of membranes.  Good fetal movements.    Eating well without significant nausea/vomiting  Not gaining weight  Taking prenatal vitamins, iron supplement, and baby aspirin     Not doing Connected MOM.  Not getting her equipment.    NST NOTES    Patient presents for her office visit and NST.    Indication:    Obesity with previous  section    Interpretation:    Fetal heart tones show a baseline of 130-140 with at least 15 bpm x 15 seconds accelerations and moderate variability.  This is reactive.  No decelerations noted.  Good fetal activities noted.    Time:    Patient was monitored for 40 minutes for this visit    Armani Britton MD    LIMITED FOCUS ULTRASOUND    Date: 2022    Indications:   Ultrasound performed for ROMÁN/MVP.    Findings:   *A transabdominal ultrasound performed showing normal fetus.  ROMÁN: 12.6  MVP: 5.43    Plan:   See Clinic Notes      Discussed Covid and vaccine  Discussed routine care  Discussed RCS with tubal ligation on 2022.  Scheduled previously  Medicaid tubal consent signed previously    Back next week for follow-up, NST and ROMÁN/MVP    Vitals signs, FHTs, urine dip, and PE findings documented, reviewed and available in OB flow chart.   I spent a total of 20 minutes on the day of the visit. This includes face to face time and non-face to face time preparing to see the patient (eg, review of tests and charts), Obtaining and/or reviewing separately obtained history, Documenting clinical information in the electronic or other health record, Independently interpreting results and communicating results to the patient/family/caregiver, or Care coordination.

## 2022-06-16 NOTE — PROGRESS NOTES
LIMITED FOCUS ULTRASOUND    Date: 6/16/2022    Indications:   Ultrasound performed for ROMÁN/MVP.    Findings:   *A transabdominal ultrasound performed showing normal fetus.  ROMÁN: 12.6  MVP: 5.43    Plan:   See Clinic Notes

## 2022-06-17 ENCOUNTER — HOSPITAL ENCOUNTER (OUTPATIENT)
Facility: HOSPITAL | Age: 32
Discharge: HOME | End: 2022-06-17
Attending: OBSTETRICS & GYNECOLOGY | Admitting: OBSTETRICS & GYNECOLOGY
Payer: MEDICAID

## 2022-06-17 VITALS — OXYGEN SATURATION: 98 % | HEART RATE: 94 BPM

## 2022-06-17 DIAGNOSIS — O36.8190 DECREASED FETAL MOVEMENT: ICD-10-CM

## 2022-06-17 PROCEDURE — 59025 FETAL NON-STRESS TEST: CPT

## 2022-06-17 PROCEDURE — 99211 OFF/OP EST MAY X REQ PHY/QHP: CPT

## 2022-06-18 NOTE — NURSING
nst REACTVE. No contractions noted. Pt states feels comfortable going home.  Pt happy and reassured that she feels fetal movement.      Instructed on discharge care.  Ambulated off unit w/ s.o

## 2022-06-18 NOTE — NURSING
"Ambulated onto unit escorted by tech.  Alert and oriented, speech clear w/ appropriate response,. Denies pain. States "electrocuted " herself on the stove top and has not felt the baby since.  States when she arrived to the hospital she felt the baby kick once.   Abdomen soft w/o tenderness to palpation.  Active fetal movement audible on monitor. fht 135. Moderate variability. No other complaints.  S.o at bedside. Po hydration started.   "

## 2022-06-18 NOTE — DISCHARGE INSTRUCTIONS
Home Undelivered Discharge Instructions    After Discharge Orders:    Future Appointments   Date Time Provider Department Center   6/23/2022  1:00 PM [facility]   7/1/2022 10:15 AM [facility]   7/8/2022 10:00 AM OB/GN ROOM 1 ULTRASOUND [facility]   7/15/2022  9:00 AM Armani Britton MD [facility]       Call physician's office for further questions    Current Discharge Medication List        CONTINUE these medications which have NOT CHANGED    Details   acetaminophen (TYLENOL) 500 MG tablet Take 1 tablet (500 mg total) by mouth every 4 (four) hours as needed (Fever).  Qty: 30 tablet, Refills: 0      aspirin 81 MG Chew Take 1 tablet (81 mg total) by mouth once daily.  Qty: 120 tablet, Refills: 3      prenatal vit27,calcium-iron-FA (VINATE ONE) 60 mg iron-1 mg Tab Take 1 tablet by mouth.      valACYclovir (VALTREX) 500 MG tablet Take 2 tablets (1,000 mg total) by mouth once daily.  Qty: 30 tablet, Refills: 1    Associated Diagnoses: Herpes simplex vulvovaginitis                     Diet:  normal diet as tolerated    Rest: normal activity as tolerated    Other instructions: Do kick counts once a day on your baby. Choose the time of day your baby is most active. Get in a comfortable lying or sitting position and time how long it takes to feel 10 kicks, twists, turns, swishes, or rolls. Call your physician or midwife if there have not been 10 kicks in 1 hours    Call physician or midwife, return to Labor and Delivery, call 911, or go to the nearest Emergency Room if: increased leakage or fluid, contractions more than  10 per  1 hour, decreased fetal movement, persistent low back pain or cramping, bleeding from vaginal area, difficulty urinating, pain with urination, difficulty breathing, new calf pain, persistent headache, or vision change

## 2022-06-24 ENCOUNTER — HOSPITAL ENCOUNTER (OUTPATIENT)
Facility: HOSPITAL | Age: 32
Discharge: HOME | End: 2022-06-24
Attending: OBSTETRICS & GYNECOLOGY | Admitting: OBSTETRICS & GYNECOLOGY
Payer: MEDICAID

## 2022-06-24 VITALS — SYSTOLIC BLOOD PRESSURE: 114 MMHG | TEMPERATURE: 99 F | DIASTOLIC BLOOD PRESSURE: 65 MMHG

## 2022-06-24 PROCEDURE — 59025 FETAL NON-STRESS TEST: CPT

## 2022-06-24 PROCEDURE — 99211 OFF/OP EST MAY X REQ PHY/QHP: CPT | Mod: 25

## 2022-06-24 NOTE — DISCHARGE INSTRUCTIONS
Home Undelivered Discharge Instructions    After Discharge Orders:    Future Appointments   Date Time Provider Department Center   7/1/2022 10:15 AM Armani Britton MD [facility]   7/8/2022 10:00 AM OB/GN ROOM 1 ULTRASOUND [facility]   7/15/2022  9:00 AM Armani Britton MD [facility]       Call physician or midwife's office  for instructions.    Current Discharge Medication List        CONTINUE these medications which have NOT CHANGED    Details   acetaminophen (TYLENOL) 500 MG tablet Take 1 tablet (500 mg total) by mouth every 4 (four) hours as needed (Fever).  Qty: 30 tablet, Refills: 0      aspirin 81 MG Chew Take 1 tablet (81 mg total) by mouth once daily.  Qty: 120 tablet, Refills: 3      prenatal vit27,calcium-iron-FA (VINATE ONE) 60 mg iron-1 mg Tab Take 1 tablet by mouth.      valACYclovir (VALTREX) 500 MG tablet Take 2 tablets (1,000 mg total) by mouth once daily.  Qty: 30 tablet, Refills: 1    Associated Diagnoses: Herpes simplex vulvovaginitis                     Diet:  normal diet as tolerated    Rest: normal activity as tolerated    Other instructions: Do kick counts once a day on your baby. Choose the time of day your baby is most active. Get in a comfortable lying or sitting position and time how long it takes to feel 10 kicks, twists, turns, swishes, or rolls. Call your physician or midwife if there have not been 10 kicks in 2 hours    Call physician or midwife, return to Labor and Delivery, call 911, or go to the nearest Emergency Room if: increased leakage or fluid, contractions more than  4 per  1 hour, decreased fetal movement, persistent low back pain or cramping, bleeding from vaginal area, difficulty urinating, pain with urination, difficulty breathing, new calf pain, persistent headache, or vision change

## 2022-07-01 ENCOUNTER — ROUTINE PRENATAL (OUTPATIENT)
Dept: OBSTETRICS AND GYNECOLOGY | Facility: CLINIC | Age: 32
End: 2022-07-01
Payer: MEDICAID

## 2022-07-01 VITALS — SYSTOLIC BLOOD PRESSURE: 110 MMHG | DIASTOLIC BLOOD PRESSURE: 58 MMHG | BODY MASS INDEX: 44.84 KG/M2 | WEIGHT: 222 LBS

## 2022-07-01 DIAGNOSIS — O34.219 PREVIOUS CESAREAN SECTION COMPLICATING PREGNANCY, ANTEPARTUM CONDITION OR COMPLICATION: ICD-10-CM

## 2022-07-01 DIAGNOSIS — Z3A.35 35 WEEKS GESTATION OF PREGNANCY: Primary | ICD-10-CM

## 2022-07-01 PROCEDURE — 99999 PR PBB SHADOW E&M-EST. PATIENT-LVL III: ICD-10-PCS | Mod: PBBFAC,,, | Performed by: OBSTETRICS & GYNECOLOGY

## 2022-07-01 PROCEDURE — 76815 PR  US,PREGNANT UTERUS,LIMITED, 1/> FETUSES: ICD-10-PCS | Mod: 26,S$PBB,, | Performed by: OBSTETRICS & GYNECOLOGY

## 2022-07-01 PROCEDURE — 59025 PR FETAL 2N-STRESS TEST: ICD-10-PCS | Mod: 26,S$PBB,, | Performed by: OBSTETRICS & GYNECOLOGY

## 2022-07-01 PROCEDURE — 99999 PR PBB SHADOW E&M-EST. PATIENT-LVL III: CPT | Mod: PBBFAC,,, | Performed by: OBSTETRICS & GYNECOLOGY

## 2022-07-01 PROCEDURE — 99213 OFFICE O/P EST LOW 20 MIN: CPT | Mod: PBBFAC,TH,25 | Performed by: OBSTETRICS & GYNECOLOGY

## 2022-07-01 PROCEDURE — 76815 OB US LIMITED FETUS(S): CPT | Mod: 26,S$PBB,, | Performed by: OBSTETRICS & GYNECOLOGY

## 2022-07-01 PROCEDURE — 59025 FETAL NON-STRESS TEST: CPT | Mod: 26,S$PBB,, | Performed by: OBSTETRICS & GYNECOLOGY

## 2022-07-01 PROCEDURE — 76815 OB US LIMITED FETUS(S): CPT | Mod: PBBFAC | Performed by: OBSTETRICS & GYNECOLOGY

## 2022-07-01 PROCEDURE — 99213 OFFICE O/P EST LOW 20 MIN: CPT | Mod: TH,S$PBB,25, | Performed by: OBSTETRICS & GYNECOLOGY

## 2022-07-01 PROCEDURE — 59025 FETAL NON-STRESS TEST: CPT | Mod: PBBFAC | Performed by: OBSTETRICS & GYNECOLOGY

## 2022-07-01 PROCEDURE — 99213 PR OFFICE/OUTPT VISIT, EST, LEVL III, 20-29 MIN: ICD-10-PCS | Mod: TH,S$PBB,25, | Performed by: OBSTETRICS & GYNECOLOGY

## 2022-07-01 NOTE — PROGRESS NOTES
LIMITED FOCUS ULTRASOUND    Date: 7/1/2022    Indications:   Ultrasound performed for ROMÁN/MVP.    Findings:   *A transabdominal ultrasound performed showing normal fetus.  ROMÁN: 18.34  MVP: 5.56    Plan:   See Clinic Notes

## 2022-07-07 ENCOUNTER — PATIENT MESSAGE (OUTPATIENT)
Dept: MATERNAL FETAL MEDICINE | Facility: CLINIC | Age: 32
End: 2022-07-07
Payer: MEDICAID

## 2022-07-08 ENCOUNTER — PROCEDURE VISIT (OUTPATIENT)
Dept: MATERNAL FETAL MEDICINE | Facility: CLINIC | Age: 32
End: 2022-07-08
Payer: MEDICAID

## 2022-07-08 DIAGNOSIS — Z36.89 ENCOUNTER FOR ULTRASOUND TO ASSESS FETAL GROWTH: ICD-10-CM

## 2022-07-08 PROCEDURE — 76816 US MFM PROCEDURE (VIEWPOINT): ICD-10-PCS | Mod: 26,S$PBB,, | Performed by: OBSTETRICS & GYNECOLOGY

## 2022-07-08 PROCEDURE — 76819 FETAL BIOPHYS PROFIL W/O NST: CPT | Mod: 26,S$PBB,, | Performed by: OBSTETRICS & GYNECOLOGY

## 2022-07-08 PROCEDURE — 76819 US MFM PROCEDURE (VIEWPOINT): ICD-10-PCS | Mod: 26,S$PBB,, | Performed by: OBSTETRICS & GYNECOLOGY

## 2022-07-08 PROCEDURE — 76816 OB US FOLLOW-UP PER FETUS: CPT | Mod: PBBFAC,PO | Performed by: OBSTETRICS & GYNECOLOGY

## 2022-07-14 ENCOUNTER — PATIENT MESSAGE (OUTPATIENT)
Dept: OBSTETRICS AND GYNECOLOGY | Facility: CLINIC | Age: 32
End: 2022-07-14
Payer: MEDICAID

## 2022-07-14 DIAGNOSIS — O99.213 OBESITY AFFECTING PREGNANCY IN THIRD TRIMESTER: Primary | ICD-10-CM

## 2022-07-20 ENCOUNTER — CLINICAL SUPPORT (OUTPATIENT)
Dept: OBSTETRICS AND GYNECOLOGY | Facility: CLINIC | Age: 32
End: 2022-07-20
Payer: MEDICAID

## 2022-07-20 VITALS — SYSTOLIC BLOOD PRESSURE: 120 MMHG | DIASTOLIC BLOOD PRESSURE: 76 MMHG

## 2022-07-20 DIAGNOSIS — O99.213 OBESITY AFFECTING PREGNANCY IN THIRD TRIMESTER: ICD-10-CM

## 2022-07-20 PROCEDURE — 59025 PR FETAL 2N-STRESS TEST: ICD-10-PCS | Mod: 26,S$PBB,, | Performed by: OBSTETRICS & GYNECOLOGY

## 2022-07-20 PROCEDURE — 59025 FETAL NON-STRESS TEST: CPT | Mod: 26,PBBFAC

## 2022-07-20 PROCEDURE — 59025 FETAL NON-STRESS TEST: CPT | Mod: 26,S$PBB,, | Performed by: OBSTETRICS & GYNECOLOGY

## 2022-07-20 PROCEDURE — 59025 FETAL NON-STRESS TEST: CPT | Mod: PBBFAC,76 | Performed by: OBSTETRICS & GYNECOLOGY

## 2022-07-20 NOTE — PROGRESS NOTES
NST NOTES    Patient presents for her office visit and NST.    Indication:    Morbid obesity with BMI over 40    Interpretation:    Fetal heart tones show a baseline of 130-140 with at least 15 bpm x 15 seconds accelerations and moderate variability.  This is reactive.  No decelerations noted.  Good fetal activities noted.    Time:    Patient was monitored for 25 minutes for this visit    Armani Britton MD

## 2022-07-20 NOTE — PROGRESS NOTES
NST completed in prenatal testing clinic. NST reactive and reassuring. Patient states that she has occasional mild contractions, especially at night. Patient educated on importance of staying hydrated. Labor precautions given. Denies leaking of fluid or vaginal bleeding. Patient reports + fetal movement.

## 2022-07-21 ENCOUNTER — HOSPITAL ENCOUNTER (OUTPATIENT)
Facility: HOSPITAL | Age: 32
Discharge: HOME | End: 2022-07-21
Attending: OBSTETRICS & GYNECOLOGY | Admitting: OBSTETRICS & GYNECOLOGY
Payer: MEDICAID

## 2022-07-21 VITALS
RESPIRATION RATE: 20 BRPM | SYSTOLIC BLOOD PRESSURE: 121 MMHG | HEART RATE: 93 BPM | DIASTOLIC BLOOD PRESSURE: 69 MMHG | OXYGEN SATURATION: 98 % | TEMPERATURE: 99 F

## 2022-07-21 DIAGNOSIS — Z34.93 PREGNANCY WITH ONE FETUS IN THIRD TRIMESTER: ICD-10-CM

## 2022-07-21 DIAGNOSIS — Z3A.38 38 WEEKS GESTATION OF PREGNANCY: Primary | ICD-10-CM

## 2022-07-21 DIAGNOSIS — O26.899 ABDOMINAL PAIN AFFECTING PREGNANCY: ICD-10-CM

## 2022-07-21 DIAGNOSIS — O99.810 ABNORMAL MATERNAL GLUCOSE TOLERANCE, ANTEPARTUM: ICD-10-CM

## 2022-07-21 DIAGNOSIS — L30.9 ECZEMA, UNSPECIFIED TYPE: ICD-10-CM

## 2022-07-21 DIAGNOSIS — O35.2XX1: ICD-10-CM

## 2022-07-21 DIAGNOSIS — R10.9 ABDOMINAL PAIN AFFECTING PREGNANCY: ICD-10-CM

## 2022-07-21 PROCEDURE — 99211 OFF/OP EST MAY X REQ PHY/QHP: CPT | Mod: 25

## 2022-07-21 PROCEDURE — 59025 FETAL NON-STRESS TEST: CPT | Mod: 26,,, | Performed by: OBSTETRICS & GYNECOLOGY

## 2022-07-21 PROCEDURE — 59025 PR FETAL 2N-STRESS TEST: ICD-10-PCS | Mod: 26,,, | Performed by: OBSTETRICS & GYNECOLOGY

## 2022-07-21 PROCEDURE — 99213 OFFICE O/P EST LOW 20 MIN: CPT | Mod: 25,TH,, | Performed by: OBSTETRICS & GYNECOLOGY

## 2022-07-21 PROCEDURE — 99213 PR OFFICE/OUTPT VISIT, EST, LEVL III, 20-29 MIN: ICD-10-PCS | Mod: 25,TH,, | Performed by: OBSTETRICS & GYNECOLOGY

## 2022-07-21 PROCEDURE — 63600175 PHARM REV CODE 636 W HCPCS: Performed by: OBSTETRICS & GYNECOLOGY

## 2022-07-21 PROCEDURE — 59025 FETAL NON-STRESS TEST: CPT

## 2022-07-21 RX ORDER — PROCHLORPERAZINE EDISYLATE 5 MG/ML
5 INJECTION INTRAMUSCULAR; INTRAVENOUS EVERY 6 HOURS PRN
Status: DISCONTINUED | OUTPATIENT
Start: 2022-07-21 | End: 2022-07-21 | Stop reason: HOSPADM

## 2022-07-21 RX ORDER — ONDANSETRON 8 MG/1
8 TABLET, ORALLY DISINTEGRATING ORAL EVERY 8 HOURS PRN
Status: DISCONTINUED | OUTPATIENT
Start: 2022-07-21 | End: 2022-07-21 | Stop reason: HOSPADM

## 2022-07-21 RX ORDER — SODIUM CHLORIDE, SODIUM LACTATE, POTASSIUM CHLORIDE, CALCIUM CHLORIDE 600; 310; 30; 20 MG/100ML; MG/100ML; MG/100ML; MG/100ML
INJECTION, SOLUTION INTRAVENOUS CONTINUOUS
Status: DISCONTINUED | OUTPATIENT
Start: 2022-07-21 | End: 2022-07-21 | Stop reason: HOSPADM

## 2022-07-21 RX ORDER — ACETAMINOPHEN 500 MG
500 TABLET ORAL EVERY 6 HOURS PRN
Status: DISCONTINUED | OUTPATIENT
Start: 2022-07-21 | End: 2022-07-21 | Stop reason: HOSPADM

## 2022-07-21 RX ADMIN — SODIUM CHLORIDE, SODIUM LACTATE, POTASSIUM CHLORIDE, AND CALCIUM CHLORIDE: .6; .31; .03; .02 INJECTION, SOLUTION INTRAVENOUS at 02:07

## 2022-07-21 RX ADMIN — SODIUM CHLORIDE, SODIUM LACTATE, POTASSIUM CHLORIDE, AND CALCIUM CHLORIDE: .6; .31; .03; .02 INJECTION, SOLUTION INTRAVENOUS at 01:07

## 2022-07-21 NOTE — DISCHARGE SUMMARY
[facility] [place] - Labor & Delivery  Obstetrics  Discharge Summary      Patient Name: Brock Alfaro  MRN: [id number]  Admission Date: 2022  Hospital Length of Stay: 0 days  Discharge Date and Time: 2022  Attending Physician: Armani Britton MD   Discharging Provider: Armani Britton MD   Primary Care Provider: Primary Doctor No    HPI: No notes on file    FHT: 130-140 Cat 1 (reassuring)  TOCO:  Irregular contractions    * No surgery found *     Hospital Course:   33 yo  at 38w2d  Previous  section  Getting NST weekly for obesity in pregnancy  Has not been to our clinic for the past three weeks.  Came in earlier this morning for some abdominal pain and contractions  No vaginal bleeding or rupture of membranes   Good fetal movements    On Labor and Delivery, vitals stable  FHT: 130-140 Reactive.  Category 1  Contraction:  irregular.  Cervix:  Fingertip, thick and high.  Vertex    IVF given  Patient still with some pain and contractions.  But better  More bothered with  her hidradenitis suppurativa.    Will discharge home on Labor precautions  Fetal kick count  Advise to come back to Labor and Delivery if having signs of labor or concerns for fetal well-being.  Encourage to keep her clinic appointment next week  Orders in for repeat  section next Tuesday,  at 39 weeks    Armani Britton MD             Final Active Diagnoses:    Diagnosis Date Noted POA    PRINCIPAL PROBLEM:  38 weeks gestation of pregnancy [Z3A.38] 2022 Not Applicable    Abdominal pain affecting pregnancy [O26.899, R10.9] 2022 Yes    Abnormal maternal glucose tolerance, antepartum [O99.810] 2014 Yes      Problems Resolved During this Admission:        Significant Diagnostic Studies: None      Feeding Method: Not yet delivered    Immunizations     None          This patient has no babies on file.  Pending Diagnostic Studies:     None          Discharged Condition: good    Disposition: Home or Self  Care    Follow Up:   Follow-up Information     Armani Britton MD Follow up in 4 day(s).    Specialties: Obstetrics and Gynecology, Obstetrics and Gynecology  Contact information:  [address]  [address]  [facility] [phone]                       Patient Instructions:      No dressing needed     Medications:  Current Discharge Medication List      CONTINUE these medications which have NOT CHANGED    Details   acetaminophen (TYLENOL) 500 MG tablet Take 1 tablet (500 mg total) by mouth every 4 (four) hours as needed (Fever).  Qty: 30 tablet, Refills: 0      aspirin 81 MG Chew Take 1 tablet (81 mg total) by mouth once daily.  Qty: 120 tablet, Refills: 3      prenatal vit27,calcium-iron-FA (VINATE ONE) 60 mg iron-1 mg Tab Take 1 tablet by mouth.      valACYclovir (VALTREX) 500 MG tablet Take 2 tablets (1,000 mg total) by mouth once daily.  Qty: 30 tablet, Refills: 1    Associated Diagnoses: Herpes simplex vulvovaginitis             Armani Britton MD  Obstetrics  [facility] [place] - Labor & Delivery

## 2022-07-21 NOTE — TREATMENT PLAN
Pt arrived with c/o ctx that began at 8pm;states she was at work and they became worse;states her pain is in her upper left area and radiates down her left side to her legs;states she feels them sprapubically and in her lower back but the pain is the worse at the top of her abdomen;denies vag bleeding and leaking of fluid;+fm noted;abd soft and nontender to palp;poc discussed;pt states she drinks plenty of fluids;is scheduled for a section on the 26th; states she last ate something at 9pm

## 2022-07-21 NOTE — NURSING
Strip reviewed per MD.NST reactive.SVE with no cervical change.Discharge order noted.Written instructions provided and reviewed.Pre-op teaching initiated.

## 2022-07-21 NOTE — HOSPITAL COURSE
31 yo  at 38w2d  Previous  section  Getting NST weekly for obesity in pregnancy  Has not been to our clinic for the past three weeks.  Came in earlier this morning for some abdominal pain and contractions  No vaginal bleeding or rupture of membranes   Good fetal movements    On Labor and Delivery, vitals stable  FHT: 130-140 Reactive.  Category 1  Contraction:  irregular.  Cervix:  Fingertip, thick and high.  Vertex    IVF given  Patient still with some pain and contractions.  But better  More bothered with  her hidradenitis suppurativa.    Will discharge home on Labor precautions  Fetal kick count  Advise to come back to Labor and Delivery if having signs of labor or concerns for fetal well-being.  Encourage to keep her clinic appointment next week  Orders in for repeat  section next Tuesday,  at 39 weeks    Armani Britton MD

## 2022-07-25 ENCOUNTER — CLINICAL SUPPORT (OUTPATIENT)
Dept: OBSTETRICS AND GYNECOLOGY | Facility: CLINIC | Age: 32
End: 2022-07-25
Payer: MEDICAID

## 2022-07-25 ENCOUNTER — ROUTINE PRENATAL (OUTPATIENT)
Dept: OBSTETRICS AND GYNECOLOGY | Facility: CLINIC | Age: 32
End: 2022-07-25
Payer: MEDICAID

## 2022-07-25 ENCOUNTER — ANESTHESIA EVENT (OUTPATIENT)
Dept: OBSTETRICS AND GYNECOLOGY | Facility: HOSPITAL | Age: 32
End: 2022-07-25
Payer: MEDICAID

## 2022-07-25 VITALS — BODY MASS INDEX: 46.75 KG/M2 | SYSTOLIC BLOOD PRESSURE: 120 MMHG | WEIGHT: 231.5 LBS | DIASTOLIC BLOOD PRESSURE: 72 MMHG

## 2022-07-25 DIAGNOSIS — Z3A.38 38 WEEKS GESTATION OF PREGNANCY: Primary | ICD-10-CM

## 2022-07-25 DIAGNOSIS — O99.213 OBESITY AFFECTING PREGNANCY IN THIRD TRIMESTER: Primary | ICD-10-CM

## 2022-07-25 DIAGNOSIS — O99.213 OBESITY AFFECTING PREGNANCY IN THIRD TRIMESTER: ICD-10-CM

## 2022-07-25 DIAGNOSIS — O34.219 PREVIOUS CESAREAN SECTION COMPLICATING PREGNANCY, ANTEPARTUM CONDITION OR COMPLICATION: ICD-10-CM

## 2022-07-25 PROCEDURE — 99999 PR PBB SHADOW E&M-EST. PATIENT-LVL III: CPT | Mod: PBBFAC,,, | Performed by: OBSTETRICS & GYNECOLOGY

## 2022-07-25 PROCEDURE — 59025 FETAL NON-STRESS TEST: CPT | Mod: PBBFAC | Performed by: OBSTETRICS & GYNECOLOGY

## 2022-07-25 PROCEDURE — 99213 OFFICE O/P EST LOW 20 MIN: CPT | Mod: PBBFAC,TH,25 | Performed by: OBSTETRICS & GYNECOLOGY

## 2022-07-25 PROCEDURE — 59025 FETAL NON-STRESS TEST: CPT | Mod: 26,S$PBB,, | Performed by: OBSTETRICS & GYNECOLOGY

## 2022-07-25 PROCEDURE — 99213 OFFICE O/P EST LOW 20 MIN: CPT | Mod: TH,S$PBB,25, | Performed by: OBSTETRICS & GYNECOLOGY

## 2022-07-25 PROCEDURE — 99213 PR OFFICE/OUTPT VISIT, EST, LEVL III, 20-29 MIN: ICD-10-PCS | Mod: TH,S$PBB,25, | Performed by: OBSTETRICS & GYNECOLOGY

## 2022-07-25 PROCEDURE — 59025 PR FETAL 2N-STRESS TEST: ICD-10-PCS | Mod: 26,S$PBB,, | Performed by: OBSTETRICS & GYNECOLOGY

## 2022-07-25 PROCEDURE — 99999 PR PBB SHADOW E&M-EST. PATIENT-LVL III: ICD-10-PCS | Mod: PBBFAC,,, | Performed by: OBSTETRICS & GYNECOLOGY

## 2022-07-25 NOTE — PROGRESS NOTES
NST completed in prenatal testing clinic. NST reactive and reassuring. Patient denies cxtn, leaking of fluid or vaginal bleeding. Patient reports + fetal movement. Routine OB visit with Dr. Britton to follow NST.

## 2022-07-25 NOTE — PROGRESS NOTES
38w6d    Patient comes in today for follow-up prenatal care  No new complaint.  No vaginal bleeding, contractions, or rupture of membranes.  Good fetal movements.    Eating well without significant nausea/vomiting  Not gaining weight  Taking prenatal vitamins, iron supplement, and baby aspirin     Not doing Connected MOM.  Not getting her equipment.    NST NOTES    Patient presents for her office visit and NST.    Indication:    Obesity with previous  section    Interpretation:    Fetal heart tones show a baseline of 130-140 with at least 15 bpm x 15 seconds accelerations and moderate variability.  This is reactive.  No decelerations noted.  Good fetal activities noted.    Time:    Patient was monitored for 21 minutes for this visit    Armani Britton MD      Discussed Covid and vaccine  Discussed routine care  Discussed RCS with tubal ligation on 2022.  Scheduled previously  Medicaid tubal consent signed previously    Vitals signs, FHTs, urine dip, and PE findings documented, reviewed and available in OB flow chart.   I spent a total of 20 minutes on the day of the visit. This includes face to face time and non-face to face time preparing to see the patient (eg, review of tests and charts), Obtaining and/or reviewing separately obtained history, Documenting clinical information in the electronic or other health record, Independently interpreting results and communicating results to the patient/family/caregiver, or Care coordination.                      Complex Repair And Melolabial Flap Text: The defect edges were debeveled with a #15 scalpel blade.  The primary defect was closed partially with a complex linear closure.  Given the location of the remaining defect, shape of the defect and the proximity to free margins a melolabial flap was deemed most appropriate for complete closure of the defect.  Using a sterile surgical marker, an appropriate advancement flap was drawn incorporating the defect and placing the expected incisions within the relaxed skin tension lines where possible.    The area thus outlined was incised deep to adipose tissue with a #15 scalpel blade.  The skin margins were undermined to an appropriate distance in all directions utilizing iris scissors.

## 2022-07-26 ENCOUNTER — ANESTHESIA (OUTPATIENT)
Dept: OBSTETRICS AND GYNECOLOGY | Facility: HOSPITAL | Age: 32
End: 2022-07-26
Payer: MEDICAID

## 2022-07-26 ENCOUNTER — HOSPITAL ENCOUNTER (INPATIENT)
Facility: HOSPITAL | Age: 32
LOS: 2 days | Discharge: HOME | End: 2022-07-28
Attending: OBSTETRICS & GYNECOLOGY | Admitting: OBSTETRICS & GYNECOLOGY
Payer: MEDICAID

## 2022-07-26 DIAGNOSIS — Z98.891 STATUS POST CESAREAN SECTION: Primary | ICD-10-CM

## 2022-07-26 DIAGNOSIS — Z3A.39 39 WEEKS GESTATION OF PREGNANCY: ICD-10-CM

## 2022-07-26 PROBLEM — O34.219 PREVIOUS CESAREAN SECTION COMPLICATING PREGNANCY, ANTEPARTUM CONDITION OR COMPLICATION: Status: ACTIVE | Noted: 2022-07-26

## 2022-07-26 LAB
ABO + RH BLD: NORMAL
BASOPHILS # BLD AUTO: 0.03 K/UL (ref 0–0.2)
BASOPHILS NFR BLD: 0.3 % (ref 0–1.9)
BLD GP AB SCN CELLS X3 SERPL QL: NORMAL
DIFFERENTIAL METHOD: ABNORMAL
EOSINOPHIL # BLD AUTO: 0.1 K/UL (ref 0–0.5)
EOSINOPHIL NFR BLD: 1.4 % (ref 0–8)
ERYTHROCYTE [DISTWIDTH] IN BLOOD BY AUTOMATED COUNT: 14.2 % (ref 11.5–14.5)
HCT VFR BLD AUTO: 35.1 % (ref 37–48.5)
HGB BLD-MCNC: 11.2 G/DL (ref 12–16)
IMM GRANULOCYTES # BLD AUTO: 0.04 K/UL (ref 0–0.04)
IMM GRANULOCYTES NFR BLD AUTO: 0.5 % (ref 0–0.5)
LYMPHOCYTES # BLD AUTO: 1.7 K/UL (ref 1–4.8)
LYMPHOCYTES NFR BLD: 19.2 % (ref 18–48)
MCH RBC QN AUTO: 25.9 PG (ref 27–31)
MCHC RBC AUTO-ENTMCNC: 31.9 G/DL (ref 32–36)
MCV RBC AUTO: 81 FL (ref 82–98)
MONOCYTES # BLD AUTO: 0.6 K/UL (ref 0.3–1)
MONOCYTES NFR BLD: 6.9 % (ref 4–15)
NEUTROPHILS # BLD AUTO: 6.3 K/UL (ref 1.8–7.7)
NEUTROPHILS NFR BLD: 71.7 % (ref 38–73)
NRBC BLD-RTO: 0 /100 WBC
PLATELET # BLD AUTO: 268 K/UL (ref 150–450)
PMV BLD AUTO: 10.2 FL (ref 9.2–12.9)
RBC # BLD AUTO: 4.32 M/UL (ref 4–5.4)
SARS-COV-2 RDRP RESP QL NAA+PROBE: NEGATIVE
WBC # BLD AUTO: 8.79 K/UL (ref 3.9–12.7)

## 2022-07-26 PROCEDURE — 36004724 HC OB OR TIME LEV III - 1ST 15 MIN: Performed by: OBSTETRICS & GYNECOLOGY

## 2022-07-26 PROCEDURE — 63600175 PHARM REV CODE 636 W HCPCS: Performed by: NURSE ANESTHETIST, CERTIFIED REGISTERED

## 2022-07-26 PROCEDURE — 71000039 HC RECOVERY, EACH ADD'L HOUR: Performed by: OBSTETRICS & GYNECOLOGY

## 2022-07-26 PROCEDURE — 71000033 HC RECOVERY, INTIAL HOUR: Performed by: OBSTETRICS & GYNECOLOGY

## 2022-07-26 PROCEDURE — 37000009 HC ANESTHESIA EA ADD 15 MINS: Performed by: OBSTETRICS & GYNECOLOGY

## 2022-07-26 PROCEDURE — 63600175 PHARM REV CODE 636 W HCPCS: Performed by: OBSTETRICS & GYNECOLOGY

## 2022-07-26 PROCEDURE — 59514 PR CESAREAN DELIVERY ONLY: ICD-10-PCS | Mod: GB,,, | Performed by: OBSTETRICS & GYNECOLOGY

## 2022-07-26 PROCEDURE — 86592 SYPHILIS TEST NON-TREP QUAL: CPT | Performed by: OBSTETRICS & GYNECOLOGY

## 2022-07-26 PROCEDURE — 37000008 HC ANESTHESIA 1ST 15 MINUTES: Performed by: OBSTETRICS & GYNECOLOGY

## 2022-07-26 PROCEDURE — 37000009 HC ANESTHESIA EA ADD 15 MINS: Mod: SZN

## 2022-07-26 PROCEDURE — 25000003 PHARM REV CODE 250: Performed by: ANESTHESIOLOGY

## 2022-07-26 PROCEDURE — 59514 CESAREAN DELIVERY ONLY: CPT | Mod: GB,,, | Performed by: OBSTETRICS & GYNECOLOGY

## 2022-07-26 PROCEDURE — 11000001 HC ACUTE MED/SURG PRIVATE ROOM

## 2022-07-26 PROCEDURE — 86850 RBC ANTIBODY SCREEN: CPT | Performed by: OBSTETRICS & GYNECOLOGY

## 2022-07-26 PROCEDURE — 36004725 HC OB OR TIME LEV III - EA ADD 15 MIN: Mod: SZN

## 2022-07-26 PROCEDURE — 63600175 PHARM REV CODE 636 W HCPCS: Performed by: ANESTHESIOLOGY

## 2022-07-26 PROCEDURE — 25000003 PHARM REV CODE 250: Performed by: NURSE ANESTHETIST, CERTIFIED REGISTERED

## 2022-07-26 PROCEDURE — 59514 PRA REAN DELIVERY ONLY: ICD-10-PCS | Mod: QX,CRNA,, | Performed by: NURSE ANESTHETIST, CERTIFIED REGISTERED

## 2022-07-26 PROCEDURE — 59514 CESAREAN DELIVERY ONLY: CPT | Mod: QY,ANES,, | Performed by: ANESTHESIOLOGY

## 2022-07-26 PROCEDURE — 36004725 HC OB OR TIME LEV III - EA ADD 15 MIN: Performed by: OBSTETRICS & GYNECOLOGY

## 2022-07-26 PROCEDURE — 59514 CESAREAN DELIVERY ONLY: CPT | Mod: 80,GB,, | Performed by: OBSTETRICS & GYNECOLOGY

## 2022-07-26 PROCEDURE — 59514 PR CESAREAN DELIVERY ONLY: ICD-10-PCS | Mod: 80,GB,, | Performed by: OBSTETRICS & GYNECOLOGY

## 2022-07-26 PROCEDURE — U0002 COVID-19 LAB TEST NON-CDC: HCPCS | Performed by: OBSTETRICS & GYNECOLOGY

## 2022-07-26 PROCEDURE — 85025 COMPLETE CBC W/AUTO DIFF WBC: CPT | Performed by: OBSTETRICS & GYNECOLOGY

## 2022-07-26 PROCEDURE — 25000003 PHARM REV CODE 250: Performed by: OBSTETRICS & GYNECOLOGY

## 2022-07-26 PROCEDURE — 59514 CESAREAN DELIVERY ONLY: CPT | Mod: QX,CRNA,, | Performed by: NURSE ANESTHETIST, CERTIFIED REGISTERED

## 2022-07-26 PROCEDURE — 59514 PRA REAN DELIVERY ONLY: ICD-10-PCS | Mod: QY,ANES,, | Performed by: ANESTHESIOLOGY

## 2022-07-26 RX ORDER — KETOROLAC TROMETHAMINE 30 MG/ML
30 INJECTION, SOLUTION INTRAMUSCULAR; INTRAVENOUS EVERY 8 HOURS
Status: COMPLETED | OUTPATIENT
Start: 2022-07-26 | End: 2022-07-27

## 2022-07-26 RX ORDER — MISOPROSTOL 200 UG/1
200 TABLET ORAL ONCE AS NEEDED
Status: DISCONTINUED | OUTPATIENT
Start: 2022-07-26 | End: 2022-07-28 | Stop reason: HOSPADM

## 2022-07-26 RX ORDER — MIDAZOLAM HYDROCHLORIDE 1 MG/ML
INJECTION INTRAMUSCULAR; INTRAVENOUS
Status: DISCONTINUED | OUTPATIENT
Start: 2022-07-26 | End: 2022-07-26

## 2022-07-26 RX ORDER — OXYCODONE AND ACETAMINOPHEN 10; 325 MG/1; MG/1
1 TABLET ORAL EVERY 4 HOURS PRN
Status: DISCONTINUED | OUTPATIENT
Start: 2022-07-26 | End: 2022-07-28 | Stop reason: HOSPADM

## 2022-07-26 RX ORDER — OXYCODONE HYDROCHLORIDE 5 MG/1
5 TABLET ORAL EVERY 4 HOURS PRN
Status: DISCONTINUED | OUTPATIENT
Start: 2022-07-26 | End: 2022-07-26

## 2022-07-26 RX ORDER — ACETAMINOPHEN 325 MG/1
650 TABLET ORAL EVERY 6 HOURS PRN
Status: DISCONTINUED | OUTPATIENT
Start: 2022-07-26 | End: 2022-07-28 | Stop reason: HOSPADM

## 2022-07-26 RX ORDER — ACETAMINOPHEN 10 MG/ML
INJECTION, SOLUTION INTRAVENOUS
Status: DISCONTINUED | OUTPATIENT
Start: 2022-07-26 | End: 2022-07-26

## 2022-07-26 RX ORDER — IBUPROFEN 400 MG/1
800 TABLET ORAL EVERY 8 HOURS
Status: DISCONTINUED | OUTPATIENT
Start: 2022-07-27 | End: 2022-07-28 | Stop reason: HOSPADM

## 2022-07-26 RX ORDER — ONDANSETRON 2 MG/ML
4 INJECTION INTRAMUSCULAR; INTRAVENOUS EVERY 6 HOURS PRN
Status: DISCONTINUED | OUTPATIENT
Start: 2022-07-26 | End: 2022-07-26

## 2022-07-26 RX ORDER — SIMETHICONE 80 MG
1 TABLET,CHEWABLE ORAL EVERY 6 HOURS PRN
Status: DISCONTINUED | OUTPATIENT
Start: 2022-07-26 | End: 2022-07-28 | Stop reason: HOSPADM

## 2022-07-26 RX ORDER — DIPHENHYDRAMINE HYDROCHLORIDE 50 MG/ML
25 INJECTION INTRAMUSCULAR; INTRAVENOUS EVERY 4 HOURS PRN
Status: DISCONTINUED | OUTPATIENT
Start: 2022-07-26 | End: 2022-07-28 | Stop reason: HOSPADM

## 2022-07-26 RX ORDER — DOCUSATE SODIUM 100 MG/1
200 CAPSULE, LIQUID FILLED ORAL 2 TIMES DAILY PRN
Status: DISCONTINUED | OUTPATIENT
Start: 2022-07-26 | End: 2022-07-28 | Stop reason: HOSPADM

## 2022-07-26 RX ORDER — MUPIROCIN 20 MG/G
OINTMENT TOPICAL 2 TIMES DAILY
Status: DISCONTINUED | OUTPATIENT
Start: 2022-07-26 | End: 2022-07-28 | Stop reason: HOSPADM

## 2022-07-26 RX ORDER — DIPHENHYDRAMINE HCL 25 MG
25 CAPSULE ORAL EVERY 4 HOURS PRN
Status: DISCONTINUED | OUTPATIENT
Start: 2022-07-26 | End: 2022-07-28 | Stop reason: HOSPADM

## 2022-07-26 RX ORDER — ONDANSETRON HYDROCHLORIDE 2 MG/ML
INJECTION, SOLUTION INTRAMUSCULAR; INTRAVENOUS
Status: DISCONTINUED | OUTPATIENT
Start: 2022-07-26 | End: 2022-07-26

## 2022-07-26 RX ORDER — ACETAMINOPHEN 325 MG/1
650 TABLET ORAL EVERY 6 HOURS
Status: DISCONTINUED | OUTPATIENT
Start: 2022-07-26 | End: 2022-07-26

## 2022-07-26 RX ORDER — OXYCODONE AND ACETAMINOPHEN 5; 325 MG/1; MG/1
1 TABLET ORAL EVERY 4 HOURS PRN
Status: DISCONTINUED | OUTPATIENT
Start: 2022-07-26 | End: 2022-07-28 | Stop reason: HOSPADM

## 2022-07-26 RX ORDER — DIPHENHYDRAMINE HYDROCHLORIDE 50 MG/ML
12.5 INJECTION INTRAMUSCULAR; INTRAVENOUS
Status: DISCONTINUED | OUTPATIENT
Start: 2022-07-26 | End: 2022-07-26

## 2022-07-26 RX ORDER — ONDANSETRON 8 MG/1
8 TABLET, ORALLY DISINTEGRATING ORAL EVERY 8 HOURS PRN
Status: DISCONTINUED | OUTPATIENT
Start: 2022-07-26 | End: 2022-07-28 | Stop reason: HOSPADM

## 2022-07-26 RX ORDER — OXYTOCIN/RINGER'S LACTATE 30/500 ML
95 PLASTIC BAG, INJECTION (ML) INTRAVENOUS ONCE
Status: COMPLETED | OUTPATIENT
Start: 2022-07-26 | End: 2022-07-26

## 2022-07-26 RX ORDER — OXYCODONE HYDROCHLORIDE 5 MG/1
10 TABLET ORAL EVERY 4 HOURS PRN
Status: DISCONTINUED | OUTPATIENT
Start: 2022-07-26 | End: 2022-07-26

## 2022-07-26 RX ORDER — OXYTOCIN 10 [USP'U]/ML
INJECTION, SOLUTION INTRAMUSCULAR; INTRAVENOUS
Status: DISCONTINUED | OUTPATIENT
Start: 2022-07-26 | End: 2022-07-26

## 2022-07-26 RX ORDER — BUPIVACAINE HYDROCHLORIDE 7.5 MG/ML
INJECTION, SOLUTION INTRASPINAL
Status: DISCONTINUED | OUTPATIENT
Start: 2022-07-26 | End: 2022-07-26

## 2022-07-26 RX ORDER — FENTANYL CITRATE 50 UG/ML
INJECTION, SOLUTION INTRAMUSCULAR; INTRAVENOUS
Status: DISCONTINUED | OUTPATIENT
Start: 2022-07-26 | End: 2022-07-26

## 2022-07-26 RX ORDER — SODIUM CITRATE AND CITRIC ACID MONOHYDRATE 334; 500 MG/5ML; MG/5ML
30 SOLUTION ORAL ONCE
Status: COMPLETED | OUTPATIENT
Start: 2022-07-26 | End: 2022-07-26

## 2022-07-26 RX ORDER — KETOROLAC TROMETHAMINE 30 MG/ML
30 INJECTION, SOLUTION INTRAMUSCULAR; INTRAVENOUS EVERY 6 HOURS
Status: DISCONTINUED | OUTPATIENT
Start: 2022-07-26 | End: 2022-07-26

## 2022-07-26 RX ORDER — FAMOTIDINE 10 MG/ML
20 INJECTION INTRAVENOUS ONCE
Status: COMPLETED | OUTPATIENT
Start: 2022-07-26 | End: 2022-07-26

## 2022-07-26 RX ORDER — PROCHLORPERAZINE EDISYLATE 5 MG/ML
5 INJECTION INTRAMUSCULAR; INTRAVENOUS EVERY 6 HOURS PRN
Status: DISCONTINUED | OUTPATIENT
Start: 2022-07-26 | End: 2022-07-26

## 2022-07-26 RX ORDER — PROCHLORPERAZINE EDISYLATE 5 MG/ML
5 INJECTION INTRAMUSCULAR; INTRAVENOUS EVERY 6 HOURS PRN
Status: DISCONTINUED | OUTPATIENT
Start: 2022-07-26 | End: 2022-07-28 | Stop reason: HOSPADM

## 2022-07-26 RX ORDER — MORPHINE SULFATE 1 MG/ML
INJECTION, SOLUTION EPIDURAL; INTRATHECAL; INTRAVENOUS
Status: DISCONTINUED | OUTPATIENT
Start: 2022-07-26 | End: 2022-07-26

## 2022-07-26 RX ORDER — PHENYLEPHRINE HYDROCHLORIDE 10 MG/ML
INJECTION INTRAVENOUS
Status: DISCONTINUED | OUTPATIENT
Start: 2022-07-26 | End: 2022-07-26

## 2022-07-26 RX ORDER — METOCLOPRAMIDE HYDROCHLORIDE 5 MG/ML
10 INJECTION INTRAMUSCULAR; INTRAVENOUS ONCE
Status: COMPLETED | OUTPATIENT
Start: 2022-07-26 | End: 2022-07-26

## 2022-07-26 RX ORDER — NALOXONE HCL 0.4 MG/ML
0.04 VIAL (ML) INJECTION EVERY 5 MIN PRN
Status: DISCONTINUED | OUTPATIENT
Start: 2022-07-26 | End: 2022-07-26

## 2022-07-26 RX ADMIN — PROCHLORPERAZINE EDISYLATE 5 MG: 5 INJECTION INTRAMUSCULAR; INTRAVENOUS at 02:07

## 2022-07-26 RX ADMIN — PHENYLEPHRINE HYDROCHLORIDE 100 MCG: 10 INJECTION INTRAVENOUS at 08:07

## 2022-07-26 RX ADMIN — PHENYLEPHRINE HYDROCHLORIDE 100 MCG: 10 INJECTION INTRAVENOUS at 07:07

## 2022-07-26 RX ADMIN — KETOROLAC TROMETHAMINE 30 MG: 30 INJECTION, SOLUTION INTRAMUSCULAR at 02:07

## 2022-07-26 RX ADMIN — FAMOTIDINE 20 MG: 10 INJECTION, SOLUTION INTRAVENOUS at 06:07

## 2022-07-26 RX ADMIN — Medication 0.1 MG: at 07:07

## 2022-07-26 RX ADMIN — FENTANYL CITRATE 10 MCG: 50 INJECTION, SOLUTION INTRAMUSCULAR; INTRAVENOUS at 07:07

## 2022-07-26 RX ADMIN — Medication 95 MILLI-UNITS/MIN: at 08:07

## 2022-07-26 RX ADMIN — GLYCOPYRROLATE 0.2 MG: 0.2 INJECTION, SOLUTION INTRAMUSCULAR; INTRAVITREAL at 07:07

## 2022-07-26 RX ADMIN — ACETAMINOPHEN 1000 MG: 10 INJECTION, SOLUTION INTRAVENOUS at 08:07

## 2022-07-26 RX ADMIN — OXYCODONE AND ACETAMINOPHEN 1 TABLET: 10; 325 TABLET ORAL at 09:07

## 2022-07-26 RX ADMIN — BUPIVACAINE HYDROCHLORIDE IN DEXTROSE 1.2 ML: 7.5 INJECTION, SOLUTION SUBARACHNOID at 07:07

## 2022-07-26 RX ADMIN — MUPIROCIN: 20 OINTMENT TOPICAL at 10:07

## 2022-07-26 RX ADMIN — ONDANSETRON 4 MG: 2 INJECTION, SOLUTION INTRAMUSCULAR; INTRAVENOUS at 07:07

## 2022-07-26 RX ADMIN — SODIUM CHLORIDE, SODIUM LACTATE, POTASSIUM CHLORIDE, AND CALCIUM CHLORIDE 1000 ML: .6; .31; .03; .02 INJECTION, SOLUTION INTRAVENOUS at 07:07

## 2022-07-26 RX ADMIN — PHENYLEPHRINE HYDROCHLORIDE 50 MCG: 10 INJECTION INTRAVENOUS at 07:07

## 2022-07-26 RX ADMIN — SODIUM CITRATE AND CITRIC ACID MONOHYDRATE 30 ML: 500; 334 SOLUTION ORAL at 06:07

## 2022-07-26 RX ADMIN — MUPIROCIN: 20 OINTMENT TOPICAL at 08:07

## 2022-07-26 RX ADMIN — OXYTOCIN 30 UNITS: 10 INJECTION, SOLUTION INTRAMUSCULAR; INTRAVENOUS at 07:07

## 2022-07-26 RX ADMIN — CEFAZOLIN SODIUM 3 G: 2 SOLUTION INTRAVENOUS at 07:07

## 2022-07-26 RX ADMIN — SODIUM CHLORIDE, SODIUM LACTATE, POTASSIUM CHLORIDE, AND CALCIUM CHLORIDE: .6; .31; .03; .02 INJECTION, SOLUTION INTRAVENOUS at 07:07

## 2022-07-26 RX ADMIN — METOCLOPRAMIDE 10 MG: 5 INJECTION, SOLUTION INTRAMUSCULAR; INTRAVENOUS at 06:07

## 2022-07-26 RX ADMIN — MIDAZOLAM HYDROCHLORIDE 2 MG: 1 INJECTION, SOLUTION INTRAMUSCULAR; INTRAVENOUS at 07:07

## 2022-07-26 RX ADMIN — KETOROLAC TROMETHAMINE 30 MG: 30 INJECTION, SOLUTION INTRAMUSCULAR at 10:07

## 2022-07-26 RX ADMIN — FENTANYL CITRATE 90 MCG: 50 INJECTION, SOLUTION INTRAMUSCULAR; INTRAVENOUS at 07:07

## 2022-07-26 RX ADMIN — PHENYLEPHRINE HYDROCHLORIDE 50 MCG: 10 INJECTION INTRAVENOUS at 08:07

## 2022-07-26 NOTE — ANESTHESIA PROCEDURE NOTES
Spinal    Diagnosis: IUP for C/s  Patient location during procedure: OR  Start time: 7/26/2022 7:28 AM  Timeout: 7/26/2022 7:27 AM  End time: 7/26/2022 7:35 AM    Staffing  Authorizing Provider: Nikki Russo MD  Performing Provider: Nikki Russo MD    Preanesthetic Checklist  Completed: patient identified, IV checked, site marked, risks and benefits discussed, surgical consent, monitors and equipment checked, pre-op evaluation and timeout performed  Spinal Block  Patient position: sitting  Prep: ChloraPrep  Patient monitoring: continuous pulse ox, frequent blood pressure checks, heart rate and cardiac monitor  Approach: midline  Location: L2-3  Injection technique: single shot  CSF Fluid: clear free-flowing CSF  Needle  Needle type: pencil-tip   Needle gauge: 24 G  Needle length: 4 in  Additional Documentation: incremental injection, negative aspiration for heme and no paresthesia on injection  Needle localization: anatomical landmarks  Assessment  Sensory level: T6   Dermatomal levels determined by alcohol wipe and pinch or prick  Ease of block: easy  Patient's tolerance of the procedure: comfortable throughout block and no complaints

## 2022-07-26 NOTE — L&D DELIVERY NOTE
[facility] - Labor & Delivery   Section   Operative Note    SUMMARY     Date of Procedure: 2022        PREOPERATIVE DIAGNOSES:  1.  Intrauterine pregnancy at 39w0d.  2.  Previous  section.     POSTOPERATIVE DIAGNOSES:  1.  Intrauterine pregnancy at 39w0d.  2.  Previous  section.     PROCEDURE:  Repeat low transverse  section.     SURGEON:  Armani Britton M.D.     ASSISTANT:  Ravi Anderson M.D.     ANESTHESIA:  Spinal by TORRES Russo MD     BLOOD LOSS:  About 500 mL.     URINE:  Clear.     FINDINGS:  Viable male infant delivered from vertex position at 0751 on 22.  Weight is 7#1.9 or 3230 g.  Apgar was 9 at 1 minute and 9 at 5 minutes.     OTHER FINDINGS:  Include normal tubes and ovaries bilaterally.     COMPLICATIONS:  None.     SPECIMEN:  None.     HISTORY:  The patient is a 32 year-old  at 39w0 consistent with early ultrasound, previous  section with obesity.  Admitted for RCS with BTL.  However, she changed her mind regarding sterilization prior to starting surgery.  Risks and benefits discussed.     PROCEDURE IN DETAIL:  After confirming appropriate consent was signed in chart, the patient was then transported to the OR.  Spinal anesthesia per Anesthesia.  The patient was then put in a supine position, prepped and draped.  Adequate anesthesia was verified with negative Allis test to the umbilicus.  A Pfannenstiel skin incision was then made with the scalpel, extended down to the fascia.  Fascia was then entered sharply and extended bilaterally sharply.  Peritoneum was then identified and entered bluntly and sharply.  The lower uterine segment was then identified.  Hysterotomy was performed using the scalpel transversely and amniotomy performed bluntly without any difficulty.  Clear fluid noted.  The uterine incision was then extended bluntly using the surgeon's finger.  A viable male infant delivered from vertex position without any difficulty, suctioned on the  field and handed off to the nurse practitioner in attendance.  Birth time was 0751 on 22.  Weight is 7#1.9 or 3230 gm.  Apgar is 9 at 1 minute and 9 at 5 minutes.  Cord blood was then obtained.  Placenta was then manually extracted intact.  Uterus was then delivered into the abdominal incision.  Endometrial cavity was then wiped clean with wet laps.  Uterine incision was then repaired in 2 layers using #0 Monocryl in a running interlocking fashion with the second layer in an imbricating manner.  Good hemostasis was noted.  Again, normal tubes and ovaries bilaterally.  Uterus was then replaced back to the abdomen.  Good hemostasis was noted.  Peritoneum was then closed using #3-0 Vicryl, fascia was then closed using #1 Vicryl in a running nonlocking fashion.  Subcutaneous tissue was then noted to be in good hemostasis.  It was then reapproximated using #3-0 Vicryl and then the skin was then reapproximated using #3-0 Vicryl on a Sergo needle and a pressure bandage applied with the Aquacel dressing.  The patient was then transferred to the Recovery Room in stable condition.         Specimens:   Specimen (24h ago, onward)            None          Condition: Good    Disposition: PACU - hemodynamically stable.    Attestation: Good         Delivery Information for Juan Alfaro    Birth information:  YOB: 2023   Time of birth: 7:51 AM   Sex: male   Head Delivery Date/Time: 2022  7:51 AM   Delivery type: , Low Transverse   Gestational Age: 39w0d    Delivery Providers    Delivering clinician: Armani Britton MD   Provider Role    Ravi Anderson MD Assisting Surgeon    Steph Platt RN Registered Nurse    Lizabeth Shabazz RN Registered Nurse    Sima Nichole RN Registered Nurse    Nicolasa Estrdaa Surgical Tech    BALTA Clement Nurse Practitioner    Gatito Brown, KI Nurse Anesthetist    Nikki Russo MD Anesthesiologist            Measurements    Weight: 3230 g  Weight  (lbs): 7 lb 1.9 oz  Length:          Apgars    Living status: Living  Apgars:  1 min.:  5 min.:  10 min.:  15 min.:  20 min.:    Skin color:  1  1       Heart rate:  2  2       Reflex irritability:  2  2       Muscle tone:  2  2       Respiratory effort:  2  2       Total:  9  9       Apgars assigned by: NANCY CROW RN         Operative Delivery    Forceps attempted?: No  Vacuum extractor attempted?: No         Shoulder Dystocia    Shoulder dystocia present?: No           Presentation    Presentation: Vertex  Position: Left Occiput Anterior           Interventions/Resuscitation    Method: Bulb Suctioning, Tactile Stimulation, NICU Attended       Cord    Vessels: 3 vessels  Complications: None  Delayed Cord Clamping?: Yes  Cord Clamped Date/Time: 2022  7:52 AM  Cord Blood Disposition: Sent with Baby  Gases Sent?: No  Stem Cell Collection (by MD): No       Placenta    Placenta delivery date/time: 2022 0752  Placenta removal: Manual removal  Placenta appearance: Intact  Placenta disposition: discarded           Labor Events:       labor:       Labor Onset Date/Time:         Dilation Complete Date/Time:         Start Pushing Date/Time:         Start Pushing Date/Time:       Rupture Date/Time:            Rupture type:          Fluid Amount:       Fluid Color:       Fluid Odor:       Membrane Status:                steroids:       Antibiotics given for GBS:       Induction:       Indications for induction:        Augmentation:       Indications for augmentation:       Labor complications:       Additional complications:          Cervical ripening:                     Delivery:      Episiotomy:       Indication for Episiotomy:       Perineal Lacerations:   Repaired:      Periurethral Laceration:   Repaired:     Labial Laceration:   Repaired:     Sulcus Laceration:   Repaired:     Vaginal Laceration:   Repaired:     Cervical Laceration:   Repaired:     Repair suture:       Repair # of packets:        Last Value - EBL - Nursing (mL):       Sum - EBL - Nursing (mL): 271     Last Value - EBL - Anesthesia (mL): 271      Calculated QBL (mL):       Vaginal Sweep Performed:       Surgicount Correct:         Other providers:       Anesthesia    Method: Spinal          Details (if applicable):  Trial of Labor No    Categorization: Repeat    Priority: Routine   Indications for : Repeat Section   Incision Type: low transverse     Additional  information:  Forceps:    Vacuum:    Breech:    Observed anomalies    Other (Comments):

## 2022-07-26 NOTE — LACTATION NOTE
07/26/22 0842   Maternal Assessment   Breast Density Bilateral:;soft   Areola Bilateral:;elastic   Nipples Bilateral:;everted   Maternal Infant Feeding   Maternal Emotional State assist needed   Infant Positioning clutch/football   Signs of Milk Transfer audible swallow;infant jaw motion present   Pain with Feeding no   Comfort Measures Before/During Feeding infant position adjusted;latch adjusted;maternal position adjusted   Latch Assistance yes     Baby skin to skin with mother after delivery. Assisted to latch baby to left breast in football position. Baby latched deeply, nursing well with audible swallows. Mother denies pain during feeding. Reviewed basic breastfeeding instructions and encouraged to call me for any further assistance. Patient verbalizes understanding of all instructions with good recall. Will follow up when patient transferred to postpartum room.     Instructed on proper latch to facilitate effective breastfeeding.  Discussed recognizing hunger cues, appropriate positioning and wide mouth latch.  Discussed ways to determine an effective latch including:  areola included in latch, rhythmic/nutritive sucking and audible swallowing.  Also discussed soreness/tenderness associated with latch and prevention and treatment.  Pt states understanding and verbalized appropriate recall.

## 2022-07-26 NOTE — HPI
31 yo  at 39w0d  Previous  section  Undesired fertility  Obesity with BMI over 40  Admitted today, 22 for RCSBTL  Risks and benefits discussed

## 2022-07-26 NOTE — ASSESSMENT & PLAN NOTE
Proceed with RCSBTL today  Again, procedures explained  Risks and benefits discussed, including risks of hemorrhage, infection, bladder and bowel injuries...  Questions answered  Consents signed previously  She is nervous but eager to proceed

## 2022-07-26 NOTE — NURSING
Pt transferred to the mother/baby unit via stretcher, vss, devin care done, SCDs and ibarra in place. Pt requesting large cup of ice. Denies any other needs at this time.

## 2022-07-26 NOTE — SUBJECTIVE & OBJECTIVE
Obstetric HPI:  Patient reports None contractions, active fetal movement, No vaginal bleeding , No loss of fluid     This pregnancy has been complicated by   Previous  section  Undesired fertility  Obesity    OB History    Para Term  AB Living   4 1 1 0 2 1   SAB IAB Ectopic Multiple Live Births   2 0 0 0 1      # Outcome Date GA Lbr Parish/2nd Weight Sex Delivery Anes PTL Lv   4 Current            3 Term 14 41w2d  3.331 kg (7 lb 5.5 oz) F CS-LTranv EPI N LEILA      Name: ALFRED,BABY GIRL      Apgar1: 9  Apgar5: 9   2 SAB            1 SAB              Past Medical History:   Diagnosis Date    Hay fever     Oral herpes      Past Surgical History:   Procedure Laterality Date     SECTION      DILATION AND CURETTAGE OF UTERUS         PTA Medications   Medication Sig    acetaminophen (TYLENOL) 500 MG tablet Take 1 tablet (500 mg total) by mouth every 4 (four) hours as needed (Fever).    aspirin 81 MG Chew Take 1 tablet (81 mg total) by mouth once daily.    prenatal vit27,calcium-iron-FA (VINATE ONE) 60 mg iron-1 mg Tab Take 1 tablet by mouth.    valACYclovir (VALTREX) 500 MG tablet Take 2 tablets (1,000 mg total) by mouth once daily.       Review of patient's allergies indicates:   Allergen Reactions    Unclassified drug Anaphylaxis     toothpaste    Grass pollen- grass standard     Msg [glutamic acid hcl]         Family History       Problem Relation (Age of Onset)    Breast cancer Maternal Grandmother          Tobacco Use    Smoking status: Never Smoker    Smokeless tobacco: Never Used   Substance and Sexual Activity    Alcohol use: Yes     Comment: rare    Drug use: No    Sexual activity: Yes     Partners: Male     Birth control/protection: None     Review of Systems   Objective:     Vital Signs (Most Recent):  Temp: 98 °F (36.7 °C) (22)  Pulse: 98 (22)  Resp: 20 (22)  BP: 123/84 (22)  SpO2: 100 % (22) Vital Signs (24h  Range):  Temp:  [98 °F (36.7 °C)] 98 °F (36.7 °C)  Pulse:  [90-98] 98  Resp:  [20] 20  SpO2:  [100 %] 100 %  BP: (120-123)/(72-84) 123/84     Weight: 104.8 kg (231 lb)  Body mass index is 46.66 kg/m².    FHT: 130 Cat 1 (reassuring)  TOCO:  Irregular contractions    Physical Exam    Cervix:  Dilation:  1  Effacement:  50%  Station: -3  Presentation: Vertex     Significant Labs:  Lab Results   Component Value Date    GROUPTRH B POS 01/12/2022    HEPBSAG Negative 01/12/2022       CBC:   Recent Labs   Lab 07/26/22  0550   WBC 8.79   RBC 4.32   HGB 11.2*   HCT 35.1*      MCV 81*   MCH 25.9*   MCHC 31.9*     I have personallly reviewed all pertinent lab results from the last 24 hours.

## 2022-07-26 NOTE — HOSPITAL COURSE
Repeat low transverse  section   NO TUBAL.  Patient changed her mind prior to surgery    MD Monica Britton MD  Spinal by TORRES Russo MD  Viable male infant 0751 22  Weight: 7#1.9 3230 gm  Apgar:   Placenta: manually extracted intact  EBL: 500 cc  Urine: clear.  No specimen  No complication    2022 Doing well.  Tolerating diet.  Minimal pain.  Trying to breast-feed    2022   Postpartum course was benign.  She is breast-feeding well with some supplementation.  Exam was benign with patient afebrile, vitals stable, and minimal bleeding.  Normal activities.  Patient discharged home on postpartum day #2, 22   Discharge medications include Percocet, Motrin, prenatal vitamins, and iron supplement.  Follow-up with me next week for dressing removal.    Armani Britton MD.

## 2022-07-26 NOTE — ANESTHESIA POSTPROCEDURE EVALUATION
Anesthesia Post Evaluation    Patient: Brock Alfaro    Procedure(s) Performed: Procedure(s) (LRB):   SECTION, WITH TUBAL LIGATION (N/A)    Final Anesthesia Type: spinal      Patient location during evaluation: labor & delivery  Patient participation: Yes- Able to Participate  Level of consciousness: awake and alert  Post-procedure vital signs: reviewed and stable  Pain management: pain needs to be addressed  Airway patency: patent  AMANDA mitigation strategies: Use of major conduction anesthesia (spinal/epidural) or peripheral nerve block and Multimodal analgesia  PONV status at discharge: No PONV  Anesthetic complications: no      Cardiovascular status: blood pressure returned to baseline  Respiratory status: unassisted and spontaneous ventilation  Hydration status: euvolemic  Follow-up not needed.          Vitals Value Taken Time   /64 22 1021   Temp 36.6 °C (97.8 °F) 22 0830   Pulse 81 22 1028   Resp 18 22 0940   SpO2 99 % 22 1028   Vitals shown include unvalidated device data.      No case tracking events are documented in the log.      Pain/[place] Score: Pain Rating Prior to Med Admin: 7 (2022  9:40 AM)

## 2022-07-26 NOTE — ANESTHESIA PREPROCEDURE EVALUATION
2022  Brock Alfaro is a 32 y.o., female.  Pre-operative evaluation for Procedure(s) (LRB):   SECTION, WITH TUBAL LIGATION (N/A)    Brock Alfaro is a 32 y.o. female     Patient Active Problem List   Diagnosis    Obesity affecting pregnancy in third trimester    Family Hx of DiGeorge Syndrome    Abnormal maternal glucose tolerance, antepartum    Pregnancy with one fetus    Eczema    39 weeks gestation of pregnancy    Abdominal pain affecting pregnancy    Previous  section complicating pregnancy, antepartum condition or complication       Review of patient's allergies indicates:   Allergen Reactions    Unclassified drug Anaphylaxis     toothpaste    Grass pollen- grass standard     Msg [glutamic acid hcl]        No current facility-administered medications on file prior to encounter.     Current Outpatient Medications on File Prior to Encounter   Medication Sig Dispense Refill    acetaminophen (TYLENOL) 500 MG tablet Take 1 tablet (500 mg total) by mouth every 4 (four) hours as needed (Fever). 30 tablet 0    aspirin 81 MG Chew Take 1 tablet (81 mg total) by mouth once daily. 120 tablet 3    prenatal vit27,calcium-iron-FA (VINATE ONE) 60 mg iron-1 mg Tab Take 1 tablet by mouth.      valACYclovir (VALTREX) 500 MG tablet Take 2 tablets (1,000 mg total) by mouth once daily. 30 tablet 1       Past Surgical History:   Procedure Laterality Date     SECTION      DILATION AND CURETTAGE OF UTERUS         Social History     Socioeconomic History    Marital status: Single   Tobacco Use    Smoking status: Never Smoker    Smokeless tobacco: Never Used   Substance and Sexual Activity    Alcohol use: Yes     Comment: rare    Drug use: No    Sexual activity: Yes     Partners: Male     Birth control/protection: None   Social History Narrative    Together for  awhile    He works for [employer]    She works for a non-profit organization.         CBC:   Recent Labs     22  0550   WBC 8.79   RBC 4.32   HGB 11.2*   HCT 35.1*      MCV 81*   MCH 25.9*   MCHC 31.9*       CMP: No results for input(s): NA, K, CL, CO2, BUN, CREATININE, GLU, MG, PHOS, CALCIUM, ALBUMIN, PROT, ALKPHOS, ALT, AST, BILITOT in the last 72 hours.    INR  No results for input(s): PT, INR, PROTIME, APTT in the last 72 hours.        Diagnostic Studies:      EKD Echo:  No results found for this or any previous visit.      Pre-op Assessment    I have reviewed the Patient Summary Reports.    I have reviewed the NPO Status.   I have reviewed the Medications.     Review of Systems  Anesthesia Hx:  No problems with previous Anesthesia  History of prior surgery of interest to airway management or planning: Denies Family Hx of Anesthesia complications.   Denies Personal Hx of Anesthesia complications.   Social:  Non-Smoker    Hematology/Oncology:  Hematology Normal   Oncology Normal     EENT/Dental:EENT/Dental Normal   Cardiovascular:  Cardiovascular Normal Exercise tolerance: good     Pulmonary:  Pulmonary Normal    Renal/:  Renal/ Normal     Hepatic/GI:  Hepatic/GI Normal    Neurological:  Neurology Normal    Endocrine:  Morbid Obesity / BMI > 40      Physical Exam  General: Cooperative, Alert and Oriented    Airway:  Mallampati: III   Mouth Opening: Small, but > 3cm  TM Distance: 4 - 6 cm  Neck ROM: Normal ROM    Dental:  Intact        Anesthesia Plan  Type of Anesthesia, risks & benefits discussed:    Anesthesia Type: Spinal  Intra-op Monitoring Plan: Standard ASA Monitors  Post Op Pain Control Plan: multimodal analgesia and intrathecal opioid  Informed Consent: Informed consent signed with the Patient and all parties understand the risks and agree with anesthesia plan.  All questions answered. Patient consented to blood products? Yes  ASA Score: 2  Day of Surgery  Review of History & Physical: H&P Update referred to the surgeon/provider.    Ready For Surgery From Anesthesia Perspective.     .

## 2022-07-26 NOTE — DISCHARGE INSTRUCTIONS
Breastfeeding Discharge Instructions     AAP recommendation of exclusive breastfeeding for the first 6 months of life and continued breastfeeding with the introduction of supplemental foods beyond the first year of life and recommends to delay all bottle and pacifier use until after 4 weeks of age and breastfeeding is well established.  Discussed the benefits of exclusive breastfeeding for both mother and baby.  Discussed the risks of supplementation/pacifier use on the exclusivity of breastfeeding in the first 6 months. Feed the baby at the earliest sign of hunger or comfort  Hands to mouth, sucking motions  Rooting or searching for something to suck on  Dont wait for crying - it is a not a late sign of hunger; it is a sign of distress    The feedings may be 8-12 times per 24hrs and will not follow a schedule  Alternate the breast you start the feeding with, or start with the breast that feels the fullest  Switch breasts when the baby takes himself off the breast or falls asleep  Keep offering breasts until the baby looks full, no longer gives hunger signs, and stays asleep when placed on his back in the crib  If the baby is sleepy and wont wake for a feeding, put the baby skin-to-skin dressed in a diaper against the mothers bare chest  Sleep near your baby  The baby should be positioned and latched on to the breast correctly  Chest-to-chest, chin in the breast  Babys lips are flipped outward  Babys mouth is stretched open wide like a shout  Babys sucking should feel like tugging to the mother  The baby should be drinking at the breast:  You should hear swallowing or gulping throughout the feeding  You should see milk on the babys lips when he comes off the breast  Your breasts should be softer when the baby is finished feeding  The baby should look relaxed at the end of feedings  After the 4th day and your milk is in:  The babys poop should turn bright yellow and be loose, watery, and seedy  The baby  should have at least 3-4 poops the size of the palm of your hand per day  The baby should have at least 6-8 wet diapers per day  The urine should be light yellow in color  You should drink when you are thirsty and eat a healthy diet when you are    hungry.     Take naps to get the rest you need.   Take medications and/or drink alcohol only with permission of your obstetrician    or the babys pediatrician.  You can also call the [facility],   ([phone]), Monday-Friday, 8am-5pm Central time, to get the most   up-to-date evidence-based information on the use of medications during   pregnancy and breastfeeding.      The baby should be examined by a pediatrician at 3-5 days of age; unless ordered sooner by the pediatrician.  Once your milk comes in, the baby should be back to birth weight no later than 10-14 days of age.    Primary Engorgement    If the milk is flowing, use wet or dry heat applied to the breasts for approximately 10min prior to each feeding as a comfort measure to facilitate the milk ejection reflex    Follow heat treatment with breast massage to soften hard/lumpy areas of the breast    Use unrestricted, frequent, effective feedings.      Wake baby to feed if necessary    Avoid pacifier and bottle feedings    Hand express or pump breasts to the point of comfort prn    Use cold treatments in the form of ice packs/gel packs/ frozen vegetables wrapped in a soft thin cloth and applied to the breasts for approximately 20min after each feeding until engorgement is resolved    Wear comfortable, supportive bra    Take pain medicine prn    Use anti-inflammatory medications if prescribed by physician    Other:     Pumping Instructions :    Preparation and Hygiene:    Shower daily.  Wear a clean bra each day and wash daily in warm soapy water.  Change wet or moist breast pads frequently.  Moist pads can promote growth of germs.  Actively wash your hands, paying close attention to the area around  and under your fingernails, thoroughly with soap and water for 15 seconds before pumping or handling your milk.  Re-wash your hands if you touch anything (scratching your nose, answering the phone, etc) while pumping or handling your milk.   Before pumping your breasts, assemble the pump collection kit and have ready the sterile container and labels.  Place these items on a clean surface next to the breastpump.  Each time after you have finished pumping, take apart all of the parts of the breastpump collection kit and place them in a separate cleaning container (do not place them in the sink).  Be sure to remove the yellow valve from the breastshield and separate the white membrane from the yellow valve.  Rinse all of these parts with cool water.  Then use a new sponge and/or bottle brush and dishwashing detergent to clean the parts.  Rinse off the soapy water with cool water and air dry on a clean towel covered with a clean cloth.  All parts may also be washed after each use in the top rack of a .  Once each day, sterilize all of the parts of the breastpump collection kit.  This can be done by boiling the kit parts for 10 minutes or by using a Quick Clean Micro-Steam Bag made by [employer]  If condensation appears in the tubing, continue to run the pump with the tubing attached for 1-2 minutes or until the tubing is dry.   Notify your babys nurse or doctor if you become ill or need to take any medication, even over-the-counter medicines.        Collection and Storage of Expressed Breastmilk:         1. Pump your breasts at least 8-10 times every 24 hours.  Double pump (both breasts at  the same time) for at least 15-20 minutes using the most suction that is comfortable.    2. Write the date and time of pumping and the name of any medications you are takingon the babys pre-printed hospital identification label.   3.    Do not touch the inside of the storage containers or lids.      4.        Tightly  screw the lid onto the container and place immediately into the                                refrigerator for daily use.  Bottle may remain at room temperature if the next                    feeding is within 4 hours.  5.    Expressed breastmilk should be refrigerated or frozen within 4 hours of                pumping.  6.        Do not store expressed breastmilk on the door of your refrigerator or freeze             where the temperature is warmer.   7.        Refrigerated milk may be stored in for up to 7 days.  At this point it can be              moved to the freezer for 6 -12 months.  8.        Thaw frozen breast milk in overnight in the refrigerator.  Once milk is thawed it              must be used within 24 hours.  9.        Refrigerated breast milk needs to be warmed to room temperature.  Warm by leaving unrefrigerated until it reached room temperature, or place sealed bottle into a cup of warm (not boiling) water or use a bottle warmer.               Never warm breast milk in a microwave or boiling water.    For any questions or concerns call:  Lactation Department at [phone]

## 2022-07-26 NOTE — H&P
[facility] - Labor & Delivery  Obstetrics  History & Physical    Patient Name: Brock Alfaro  MRN: [id number]  Admission Date: 2022  Primary Care Provider: Primary Doctor No    Subjective:     Principal Problem:39 weeks gestation of pregnancy    History of Present Illness:  33 yo  at 39w0d  Previous  section  Undesired fertility  Obesity with BMI over 40  Admitted today, 22 for RCSBTL  Risks and benefits discussed      Obstetric HPI:  Patient reports None contractions, active fetal movement, No vaginal bleeding , No loss of fluid     This pregnancy has been complicated by   1. Previous  section  2. Undesired fertility  3. Obesity    OB History    Para Term  AB Living   4 1 1 0 2 1   SAB IAB Ectopic Multiple Live Births   2 0 0 0 1      # Outcome Date GA Lbr Parish/2nd Weight Sex Delivery Anes PTL Lv   4 Current            3 Term 14 41w2d  3.331 kg (7 lb 5.5 oz) F CS-LTranv EPI N LEILA      Name: ALFRED,BABY GIRL      Apgar1: 9  Apgar5: 9   2 SAB            1 SAB              Past Medical History:   Diagnosis Date    Hay fever     Oral herpes      Past Surgical History:   Procedure Laterality Date     SECTION      DILATION AND CURETTAGE OF UTERUS         PTA Medications   Medication Sig    acetaminophen (TYLENOL) 500 MG tablet Take 1 tablet (500 mg total) by mouth every 4 (four) hours as needed (Fever).    aspirin 81 MG Chew Take 1 tablet (81 mg total) by mouth once daily.    prenatal vit27,calcium-iron-FA (VINATE ONE) 60 mg iron-1 mg Tab Take 1 tablet by mouth.    valACYclovir (VALTREX) 500 MG tablet Take 2 tablets (1,000 mg total) by mouth once daily.       Review of patient's allergies indicates:   Allergen Reactions    Unclassified drug Anaphylaxis     toothpaste    Grass pollen- grass standard     Msg [glutamic acid hcl]         Family History       Problem Relation (Age of Onset)    Breast cancer Maternal Grandmother          Tobacco Use     Smoking status: Never Smoker    Smokeless tobacco: Never Used   Substance and Sexual Activity    Alcohol use: Yes     Comment: rare    Drug use: No    Sexual activity: Yes     Partners: Male     Birth control/protection: None     Review of Systems   Objective:     Vital Signs (Most Recent):  Temp: 98 °F (36.7 °C) (22)  Pulse: 98 (22)  Resp: 20 (22)  BP: 123/84 (22 06)  SpO2: 100 % (22 0558) Vital Signs (24h Range):  Temp:  [98 °F (36.7 °C)] 98 °F (36.7 °C)  Pulse:  [90-98] 98  Resp:  [20] 20  SpO2:  [100 %] 100 %  BP: (120-123)/(72-84) 123/84     Weight: 104.8 kg (231 lb)  Body mass index is 46.66 kg/m².    FHT: 130 Cat 1 (reassuring)  TOCO:  Irregular contractions    Physical Exam    Cervix:  Dilation:  1  Effacement:  50%  Station: -3  Presentation: Vertex     Significant Labs:  Lab Results   Component Value Date    GROUPTRH B POS 2022    HEPBSAG Negative 2022       CBC:   Recent Labs   Lab 22  0550   WBC 8.79   RBC 4.32   HGB 11.2*   HCT 35.1*      MCV 81*   MCH 25.9*   MCHC 31.9*     I have personallly reviewed all pertinent lab results from the last 24 hours.    Assessment/Plan:     32 y.o. female  at 39w0d for:    * 39 weeks gestation of pregnancy  Proceed with RCSBTL today  Again, procedures explained  Risks and benefits discussed, including risks of hemorrhage, infection, bladder and bowel injuries...  Questions answered  Consents signed previously  She is nervous but eager to proceed           Previous  section complicating pregnancy, antepartum condition or complication  Proceed with RCSBTL today  Again, procedures explained  Risks and benefits discussed, including risks of hemorrhage, infection, bladder and bowel injuries...  Questions answered  Consents signed previously  She is nervous but eager to proceed         Obesity affecting pregnancy in third trimester  Proceed with RCSBTL today  Again, procedures  explained  Risks and benefits discussed, including risks of hemorrhage, infection, bladder and bowel injuries...  Questions answered  Consents signed previously  She is nervous but eager to proceed             Armani Britton MD  Obstetrics  [facility] - Labor & Delivery

## 2022-07-26 NOTE — TRANSFER OF CARE
"Anesthesia Transfer of Care Note    Patient: Brock Alfaro    Procedure(s) Performed: Procedure(s) (LRB):   SECTION, WITH TUBAL LIGATION (N/A)    Patient location: Labor and Delivery    Anesthesia Type: spinal    Transport from OR: Transported from OR on room air with adequate spontaneous ventilation    Post pain: adequate analgesia    Post assessment: no apparent anesthetic complications and tolerated procedure well    Post vital signs: stable    Level of consciousness: awake, alert and oriented    Nausea/Vomiting: no nausea/vomiting    Complications: none    Transfer of care protocol was followed      Last vitals:   Visit Vitals  /84   Pulse 98   Temp 36.7 °C (98 °F) (Oral)   Resp 20   Ht 4' 11" (1.499 m)   Wt 104.8 kg (231 lb)   SpO2 100%   Breastfeeding No   BMI 46.66 kg/m²     "

## 2022-07-27 LAB
BASOPHILS # BLD AUTO: 0.02 K/UL (ref 0–0.2)
BASOPHILS NFR BLD: 0.2 % (ref 0–1.9)
DIFFERENTIAL METHOD: ABNORMAL
EOSINOPHIL # BLD AUTO: 0.1 K/UL (ref 0–0.5)
EOSINOPHIL NFR BLD: 1 % (ref 0–8)
ERYTHROCYTE [DISTWIDTH] IN BLOOD BY AUTOMATED COUNT: 14.3 % (ref 11.5–14.5)
HCT VFR BLD AUTO: 30.1 % (ref 37–48.5)
HGB BLD-MCNC: 9.7 G/DL (ref 12–16)
IMM GRANULOCYTES # BLD AUTO: 0.04 K/UL (ref 0–0.04)
IMM GRANULOCYTES NFR BLD AUTO: 0.5 % (ref 0–0.5)
LYMPHOCYTES # BLD AUTO: 1.2 K/UL (ref 1–4.8)
LYMPHOCYTES NFR BLD: 13.4 % (ref 18–48)
MCH RBC QN AUTO: 26.6 PG (ref 27–31)
MCHC RBC AUTO-ENTMCNC: 32.2 G/DL (ref 32–36)
MCV RBC AUTO: 83 FL (ref 82–98)
MONOCYTES # BLD AUTO: 0.8 K/UL (ref 0.3–1)
MONOCYTES NFR BLD: 8.8 % (ref 4–15)
NEUTROPHILS # BLD AUTO: 6.7 K/UL (ref 1.8–7.7)
NEUTROPHILS NFR BLD: 76.1 % (ref 38–73)
NRBC BLD-RTO: 0 /100 WBC
PLATELET # BLD AUTO: 201 K/UL (ref 150–450)
PMV BLD AUTO: 9.8 FL (ref 9.2–12.9)
RBC # BLD AUTO: 3.65 M/UL (ref 4–5.4)
RPR SER QL: NORMAL
WBC # BLD AUTO: 8.83 K/UL (ref 3.9–12.7)

## 2022-07-27 PROCEDURE — 63600175 PHARM REV CODE 636 W HCPCS: Performed by: OBSTETRICS & GYNECOLOGY

## 2022-07-27 PROCEDURE — 99231 PR SUBSEQUENT HOSPITAL CARE,LEVL I: ICD-10-PCS | Mod: ,,, | Performed by: OBSTETRICS & GYNECOLOGY

## 2022-07-27 PROCEDURE — 36415 COLL VENOUS BLD VENIPUNCTURE: CPT | Performed by: OBSTETRICS & GYNECOLOGY

## 2022-07-27 PROCEDURE — 11000001 HC ACUTE MED/SURG PRIVATE ROOM

## 2022-07-27 PROCEDURE — 85025 COMPLETE CBC W/AUTO DIFF WBC: CPT | Performed by: OBSTETRICS & GYNECOLOGY

## 2022-07-27 PROCEDURE — 25000003 PHARM REV CODE 250: Performed by: OBSTETRICS & GYNECOLOGY

## 2022-07-27 PROCEDURE — 99231 SBSQ HOSP IP/OBS SF/LOW 25: CPT | Mod: ,,, | Performed by: OBSTETRICS & GYNECOLOGY

## 2022-07-27 RX ADMIN — MUPIROCIN: 20 OINTMENT TOPICAL at 09:07

## 2022-07-27 RX ADMIN — KETOROLAC TROMETHAMINE 30 MG: 30 INJECTION, SOLUTION INTRAMUSCULAR at 05:07

## 2022-07-27 RX ADMIN — OXYCODONE AND ACETAMINOPHEN 1 TABLET: 10; 325 TABLET ORAL at 09:07

## 2022-07-27 RX ADMIN — IBUPROFEN 800 MG: 400 TABLET, FILM COATED ORAL at 09:07

## 2022-07-27 RX ADMIN — ACETAMINOPHEN 650 MG: 325 TABLET ORAL at 07:07

## 2022-07-27 RX ADMIN — IBUPROFEN 800 MG: 400 TABLET, FILM COATED ORAL at 01:07

## 2022-07-27 NOTE — SUBJECTIVE & OBJECTIVE
Interval History:   Status post repeat low transverse  section on 22    She is doing well this morning. She is tolerating a regular diet without nausea or vomiting. She is voiding spontaneously. She is ambulating. She has passed flatus, and has not a BM. Vaginal bleeding is mild. She denies fever or chills. Abdominal pain is mild and controlled with oral medications. She Is breastfeeding. She desires circumcision for her male baby: yes.    Objective:     Vital Signs (Most Recent):  Temp: 98.4 °F (36.9 °C) (22)  Pulse: 81 (22)  Resp: 18 (22)  BP: (!) 97/52 (22)  SpO2: 99 % (22)   Vital Signs (24h Range):  Temp:  [97.8 °F (36.6 °C)-98.7 °F (37.1 °C)] 98.4 °F (36.9 °C)  Pulse:  [] 81  Resp:  [16-20] 18  SpO2:  [95 %-100 %] 99 %  BP: ()/(50-76) 97/52     Weight: 104.8 kg (231 lb)  Body mass index is 46.66 kg/m².      Intake/Output Summary (Last 24 hours) at 2022 0700  Last data filed at 2022 0600  Gross per 24 hour   Intake 198.25 ml   Output 3407 ml   Net -3208.75 ml         Significant Labs:  Lab Results   Component Value Date    GROUPTRH B POS 2022    HEPBSAG Negative 2022     Recent Labs   Lab 22  0553   HGB 9.7*   HCT 30.1*       CBC:   Recent Labs   Lab 22  0553   WBC 8.83   RBC 3.65*   HGB 9.7*   HCT 30.1*      MCV 83   MCH 26.6*   MCHC 32.2     I have personallly reviewed all pertinent lab results from the last 24 hours.    Physical Exam:   Constitutional: She appears well-developed and well-nourished. No distress.    HENT:   Head: Normocephalic and atraumatic.    Eyes: EOM are normal.     Cardiovascular:  Normal rate.             Pulmonary/Chest: Effort normal. No respiratory distress.        Abdominal: Soft. She exhibits no distension. There is no abdominal tenderness. There is no rebound and no guarding.   Pfannenstiel incision in AquaCel dressing.  No evidence of active bleeding.              Musculoskeletal: Normal range of motion.       Neurological: She is alert.    Skin: Skin is warm and dry.    Psychiatric: She has a normal mood and affect.

## 2022-07-27 NOTE — ASSESSMENT & PLAN NOTE
Routine postpartum care  Watch vaginal bleeding  Pain control  Lactation assistance  Discharge home once milestones are met.

## 2022-07-27 NOTE — LACTATION NOTE
07/27/22 0930   Maternal Assessment   Breast Shape Bilateral:;pendulous   Breast Density Bilateral:;soft   Areola Bilateral:;dense;elastic   Nipples Bilateral:;flat;Left:;graspable   Maternal Infant Feeding   Maternal Emotional State assist needed   Infant Positioning clutch/football   Signs of Milk Transfer audible swallow;infant jaw motion present   Pain with Feeding yes   Pain Location nipple, right   Pain Description sharp   Comfort Measures Before/During Feeding suction broken using finger   Latch Assistance yes   Mother with baby sleepy after circumcision -placed skin to skin and baby not waking -assistance given to wake and latch -attempt on right side and too painful for mother to continue nipple very sensitive -moved to left side and able to latch with assti for some strong sucking  and swallows noted -mother copmpalint of initial latch pain but better once sucking well -given breast shells and and latch assist for nipple eversion -encouraged call for any assistance today

## 2022-07-27 NOTE — PROGRESS NOTES
[facility] [place] [facility]  Obstetrics  Postpartum Progress Note    Patient Name: Brock Alfaro  MRN: [id number]  Admission Date: 2022  Hospital Length of Stay: 1 days  Attending Physician: Armani Britton MD  Primary Care Provider: Primary Doctor No    Subjective:     Principal Problem:Status post  section    Hospital Course:  Repeat low transverse  section   NO TUBAL.  Patient changed her mind prior to surgery    MD Monica Britton MD  Spinal by TORRES Russo MD  Viable male infant 07522  Weight: 7#1.9 3230 gm  Apgar: 9/9  Placenta: manually extracted intact  EBL: 500 cc  Urine: clear.  No specimen  No complication    2022 Doing well.  Tolerating diet.  Minimal pain.  Trying to breast-feed      Interval History:   Status post repeat low transverse  section on 22    She is doing well this morning. She is tolerating a regular diet without nausea or vomiting. She is voiding spontaneously. She is ambulating. She has passed flatus, and has not a BM. Vaginal bleeding is mild. She denies fever or chills. Abdominal pain is mild and controlled with oral medications. She Is breastfeeding. She desires circumcision for her male baby: yes.    Objective:     Vital Signs (Most Recent):  Temp: 98.4 °F (36.9 °C) (22)  Pulse: 81 (22)  Resp: 18 (22)  BP: (!) 97/52 (22)  SpO2: 99 % (22)   Vital Signs (24h Range):  Temp:  [97.8 °F (36.6 °C)-98.7 °F (37.1 °C)] 98.4 °F (36.9 °C)  Pulse:  [] 81  Resp:  [16-20] 18  SpO2:  [95 %-100 %] 99 %  BP: ()/(50-76) 97/52     Weight: 104.8 kg (231 lb)  Body mass index is 46.66 kg/m².      Intake/Output Summary (Last 24 hours) at 2022 0700  Last data filed at 2022 0600  Gross per 24 hour   Intake 198.25 ml   Output 3407 ml   Net -3208.75 ml         Significant Labs:  Lab Results   Component Value Date    GROUPTRH B POS 2022    HEPBSAG Negative 2022     Recent Labs   Lab  22  0553   HGB 9.7*   HCT 30.1*       CBC:   Recent Labs   Lab 22  0553   WBC 8.83   RBC 3.65*   HGB 9.7*   HCT 30.1*      MCV 83   MCH 26.6*   MCHC 32.2     I have personallly reviewed all pertinent lab results from the last 24 hours.    Physical Exam:   Constitutional: She appears well-developed and well-nourished. No distress.    HENT:   Head: Normocephalic and atraumatic.    Eyes: EOM are normal.     Cardiovascular:  Normal rate.             Pulmonary/Chest: Effort normal. No respiratory distress.        Abdominal: Soft. She exhibits no distension. There is no abdominal tenderness. There is no rebound and no guarding.   Pfannenstiel incision in AquaCel dressing.  No evidence of active bleeding.             Musculoskeletal: Normal range of motion.       Neurological: She is alert.    Skin: Skin is warm and dry.    Psychiatric: She has a normal mood and affect.     Assessment/Plan:     32 y.o. female  for:    * Status post  section  Routine postpartum care  Watch vaginal bleeding  Pain control  Lactation assistance  Discharge home once milestones are met.           BMI 40.0-44.9, adult             Disposition: As patient meets milestones, will plan to discharge home.    Armani Britton MD  Obstetrics  [facility] [place] [facility]

## 2022-07-27 NOTE — LACTATION NOTE
Mother with flat nipples and right side very sensitive -unable to tolerate baby on right side -discussed plan for pumping that side    Medela Symphony pump, tubing, collections containers and labels brought to bedside.  Discussed proper pump setting of initiation phase.  Instructed on proper usage of pump and to adjust suction according to maximum comfort level.  Verified appropriate flange fit.  Educated on the frequency and duration of pumping in order to promote and maintain a full milk supply.  Hands on pumping technique reviewed.  Encouraged hand expression after pumping.  Instructed on cleaning of breast pump parts.  Written instructions also given.  Pt verbalized understanding and appropriate recall for proper milk handling, collection, labeling, storage and transportation.  Mother able to tolerate pump at breast on 2 drops of suction -will plan to increase suction as tolerated

## 2022-07-27 NOTE — PLAN OF CARE
VSS, Breastfeeding on demand, encouraged 8 times in 24 hours. Lactation initiated pumping for right breast d/t discomfort during latch with . Supportive bra encouraged. Fundus and lochia WDL. LTVA Aquacel dressing CDI. Abdominal Binder in use. BS+ 4 quads, states flatus, ambulating , voiding and tolerating regular diet. Bonding well with baby. Pain being managed with motrin, patient declined narcotics offered. Stated an understanding to POC.  SO at bedside assisting with needs.

## 2022-07-27 NOTE — PLAN OF CARE
VSS, breastfeeding on demand, encouraged 8 times in 24 hours, wearing supportive bra. Fundus and lochia WDL. Voiding, ambulating and tolerating regular diet well. Bonding well with baby. Pain being managed with scheduled meds. Stated an understanding to POC.

## 2022-07-28 VITALS
OXYGEN SATURATION: 98 % | BODY MASS INDEX: 46.57 KG/M2 | TEMPERATURE: 98 F | DIASTOLIC BLOOD PRESSURE: 85 MMHG | RESPIRATION RATE: 18 BRPM | SYSTOLIC BLOOD PRESSURE: 131 MMHG | HEIGHT: 59 IN | WEIGHT: 231 LBS | HEART RATE: 102 BPM

## 2022-07-28 PROBLEM — Z98.891 STATUS POST CESAREAN SECTION: Status: RESOLVED | Noted: 2022-07-26 | Resolved: 2022-07-28

## 2022-07-28 PROBLEM — R10.9 ABDOMINAL PAIN AFFECTING PREGNANCY: Status: RESOLVED | Noted: 2022-07-21 | Resolved: 2022-07-28

## 2022-07-28 PROBLEM — O26.899 ABDOMINAL PAIN AFFECTING PREGNANCY: Status: RESOLVED | Noted: 2022-07-21 | Resolved: 2022-07-28

## 2022-07-28 PROCEDURE — 25000003 PHARM REV CODE 250: Performed by: OBSTETRICS & GYNECOLOGY

## 2022-07-28 PROCEDURE — 51702 INSERT TEMP BLADDER CATH: CPT

## 2022-07-28 PROCEDURE — 99238 PR HOSPITAL DISCHARGE DAY,<30 MIN: ICD-10-PCS | Mod: ,,, | Performed by: OBSTETRICS & GYNECOLOGY

## 2022-07-28 PROCEDURE — 99238 HOSP IP/OBS DSCHRG MGMT 30/<: CPT | Mod: ,,, | Performed by: OBSTETRICS & GYNECOLOGY

## 2022-07-28 RX ORDER — OXYCODONE AND ACETAMINOPHEN 5; 325 MG/1; MG/1
1 TABLET ORAL EVERY 4 HOURS PRN
Qty: 15 TABLET | Refills: 0 | Status: SHIPPED | OUTPATIENT
Start: 2022-07-28 | End: 2024-01-03

## 2022-07-28 RX ORDER — IBUPROFEN 800 MG/1
800 TABLET ORAL EVERY 8 HOURS
Qty: 40 TABLET | Refills: 1 | OUTPATIENT
Start: 2022-07-28 | End: 2023-06-30

## 2022-07-28 RX ADMIN — OXYCODONE AND ACETAMINOPHEN 1 TABLET: 5; 325 TABLET ORAL at 09:07

## 2022-07-28 RX ADMIN — MUPIROCIN: 20 OINTMENT TOPICAL at 09:07

## 2022-07-28 RX ADMIN — IBUPROFEN 800 MG: 400 TABLET, FILM COATED ORAL at 05:07

## 2022-07-28 NOTE — PLAN OF CARE
Problem: Adult Inpatient Plan of Care  Goal: Plan of Care Review  Outcome: Met  Goal: Patient-Specific Goal (Individualized)  Outcome: Met  Goal: Absence of Hospital-Acquired Illness or Injury  Outcome: Met  Goal: Optimal Comfort and Wellbeing  Outcome: Met  Goal: Readiness for Transition of Care  Outcome: Met     Problem: Bariatric Environmental Safety  Goal: Safety Maintained with Care  Outcome: Met     Problem:  Fall Injury Risk  Goal: Absence of Fall, Infant Drop and Related Injury  Outcome: Met     Problem: Infection  Goal: Absence of Infection Signs and Symptoms  Outcome: Met     Problem: Breastfeeding  Goal: Effective Breastfeeding  Outcome: Met     Problem: Adjustment to Role Transition (Postpartum  Delivery)  Goal: Successful Maternal Role Transition  Outcome: Met     Problem: Bleeding (Postpartum  Delivery)  Goal: Hemostasis  Outcome: Met     Problem: Infection (Postpartum  Delivery)  Goal: Absence of Infection Signs and Symptoms  Outcome: Met     Problem: Pain (Postpartum  Delivery)  Goal: Acceptable Pain Control  Outcome: Met     Problem: Postoperative Nausea and Vomiting (Postpartum  Delivery)  Goal: Nausea and Vomiting Relief  Outcome: Met

## 2022-07-28 NOTE — LACTATION NOTE
07/28/22 0900   Maternal Assessment   Breast Shape Bilateral:;pendulous   Breast Density Bilateral:;soft;filling   Areola Bilateral:;elastic   Nipples Bilateral:;flat   Maternal Infant Feeding   Maternal Emotional State independent;relaxed   Equipment Type   Breast Pump Type double electric, hospital grade   Breast Pump Flange Type hard   Breast Pump Flange Size 27 mm   Breast Pumping   Breast Pumping Interventions frequent pumping encouraged   Breast Pumping double electric breast pump utilized   Lactation Referrals   Lactation Referrals outpatient lactation program;pediatric care provider;WIC (women, infants and children) program   Mother pumping per her choice because nipples are very sensitive and could not tolerate baby on the breast -ready for discharge today and will rent hospital grade pump until milk comes in and baby breastfeeding -has RX for personal pump -review breastfeeding discharge information and reinforced frequent pumping at least 8 times in 24 hours and plan attempt baby on breast as tolerated -aware to monitor wet and dirty diapers over the next few days  -referred to breastfeeding guide for community resources -all questions answered  and states understanding of all information

## 2022-07-28 NOTE — DISCHARGE SUMMARY
[facility] [place] [facility]  Obstetrics  Discharge Summary      Patient Name: Brock Alfaro  MRN: [id number]  Admission Date: 2022  Hospital Length of Stay: 2 days  Discharge Date and Time: 2022  Attending Physician: Armani rBitton MD   Discharging Provider: Armani Britton MD   Primary Care Provider: Primary Doctor No    HPI: 31 yo  at 39w0d  Previous  section  Undesired fertility  Obesity with BMI over 40  Admitted today, 22 for RCSBTL  Risks and benefits discussed          Procedure(s) (LRB):   SECTION, WITH TUBAL LIGATION (N/A)     Hospital Course:   Repeat low transverse  section   NO TUBAL.  Patient changed her mind prior to surgery    MD Monica Britton MD  Spinal by TORRES Russo MD  Viable male infant 0751 22  Weight: 7#1.9 3230 gm  Apgar: 9/9  Placenta: manually extracted intact  EBL: 500 cc  Urine: clear.  No specimen  No complication    2022 Doing well.  Tolerating diet.  Minimal pain.  Trying to breast-feed    2022   Postpartum course was benign.  She is breast-feeding well with some supplementation.  Exam was benign with patient afebrile, vitals stable, and minimal bleeding.  Normal activities.  Patient discharged home on postpartum day #2, 22   Discharge medications include Percocet, Motrin, prenatal vitamins, and iron supplement.  Follow-up with me next week for dressing removal.    Armani Britton MD.             Final Active Diagnoses:    Diagnosis Date Noted POA    PRINCIPAL PROBLEM:  Status post  section [Z98.891] 2022 Not Applicable    BMI 40.0-44.9, adult [Z68.41] 2013 Not Applicable      Problems Resolved During this Admission:    Diagnosis Date Noted Date Resolved POA    39 weeks gestation of pregnancy [Z3A.39] 2022 Not Applicable    Normal pregnancy in third trimester [Z34.93] 2014 Not Applicable        Significant Diagnostic Studies: Labs:   CBC   Recent Labs   Lab 22  0565    WBC 8.83   HGB 9.7*   HCT 30.1*       and All labs within the past 24 hours have been reviewed      Feeding Method: both breast and bottle    Immunizations     Date Immunization Status Dose Route/Site Given by    22 MMR Incomplete 0.5 mL Subcutaneous/     22 Tdap Incomplete 0.5 mL Intramuscular/           Delivery:    Episiotomy:     Lacerations:     Repair suture:     Repair # of packets:     Blood loss (ml):       Birth information:  YOB: 2023   Time of birth: 7:51 AM   Sex: male   Delivery type: , Low Transverse   Gestational Age: 39w0d    Delivery Clinician:      Other providers:       Additional  information:  Forceps:    Vacuum:    Breech:    Observed anomalies      Living?:           APGARS  One minute Five minutes Ten minutes   Skin color:         Heart rate:         Grimace:         Muscle tone:         Breathing:         Totals: 9  9        Placenta: Delivered:       appearance    Pending Diagnostic Studies:     None          Discharged Condition: good    Disposition: Home or Self Care    Follow Up:   Follow-up Information     Armani Britton MD Follow up in 1 week(s).    Specialties: Obstetrics and Gynecology, Obstetrics and Gynecology  Why: for dressing removal, postop follow-up  Contact information:  [address]  [address]  [facility] [id number] [phone]                       Patient Instructions:      BREAST PUMP FOR HOME USE     Order Specific Question Answer Comments   Type of pump: Electric    Weight: 104.8 kg (231 lb)    Length of need (1-99 months): 99      No dressing needed     Medications:  Current Discharge Medication List      START taking these medications    Details   ibuprofen (ADVIL,MOTRIN) 800 MG tablet Take 1 tablet (800 mg total) by mouth every 8 (eight) hours.  Qty: 40 tablet, Refills: 1      oxyCODONE-acetaminophen (PERCOCET) 5-325 mg per tablet Take 1 tablet by mouth every 4 (four) hours as needed for Pain.  Qty: 15 tablet,  Refills: 0         CONTINUE these medications which have NOT CHANGED    Details   acetaminophen (TYLENOL) 500 MG tablet Take 1 tablet (500 mg total) by mouth every 4 (four) hours as needed (Fever).  Qty: 30 tablet, Refills: 0      aspirin 81 MG Chew Take 1 tablet (81 mg total) by mouth once daily.  Qty: 120 tablet, Refills: 3      prenatal vit27,calcium-iron-FA (VINATE ONE) 60 mg iron-1 mg Tab Take 1 tablet by mouth.      valACYclovir (VALTREX) 500 MG tablet Take 2 tablets (1,000 mg total) by mouth once daily.  Qty: 30 tablet, Refills: 1    Associated Diagnoses: Herpes simplex vulvovaginitis             Armani Britton MD  Obstetrics  [facility]

## 2022-07-28 NOTE — PLAN OF CARE
Mom and Dad bonding well with baby. Feeding every 2-3 hours, both breast and formula supplementation.  Ambulating to restroom without difficulty.  Family at bedside participating with care.  Fundus firm with light lochia. Pain managed with scheduled and PRN meds. Questions encouraged and addressed.

## 2022-08-02 ENCOUNTER — LAB VISIT (OUTPATIENT)
Dept: LAB | Facility: HOSPITAL | Age: 32
End: 2022-08-02
Attending: OBSTETRICS & GYNECOLOGY
Payer: MEDICAID

## 2022-08-02 ENCOUNTER — POSTPARTUM VISIT (OUTPATIENT)
Dept: OBSTETRICS AND GYNECOLOGY | Facility: CLINIC | Age: 32
End: 2022-08-02
Payer: MEDICAID

## 2022-08-02 VITALS
DIASTOLIC BLOOD PRESSURE: 82 MMHG | WEIGHT: 218.25 LBS | BODY MASS INDEX: 44 KG/M2 | SYSTOLIC BLOOD PRESSURE: 144 MMHG | HEIGHT: 59 IN

## 2022-08-02 DIAGNOSIS — Z09 POSTOP CHECK: Primary | ICD-10-CM

## 2022-08-02 LAB
ALBUMIN SERPL BCP-MCNC: 2.9 G/DL (ref 3.5–5.2)
ALP SERPL-CCNC: 134 U/L (ref 55–135)
ALT SERPL W/O P-5'-P-CCNC: 21 U/L (ref 10–44)
ANION GAP SERPL CALC-SCNC: 12 MMOL/L (ref 8–16)
AST SERPL-CCNC: 21 U/L (ref 10–40)
BASOPHILS # BLD AUTO: 0.02 K/UL (ref 0–0.2)
BASOPHILS NFR BLD: 0.3 % (ref 0–1.9)
BILIRUB SERPL-MCNC: 0.4 MG/DL (ref 0.1–1)
BUN SERPL-MCNC: 13 MG/DL (ref 6–20)
CALCIUM SERPL-MCNC: 9.6 MG/DL (ref 8.7–10.5)
CHLORIDE SERPL-SCNC: 108 MMOL/L (ref 95–110)
CO2 SERPL-SCNC: 24 MMOL/L (ref 23–29)
CREAT SERPL-MCNC: 0.8 MG/DL (ref 0.5–1.4)
DIFFERENTIAL METHOD: ABNORMAL
EOSINOPHIL # BLD AUTO: 0.2 K/UL (ref 0–0.5)
EOSINOPHIL NFR BLD: 3 % (ref 0–8)
ERYTHROCYTE [DISTWIDTH] IN BLOOD BY AUTOMATED COUNT: 14.2 % (ref 11.5–14.5)
EST. GFR  (NO RACE VARIABLE): >60 ML/MIN/1.73 M^2
GLUCOSE SERPL-MCNC: 66 MG/DL (ref 70–110)
HCT VFR BLD AUTO: 33.9 % (ref 37–48.5)
HGB BLD-MCNC: 10.7 G/DL (ref 12–16)
IMM GRANULOCYTES # BLD AUTO: 0.03 K/UL (ref 0–0.04)
IMM GRANULOCYTES NFR BLD AUTO: 0.5 % (ref 0–0.5)
LYMPHOCYTES # BLD AUTO: 1.5 K/UL (ref 1–4.8)
LYMPHOCYTES NFR BLD: 25.4 % (ref 18–48)
MCH RBC QN AUTO: 26.2 PG (ref 27–31)
MCHC RBC AUTO-ENTMCNC: 31.6 G/DL (ref 32–36)
MCV RBC AUTO: 83 FL (ref 82–98)
MONOCYTES # BLD AUTO: 0.4 K/UL (ref 0.3–1)
MONOCYTES NFR BLD: 7.4 % (ref 4–15)
NEUTROPHILS # BLD AUTO: 3.8 K/UL (ref 1.8–7.7)
NEUTROPHILS NFR BLD: 63.4 % (ref 38–73)
NRBC BLD-RTO: 0 /100 WBC
PLATELET # BLD AUTO: 305 K/UL (ref 150–450)
PMV BLD AUTO: 9.1 FL (ref 9.2–12.9)
POTASSIUM SERPL-SCNC: 4.3 MMOL/L (ref 3.5–5.1)
PROT SERPL-MCNC: 6.6 G/DL (ref 6–8.4)
RBC # BLD AUTO: 4.09 M/UL (ref 4–5.4)
SODIUM SERPL-SCNC: 144 MMOL/L (ref 136–145)
URATE SERPL-MCNC: 6.5 MG/DL (ref 2.4–5.7)
WBC # BLD AUTO: 5.98 K/UL (ref 3.9–12.7)

## 2022-08-02 PROCEDURE — 99999 PR PBB SHADOW E&M-EST. PATIENT-LVL III: ICD-10-PCS | Mod: PBBFAC,,, | Performed by: OBSTETRICS & GYNECOLOGY

## 2022-08-02 PROCEDURE — 99999 PR PBB SHADOW E&M-EST. PATIENT-LVL III: CPT | Mod: PBBFAC,,, | Performed by: OBSTETRICS & GYNECOLOGY

## 2022-08-02 PROCEDURE — 85025 COMPLETE CBC W/AUTO DIFF WBC: CPT | Performed by: OBSTETRICS & GYNECOLOGY

## 2022-08-02 PROCEDURE — 36415 COLL VENOUS BLD VENIPUNCTURE: CPT | Performed by: OBSTETRICS & GYNECOLOGY

## 2022-08-02 PROCEDURE — 80053 COMPREHEN METABOLIC PANEL: CPT | Performed by: OBSTETRICS & GYNECOLOGY

## 2022-08-02 PROCEDURE — 99024 PR POST-OP FOLLOW-UP VISIT: ICD-10-PCS | Mod: ,,, | Performed by: OBSTETRICS & GYNECOLOGY

## 2022-08-02 PROCEDURE — 99024 POSTOP FOLLOW-UP VISIT: CPT | Mod: ,,, | Performed by: OBSTETRICS & GYNECOLOGY

## 2022-08-02 PROCEDURE — 84550 ASSAY OF BLOOD/URIC ACID: CPT | Performed by: OBSTETRICS & GYNECOLOGY

## 2022-08-02 PROCEDURE — 99213 OFFICE O/P EST LOW 20 MIN: CPT | Mod: PBBFAC,TH | Performed by: OBSTETRICS & GYNECOLOGY

## 2022-08-02 NOTE — PROGRESS NOTES
Subjective:       Patient ID: Brock Alfaro is a 32 y.o. female.    Chief Complaint:  Postpartum Follow-up (1 wk postpartum for RCS on 2022.)      History of Present Illness  HPI  Ms Alfaro is a 32 years old, status post repeat low transverse  section on 2022.  She comes in today for an exam and followup.  Patient has no current complaints.  No fever or chills.  No nausea or vomiting.  No diarrhea or constipation.  No abdominal or pelvic pain.    She has not resumed normal intercourse.  Patient has begun some walking for exercise. She denies having signs and symptoms of significant depression.  She is breast-feeding with some supplementation well.    Baby has not been sleeping much.  She is worried.    No headache, blurry vision or abdominal pain.        GYN & OB History  No LMP recorded.   Date of Last Pap: No result found    OB History    Para Term  AB Living   4 2 2   2 2   SAB IAB Ectopic Multiple Live Births   2     0 2      # Outcome Date GA Lbr Parish/2nd Weight Sex Delivery Anes PTL Lv   4 Term 22 39w0d  3.23 kg (7 lb 1.9 oz) M CS-LTranv Spinal  LEILA   3 Term 14 41w2d  3.331 kg (7 lb 5.5 oz) F CS-LTranv EPI N LEILA   2 SAB            1 SAB              Past Medical History:   Diagnosis Date    Hay fever     Oral herpes        Past Surgical History:   Procedure Laterality Date     SECTION      DILATION AND CURETTAGE OF UTERUS         Family History   Problem Relation Age of Onset    Breast cancer Maternal Grandmother     Ovarian cancer Neg Hx     Colon cancer Neg Hx        Social History     Socioeconomic History    Marital status: Single   Tobacco Use    Smoking status: Never Smoker    Smokeless tobacco: Never Used   Substance and Sexual Activity    Alcohol use: Yes     Comment: rare    Drug use: No    Sexual activity: Yes     Partners: Male     Birth control/protection: None   Social History Narrative    Together for awhile    He works for  [employer] office    She works for a non-profit organization.       Current Outpatient Medications   Medication Sig Dispense Refill    acetaminophen (TYLENOL) 500 MG tablet Take 1 tablet (500 mg total) by mouth every 4 (four) hours as needed (Fever). 30 tablet 0    aspirin 81 MG Chew Take 1 tablet (81 mg total) by mouth once daily. 120 tablet 3    ibuprofen (ADVIL,MOTRIN) 800 MG tablet Take 1 tablet (800 mg total) by mouth every 8 (eight) hours. 40 tablet 1    oxyCODONE-acetaminophen (PERCOCET) 5-325 mg per tablet Take 1 tablet by mouth every 4 (four) hours as needed for Pain. 15 tablet 0    prenatal vit27,calcium-iron-FA (VINATE ONE) 60 mg iron-1 mg Tab Take 1 tablet by mouth.      valACYclovir (VALTREX) 500 MG tablet Take 2 tablets (1,000 mg total) by mouth once daily. 30 tablet 1     No current facility-administered medications for this visit.       Review of patient's allergies indicates:   Allergen Reactions    Unclassified drug Anaphylaxis     toothpaste    Grass pollen-joey grass standard     Msg [glutamic acid hcl]        Review of Systems  Review of Systems   Constitutional: Positive for fatigue. Negative for activity change, appetite change, chills, fever and unexpected weight change.   HENT: Negative for mouth sores.    Respiratory: Negative for cough, shortness of breath and wheezing.    Cardiovascular: Negative for chest pain and palpitations.   Gastrointestinal: Negative for abdominal pain, bloating, blood in stool, constipation, nausea and vomiting.        Incisional pain.   Endocrine: Negative for diabetes and hot flashes.   Genitourinary: Negative for dysmenorrhea, dyspareunia, dysuria, frequency, hematuria, menorrhagia, menstrual problem, pelvic pain, urgency, vaginal bleeding, vaginal discharge, vaginal pain, urinary incontinence, postcoital bleeding and vaginal odor.   Musculoskeletal: Negative for back pain and myalgias.   Integumentary:  Negative for rash, breast mass and  nipple discharge.   Neurological: Negative for seizures and headaches.   Psychiatric/Behavioral: Negative for depression and sleep disturbance. The patient is not nervous/anxious.    Breast: Negative for mass, mastodynia and nipple discharge          Objective:    Physical Exam:   Constitutional: She appears well-developed and well-nourished. No distress.    HENT:   Head: Normocephalic and atraumatic.    Eyes: EOM are normal.     Cardiovascular: Normal rate.     Pulmonary/Chest: Effort normal. No respiratory distress.        Abdominal: Soft. She exhibits no distension. There is no abdominal tenderness. There is no rebound and no guarding.   Pfannenstiel incision in AquaCel dressing.  No evidence of active bleeding.  Dressing removed.  Incision is clean, dry, intact.  Healing well without any evidence of infection.               Musculoskeletal: Normal range of motion.       Neurological: She is alert.    Skin: Skin is warm and dry.    Psychiatric: She has a normal mood and affect.          Assessment:        1. Postop check    2. Pregnancy induced hypertension, postpartum              Plan:      I have discussed with the patient her condition.  She is doing well with respect to surgery.  She will continue to keep her incision clean and dry to promote proper healing.  Pre-eclamptic labs  She will be back in 4 weeks for follow-up.  She can come back sooner if she needs us.

## 2023-01-27 ENCOUNTER — OFFICE VISIT (OUTPATIENT)
Dept: OBSTETRICS AND GYNECOLOGY | Facility: CLINIC | Age: 33
End: 2023-01-27
Payer: MEDICAID

## 2023-01-27 VITALS
DIASTOLIC BLOOD PRESSURE: 79 MMHG | WEIGHT: 225.31 LBS | SYSTOLIC BLOOD PRESSURE: 101 MMHG | BODY MASS INDEX: 45.51 KG/M2

## 2023-01-27 DIAGNOSIS — Z30.09 BIRTH CONTROL COUNSELING: ICD-10-CM

## 2023-01-27 DIAGNOSIS — L68.0 HIRSUTISM: ICD-10-CM

## 2023-01-27 DIAGNOSIS — Z11.3 SCREENING EXAMINATION FOR STD (SEXUALLY TRANSMITTED DISEASE): ICD-10-CM

## 2023-01-27 DIAGNOSIS — N76.0 ACUTE VAGINITIS: Primary | ICD-10-CM

## 2023-01-27 DIAGNOSIS — Z30.018 ENCOUNTER FOR INITIAL PRESCRIPTION OF OTHER CONTRACEPTIVES: ICD-10-CM

## 2023-01-27 PROBLEM — Z98.891 STATUS POST CESAREAN SECTION: Status: RESOLVED | Noted: 2022-07-26 | Resolved: 2023-01-27

## 2023-01-27 PROCEDURE — 1159F MED LIST DOCD IN RCRD: CPT | Mod: CPTII,,, | Performed by: NURSE PRACTITIONER

## 2023-01-27 PROCEDURE — 3078F DIAST BP <80 MM HG: CPT | Mod: CPTII,,, | Performed by: NURSE PRACTITIONER

## 2023-01-27 PROCEDURE — 3078F PR MOST RECENT DIASTOLIC BLOOD PRESSURE < 80 MM HG: ICD-10-PCS | Mod: CPTII,,, | Performed by: NURSE PRACTITIONER

## 2023-01-27 PROCEDURE — 87591 N.GONORRHOEAE DNA AMP PROB: CPT | Performed by: NURSE PRACTITIONER

## 2023-01-27 PROCEDURE — 3008F PR BODY MASS INDEX (BMI) DOCUMENTED: ICD-10-PCS | Mod: CPTII,,, | Performed by: NURSE PRACTITIONER

## 2023-01-27 PROCEDURE — 99213 PR OFFICE/OUTPT VISIT, EST, LEVL III, 20-29 MIN: ICD-10-PCS | Mod: S$PBB,,, | Performed by: NURSE PRACTITIONER

## 2023-01-27 PROCEDURE — 3008F BODY MASS INDEX DOCD: CPT | Mod: CPTII,,, | Performed by: NURSE PRACTITIONER

## 2023-01-27 PROCEDURE — 3074F SYST BP LT 130 MM HG: CPT | Mod: CPTII,,, | Performed by: NURSE PRACTITIONER

## 2023-01-27 PROCEDURE — 99213 OFFICE O/P EST LOW 20 MIN: CPT | Mod: S$PBB,,, | Performed by: NURSE PRACTITIONER

## 2023-01-27 PROCEDURE — 99999 PR PBB SHADOW E&M-EST. PATIENT-LVL III: ICD-10-PCS | Mod: PBBFAC,,, | Performed by: NURSE PRACTITIONER

## 2023-01-27 PROCEDURE — 99213 OFFICE O/P EST LOW 20 MIN: CPT | Mod: PBBFAC | Performed by: NURSE PRACTITIONER

## 2023-01-27 PROCEDURE — 1159F PR MEDICATION LIST DOCUMENTED IN MEDICAL RECORD: ICD-10-PCS | Mod: CPTII,,, | Performed by: NURSE PRACTITIONER

## 2023-01-27 PROCEDURE — 99999 PR PBB SHADOW E&M-EST. PATIENT-LVL III: CPT | Mod: PBBFAC,,, | Performed by: NURSE PRACTITIONER

## 2023-01-27 PROCEDURE — 81514 NFCT DS BV&VAGINITIS DNA ALG: CPT | Performed by: NURSE PRACTITIONER

## 2023-01-27 PROCEDURE — 3074F PR MOST RECENT SYSTOLIC BLOOD PRESSURE < 130 MM HG: ICD-10-PCS | Mod: CPTII,,, | Performed by: NURSE PRACTITIONER

## 2023-01-27 RX ORDER — FLUCONAZOLE 200 MG/1
200 TABLET ORAL
Qty: 2 TABLET | Refills: 0 | Status: SHIPPED | OUTPATIENT
Start: 2023-01-27 | End: 2023-01-31

## 2023-01-27 RX ORDER — PHENTERMINE HYDROCHLORIDE 37.5 MG/1
37.5 TABLET ORAL
COMMUNITY
End: 2023-11-28

## 2023-01-27 RX ORDER — LACTIC ACID, L-, CITRIC ACID MONOHYDRATE, AND POTASSIUM BITARTRATE 90; 50; 20 MG/5G; MG/5G; MG/5G
5 GEL VAGINAL DAILY PRN
Qty: 60 G | Refills: 11 | Status: SHIPPED | OUTPATIENT
Start: 2023-01-27 | End: 2024-01-03

## 2023-01-27 RX ORDER — METRONIDAZOLE 500 MG/1
500 TABLET ORAL EVERY 12 HOURS
Qty: 14 TABLET | Refills: 0 | Status: SHIPPED | OUTPATIENT
Start: 2023-01-27 | End: 2023-02-03

## 2023-01-27 NOTE — PROGRESS NOTES
Brock Alfaro is a 32 y.o. female  presents with complaint of vaginal discharge and discomfort x 1 week. New pt- usually sees a provider at [facility]. Reports a discharge with odor, feels uncomfortable inside the vagina. Questioning yeast versus BV. She is SA with one partner. Has a 5 mo at home. She is not breast feeding. Cycles are monthly. Reports excess facial hair under her chin- questioning hormonal imbalance. Needs birth control- not ready for another baby at this time. Uses condoms but inconsistently. Did not do well with Nuvaring, depo, or ocps.     Past Medical History:   Diagnosis Date    Hay fever     Oral herpes      Past Surgical History:   Procedure Laterality Date     SECTION       SECTION WITH TUBAL LIGATION N/A 2022    Procedure:  SECTION, WITH TUBAL LIGATION;  Surgeon: Armani Britton MD;  Location: [facility] L&D OR;  Service: OB/GYN;  Laterality: N/A;    DILATION AND CURETTAGE OF UTERUS       Social History     Tobacco Use    Smoking status: Never    Smokeless tobacco: Never   Substance Use Topics    Alcohol use: Yes     Comment: rare    Drug use: No     Family History   Problem Relation Age of Onset    Breast cancer Maternal Grandmother     Ovarian cancer Neg Hx     Colon cancer Neg Hx      OB History    Para Term  AB Living   4 2 2   2 2   SAB IAB Ectopic Multiple Live Births   2     0 2      # Outcome Date GA Lbr Parish/2nd Weight Sex Delivery Anes PTL Lv   4 Term 22 39w0d  3.23 kg (7 lb 1.9 oz) M CS-LTranv Spinal  LEILA   3 Term 14 41w2d  3.331 kg (7 lb 5.5 oz) F CS-LTranv EPI N LEILA   2 SAB            1 SAB              ROS:  GENERAL: No fever, chills, fatigability or weight loss.  VULVAR: No pain, no lesions and no itching.  VAGINAL: No relaxation, no itching, + discharge, no abnormal bleeding and no lesions.  ABDOMEN: No abdominal pain. Denies nausea. Denies vomiting. No diarrhea. No constipation  BREAST: Denies pain. No lumps. No  discharge.  URINARY: No incontinence, no nocturia, no frequency and no dysuria.  CARDIOVASCULAR: No chest pain. No shortness of breath. No leg cramps.  NEUROLOGICAL: No headaches. No vision changes.    PHYSICAL EXAM:  VULVA: normal appearing vulva with no masses, tenderness or lesions   VAGINA: normal appearing vagina with normal color. Large amount of thin white milky discharge, no lesions   CERVIX: normal appearing cervix without discharge or lesions   UTERUS: uterus is normal size, shape, consistency and nontender   ADNEXA: normal adnexa in size, nontender and no masses    ASSESSMENT and PLAN:    ICD-10-CM ICD-9-CM    1. Acute vaginitis  N76.0 616.10 Vaginosis Screen by DNA Probe      fluconazole (DIFLUCAN) 200 MG Tab      metroNIDAZOLE (FLAGYL) 500 MG tablet      2. Screening examination for STD (sexually transmitted disease)  Z11.3 V74.5 C. trachomatis/N. gonorrhoeae by AMP DNA      3. Birth control counseling  Z30.09 V25.09       4. Encounter for initial prescription of other contraceptives  Z30.018 V25.02 lactic acid-citric-potassium (PHEXXI) 1.8-1-0.4 % Gel      5. Hirsutism  L68.0 704.1         GC and affirm pending. Probably BV based off exam findings- will send in Rx today since it is Friday.  Declines labs  Discussed birth control- wants to try Phexxi  Discussed hirsutism- reviewed Aldactone, derm referral. No pelvic US on file. Can further discussed at next annual exam. Denies history of PCOS.    Patient was counseled today on vaginitis prevention including :  a. avoiding feminine products such as deoderant soaps, body wash, bubble bath, douches, scented toilet paper, deoderant tampons or pads, feminine wipes, chronic pad use, etc.  b. avoiding other vulvovaginal irritants such as long hot baths, humidity, tight, synthetic clothing, chlorine and sitting around in wet bathing suits  c. wearing cotton underwear, avoiding thong underwear and no underwear to bed  d. taking showers instead of baths and use a  hair dryer on cool setting afterwards to dry  e. wearing cotton to exercise and shower immediately after exercise and change clothes  f. using polyurethane condoms without spermicide if sexually active and symptoms are triggered by intercourse    FOLLOW UP: PRN lack of improvement.    As of April 1, 2021, the Cures Act has been passed nationally. This new law requires that all doctors progress notes, lab results, pathology reports and radiology reports be released IMMEDIATELY to the patient in the patient portal. That means that the results are released to you at the EXACT same time they are released to me. Therefore, with all of the patients that I have I am not able to reply to each patient exactly when the results come in. So there will be a delay from when you see the results to when I see them and have time to come up with a response to send you. Also I only see these results when I am on the computer at work. So if the results come in over the weekend or after 5 pm of a work day, I will not see them until the next business day. As you can tell, this is a challenge as a provider to give every patient the quick response they hope for and deserve. So please be patient!   Thanks for your understanding and patience.

## 2023-01-29 LAB
C TRACH DNA SPEC QL NAA+PROBE: NOT DETECTED
N GONORRHOEA DNA SPEC QL NAA+PROBE: NOT DETECTED

## 2023-01-30 LAB
BACTERIAL VAGINOSIS DNA: POSITIVE
CANDIDA GLABRATA DNA: NEGATIVE
CANDIDA KRUSEI DNA: NEGATIVE
CANDIDA RRNA VAG QL PROBE: NEGATIVE
T VAGINALIS RRNA GENITAL QL PROBE: NEGATIVE

## 2023-04-19 ENCOUNTER — PATIENT MESSAGE (OUTPATIENT)
Dept: RESEARCH | Facility: HOSPITAL | Age: 33
End: 2023-04-19
Payer: MEDICAID

## 2023-06-30 ENCOUNTER — HOSPITAL ENCOUNTER (EMERGENCY)
Facility: HOSPITAL | Age: 33
Discharge: HOME | End: 2023-06-30
Attending: EMERGENCY MEDICINE
Payer: MEDICAID

## 2023-06-30 ENCOUNTER — OFFICE VISIT (OUTPATIENT)
Dept: OBSTETRICS AND GYNECOLOGY | Facility: CLINIC | Age: 33
End: 2023-06-30
Payer: MEDICAID

## 2023-06-30 VITALS
DIASTOLIC BLOOD PRESSURE: 87 MMHG | TEMPERATURE: 98 F | SYSTOLIC BLOOD PRESSURE: 131 MMHG | BODY MASS INDEX: 45.55 KG/M2 | HEIGHT: 60 IN | WEIGHT: 232 LBS | RESPIRATION RATE: 17 BRPM | HEART RATE: 85 BPM | OXYGEN SATURATION: 98 %

## 2023-06-30 VITALS — DIASTOLIC BLOOD PRESSURE: 90 MMHG | BODY MASS INDEX: 45.21 KG/M2 | SYSTOLIC BLOOD PRESSURE: 152 MMHG | WEIGHT: 231.5 LBS

## 2023-06-30 DIAGNOSIS — Z12.39 SCREENING BREAST EXAMINATION: ICD-10-CM

## 2023-06-30 DIAGNOSIS — Z01.419 ENCOUNTER FOR GYNECOLOGICAL EXAMINATION WITHOUT ABNORMAL FINDING: Primary | ICD-10-CM

## 2023-06-30 DIAGNOSIS — J02.9 SORE THROAT: Primary | ICD-10-CM

## 2023-06-30 DIAGNOSIS — N90.89 VULVAR IRRITATION: ICD-10-CM

## 2023-06-30 LAB
B-HCG UR QL: NEGATIVE
CTP QC/QA: YES
CTP QC/QA: YES
MOLECULAR STREP A: NEGATIVE

## 2023-06-30 PROCEDURE — 3008F PR BODY MASS INDEX (BMI) DOCUMENTED: ICD-10-PCS | Mod: CPTII,,, | Performed by: OBSTETRICS & GYNECOLOGY

## 2023-06-30 PROCEDURE — 3077F SYST BP >= 140 MM HG: CPT | Mod: CPTII,,, | Performed by: OBSTETRICS & GYNECOLOGY

## 2023-06-30 PROCEDURE — 96372 THER/PROPH/DIAG INJ SC/IM: CPT | Performed by: EMERGENCY MEDICINE

## 2023-06-30 PROCEDURE — 63600175 PHARM REV CODE 636 W HCPCS: Performed by: EMERGENCY MEDICINE

## 2023-06-30 PROCEDURE — 3080F DIAST BP >= 90 MM HG: CPT | Mod: CPTII,,, | Performed by: OBSTETRICS & GYNECOLOGY

## 2023-06-30 PROCEDURE — 99284 EMERGENCY DEPT VISIT MOD MDM: CPT | Mod: 27

## 2023-06-30 PROCEDURE — 87651 STREP A DNA AMP PROBE: CPT

## 2023-06-30 PROCEDURE — 81025 URINE PREGNANCY TEST: CPT | Performed by: EMERGENCY MEDICINE

## 2023-06-30 PROCEDURE — 3077F PR MOST RECENT SYSTOLIC BLOOD PRESSURE >= 140 MM HG: ICD-10-PCS | Mod: CPTII,,, | Performed by: OBSTETRICS & GYNECOLOGY

## 2023-06-30 PROCEDURE — 99395 PREV VISIT EST AGE 18-39: CPT | Mod: S$PBB,,, | Performed by: OBSTETRICS & GYNECOLOGY

## 2023-06-30 PROCEDURE — 3008F BODY MASS INDEX DOCD: CPT | Mod: CPTII,,, | Performed by: OBSTETRICS & GYNECOLOGY

## 2023-06-30 PROCEDURE — 99212 OFFICE O/P EST SF 10 MIN: CPT | Mod: 25,PBBFAC | Performed by: OBSTETRICS & GYNECOLOGY

## 2023-06-30 PROCEDURE — 81514 NFCT DS BV&VAGINITIS DNA ALG: CPT | Performed by: OBSTETRICS & GYNECOLOGY

## 2023-06-30 PROCEDURE — 99999 PR PBB SHADOW E&M-EST. PATIENT-LVL II: CPT | Mod: PBBFAC,,, | Performed by: OBSTETRICS & GYNECOLOGY

## 2023-06-30 PROCEDURE — 3080F PR MOST RECENT DIASTOLIC BLOOD PRESSURE >= 90 MM HG: ICD-10-PCS | Mod: CPTII,,, | Performed by: OBSTETRICS & GYNECOLOGY

## 2023-06-30 PROCEDURE — 99999 PR PBB SHADOW E&M-EST. PATIENT-LVL II: ICD-10-PCS | Mod: PBBFAC,,, | Performed by: OBSTETRICS & GYNECOLOGY

## 2023-06-30 PROCEDURE — 99395 PR PREVENTIVE VISIT,EST,18-39: ICD-10-PCS | Mod: S$PBB,,, | Performed by: OBSTETRICS & GYNECOLOGY

## 2023-06-30 RX ORDER — ALUMINUM HYDROXIDE, MAGNESIUM HYDROXIDE, AND SIMETHICONE 2400; 240; 2400 MG/30ML; MG/30ML; MG/30ML
5 SUSPENSION ORAL EVERY 6 HOURS PRN
Qty: 100 ML | Refills: 1 | Status: SHIPPED | OUTPATIENT
Start: 2023-06-30 | End: 2024-01-03

## 2023-06-30 RX ORDER — DEXAMETHASONE SODIUM PHOSPHATE 4 MG/ML
8 INJECTION, SOLUTION INTRA-ARTICULAR; INTRALESIONAL; INTRAMUSCULAR; INTRAVENOUS; SOFT TISSUE
Status: COMPLETED | OUTPATIENT
Start: 2023-06-30 | End: 2023-06-30

## 2023-06-30 RX ORDER — KETOROLAC TROMETHAMINE 30 MG/ML
15 INJECTION, SOLUTION INTRAMUSCULAR; INTRAVENOUS
Status: COMPLETED | OUTPATIENT
Start: 2023-06-30 | End: 2023-06-30

## 2023-06-30 RX ORDER — CLOTRIMAZOLE AND BETAMETHASONE DIPROPIONATE 10; .64 MG/G; MG/G
CREAM TOPICAL 2 TIMES DAILY
Qty: 45 G | Refills: 4 | Status: SHIPPED | OUTPATIENT
Start: 2023-06-30 | End: 2024-01-03

## 2023-06-30 RX ORDER — IBUPROFEN 600 MG/1
600 TABLET ORAL EVERY 6 HOURS PRN
Qty: 20 TABLET | Refills: 0 | Status: SHIPPED | OUTPATIENT
Start: 2023-06-30 | End: 2023-11-28

## 2023-06-30 RX ADMIN — DEXAMETHASONE SODIUM PHOSPHATE 8 MG: 4 INJECTION INTRA-ARTICULAR; INTRALESIONAL; INTRAMUSCULAR; INTRAVENOUS; SOFT TISSUE at 09:06

## 2023-06-30 RX ADMIN — KETOROLAC TROMETHAMINE 15 MG: 30 INJECTION, SOLUTION INTRAMUSCULAR; INTRAVENOUS at 09:06

## 2023-06-30 NOTE — PROGRESS NOTES
Subjective:      Patient ID: Brock Alfaro is a 32 y.o. female.    Chief Complaint:  Well Woman and Vaginal Pain      History of Present Illness  HPI  Annual Exam-Premenopausal  Patient presents for annual exam. The patient has no complaints today. The patient is sexually active. GYN screening history: last pap: was normal. The patient wears seatbelts: yes. The patient participates in regular exercise: yes. Has the patient ever been transfused or tattooed?: not asked. The patient reports that there is not domestic violence in her life.    Has hydradenitis.  Gets BV and yeast every time she shaves.  Today, reports vulvar irritation.     GYN & OB History  No LMP recorded.   Date of Last Pap: No result found    OB History    Para Term  AB Living   4 2 2   2 2   SAB IAB Ectopic Multiple Live Births   2       2      # Outcome Date GA Lbr Parish/2nd Weight Sex Delivery Anes PTL Lv   4 Term 22 39w0d  3.23 kg (7 lb 1.9 oz) M CS-LTranv Spinal  LEILA   3 Term 14 41w2d  3.331 kg (7 lb 5.5 oz) F CS-LTranv EPI N LEILA   2 SAB            1 SAB              Past Medical History:  Past Medical History:   Diagnosis Date    Alopecia     Hay fever     Oral herpes        Past Surgical History:  Past Surgical History:   Procedure Laterality Date     SECTION       SECTION WITH TUBAL LIGATION N/A 2022    Procedure:  SECTION, WITH TUBAL LIGATION;  Surgeon: Armani Britton MD;  Location: [facility] L&D OR;  Service: OB/GYN;  Laterality: N/A;    DILATION AND CURETTAGE OF UTERUS         Family History:  Family History   Problem Relation Age of Onset    Breast cancer Maternal Grandmother     Ovarian cancer Neg Hx     Colon cancer Neg Hx        Allergies:  Review of patient's allergies indicates:   Allergen Reactions    Unclassified drug Anaphylaxis     toothpaste    Grass pollen- grass standard     Msg [glutamic acid hcl]        Medications:  Current Outpatient Medications on File Prior to  Visit   Medication Sig Dispense Refill    acetaminophen (TYLENOL) 500 MG tablet Take 1 tablet (500 mg total) by mouth every 4 (four) hours as needed (Fever). 30 tablet 0    aspirin 81 MG Chew Take 1 tablet (81 mg total) by mouth once daily. 120 tablet 3    lactic acid-citric-potassium (PHEXXI) 1.8-1-0.4 % Gel Place 5 g vaginally daily as needed (with intercourse). 60 g 11    oxyCODONE-acetaminophen (PERCOCET) 5-325 mg per tablet Take 1 tablet by mouth every 4 (four) hours as needed for Pain. 15 tablet 0    phentermine (ADIPEX-P) 37.5 mg tablet Take 37.5 mg by mouth before breakfast.      prenatal vit27,calcium-iron-FA (VINATE ONE) 60 mg iron-1 mg Tab Take 1 tablet by mouth.      valACYclovir (VALTREX) 500 MG tablet Take 2 tablets (1,000 mg total) by mouth once daily. 30 tablet 1    valACYclovir (VALTREX) 500 MG tablet Take 2 tablets (1,000 mg total) by mouth once daily. 30 tablet 1    [DISCONTINUED] ibuprofen (ADVIL,MOTRIN) 800 MG tablet Take 1 tablet (800 mg total) by mouth every 8 (eight) hours. 40 tablet 1     No current facility-administered medications on file prior to visit.       Social History:  Social History     Tobacco Use    Smoking status: Never    Smokeless tobacco: Never   Substance Use Topics    Alcohol use: Yes     Comment: rare    Drug use: No            Review of Systems  Review of Systems   Constitutional: Negative.    HENT: Negative.     Eyes: Negative.    Respiratory: Negative.     Cardiovascular: Negative.    Gastrointestinal: Negative.    Endocrine: Negative.    Genitourinary: Negative.    Musculoskeletal: Negative.    Integumentary:  Negative.   Neurological: Negative.    Hematological: Negative.    Psychiatric/Behavioral: Negative.     Breast: negative.         Objective:     Physical Exam:   Constitutional: She is oriented to person, place, and time. She appears well-nourished.    HENT:   Head: Normocephalic and atraumatic.    Eyes: EOM are normal. Right eye exhibits normal extraocular  motion. Left eye exhibits normal extraocular motion.    Neck: No thyromegaly present.    Cardiovascular:  Normal rate.             Pulmonary/Chest: Effort normal. No respiratory distress. Right breast exhibits no mass, no skin change and no tenderness. Left breast exhibits no mass, no skin change and no tenderness. Breasts are symmetrical.        Abdominal: Soft. She exhibits no distension and no mass. There is no abdominal tenderness.     Genitourinary:    Vagina, uterus, right adnexa and left adnexa normal.      Pelvic exam was performed with patient supine.   The external female genitalia was normal.   No external genitalia lesions identified,Genitalia hair distrobution normal .   Labial bartholins normal.There is no rash or lesion on the right labia. There is no rash or lesion on the left labia. Cervix is normal. Right adnexum displays no tenderness and no fullness. Left adnexum displays no tenderness and no fullness. No  no vaginal discharge or bleeding in the vagina.    No signs of injury in the vagina.   Vagina was moist.Cervix exhibits no motion tenderness and no friability. Uterus consistancy normal. and Uerus contour normal  Uterus is not tender. Normal urethral meatus.Urethral Meatus exhibits: urethral lesion and prolapsedUrethra findings: no urethral mass and no tendernessBladder findings: no bladder distention and no bladder tenderness          Musculoskeletal: Normal range of motion.      Lymphadenopathy:     She has no cervical adenopathy.    Neurological: She is oriented to person, place, and time.   Cranial Nerves II-XII grossly intact.    Skin: No rash noted. No erythema.    Psychiatric: She has a normal mood and affect. Her behavior is normal.       Assessment:     1. Encounter for gynecological examination without abnormal finding    2. Screening breast examination    3. Vulvar irritation               Plan:     1. Encounter for gynecological examination without abnormal finding  - Pap due in 1  year.  -   Screening tests as ordered.  - Diet and exercise encouraged.    Counseling: injury prevention: Driving under the influence of alcohol  Seatbelts  Stress management techniques  indications for and frequency of periodic gynecologic exam  reviewed current Pap guidelines. Explained new understanding of natural history of cervical disease and improved Paps. Recommended guideline concordant care.    2. Screening breast examination  - Self breast exams encouraged    3. Vulvar irritation  - boric acid (BORIC ACID) vaginal suppository; Place 1 each (650 mg total) vaginally every evening for 14 days, THEN 1 each (650 mg total) twice a week.  Dispense: 60 suppository; Refill: 2  - clotrimazole-betamethasone 1-0.05% (LOTRISONE) cream; Apply topically 2 (two) times daily.  Dispense: 45 g; Refill: 4  - Vaginosis Screen by DNA Probe

## 2023-06-30 NOTE — ED PROVIDER NOTES
Encounter Date: 2023    SCRIBE #1 NOTE: I, Mary Jane Cummings, am scribing for, and in the presence of,  Alireza Goodwin Jr., MD. I have scribed the following portions of the note - Other sections scribed: HPI, ROS.     History     Chief Complaint   Patient presents with    Sore Throat     PT to ER with reports of sore throat x 1 week. PT seen at PCP and told to salt water gargle. PT reports no improvement. Pt also reports tongue numbness and burning in throat x 1 week. Pt reports hx of reflex      CC: sore throat    HPI: This is a 32 y.o.female patient, with a PMHx of Alopecia, Hay fever and Oral herpes, presenting to the ED for further evaluation of sore throat beginning a week ago. Patient states there is a burning sensation in her throat. Patient reports pain worsens when laying down and is worst in the mornings after waking up. Patient reports associated symptoms of tongue numbness. Patient states doing salt water gargle/coconut oil gargle as advised by her PCP without any relief. Patient also reports using chloraseptic spray and TUMS without any relief. Patient denies any voice change or change in sense of taste. Patient reports having a negative at home COVID test. Patient denies any fever, chills, shortness of breath, chest pain, abdominal pain, rash, headaches, congestion, rhinorrhea, cough, nausea, vomiting, diarrhea, dysuria, or any other associated symptoms. No alleviating or aggravating factors.      The history is provided by the patient. No  was used.   Review of patient's allergies indicates:   Allergen Reactions    Unclassified drug Anaphylaxis     toothpaste    Grass pollen- grass standard     Msg [glutamic acid hcl]      Past Medical History:   Diagnosis Date    Alopecia     Hay fever     Oral herpes      Past Surgical History:   Procedure Laterality Date     SECTION       SECTION WITH TUBAL LIGATION N/A 2022    Procedure:  SECTION, WITH TUBAL  LIGATION;  Surgeon: Armani Britton MD;  Location: [facility] L&D OR;  Service: OB/GYN;  Laterality: N/A;    DILATION AND CURETTAGE OF UTERUS       Family History   Problem Relation Age of Onset    Breast cancer Maternal Grandmother     Ovarian cancer Neg Hx     Colon cancer Neg Hx      Social History     Tobacco Use    Smoking status: Never    Smokeless tobacco: Never   Substance Use Topics    Alcohol use: Yes     Comment: rare    Drug use: No     Review of Systems   Constitutional:  Negative for fever.   HENT:  Positive for sore throat. Negative for congestion and rhinorrhea.         (+) tongue numbness   Eyes:  Negative for visual disturbance.   Respiratory:  Negative for cough and shortness of breath.    Cardiovascular:  Negative for chest pain.   Gastrointestinal:  Negative for abdominal pain, diarrhea, nausea and vomiting.   Genitourinary:  Negative for dysuria.   Musculoskeletal:  Negative for back pain.   Skin:  Negative for rash.   Neurological:  Negative for headaches.   All other systems reviewed and are negative.    Physical Exam     Initial Vitals [06/30/23 0827]   BP Pulse Resp Temp SpO2   (!) 138/98 88 20 98 °F (36.7 °C) 99 %      MAP       --         Physical Exam    Nursing note and vitals reviewed.  Constitutional: She appears well-developed and well-nourished. She is not diaphoretic. No distress.   HENT:   Head: Normocephalic and atraumatic.   Right Ear: External ear normal.   Left Ear: External ear normal.   Nose: Nose normal.   Mouth/Throat: No oropharyngeal exudate.   No swelling of the intraoral structures.  No evidence of exudate or erythema in the posterior oropharynx.   Eyes: Conjunctivae and EOM are normal. Pupils are equal, round, and reactive to light. Right eye exhibits no discharge. Left eye exhibits no discharge. No scleral icterus.   Neck: Neck supple.   Neck is supple.  There is no swelling of the neck.  Patient's voice is not muffled.  No drooling or stridor.   Normal range of  motion.  Cardiovascular:  Normal rate, regular rhythm, normal heart sounds and intact distal pulses.           No murmur heard.  Pulmonary/Chest: Breath sounds normal. No respiratory distress. She has no wheezes. She has no rhonchi. She has no rales.   Abdominal: Abdomen is soft. She exhibits no distension and no mass. There is no abdominal tenderness. There is no rebound and no guarding.   Musculoskeletal:         General: No tenderness or edema. Normal range of motion.      Cervical back: Normal range of motion and neck supple.     Neurological: She is alert and oriented to person, place, and time. She has normal strength. No cranial nerve deficit or sensory deficit.   Skin: Skin is warm and dry. No rash noted. No erythema. No pallor.   Psychiatric: She has a normal mood and affect. Her behavior is normal. Judgment and thought content normal.       ED Course   Procedures  Labs Reviewed   POCT URINE PREGNANCY   POCT STREP A MOLECULAR          Imaging Results    None          Medications   ketorolac injection 15 mg (15 mg Intramuscular Given 6/30/23 4178)   dexAMETHasone injection 8 mg (8 mg Intramuscular Given 6/30/23 7667)     Medical Decision Making:   History:   Old Medical Records: I decided to obtain old medical records.  Clinical Tests:   Lab Tests: Ordered and Reviewed  ED Management:  This is the emergent evaluation of a 32-year-old female presents emergency department with 1 week of burning in her throat which is worse in the morning.  Differential diagnosis at the time of initial evaluation included, but was not limited to:  Viral illness, pill esophagitis, streptococcal pharyngitis.  The patient has no signs of oropharyngeal exudate.  She does not have a muffled voice.  There is no drooling or stridor.  She is not having difficulty swallowing or tolerating secretions.  She is not short of breath.  She has supple neck.  I do not suspect retropharyngeal abscess.  There is no evidence of peritonsillar  abscess.  I will give this patient medications for symptomatic treatment.  Advised her to return if she gets worse or if new symptoms develop, especially any difficulty swallowing, difficulty breathing, or neck swelling.  This is most likely a viral process.        Scribe Attestation:   Scribe #1: I performed the above scribed service and the documentation accurately describes the services I performed. I attest to the accuracy of the note.                   Clinical Impression:   Final diagnoses:  [J02.9] Sore throat (Primary)        ED Disposition Condition    Discharge Stable          ED Prescriptions       Medication Sig Dispense Start Date End Date Auth. Provider    aluminum & magnesium hydroxide-simethicone (MAALOX MAXIMUM STRENGTH) 400-400-40 mg/5 mL suspension Take 5 mLs by mouth every 6 (six) hours as needed (throat burning). 100 mL 6/30/2023 6/29/2024 Alireza Goodwin Jr., MD    ibuprofen (ADVIL,MOTRIN) 600 MG tablet Take 1 tablet (600 mg total) by mouth every 6 (six) hours as needed for Pain. 20 tablet 6/30/2023 -- Alireza Goodwin Jr., MD          Follow-up Information    None       I, Alireza Goodwin MD, personally performed the services described in this documentation. All medical record entries made by the scribe were at my direction and in my presence. I have reviewed the chart and agree that the record reflects my personal performance and is accurate and complete.       Alireza Goodwin Jr., MD  06/30/23 0406

## 2023-06-30 NOTE — ED NOTES
Brock Alfaro, a 32 y.o. female presents to the ED w/ complaint of burning sore throat x 1 week. Pt also reports accompanying tongue numbness. Pt denies any hx of acid reflux, but states it runs in her family and tums did not help when she tried. Pt denies any other s/s. Pt is AAOX4, VSS, and NADN.     Triage note:  Chief Complaint   Patient presents with    Sore Throat     PT to ER with reports of sore throat x 1 week. PT seen at PCP and told to salt water gargle. PT reports no improvement. Pt also reports tongue numbness and burning in throat x 1 week. Pt reports hx of reflex      Review of patient's allergies indicates:   Allergen Reactions    Unclassified drug Anaphylaxis     toothpaste    Grass pollen-joey grass standard     Msg [glutamic acid hcl]      Past Medical History:   Diagnosis Date    Alopecia     Hay fever     Oral herpes

## 2023-06-30 NOTE — DISCHARGE INSTRUCTIONS
Please take medications as prescribed for symptoms.  Return if you get worse or if new symptoms develop, especially any difficulty swallowing, voice changes, difficulty breathing, neck swelling, tongue swelling.

## 2023-07-06 ENCOUNTER — PATIENT MESSAGE (OUTPATIENT)
Dept: OBSTETRICS AND GYNECOLOGY | Facility: CLINIC | Age: 33
End: 2023-07-06
Payer: MEDICAID

## 2023-07-06 DIAGNOSIS — N76.0 BV (BACTERIAL VAGINOSIS): Primary | ICD-10-CM

## 2023-07-06 DIAGNOSIS — B96.89 BV (BACTERIAL VAGINOSIS): Primary | ICD-10-CM

## 2023-07-06 RX ORDER — METRONIDAZOLE 500 MG/1
500 TABLET ORAL 2 TIMES DAILY
Qty: 14 TABLET | Refills: 0 | Status: SHIPPED | OUTPATIENT
Start: 2023-07-06 | End: 2023-07-13

## 2023-07-26 DIAGNOSIS — A60.04 HERPES SIMPLEX VULVOVAGINITIS: ICD-10-CM

## 2023-08-04 RX ORDER — VALACYCLOVIR HYDROCHLORIDE 500 MG/1
1000 TABLET, FILM COATED ORAL DAILY
Qty: 30 TABLET | Refills: 1 | Status: SHIPPED | OUTPATIENT
Start: 2023-08-04 | End: 2024-01-12

## 2023-11-21 ENCOUNTER — CLINICAL SUPPORT (OUTPATIENT)
Dept: OBSTETRICS AND GYNECOLOGY | Facility: CLINIC | Age: 33
End: 2023-11-21
Payer: MEDICAID

## 2023-11-21 DIAGNOSIS — N91.2 AMENORRHEA: Primary | ICD-10-CM

## 2023-11-21 DIAGNOSIS — Z71.9 COUNSELED BY NURSE: ICD-10-CM

## 2023-11-27 ENCOUNTER — NURSE TRIAGE (OUTPATIENT)
Dept: ADMINISTRATIVE | Facility: CLINIC | Age: 33
End: 2023-11-27
Payer: MEDICAID

## 2023-11-27 ENCOUNTER — HOSPITAL ENCOUNTER (EMERGENCY)
Facility: HOSPITAL | Age: 33
Discharge: HOME | End: 2023-11-28
Attending: STUDENT IN AN ORGANIZED HEALTH CARE EDUCATION/TRAINING PROGRAM
Payer: MEDICAID

## 2023-11-27 VITALS
TEMPERATURE: 99 F | HEART RATE: 92 BPM | WEIGHT: 220 LBS | DIASTOLIC BLOOD PRESSURE: 68 MMHG | BODY MASS INDEX: 43.19 KG/M2 | HEIGHT: 60 IN | RESPIRATION RATE: 20 BRPM | OXYGEN SATURATION: 96 % | SYSTOLIC BLOOD PRESSURE: 114 MMHG

## 2023-11-27 DIAGNOSIS — Z3A.01 LESS THAN 8 WEEKS GESTATION OF PREGNANCY: ICD-10-CM

## 2023-11-27 DIAGNOSIS — R10.9 ABDOMINAL PAIN: ICD-10-CM

## 2023-11-27 DIAGNOSIS — R11.2 NAUSEA AND VOMITING, UNSPECIFIED VOMITING TYPE: Primary | ICD-10-CM

## 2023-11-27 LAB
ABO AND RH: NORMAL
ALBUMIN SERPL BCP-MCNC: 3.7 G/DL (ref 3.5–5.2)
ALP SERPL-CCNC: 69 U/L (ref 55–135)
ALT SERPL W/O P-5'-P-CCNC: 17 U/L (ref 10–44)
ANION GAP SERPL CALC-SCNC: 11 MMOL/L (ref 8–16)
AST SERPL-CCNC: 18 U/L (ref 10–40)
B-HCG UR QL: POSITIVE
BASOPHILS # BLD AUTO: 0.02 K/UL (ref 0–0.2)
BASOPHILS NFR BLD: 0.2 % (ref 0–1.9)
BILIRUB SERPL-MCNC: 0.3 MG/DL (ref 0.1–1)
BILIRUB UR QL STRIP: NEGATIVE
BUN SERPL-MCNC: 9 MG/DL (ref 6–20)
CALCIUM SERPL-MCNC: 9.5 MG/DL (ref 8.7–10.5)
CHLORIDE SERPL-SCNC: 103 MMOL/L (ref 95–110)
CLARITY UR REFRACT.AUTO: CLEAR
CO2 SERPL-SCNC: 23 MMOL/L (ref 23–29)
COLOR UR AUTO: YELLOW
CREAT SERPL-MCNC: 0.7 MG/DL (ref 0.5–1.4)
CTP QC/QA: YES
DIFFERENTIAL METHOD: ABNORMAL
EOSINOPHIL # BLD AUTO: 0.1 K/UL (ref 0–0.5)
EOSINOPHIL NFR BLD: 0.7 % (ref 0–8)
ERYTHROCYTE [DISTWIDTH] IN BLOOD BY AUTOMATED COUNT: 14.4 % (ref 11.5–14.5)
EST. GFR  (NO RACE VARIABLE): >60 ML/MIN/1.73 M^2
GLUCOSE SERPL-MCNC: 85 MG/DL (ref 70–110)
GLUCOSE UR QL STRIP: NEGATIVE
HCG INTACT+B SERPL-ACNC: NORMAL MIU/ML
HCT VFR BLD AUTO: 35.7 % (ref 37–48.5)
HGB BLD-MCNC: 11.5 G/DL (ref 12–16)
HGB UR QL STRIP: NEGATIVE
IMM GRANULOCYTES # BLD AUTO: 0.03 K/UL (ref 0–0.04)
IMM GRANULOCYTES NFR BLD AUTO: 0.3 % (ref 0–0.5)
KETONES UR QL STRIP: ABNORMAL
LEUKOCYTE ESTERASE UR QL STRIP: NEGATIVE
LIPASE SERPL-CCNC: 25 U/L (ref 4–60)
LYMPHOCYTES # BLD AUTO: 1.7 K/UL (ref 1–4.8)
LYMPHOCYTES NFR BLD: 18.1 % (ref 18–48)
MAGNESIUM SERPL-MCNC: 2 MG/DL (ref 1.6–2.6)
MCH RBC QN AUTO: 27.7 PG (ref 27–31)
MCHC RBC AUTO-ENTMCNC: 32.2 G/DL (ref 32–36)
MCV RBC AUTO: 86 FL (ref 82–98)
MONOCYTES # BLD AUTO: 0.5 K/UL (ref 0.3–1)
MONOCYTES NFR BLD: 5.5 % (ref 4–15)
NEUTROPHILS # BLD AUTO: 7.1 K/UL (ref 1.8–7.7)
NEUTROPHILS NFR BLD: 75.2 % (ref 38–73)
NITRITE UR QL STRIP: NEGATIVE
NRBC BLD-RTO: 0 /100 WBC
PH UR STRIP: 6 [PH] (ref 5–8)
PLATELET # BLD AUTO: 327 K/UL (ref 150–450)
PMV BLD AUTO: 9.7 FL (ref 9.2–12.9)
POTASSIUM SERPL-SCNC: 4.3 MMOL/L (ref 3.5–5.1)
PROT SERPL-MCNC: 7.7 G/DL (ref 6–8.4)
PROT UR QL STRIP: ABNORMAL
RBC # BLD AUTO: 4.15 M/UL (ref 4–5.4)
SODIUM SERPL-SCNC: 137 MMOL/L (ref 136–145)
SP GR UR STRIP: >1.03 (ref 1–1.03)
URN SPEC COLLECT METH UR: ABNORMAL
WBC # BLD AUTO: 9.49 K/UL (ref 3.9–12.7)

## 2023-11-27 PROCEDURE — 86901 BLOOD TYPING SEROLOGIC RH(D): CPT | Performed by: EMERGENCY MEDICINE

## 2023-11-27 PROCEDURE — 63600175 PHARM REV CODE 636 W HCPCS: Performed by: STUDENT IN AN ORGANIZED HEALTH CARE EDUCATION/TRAINING PROGRAM

## 2023-11-27 PROCEDURE — 85025 COMPLETE CBC W/AUTO DIFF WBC: CPT | Performed by: EMERGENCY MEDICINE

## 2023-11-27 PROCEDURE — 25000003 PHARM REV CODE 250: Performed by: STUDENT IN AN ORGANIZED HEALTH CARE EDUCATION/TRAINING PROGRAM

## 2023-11-27 PROCEDURE — 83735 ASSAY OF MAGNESIUM: CPT | Performed by: EMERGENCY MEDICINE

## 2023-11-27 PROCEDURE — 63600175 PHARM REV CODE 636 W HCPCS

## 2023-11-27 PROCEDURE — 81003 URINALYSIS AUTO W/O SCOPE: CPT | Performed by: EMERGENCY MEDICINE

## 2023-11-27 PROCEDURE — 81025 URINE PREGNANCY TEST: CPT | Performed by: EMERGENCY MEDICINE

## 2023-11-27 PROCEDURE — 99284 EMERGENCY DEPT VISIT MOD MDM: CPT | Mod: 25

## 2023-11-27 PROCEDURE — 96365 THER/PROPH/DIAG IV INF INIT: CPT

## 2023-11-27 PROCEDURE — 96366 THER/PROPH/DIAG IV INF ADDON: CPT

## 2023-11-27 PROCEDURE — 83690 ASSAY OF LIPASE: CPT | Performed by: EMERGENCY MEDICINE

## 2023-11-27 PROCEDURE — 80053 COMPREHEN METABOLIC PANEL: CPT | Performed by: EMERGENCY MEDICINE

## 2023-11-27 PROCEDURE — 84702 CHORIONIC GONADOTROPIN TEST: CPT | Performed by: EMERGENCY MEDICINE

## 2023-11-27 RX ORDER — PYRIDOXINE HYDROCHLORIDE 100 MG/ML
12.5 INJECTION, SOLUTION INTRAMUSCULAR; INTRAVENOUS
Status: CANCELLED | OUTPATIENT
Start: 2023-11-27 | End: 2023-11-28

## 2023-11-27 RX ADMIN — PYRIDOXINE HYDROCHLORIDE 25 MG: 100 INJECTION, SOLUTION INTRAMUSCULAR; INTRAVENOUS at 08:11

## 2023-11-27 RX ADMIN — SODIUM CHLORIDE, POTASSIUM CHLORIDE, SODIUM LACTATE AND CALCIUM CHLORIDE 1000 ML: 600; 310; 30; 20 INJECTION, SOLUTION INTRAVENOUS at 08:11

## 2023-11-27 NOTE — TELEPHONE ENCOUNTER
Pt is 7-8 weeks pregnant with her third child. She is crying over the phone and states that she has been vomiting non-stop for the past week. C/o Severe headache and abd pain. Pt is vomiting whilst on the phone. Decreased UOP only urinated x1 today. Per Care Advice, pt is advised to call  now. Pt is unable to call herself. Caro Cortes RN calls EMS while this NT stays on the phone with pt until EMS arrives. Pt notes a hx hyperemesis gravidarum with her second pregnancy.     Pt asks that this NT reach out to [place], her significant other [phone] called x2 with no answer. Pt's significant other then calls pt directly and is en route to her. [facility] is called again and they are unable to provide an ETA.     Again, this NT stays on the line with pt until her significant other arrives. Pt advises NT that she is going to have him drive her to the hospital as EMS has still not arrived after waiting for over 1 hour. NT calls [facility] back to let them know pt is being driven to the hospital.  updates information.      Reason for Disposition   Sounds like a life-threatening emergency to the triager    Protocols used: Pregnancy - Morning Sickness (Nausea and Vomiting of Pregnancy)-A-OH

## 2023-11-27 NOTE — TELEPHONE ENCOUNTER
4:10 pm Received a request to call 911 from Triage nurse Elvie Arredondo for this patient via [employer] Teams chat. She reports pt is 7 weeks pregnant with severe abdominal pain, headache, and vomiting. The pt is at work alone. Spoke with [employer]  289. Provided the  with pt's current location [address] [place] [address] in [place] on the 5th floor in [facility].     Advised the  that the pt is alone in the building and that pt states Security has left the building. The  asked for the pt's phone number and asked if the pt could call her Supervisor. Provided  with patient's number that I received from Nurse Elvie via Team [phone]. Advised  the pt states her Supervisor is out of town. The  states she cannot give an ETA for EMS.    4:42 pm: Called placed to 911, spoke with  494. Advised  I am calling again to inform them per Nurse Bermeo, the pt is still complaining of 10/10 pain, still vomiting, and says she is pale. The  states the information was already relayed to EMS as a pt with nausea and vomiting in the first trimester of pregnancy. Advised  the pt could be experiencing an ectopic pregnancy and needs immediate medical attention due to her complaints of severe abdominal pain. I requested that she escalate the call to her Supervisor and send another notification to EMS. She verbalized understanding. Notified Nurse Bermeo of second call placed to [facility].  Reason for Disposition   Sounds like a life-threatening emergency to the triager    Protocols used: No Protocol Available - Sick Adult-A-[place]

## 2023-11-28 PROCEDURE — 96368 THER/DIAG CONCURRENT INF: CPT

## 2023-11-28 PROCEDURE — 25000003 PHARM REV CODE 250

## 2023-11-28 PROCEDURE — 63600175 PHARM REV CODE 636 W HCPCS

## 2023-11-28 RX ORDER — PROMETHAZINE HYDROCHLORIDE 25 MG/1
25 SUPPOSITORY RECTAL EVERY 6 HOURS PRN
Qty: 10 SUPPOSITORY | Refills: 0 | Status: SHIPPED | OUTPATIENT
Start: 2023-11-28 | End: 2024-01-03

## 2023-11-28 RX ADMIN — PROMETHAZINE HYDROCHLORIDE 12.5 MG: 25 INJECTION INTRAMUSCULAR; INTRAVENOUS at 12:11

## 2023-11-28 NOTE — DISCHARGE INSTRUCTIONS
Ultrasound finding   Single live intrauterine pregnancy with an estimated gestational age of 6 weeks and 6 days per crown-rump length.  Fetal heart rate is 126 beats per minute.     Follow up with OB at next visit   3.8 x 3.3 x 2.9 cm.  There is a suspected corpus luteum cyst present measuring 2.8 x 2.3 x 2.3 cm.  There is an additional small mildly complex cystic lesion with internal septations measuring 1.4 x 2.5 x 2.1 cm.

## 2023-11-28 NOTE — FIRST PROVIDER EVALUATION
Medical screening examination initiated.  I have conducted a focused provider triage encounter, findings are as follows:    Brief history of present illness:  Lower abd pain, n/v, headaches, LMP 10/4, UPT+ at home, hasn't seen OB yet.  H/o hyperemesis gravidarum in last pregnancy.  No bleeding/spotting.    Vitals:    11/27/23 1800   BP: (!) 164/99   Pulse: 92   Resp: 18   Temp: 98.9 °F (37.2 °C)   TempSrc: Oral   SpO2: 99%   Weight: 99.8 kg (220 lb)   Height: 5' (1.524 m)       Pertinent physical exam:  Ambulatory, nonsurgical abdomen    Brief workup plan:  labs, gyn workup    Preliminary workup initiated; this workup will be continued and followed by the physician or advanced practice provider that is assigned to the patient when roomed.

## 2023-12-04 ENCOUNTER — HOSPITAL ENCOUNTER (EMERGENCY)
Facility: HOSPITAL | Age: 33
Discharge: HOME | End: 2023-12-04
Attending: EMERGENCY MEDICINE
Payer: MEDICAID

## 2023-12-04 VITALS
BODY MASS INDEX: 42.8 KG/M2 | WEIGHT: 218 LBS | RESPIRATION RATE: 18 BRPM | DIASTOLIC BLOOD PRESSURE: 65 MMHG | OXYGEN SATURATION: 100 % | SYSTOLIC BLOOD PRESSURE: 123 MMHG | HEART RATE: 85 BPM | TEMPERATURE: 99 F | HEIGHT: 60 IN

## 2023-12-04 DIAGNOSIS — O21.9 NAUSEA AND VOMITING DURING PREGNANCY: Primary | ICD-10-CM

## 2023-12-04 LAB
ALBUMIN SERPL BCP-MCNC: 3.6 G/DL (ref 3.5–5.2)
ALP SERPL-CCNC: 71 U/L (ref 55–135)
ALT SERPL W/O P-5'-P-CCNC: 26 U/L (ref 10–44)
ANION GAP SERPL CALC-SCNC: 14 MMOL/L (ref 8–16)
AST SERPL-CCNC: 20 U/L (ref 10–40)
B-HCG UR QL: POSITIVE
BASOPHILS # BLD AUTO: 0.02 K/UL (ref 0–0.2)
BASOPHILS NFR BLD: 0.3 % (ref 0–1.9)
BILIRUB SERPL-MCNC: 0.2 MG/DL (ref 0.1–1)
BILIRUB UR QL STRIP: NEGATIVE
BUN SERPL-MCNC: 7 MG/DL (ref 6–20)
CALCIUM SERPL-MCNC: 9.4 MG/DL (ref 8.7–10.5)
CHLORIDE SERPL-SCNC: 102 MMOL/L (ref 95–110)
CLARITY UR: ABNORMAL
CO2 SERPL-SCNC: 21 MMOL/L (ref 23–29)
COLOR UR: YELLOW
CREAT SERPL-MCNC: 0.7 MG/DL (ref 0.5–1.4)
CTP QC/QA: YES
DIFFERENTIAL METHOD: ABNORMAL
EOSINOPHIL # BLD AUTO: 0.1 K/UL (ref 0–0.5)
EOSINOPHIL NFR BLD: 1.1 % (ref 0–8)
ERYTHROCYTE [DISTWIDTH] IN BLOOD BY AUTOMATED COUNT: 14.2 % (ref 11.5–14.5)
EST. GFR  (NO RACE VARIABLE): >60 ML/MIN/1.73 M^2
GLUCOSE SERPL-MCNC: 82 MG/DL (ref 70–110)
GLUCOSE UR QL STRIP: NEGATIVE
HCG INTACT+B SERPL-ACNC: NORMAL MIU/ML
HCT VFR BLD AUTO: 36.7 % (ref 37–48.5)
HGB BLD-MCNC: 11.8 G/DL (ref 12–16)
HGB UR QL STRIP: ABNORMAL
IMM GRANULOCYTES # BLD AUTO: 0.02 K/UL (ref 0–0.04)
IMM GRANULOCYTES NFR BLD AUTO: 0.3 % (ref 0–0.5)
KETONES UR QL STRIP: NEGATIVE
LEUKOCYTE ESTERASE UR QL STRIP: NEGATIVE
LYMPHOCYTES # BLD AUTO: 1.6 K/UL (ref 1–4.8)
LYMPHOCYTES NFR BLD: 19.7 % (ref 18–48)
MCH RBC QN AUTO: 27.4 PG (ref 27–31)
MCHC RBC AUTO-ENTMCNC: 32.2 G/DL (ref 32–36)
MCV RBC AUTO: 85 FL (ref 82–98)
MONOCYTES # BLD AUTO: 0.5 K/UL (ref 0.3–1)
MONOCYTES NFR BLD: 5.9 % (ref 4–15)
NEUTROPHILS # BLD AUTO: 5.7 K/UL (ref 1.8–7.7)
NEUTROPHILS NFR BLD: 72.7 % (ref 38–73)
NITRITE UR QL STRIP: NEGATIVE
NRBC BLD-RTO: 0 /100 WBC
PH UR STRIP: 6 [PH] (ref 5–8)
PLATELET # BLD AUTO: 296 K/UL (ref 150–450)
PMV BLD AUTO: 9.2 FL (ref 9.2–12.9)
POTASSIUM SERPL-SCNC: 4 MMOL/L (ref 3.5–5.1)
PROT SERPL-MCNC: 7.8 G/DL (ref 6–8.4)
PROT UR QL STRIP: NEGATIVE
RBC # BLD AUTO: 4.31 M/UL (ref 4–5.4)
SODIUM SERPL-SCNC: 137 MMOL/L (ref 136–145)
SP GR UR STRIP: 1.01 (ref 1–1.03)
URN SPEC COLLECT METH UR: ABNORMAL
UROBILINOGEN UR STRIP-ACNC: NEGATIVE EU/DL
WBC # BLD AUTO: 7.85 K/UL (ref 3.9–12.7)

## 2023-12-04 PROCEDURE — 80053 COMPREHEN METABOLIC PANEL: CPT | Performed by: NURSE PRACTITIONER

## 2023-12-04 PROCEDURE — 96374 THER/PROPH/DIAG INJ IV PUSH: CPT

## 2023-12-04 PROCEDURE — 84702 CHORIONIC GONADOTROPIN TEST: CPT | Performed by: NURSE PRACTITIONER

## 2023-12-04 PROCEDURE — 81003 URINALYSIS AUTO W/O SCOPE: CPT | Performed by: NURSE PRACTITIONER

## 2023-12-04 PROCEDURE — 25000003 PHARM REV CODE 250: Performed by: NURSE PRACTITIONER

## 2023-12-04 PROCEDURE — 85025 COMPLETE CBC W/AUTO DIFF WBC: CPT | Performed by: NURSE PRACTITIONER

## 2023-12-04 PROCEDURE — 96361 HYDRATE IV INFUSION ADD-ON: CPT

## 2023-12-04 PROCEDURE — 99284 EMERGENCY DEPT VISIT MOD MDM: CPT

## 2023-12-04 PROCEDURE — 25000003 PHARM REV CODE 250

## 2023-12-04 PROCEDURE — 81025 URINE PREGNANCY TEST: CPT

## 2023-12-04 PROCEDURE — 63600175 PHARM REV CODE 636 W HCPCS

## 2023-12-04 RX ORDER — DOXYLAMINE SUCCINATE AND PYRIDOXINE HYDROCHLORIDE, DELAYED RELEASE TABLETS 10 MG/10 MG 10; 10 MG/1; MG/1
2 TABLET, DELAYED RELEASE ORAL NIGHTLY
Qty: 10 TABLET | Refills: 0 | Status: SHIPPED | OUTPATIENT
Start: 2023-12-04 | End: 2024-01-03

## 2023-12-04 RX ORDER — ONDANSETRON 2 MG/ML
4 INJECTION INTRAMUSCULAR; INTRAVENOUS
Status: COMPLETED | OUTPATIENT
Start: 2023-12-04 | End: 2023-12-04

## 2023-12-04 RX ORDER — ONDANSETRON 4 MG/1
4 TABLET, ORALLY DISINTEGRATING ORAL EVERY 6 HOURS PRN
Qty: 15 TABLET | Refills: 0 | Status: ON HOLD | OUTPATIENT
Start: 2023-12-04 | End: 2024-01-08

## 2023-12-04 RX ORDER — ACETAMINOPHEN 500 MG
1000 TABLET ORAL
Status: COMPLETED | OUTPATIENT
Start: 2023-12-04 | End: 2023-12-04

## 2023-12-04 RX ADMIN — ONDANSETRON 4 MG: 2 INJECTION INTRAMUSCULAR; INTRAVENOUS at 04:12

## 2023-12-04 RX ADMIN — SODIUM CHLORIDE 1000 ML: 9 INJECTION, SOLUTION INTRAVENOUS at 04:12

## 2023-12-04 RX ADMIN — ACETAMINOPHEN 1000 MG: 500 TABLET ORAL at 05:12

## 2023-12-04 NOTE — ED PROVIDER NOTES
Encounter Date: 2023       History     Chief Complaint   Patient presents with    Vomiting     Patient reports vomiting with pregnancy, is currently 8 weeks, and unable to keep anything down, medications tried not helping     HPI  33-year-old female with no pertinent past medical history  approximately 8 weeks pregnant presenting to the emergency department complaining of nausea and vomiting since onset of pregnancy.  Patient states she had nausea and vomiting throughout her last pregnancy where she took Zofran, but is out of Zofran at this time.  Patient currently does not have established care with an OBGYN but has an appointment on  to establish care.  Patient denies any fever, chest pain, shortness on breath, abdominal pain, diarrhea, vaginal bleeding, vaginal discharge.    Review of patient's allergies indicates:   Allergen Reactions    Unclassified drug Anaphylaxis     toothpaste    Grass pollen- grass standard     Msg [glutamic acid hcl]      Past Medical History:   Diagnosis Date    Alopecia     Hay fever     Oral herpes      Past Surgical History:   Procedure Laterality Date     SECTION       SECTION WITH TUBAL LIGATION N/A 2022    Procedure:  SECTION, WITH TUBAL LIGATION;  Surgeon: Armani Britton MD;  Location: [facility] L&D OR;  Service: OB/GYN;  Laterality: N/A;    DILATION AND CURETTAGE OF UTERUS       Family History   Problem Relation Age of Onset    Breast cancer Maternal Grandmother     Ovarian cancer Neg Hx     Colon cancer Neg Hx      Social History     Tobacco Use    Smoking status: Never    Smokeless tobacco: Never   Substance Use Topics    Alcohol use: Yes     Comment: rare    Drug use: No     Review of Systems   Constitutional:  Negative for chills, diaphoresis, fatigue and fever.   HENT:  Negative for congestion and sore throat.    Eyes:  Negative for redness and visual disturbance.   Respiratory:  Negative for cough and shortness of breath.     Cardiovascular:  Negative for chest pain.   Gastrointestinal:  Positive for nausea and vomiting. Negative for abdominal distention, abdominal pain, constipation and diarrhea.   Genitourinary:  Negative for difficulty urinating, dysuria, frequency and hematuria.   Musculoskeletal:  Negative for back pain, neck pain and neck stiffness.   Skin:  Negative for pallor and rash.   Neurological:  Negative for dizziness, weakness, light-headedness and headaches.   Hematological:  Does not bruise/bleed easily.       Physical Exam     Initial Vitals [12/04/23 1557]   BP Pulse Resp Temp SpO2   129/80 95 18 98.8 °F (37.1 °C) 100 %      MAP       --         Physical Exam    Nursing note and vitals reviewed.  Constitutional: Vital signs are normal. She appears well-developed and well-nourished. She is not diaphoretic. No distress.   HENT:   Head: Normocephalic and atraumatic.   Right Ear: External ear normal.   Left Ear: External ear normal.   Nose: Nose normal.   Mouth/Throat: Oropharynx is clear and moist.   Eyes: Conjunctivae and EOM are normal. Pupils are equal, round, and reactive to light. Right eye exhibits no discharge. Left eye exhibits no discharge.   Neck: No JVD present.   Normal range of motion.   Full passive range of motion without pain.     Cardiovascular:  Normal rate, regular rhythm, normal heart sounds, intact distal pulses and normal pulses.           Pulmonary/Chest: Breath sounds normal. No respiratory distress.   Abdominal: Abdomen is soft. Bowel sounds are normal. She exhibits no distension and no pulsatile midline mass. There is no abdominal tenderness.   No right CVA tenderness.  No left CVA tenderness. There is no rebound and no guarding.   Musculoskeletal:         General: Normal range of motion.      Cervical back: Full passive range of motion without pain and normal range of motion.     Neurological: She is alert and oriented to person, place, and time. She has normal strength. No cranial nerve  deficit or sensory deficit.   Skin: Skin is warm and dry. Capillary refill takes less than 2 seconds. No pallor.   Psychiatric: She has a normal mood and affect. Her speech is normal and behavior is normal. Thought content normal.         ED Course   Procedures  Labs Reviewed   CBC W/ AUTO DIFFERENTIAL - Abnormal; Notable for the following components:       Result Value    Hemoglobin 11.8 (*)     Hematocrit 36.7 (*)     All other components within normal limits    Narrative:     Release to patient->Immediate   COMPREHENSIVE METABOLIC PANEL - Abnormal; Notable for the following components:    CO2 21 (*)     All other components within normal limits    Narrative:     Release to patient->Immediate   URINALYSIS, REFLEX TO URINE CULTURE - Abnormal; Notable for the following components:    Appearance, UA Hazy (*)     Occult Blood UA Trace (*)     All other components within normal limits    Narrative:     Specimen Source->Urine   POCT URINE PREGNANCY - Abnormal; Notable for the following components:    POC Preg Test, Ur Positive (*)     All other components within normal limits   HCG, QUANTITATIVE    Narrative:     Release to patient->Immediate          Imaging Results    None          Medications   sodium chloride 0.9% bolus 1,000 mL 1,000 mL (0 mLs Intravenous Stopped 23 1730)   ondansetron injection 4 mg (4 mg Intravenous Given 23 1644)   acetaminophen tablet 1,000 mg (1,000 mg Oral Given 23 1716)     Medical Decision Making  33-year-old female with no pertinent past medical history  approximately 8 weeks pregnant presenting to the emergency department complaining of nausea and vomiting since onset of pregnancy.  Patient states she had nausea and vomiting throughout her last pregnancy where she took Zofran, but is out of Zofran at this time.  Patient currently does not have established care with an OBGYN but has an appointment on  to establish care.  Patient denies any fever, chest pain,  shortness on breath, abdominal pain, diarrhea, vaginal bleeding, vaginal discharge.  Patient's chart and medical history reviewed.  Patient's vitals reviewed.  They are afebrile, no respiratory distress, nontoxic-appearing in the ED.  - Pregnancy:  Proximally 8 weeks, with the patient correlating with previous pregnancy with similar symptoms most likely related with hyperemesis .  - ectopic pregnancy:  Unlikely in the absence of vaginal bleeding   - gastroenteritis:  Unlikely without diarrhea  - ulcer/perforation:  No history of alcohol abuse, no history of H pylori, no history of chronic NSAID use.  - cholecystitis:  No right upper quadrant pain, negative Griffith's sign.  - cholangitis:  No fever, no right upper quadrant pain, no jaundice, unlikely  - pancreatitis:  No history of gallstones, no history of alcohol abuse, no epigastric pain or tenderness  - appendicitis:  No right lower quadrant pain, negative McBurney's point tenderness, negative Rovsing  - AAA/Dissection:  2+ pulses upper and lower extremities bilaterally, stable vital signs, no abdominal pulsating masses.  - diverticulitis:  No left lower quadrant pain.  - Pyelonephritis:  unlikely with no CVA tenderness bilaterally  - Pneumonia/Pneumothorax:  Unlikely with clear lung sounds of all fields bilaterally.  No shortness a breath   Lab results and physical exam findings discussed with the patient and plan is made to have her follow up with her OBGYN in the next month as well as her primary care physician in the next 3-5 days for re-evaluation.  Patient will be discharged home with diclegis for her nausea and vomiting.  Patient agrees with this plan does not have any questions for me at this time.  Patient is currently hemodynamically stable, afebrile, having resolution of her symptoms. I discussed with the patient/family the diagnosis, treatment plan, indications for return to the emergency department, and for expected follow-up. The  patient/family verbalized an understanding. The patient/family is asked if there are any questions or concerns. We discuss the case, until all issues are addressed to the patient/family's satisfaction. Patient/family understands and is agreeable to the plan.   EVERETTE JACOB    DISCLAIMER: This note was prepared with [employer] voice recognition transcription software. Garbled syntax, mangled pronouns, and other bizarre constructions may be attributed to that software system.        Amount and/or Complexity of Data Reviewed  Labs: ordered.    Risk  OTC drugs.  Prescription drug management.  Diagnosis or treatment significantly limited by social determinants of health.                                      Clinical Impression:  Final diagnoses:  [O21.9] Nausea and vomiting during pregnancy (Primary)          ED Disposition Condition    Discharge Stable          ED Prescriptions       Medication Sig Dispense Start Date End Date Auth. Provider    ondansetron (ZOFRAN-ODT) 4 MG TbDL Take 1 tablet (4 mg total) by mouth every 6 (six) hours as needed (nausea). 15 tablet 12/4/2023 -- Everette Jacob PA-C    doxylamine-pyridoxine, vit B6, (DICLEGIS) 10-10 mg TbEC Take 2 tablets by mouth every evening. 10 tablet 12/4/2023 -- Everette Jacob PA-C          Follow-up Information       Follow up With Specialties Details Why Contact Info    Your OBGYN  Schedule an appointment as soon as possible for a visit in 3 days for follow up              Everette Jacob PA-C  12/04/23 7578

## 2023-12-05 ENCOUNTER — HOSPITAL ENCOUNTER (OUTPATIENT)
Dept: PERINATAL CARE | Facility: OTHER | Age: 33
Discharge: HOME | End: 2023-12-05
Attending: OBSTETRICS & GYNECOLOGY
Payer: MEDICAID

## 2023-12-05 ENCOUNTER — OFFICE VISIT (OUTPATIENT)
Dept: OBSTETRICS AND GYNECOLOGY | Facility: CLINIC | Age: 33
End: 2023-12-05
Payer: MEDICAID

## 2023-12-05 VITALS
HEIGHT: 60 IN | WEIGHT: 240.06 LBS | SYSTOLIC BLOOD PRESSURE: 124 MMHG | DIASTOLIC BLOOD PRESSURE: 70 MMHG | BODY MASS INDEX: 47.13 KG/M2

## 2023-12-05 DIAGNOSIS — N91.2 AMENORRHEA: ICD-10-CM

## 2023-12-05 DIAGNOSIS — Z34.90 PREGNANCY, UNSPECIFIED GESTATIONAL AGE: Primary | ICD-10-CM

## 2023-12-05 DIAGNOSIS — Z98.891 HISTORY OF CESAREAN DELIVERY: ICD-10-CM

## 2023-12-05 PROCEDURE — 99203 PR OFFICE/OUTPT VISIT, NEW, LEVL III, 30-44 MIN: ICD-10-PCS | Mod: S$PBB,TH,, | Performed by: ADVANCED PRACTICE MIDWIFE

## 2023-12-05 PROCEDURE — 76801 OB US < 14 WKS SINGLE FETUS: CPT

## 2023-12-05 PROCEDURE — 76801 US OB/GYN PROCEDURE (VIEWPOINT): ICD-10-PCS | Mod: 26,,, | Performed by: OBSTETRICS & GYNECOLOGY

## 2023-12-05 PROCEDURE — 99999 PR PBB SHADOW E&M-EST. PATIENT-LVL III: CPT | Mod: PBBFAC,,, | Performed by: ADVANCED PRACTICE MIDWIFE

## 2023-12-05 PROCEDURE — 1159F PR MEDICATION LIST DOCUMENTED IN MEDICAL RECORD: ICD-10-PCS | Mod: CPTII,,, | Performed by: ADVANCED PRACTICE MIDWIFE

## 2023-12-05 PROCEDURE — 99213 OFFICE O/P EST LOW 20 MIN: CPT | Mod: PBBFAC,25,TH | Performed by: ADVANCED PRACTICE MIDWIFE

## 2023-12-05 PROCEDURE — 3008F BODY MASS INDEX DOCD: CPT | Mod: CPTII,,, | Performed by: ADVANCED PRACTICE MIDWIFE

## 2023-12-05 PROCEDURE — 1159F MED LIST DOCD IN RCRD: CPT | Mod: CPTII,,, | Performed by: ADVANCED PRACTICE MIDWIFE

## 2023-12-05 PROCEDURE — 87491 CHLMYD TRACH DNA AMP PROBE: CPT | Performed by: ADVANCED PRACTICE MIDWIFE

## 2023-12-05 PROCEDURE — 3074F PR MOST RECENT SYSTOLIC BLOOD PRESSURE < 130 MM HG: ICD-10-PCS | Mod: CPTII,,, | Performed by: ADVANCED PRACTICE MIDWIFE

## 2023-12-05 PROCEDURE — 3008F PR BODY MASS INDEX (BMI) DOCUMENTED: ICD-10-PCS | Mod: CPTII,,, | Performed by: ADVANCED PRACTICE MIDWIFE

## 2023-12-05 PROCEDURE — 99999 PR PBB SHADOW E&M-EST. PATIENT-LVL III: ICD-10-PCS | Mod: PBBFAC,,, | Performed by: ADVANCED PRACTICE MIDWIFE

## 2023-12-05 PROCEDURE — 3074F SYST BP LT 130 MM HG: CPT | Mod: CPTII,,, | Performed by: ADVANCED PRACTICE MIDWIFE

## 2023-12-05 PROCEDURE — 99203 OFFICE O/P NEW LOW 30 MIN: CPT | Mod: S$PBB,TH,, | Performed by: ADVANCED PRACTICE MIDWIFE

## 2023-12-05 PROCEDURE — 3078F PR MOST RECENT DIASTOLIC BLOOD PRESSURE < 80 MM HG: ICD-10-PCS | Mod: CPTII,,, | Performed by: ADVANCED PRACTICE MIDWIFE

## 2023-12-05 PROCEDURE — 87086 URINE CULTURE/COLONY COUNT: CPT | Performed by: ADVANCED PRACTICE MIDWIFE

## 2023-12-05 PROCEDURE — 3078F DIAST BP <80 MM HG: CPT | Mod: CPTII,,, | Performed by: ADVANCED PRACTICE MIDWIFE

## 2023-12-05 PROCEDURE — 76801 OB US < 14 WKS SINGLE FETUS: CPT | Mod: 26,,, | Performed by: OBSTETRICS & GYNECOLOGY

## 2023-12-05 NOTE — PROGRESS NOTES
30HISTORY OF PRESENT ILLNESS:    Brock Alfaro is a 33 y.o. female, ,  Patient's last menstrual period was 10/04/2023 (approximate).  for a routine exam complaining of amenorrhea.    Pt reports marked nausea/ vomiting- has rx for phenergan and zofran but reports still with n/v. Pt declines to have a Pap today. Pt did not have a BTL with her last c/section.    Past Medical History:   Diagnosis Date    Alopecia     Hay fever     Oral herpes        Past Surgical History:   Procedure Laterality Date     SECTION       SECTION WITH TUBAL LIGATION N/A 2022    Procedure:  SECTION, WITH TUBAL LIGATION;  Surgeon: Armani Britton MD;  Location: [facility] L&D OR;  Service: OB/GYN;  Laterality: N/A;    DILATION AND CURETTAGE OF UTERUS         MEDICATIONS AND ALLERGIES:      Current Outpatient Medications:     boric acid (BORIC ACID) vaginal suppository, Place 1 each (650 mg total) vaginally every evening for 14 days, THEN 1 each (650 mg total) twice a week., Disp: 60 suppository, Rfl: 2    clotrimazole-betamethasone 1-0.05% (LOTRISONE) cream, Apply topically 2 (two) times daily., Disp: 45 g, Rfl: 4    doxylamine-pyridoxine, vit B6, (DICLEGIS) 10-10 mg TbEC, Take 2 tablets by mouth every evening., Disp: 10 tablet, Rfl: 0    ondansetron (ZOFRAN-ODT) 4 MG TbDL, Take 1 tablet (4 mg total) by mouth every 6 (six) hours as needed (nausea)., Disp: 15 tablet, Rfl: 0    promethazine (PHENERGAN) 25 MG suppository, Place 1 suppository (25 mg total) rectally every 6 (six) hours as needed for Nausea., Disp: 10 suppository, Rfl: 0    valACYclovir (VALTREX) 500 MG tablet, Take 2 tablets (1,000 mg total) by mouth once daily., Disp: 30 tablet, Rfl: 1    valACYclovir (VALTREX) 500 MG tablet, Take 2 tablets (1,000 mg total) by mouth once daily., Disp: 30 tablet, Rfl: 1    acetaminophen (TYLENOL) 500 MG tablet, Take 1 tablet (500 mg total) by mouth every 4 (four) hours as needed (Fever). (Patient not taking:  Reported on 12/5/2023), Disp: 30 tablet, Rfl: 0    aluminum & magnesium hydroxide-simethicone (MAALOX MAXIMUM STRENGTH) 400-400-40 mg/5 mL suspension, Take 5 mLs by mouth every 6 (six) hours as needed (throat burning)., Disp: 100 mL, Rfl: 1    aspirin 81 MG Chew, Take 1 tablet (81 mg total) by mouth once daily., Disp: 120 tablet, Rfl: 3    lactic acid-citric-potassium (PHEXXI) 1.8-1-0.4 % Gel, Place 5 g vaginally daily as needed (with intercourse). (Patient not taking: Reported on 12/5/2023), Disp: 60 g, Rfl: 11    oxyCODONE-acetaminophen (PERCOCET) 5-325 mg per tablet, Take 1 tablet by mouth every 4 (four) hours as needed for Pain., Disp: 15 tablet, Rfl: 0    prenatal vit27,calcium-iron-FA (VINATE ONE) 60 mg iron-1 mg Tab, Take 1 tablet by mouth., Disp: , Rfl:   No current facility-administered medications for this visit.    Review of patient's allergies indicates:   Allergen Reactions    Unclassified drug Anaphylaxis     toothpaste    Grass pollen-june grass standard     Msg [glutamic acid hcl]        Family History   Problem Relation Age of Onset    Breast cancer Maternal Grandmother     Ovarian cancer Neg Hx     Colon cancer Neg Hx        Social History     Socioeconomic History    Marital status: Single   Tobacco Use    Smoking status: Never    Smokeless tobacco: Never   Substance and Sexual Activity    Alcohol use: Yes     Comment: rare    Drug use: No    Sexual activity: Yes     Partners: Male     Birth control/protection: None   Social History Narrative    Together for awhile    He works for [employer]    She works for a non-profit organization.       COMPREHENSIVE GYN HISTORY:  PAP History: Denies abnormal Paps.  Infection History: Denies STDs. Denies PID.  Benign History: Denies uterine fibroids. Denies ovarian cysts. Denies endometriosis. Denies other conditions.  Cancer History: Denies cervical cancer. Denies uterine cancer or hyperplasia. Denies ovarian cancer. Denies vulvar cancer or  pre-cancer. Denies vaginal cancer or pre-cancer. Denies breast cancer. Denies colon cancer.  Sexual Activity History: Reports currently being sexually active  Menstrual History: None.  Contraception: None    ROS:  GENERAL: No weight changes. No swelling. No fatigue. No fever.  CARDIOVASCULAR: No chest pain. No shortness of breath. No leg cramps.   NEUROLOGICAL: No headaches. No vision changes.  BREASTS: No pain. No lumps. No discharge.  ABDOMEN: No pain. No diarrhea. No constipation. Nausea/ vomiting  REPRODUCTIVE: No abnormal bleeding.   VULVA: No pain. No lesions. No itching.  VAGINA: No relaxation. No itching. No odor. No discharge. No lesions.  URINARY: No incontinence. No nocturia. No frequency. No dysuria.    /70   Ht 5' (1.524 m)   Wt 108.9 kg (240 lb 1.3 oz)   LMP 10/04/2023 (Approximate)   Breastfeeding No   BMI 46.89 kg/m²     PE:  AFFECT: Alert and oriented X 3. Interactive during exam  GENERAL: Appearance well-nourished, well-developed, in no acute distress.  HEENT: WNL  TEETH: Good dentition.  LUNGS: Easy and unlabored  HEART: Regular rate and rhythm     PROCEDURES:  UPT Positive    DIAGNOSIS:  Gyn exam  IUP with stated LMP of 10/04/23    PLAN:Routine prenatal care    MEDICATIONS PRESCRIBED:None    LABS AND TESTS ORDERED:  New Ob Labs      NEW PREGNANCY COUNSELING  Patient was counseled today on:  - Routine prenatal blood tests including HIV and anticipated course of prenatal care  - Prenatal vitamins and folic acid  - Weight gain, nutrition, and exercise  - Seafood and mercury  - Properly heating deli and prepared meats and avoiding unrefrigerated deli  meats, cheeses, and milk products,   - Avoiding cat litter and raw meats due to risk of Toxoplasmosis precautions   - Accuracy of the LMP-based JULIA and the value of an early TV-u/s  - Aneuploidy and neural tube screening -- cffDNA, sequential screening, and AFP screen at 15 weeks  - OTC medication in the first trimester  - Harmful effects of  smoking, etOH, and recreational drugs  - [facility] u/s  at 18-20 weeks.  - Common complaints of pregnancy  - Seat belt use  - Childbirth classes and hospital facilities- advised not a candidate for the ABC, advised not a candidate for   - All questions were answered    TERATOLOGY COUNSELING:   Discussed indications and options for aneuploidy screening - pamphlets given    -Lengthy discussion re nausea /vomiting in pregnancy. Discussed proper use of meds. Discussed diet changes to alleviate nausea. Discussed dehydration.    - Pain and bleeding precautions given    - Return to clinic in 4 weeks    Tashia Chou CNM    OB/GYN    Total time of 30 minutes, including face-to-face time and non-face-to-face time preparing to see the patient (eg, review of tests), obtaining and/or reviewing separately obtained history, documenting clinical information in the electronic or other health record, independently interpreting results, communicating results to the patient/family/caregiver, or care coordination.

## 2023-12-05 NOTE — DISCHARGE INSTRUCTIONS
Thank you for coming to our Emergency Department today. It is important to remember that some problems or medical conditions are difficult to diagnose and may not be found or addressed during your Emergency Department visit.  These conditions often start with non-specific symptoms and can only be diagnosed on follow up visits with your primary care physician or specialist when the symptoms continue or change. Please remember that all medical conditions can change, and we cannot predict how you will be feeling tomorrow or the next day. Return to the ER with any questions/concerns, new/concerning symptoms, worsening or failure to improve.   Be sure to follow up with your primary care doctor and review all labs/imaging/tests that were performed during your ER visit with them. It is very common for us to identify non-emergent incidental findings which must be followed up with your primary care physician.  Some labs/imaging/tests may be outside of the normal range, and require non-emergent follow-up and/or further investigation/treatment/procedures/testing to help diagnose/exclude/prevent complications or other potentially serious medical conditions. Some abnormalities may not have been discussed or addressed during your ER visit. Some lab results may not return during your ER visit but can be accessible by downloading the free [facility] Mychart willem or by visiting [email or web address] . It is important for you to review all labs/imaging/tests which are outside of the normal range with your physician.  An ER visit does not replace a primary care visit, and many screening tests or follow-up tests cannot be ordered by an ER doctor or performed by the ER. Some tests may even require pre-approval.  If you do not have a primary care doctor, you may contact the one listed on your discharge paperwork or you may also call the [facility] Appointment Desk at [phone] , or [address] [place] [address] at  [phone] to schedule an  appointment, or establish care with a primary care doctor or even a specialist and to obtain information about local resources. It is important to your health that you have a primary care doctor.  Please take all medications as directed. We have done our best to select a medication for you that will treat your condition however, all medications may potentially have side-effects and it is impossible to predict which medications may give you side-effects or what those side-effects (if any) those medications may give you.  If you feel that you are having a negative effect or side-effect of any medication you should stop taking those medications immediately and seek medical attention. If you feel that you are having a life-threatening reaction call 911.  Do not drive, swim, climb to height, take a bath, operate heavy machinery, drink alcohol or take potentially sedating medications, sign any legal documents or make any important decisions for 24 hours if you have received any pain medications, sedatives or mood altering drugs during your ER visit or within 24 hours of taking them if they have been prescribed to you.   You can find additional resources for Dentists, hearing aids, durable medical equipment, low cost pharmacies and other resources at [email or web address]  Patient agrees with this plan. Discussed with her strict return precautions, she verbalized understanding. Patient is stable for discharge.

## 2023-12-06 LAB
BACTERIA UR CULT: NORMAL
BACTERIA UR CULT: NORMAL

## 2023-12-07 LAB
C TRACH DNA SPEC QL NAA+PROBE: NOT DETECTED
N GONORRHOEA DNA SPEC QL NAA+PROBE: NOT DETECTED

## 2023-12-14 NOTE — PROGRESS NOTES
Ob Follow-up    SUBJECTIVE    HPI: Cara Quezada is a 28 y.o.  at 19w1d here for RPNV.  She denies vaginal bleeding, leakage of fluid, decreased fetal movements, and contractions.       OBJECTIVE  Visit Vitals  LMP 2023 (Exact Date)   OB Status Pregnant   Smoking Status Never      See OB flowsheet      ASSESSMENT & PLAN    Cara Quezada is a 28 y.o.  at 19w0d here for the following concerns we addressed today:    Problem List Items Addressed This Visit          Ob-Gyn Problems    Encounter for pregnancy related examination in second trimester - Primary    Overview     Dating:   [x] Initial BMI:  21  [x] Prenatal Labs:  Reviewed  [x] Genetic Screening:  rrcfDNA screen  [x] Baby ASA:  Yes  [] Anatomy US:  [] 1hr GCT at 24-28wks:  [] Tdap (27-36wks):  [x] Flu Shot:  Done  [] COVID vaccine:   [] Rhogam (if Rh neg):   [] GBS at 36 wks:  [] Breastfeeding  [] Postpartum Birth control method:   [] 39 weeks discussion of IOL vs. Expectant management:  [] Mode of delivery:                    Discussed BASA  RTC in 4 weeks      Mike Masters MD      Subjective:       Patient ID: Brock Alfaro is a 29 y.o. female.    Chief Complaint: Generalized Body Aches (joint pain)    HPI   The patient presents for evaluation multiple joint pains.  She has noted pain in the D-I-P joints of the 3rd and 4th fingers bilaterally. She states she has had recurrent mouth ulcers soreness of the tongue.  She has used Valtrex intermittently for fever blisters.  She uses ibuprofen 800 mg as needed foot joint pain or muscle cramps.    She has had recurrent nodular lesions in the gluteal areas and in the axillary areas.  She has a sister who was diagnosed with hidradenitis suppurativa.  She also gives a history of eczema involving the chest, neck, and arms.  The patient also complains of increased stress lately.    Family history is significant for maternal aunt with lupus.  Her grandmother had osteoarthritis.  A maternal uncle with breast cancer.  Maternal grandmother with bone cancer.  There is no family history of ovarian or colon cancer.  Type 2 diabetes mellitus noted in both grandmothers.    Past surgical history:  D and C, .    Social history:  The patient has never smoked.  She only occasionally uses alcohol.    Current medications:  Valtrex ibuprofen 800 mg vitamin-C folic acid.  The patient also uses supplements for hair/skin/nails.  She also uses acidophilus and Garlique capsules.    Review of Systems   Constitutional: Positive for fatigue. Negative for activity change, appetite change and unexpected weight change.   HENT: Positive for mouth sores.    Eyes: Negative for visual disturbance.   Respiratory: Negative for cough and shortness of breath.    Cardiovascular: Negative for chest pain, palpitations and leg swelling.   Gastrointestinal: Negative for abdominal pain, blood in stool, constipation and diarrhea.   Endocrine:        Increased thirst is noted.   Genitourinary: Negative for dysuria and hematuria.   Musculoskeletal: Positive for arthralgias.  Negative for neck pain and neck stiffness.   Skin: Positive for rash.   Neurological: Negative for dizziness, syncope and headaches.   Psychiatric/Behavioral: Negative for sleep disturbance.       Objective:      Physical Exam   Constitutional: She is oriented to person, place, and time. She appears well-developed and well-nourished. No distress.   HENT:   Head: Normocephalic and atraumatic.   Mouth/Throat: No oropharyngeal exudate.   Two small ulcers are noted involving the lower lip.   Eyes: Conjunctivae and EOM are normal. No scleral icterus.   Neck: Normal range of motion. Neck supple. No JVD present. No thyromegaly present.   Cardiovascular: Normal rate, regular rhythm, normal heart sounds and intact distal pulses. Exam reveals no gallop and no friction rub.   No murmur heard.  Pulmonary/Chest: Effort normal and breath sounds normal. No respiratory distress. She has no wheezes. She has no rales.   Abdominal: Soft. Bowel sounds are normal. She exhibits no mass. There is no tenderness.   Musculoskeletal: Normal range of motion. She exhibits tenderness.   Tenderness to palpation of the the DIP joints of the 3rd and 4th fingers bilaterally.   Lymphadenopathy:     She has no cervical adenopathy.   Neurological: She is alert and oriented to person, place, and time.   Skin: Skin is warm and dry. No rash noted.   Psychiatric: She has a normal mood and affect. Her behavior is normal. Thought content normal.   Nursing note and vitals reviewed.      Assessment:       1. Arthralgia, unspecified joint    2. Skin rash    3. Fatigue, unspecified type    4. Excessive thirst    5. Stress        Plan:       Brock was seen today for generalized body aches.  Blood tests will be obtained x-rays of both hands will also be obtained.  No additional medication will be prescribed at this.  Rheumatology consultation may be obtained as discussed.    Diagnoses and all orders for this visit:    Arthralgia, unspecified joint  -     X-Ray  Hand 3 View Bilateral; Future  -     Comprehensive metabolic panel; Future  -     CBC auto differential; Future  -     Hemoglobin A1c; Future  -     REINALDO Screen w/Reflex; Future  -     Lipid panel; Future  -     TSH; Future  -     Sedimentation rate; Future  -     C-reactive protein; Future  -     Rheumatoid factor; Future    Skin rash  -     Comprehensive metabolic panel; Future  -     CBC auto differential; Future  -     Hemoglobin A1c; Future  -     REINALDO Screen w/Reflex; Future  -     Lipid panel; Future  -     TSH; Future  -     Sedimentation rate; Future  -     C-reactive protein; Future  -     Rheumatoid factor; Future    Fatigue, unspecified type  -     Comprehensive metabolic panel; Future  -     CBC auto differential; Future  -     Hemoglobin A1c; Future  -     REINALDO Screen w/Reflex; Future  -     Lipid panel; Future  -     TSH; Future  -     Sedimentation rate; Future  -     C-reactive protein; Future  -     Rheumatoid factor; Future    Excessive thirst  -     Comprehensive metabolic panel; Future  -     CBC auto differential; Future  -     Hemoglobin A1c; Future  -     REINALDO Screen w/Reflex; Future  -     Lipid panel; Future  -     TSH; Future  -     Sedimentation rate; Future  -     C-reactive protein; Future  -     Rheumatoid factor; Future    Stress

## 2023-12-19 ENCOUNTER — PATIENT MESSAGE (OUTPATIENT)
Dept: URGENT CARE | Facility: CLINIC | Age: 33
End: 2023-12-19
Payer: MEDICAID

## 2023-12-19 ENCOUNTER — TELEPHONE (OUTPATIENT)
Dept: OBSTETRICS AND GYNECOLOGY | Facility: CLINIC | Age: 33
End: 2023-12-19
Payer: MEDICAID

## 2023-12-19 ENCOUNTER — HOSPITAL ENCOUNTER (EMERGENCY)
Facility: OTHER | Age: 33
Discharge: HOME | End: 2023-12-19
Attending: EMERGENCY MEDICINE
Payer: MEDICAID

## 2023-12-19 VITALS
DIASTOLIC BLOOD PRESSURE: 68 MMHG | RESPIRATION RATE: 17 BRPM | TEMPERATURE: 99 F | HEART RATE: 92 BPM | SYSTOLIC BLOOD PRESSURE: 109 MMHG | OXYGEN SATURATION: 98 %

## 2023-12-19 DIAGNOSIS — R11.2 NAUSEA AND VOMITING, UNSPECIFIED VOMITING TYPE: Primary | ICD-10-CM

## 2023-12-19 DIAGNOSIS — R10.2 PELVIC PAIN IN PREGNANCY: ICD-10-CM

## 2023-12-19 DIAGNOSIS — O26.899 PELVIC PAIN IN PREGNANCY: ICD-10-CM

## 2023-12-19 DIAGNOSIS — R12 HEARTBURN DURING PREGNANCY IN FIRST TRIMESTER: ICD-10-CM

## 2023-12-19 DIAGNOSIS — O26.891 HEARTBURN DURING PREGNANCY IN FIRST TRIMESTER: ICD-10-CM

## 2023-12-19 DIAGNOSIS — R07.9 CHEST PAIN: ICD-10-CM

## 2023-12-19 LAB
ALBUMIN SERPL BCP-MCNC: 3.7 G/DL (ref 3.5–5.2)
ALP SERPL-CCNC: 73 U/L (ref 55–135)
ALT SERPL W/O P-5'-P-CCNC: 51 U/L (ref 10–44)
ANION GAP SERPL CALC-SCNC: 12 MMOL/L (ref 8–16)
AST SERPL-CCNC: 34 U/L (ref 10–40)
BASOPHILS # BLD AUTO: 0.02 K/UL (ref 0–0.2)
BASOPHILS NFR BLD: 0.3 % (ref 0–1.9)
BILIRUB SERPL-MCNC: 0.3 MG/DL (ref 0.1–1)
BUN SERPL-MCNC: 4 MG/DL (ref 6–20)
CALCIUM SERPL-MCNC: 9.8 MG/DL (ref 8.7–10.5)
CHLORIDE SERPL-SCNC: 104 MMOL/L (ref 95–110)
CO2 SERPL-SCNC: 19 MMOL/L (ref 23–29)
CREAT SERPL-MCNC: 0.7 MG/DL (ref 0.5–1.4)
DIFFERENTIAL METHOD: NORMAL
EOSINOPHIL # BLD AUTO: 0.1 K/UL (ref 0–0.5)
EOSINOPHIL NFR BLD: 1.1 % (ref 0–8)
ERYTHROCYTE [DISTWIDTH] IN BLOOD BY AUTOMATED COUNT: 14.2 % (ref 11.5–14.5)
EST. GFR  (NO RACE VARIABLE): >60 ML/MIN/1.73 M^2
GLUCOSE SERPL-MCNC: 79 MG/DL (ref 70–110)
HCG INTACT+B SERPL-ACNC: NORMAL MIU/ML
HCT VFR BLD AUTO: 38.2 % (ref 37–48.5)
HGB BLD-MCNC: 12.7 G/DL (ref 12–16)
IMM GRANULOCYTES # BLD AUTO: 0.01 K/UL (ref 0–0.04)
IMM GRANULOCYTES NFR BLD AUTO: 0.1 % (ref 0–0.5)
LYMPHOCYTES # BLD AUTO: 1.8 K/UL (ref 1–4.8)
LYMPHOCYTES NFR BLD: 24.9 % (ref 18–48)
MCH RBC QN AUTO: 28.5 PG (ref 27–31)
MCHC RBC AUTO-ENTMCNC: 33.2 G/DL (ref 32–36)
MCV RBC AUTO: 86 FL (ref 82–98)
MONOCYTES # BLD AUTO: 0.5 K/UL (ref 0.3–1)
MONOCYTES NFR BLD: 6.7 % (ref 4–15)
NEUTROPHILS # BLD AUTO: 4.8 K/UL (ref 1.8–7.7)
NEUTROPHILS NFR BLD: 66.9 % (ref 38–73)
NRBC BLD-RTO: 0 /100 WBC
PLATELET # BLD AUTO: 294 K/UL (ref 150–450)
PMV BLD AUTO: 9.7 FL (ref 9.2–12.9)
POTASSIUM SERPL-SCNC: 4.4 MMOL/L (ref 3.5–5.1)
PROT SERPL-MCNC: 8.3 G/DL (ref 6–8.4)
RBC # BLD AUTO: 4.45 M/UL (ref 4–5.4)
SODIUM SERPL-SCNC: 135 MMOL/L (ref 136–145)
WBC # BLD AUTO: 7.14 K/UL (ref 3.9–12.7)

## 2023-12-19 PROCEDURE — 84702 CHORIONIC GONADOTROPIN TEST: CPT

## 2023-12-19 PROCEDURE — 63600175 PHARM REV CODE 636 W HCPCS

## 2023-12-19 PROCEDURE — 80053 COMPREHEN METABOLIC PANEL: CPT

## 2023-12-19 PROCEDURE — 93005 ELECTROCARDIOGRAM TRACING: CPT

## 2023-12-19 PROCEDURE — 96374 THER/PROPH/DIAG INJ IV PUSH: CPT

## 2023-12-19 PROCEDURE — 85025 COMPLETE CBC W/AUTO DIFF WBC: CPT

## 2023-12-19 PROCEDURE — 96361 HYDRATE IV INFUSION ADD-ON: CPT

## 2023-12-19 PROCEDURE — 99284 EMERGENCY DEPT VISIT MOD MDM: CPT

## 2023-12-19 PROCEDURE — 93010 EKG 12-LEAD: ICD-10-PCS | Mod: ,,, | Performed by: INTERNAL MEDICINE

## 2023-12-19 PROCEDURE — 93010 ELECTROCARDIOGRAM REPORT: CPT | Mod: ,,, | Performed by: INTERNAL MEDICINE

## 2023-12-19 PROCEDURE — 25000003 PHARM REV CODE 250

## 2023-12-19 RX ORDER — METOCLOPRAMIDE 10 MG/1
10 TABLET ORAL EVERY 6 HOURS PRN
Qty: 28 TABLET | Refills: 0 | Status: SHIPPED | OUTPATIENT
Start: 2023-12-19 | End: 2023-12-26

## 2023-12-19 RX ORDER — ACETAMINOPHEN 500 MG
1000 TABLET ORAL EVERY 8 HOURS PRN
Qty: 20 TABLET | Refills: 0 | Status: SHIPPED | OUTPATIENT
Start: 2023-12-19 | End: 2023-12-24

## 2023-12-19 RX ORDER — ACETAMINOPHEN 500 MG
1000 TABLET ORAL
Status: COMPLETED | OUTPATIENT
Start: 2023-12-19 | End: 2023-12-19

## 2023-12-19 RX ORDER — SUCRALFATE 1 G/10ML
1 SUSPENSION ORAL
Status: COMPLETED | OUTPATIENT
Start: 2023-12-19 | End: 2023-12-19

## 2023-12-19 RX ORDER — ONDANSETRON 2 MG/ML
8 INJECTION INTRAMUSCULAR; INTRAVENOUS
Status: COMPLETED | OUTPATIENT
Start: 2023-12-19 | End: 2023-12-19

## 2023-12-19 RX ORDER — SUCRALFATE 1 G/1
1 TABLET ORAL DAILY PRN
Qty: 14 TABLET | Refills: 0 | Status: SHIPPED | OUTPATIENT
Start: 2023-12-19 | End: 2024-01-03

## 2023-12-19 RX ADMIN — SUCRALFATE 1 G: 1 SUSPENSION ORAL at 05:12

## 2023-12-19 RX ADMIN — ONDANSETRON 8 MG: 2 INJECTION INTRAMUSCULAR; INTRAVENOUS at 04:12

## 2023-12-19 RX ADMIN — SODIUM CHLORIDE 1000 ML: 9 INJECTION, SOLUTION INTRAVENOUS at 04:12

## 2023-12-19 RX ADMIN — ACETAMINOPHEN 1000 MG: 500 TABLET ORAL at 04:12

## 2023-12-19 NOTE — TELEPHONE ENCOUNTER
Called pt in regards to message. No answer.  ----- Message from Carol Rosales sent at 12/19/2023 11:01 AM CST -----  Regarding: Patient having CP  Symptom: Chest Pain - Adult  Outcome: Instruct patient to Call 911 NOW!  Reason: Severe pain now    The caller accepted this outcome

## 2023-12-19 NOTE — ED PROVIDER NOTES
Encounter Date: 2023       History     Chief Complaint   Patient presents with    Abdominal Pain     Since last night. Pt 10 weeks pregnant. Pt reports n/v-- describes pain as indigestion .      Brock Alfaro is a 33 y.o. female, , approximately 10 weeks gestation with history of HS, GERD and  18 months ago presenting to the emergency department for evaluation of intermittent pelvic cramping that began today. No analgesics prior to arrival. She also reports associated nausea and multiple episodes of non bloody emesis for the past few days, which she attributes to morning sickness. She does report history of hyperemesis gravidarum. No relief of nausea and vomiting with PO Zofran, phenergan, vitamin B6 or Unisom. She has not seen her OB yet. States that she has her first appointment with Dr. Gutierres here at [facility] after [place]. Notes that she has undergone US for this pregnancy, which confirmed IUP. She denies fever, chills, cough, cold symptoms, constipation, diarrhea, urinary symptoms, vaginal bleeding, or vaginal discharge.       The history is provided by the patient.     Review of patient's allergies indicates:   Allergen Reactions    Unclassified drug Anaphylaxis     toothpaste    Grass pollen- grass standard     Msg [glutamic acid hcl]      Past Medical History:   Diagnosis Date    Alopecia     Hay fever     Oral herpes      Past Surgical History:   Procedure Laterality Date     SECTION       SECTION WITH TUBAL LIGATION N/A 2022    Procedure:  SECTION, WITH TUBAL LIGATION;  Surgeon: Armani Britton MD;  Location: [facility] L&D OR;  Service: OB/GYN;  Laterality: N/A;    DILATION AND CURETTAGE OF UTERUS       Family History   Problem Relation Age of Onset    Breast cancer Maternal Grandmother     Ovarian cancer Neg Hx     Colon cancer Neg Hx      Social History     Tobacco Use    Smoking status: Never    Smokeless tobacco: Never   Substance Use Topics     Alcohol use: Yes     Comment: rare    Drug use: No     Review of Systems   Constitutional:  Negative for chills and fever.   HENT:  Negative for congestion, rhinorrhea and sore throat.    Respiratory:  Negative for cough and shortness of breath.    Cardiovascular:  Negative for chest pain.   Gastrointestinal:  Positive for nausea and vomiting. Negative for abdominal pain, blood in stool, constipation and diarrhea.   Genitourinary:  Positive for pelvic pain. Negative for dysuria, frequency, hematuria, urgency, vaginal bleeding and vaginal discharge.   Musculoskeletal:  Negative for back pain.   Skin:  Negative for rash.   Neurological:  Negative for dizziness and headaches.   Psychiatric/Behavioral:  Negative for confusion.        Physical Exam     Initial Vitals   BP Pulse Resp Temp SpO2   12/19/23 1228 12/19/23 1228 12/19/23 1228 12/19/23 1607 12/19/23 1228   (!) 142/86 102 20 98.7 °F (37.1 °C) 100 %      MAP       --                Physical Exam    Nursing note and vitals reviewed.  Constitutional: She appears well-developed and well-nourished. No distress.   HENT:   Head: Normocephalic and atraumatic.   Nose: Nose normal.   Mouth/Throat: Oropharynx is clear and moist.   Eyes: Conjunctivae and EOM are normal.   Neck: Neck supple.   Normal range of motion.  Cardiovascular:  Normal rate, regular rhythm, normal heart sounds and intact distal pulses.           Pulmonary/Chest: Breath sounds normal. No respiratory distress. She has no wheezes. She has no rhonchi. She has no rales.   Abdominal: Abdomen is soft. Bowel sounds are normal. She exhibits no distension and no mass. There is no abdominal tenderness.   Gravid abdomen. No focal abdominal or pelvic ttp.  There is no rebound and no guarding.   Musculoskeletal:         General: Normal range of motion.      Cervical back: Normal range of motion and neck supple.     Neurological: She is alert and oriented to person, place, and time. She has normal strength.   Skin:  Skin is warm and dry.   Psychiatric: She has a normal mood and affect. Her behavior is normal. Judgment and thought content normal.         ED Course   Procedures  Labs Reviewed   COMPREHENSIVE METABOLIC PANEL - Abnormal; Notable for the following components:       Result Value    Sodium 135 (*)     CO2 19 (*)     BUN 4 (*)     ALT 51 (*)     All other components within normal limits    Narrative:     Release to patient->Immediate   CBC W/ AUTO DIFFERENTIAL    Narrative:     Release to patient->Immediate   HCG, QUANTITATIVE    Narrative:     Release to patient->Immediate     EKG Readings: (Independently Interpreted)   Initial Reading: No STEMI.   Normal sinus rhythm at a rate of 85.  No ST or T-wave changes.  Normal axis.       Imaging Results    None          Medications   sodium chloride 0.9% bolus 1,000 mL 1,000 mL (0 mLs Intravenous Stopped 23 1800)   ondansetron injection 8 mg (8 mg Intravenous Given 23 1602)   acetaminophen tablet 1,000 mg (1,000 mg Oral Given 23 1602)   sucralfate 100 mg/mL suspension 1 g (1 g Oral Given 23 1725)     Medical Decision Making  Amount and/or Complexity of Data Reviewed  Labs: ordered.    Risk  OTC drugs.  Prescription drug management.                          Medical Decision Making:   Initial Assessment:   Urgent evaluation of 33-year-old female,  approximately 10 weeks gestation who presents with intermittent pelvic cramping, nausea and vomiting. Does have history of hyperemesis gravidarum and attributes N/V to morning sickness. No relief of symptoms with Zofran or phenergan. No fever, chills, vaginal bleeding, vaginal discharge, or urinary symptoms.  On exam, she is uncomfortable appearing but nontoxic.  Hemodynamically stable.  Afebrile in the ED.  Gravid abdomen.  No focal abdominal or pelvic tenderness to palpation.   Clinical Tests:   Lab Tests: Ordered and Reviewed  ED Management:  On review of labs, no leukocytosis.  H&H stable.  Mild  hyponatremia at 135 and mild elevation of ALT of 51 likely due to N/V. CMP is unremarkable.  Beta hCG is greater than 225,000, which is appropriate for current gestational age.  UA was ordered for the patient, but she was unable to provide a urine sample during her entire ED visit.  She denies any urinary symptoms at this time.  She was feeling much better after medications and fluids.  I discontinued the UA.  Bedside ultrasound was performed and did show intrauterine pregnancy.  Fetal cardiac activity was seen.  Explained that her chest pain is related to GERD.  Discharged home with prescriptions for Carafate.  Also recommended taking Tylenol for any abdominal or pelvic cramping.  Prescription for Reglan was also provided.  Patient advised to follow-up with her OB as soon as possible.  Patient verbalized understanding and agreement with this plan of care. She was given specific return precautions. Advised to follow up with PCP as needed. All questions and concerns addressed. She is stable for discharge.     This note was created with [employer] Fluency Direct Dictation. Please excuse any spelling or grammatical errors.             Clinical Impression:  Final diagnoses:  [R07.9] Chest pain  [R11.2] Nausea and vomiting, unspecified vomiting type (Primary)  [O26.891, R12] Heartburn during pregnancy in first trimester  [O26.899, R10.2] Pelvic pain in pregnancy          ED Disposition Condition    Discharge Stable          ED Prescriptions       Medication Sig Dispense Start Date End Date Auth. Provider    acetaminophen (TYLENOL) 500 MG tablet Take 2 tablets (1,000 mg total) by mouth every 8 (eight) hours as needed for Pain (for abdominal or pelvic pain). 20 tablet 12/19/2023 12/24/2023 Reno Martinez PA-C    metoclopramide HCl (REGLAN) 10 MG tablet Take 1 tablet (10 mg total) by mouth every 6 (six) hours as needed (for nausea and vomiting). 28 tablet 12/19/2023 12/26/2023 Reno Martinez PA-C    sucralfate (CARAFATE) 1 gram  tablet Take 1 tablet (1 g total) by mouth daily as needed (for heartburn). 14 tablet 12/19/2023 1/2/2024 Reno Martinez PA-C          Follow-up Information    None          Reno Martinez PA-C  12/20/23 1111

## 2023-12-19 NOTE — ED TRIAGE NOTES
Patient presents to ED with c/o abdominal pain, N/V, indigestion and chest pain. She reports frequent vomiting with no relief from zofran or phenergan suppository. Denies fever, vaginal bleeding or urinary symptoms.

## 2023-12-19 NOTE — DISCHARGE INSTRUCTIONS
Please take Tylenol up to 1000 mg every 8 hours as needed for pelvic cramping. Do not take more than 3000 mg in a day. Please try Reglan as needed for nausea and vomiting. Please take Carafate as needed for heartburn. Make sure you are eating well and staying hydrated.

## 2023-12-28 ENCOUNTER — HOSPITAL ENCOUNTER (EMERGENCY)
Facility: HOSPITAL | Age: 33
Discharge: HOME | End: 2023-12-28
Attending: EMERGENCY MEDICINE
Payer: MEDICAID

## 2023-12-28 VITALS
DIASTOLIC BLOOD PRESSURE: 58 MMHG | RESPIRATION RATE: 18 BRPM | TEMPERATURE: 98 F | HEART RATE: 101 BPM | OXYGEN SATURATION: 99 % | SYSTOLIC BLOOD PRESSURE: 111 MMHG

## 2023-12-28 DIAGNOSIS — R10.2 PELVIC PAIN AFFECTING PREGNANCY IN FIRST TRIMESTER, ANTEPARTUM: Primary | ICD-10-CM

## 2023-12-28 DIAGNOSIS — O26.891 PELVIC PAIN AFFECTING PREGNANCY IN FIRST TRIMESTER, ANTEPARTUM: Primary | ICD-10-CM

## 2023-12-28 DIAGNOSIS — R82.71 BACTERIURIA: ICD-10-CM

## 2023-12-28 DIAGNOSIS — R10.9 ABDOMINAL PAIN: ICD-10-CM

## 2023-12-28 DIAGNOSIS — R07.89 LEFT-SIDED CHEST WALL PAIN: ICD-10-CM

## 2023-12-28 DIAGNOSIS — O21.0 MORNING SICKNESS: ICD-10-CM

## 2023-12-28 DIAGNOSIS — Z34.90 PREGNANCY, UNSPECIFIED GESTATIONAL AGE: ICD-10-CM

## 2023-12-28 LAB
ALBUMIN SERPL BCP-MCNC: 3.6 G/DL (ref 3.5–5.2)
ALP SERPL-CCNC: 65 U/L (ref 55–135)
ALT SERPL W/O P-5'-P-CCNC: 54 U/L (ref 10–44)
ANION GAP SERPL CALC-SCNC: 8 MMOL/L (ref 8–16)
AST SERPL-CCNC: 33 U/L (ref 10–40)
BACTERIA #/AREA URNS HPF: ABNORMAL /HPF
BASOPHILS # BLD AUTO: 0.01 K/UL (ref 0–0.2)
BASOPHILS NFR BLD: 0.2 % (ref 0–1.9)
BILIRUB SERPL-MCNC: 0.4 MG/DL (ref 0.1–1)
BILIRUB UR QL STRIP: NEGATIVE
BUN SERPL-MCNC: 4 MG/DL (ref 6–20)
CALCIUM SERPL-MCNC: 9.4 MG/DL (ref 8.7–10.5)
CHLORIDE SERPL-SCNC: 105 MMOL/L (ref 95–110)
CLARITY UR: ABNORMAL
CO2 SERPL-SCNC: 23 MMOL/L (ref 23–29)
COLOR UR: YELLOW
CREAT SERPL-MCNC: 0.7 MG/DL (ref 0.5–1.4)
DIFFERENTIAL METHOD BLD: ABNORMAL
EOSINOPHIL # BLD AUTO: 0.1 K/UL (ref 0–0.5)
EOSINOPHIL NFR BLD: 1.3 % (ref 0–8)
ERYTHROCYTE [DISTWIDTH] IN BLOOD BY AUTOMATED COUNT: 14.2 % (ref 11.5–14.5)
EST. GFR  (NO RACE VARIABLE): >60 ML/MIN/1.73 M^2
GLUCOSE SERPL-MCNC: 81 MG/DL (ref 70–110)
GLUCOSE UR QL STRIP: NEGATIVE
HCT VFR BLD AUTO: 36.4 % (ref 37–48.5)
HGB BLD-MCNC: 11.6 G/DL (ref 12–16)
HGB UR QL STRIP: NEGATIVE
IMM GRANULOCYTES # BLD AUTO: 0.01 K/UL (ref 0–0.04)
IMM GRANULOCYTES NFR BLD AUTO: 0.2 % (ref 0–0.5)
KETONES UR QL STRIP: ABNORMAL
LEUKOCYTE ESTERASE UR QL STRIP: ABNORMAL
LIPASE SERPL-CCNC: 27 U/L (ref 4–60)
LYMPHOCYTES # BLD AUTO: 0.8 K/UL (ref 1–4.8)
LYMPHOCYTES NFR BLD: 13.2 % (ref 18–48)
MAGNESIUM SERPL-MCNC: 1.8 MG/DL (ref 1.6–2.6)
MCH RBC QN AUTO: 27.8 PG (ref 27–31)
MCHC RBC AUTO-ENTMCNC: 31.9 G/DL (ref 32–36)
MCV RBC AUTO: 87 FL (ref 82–98)
MICROSCOPIC COMMENT: ABNORMAL
MONOCYTES # BLD AUTO: 0.5 K/UL (ref 0.3–1)
MONOCYTES NFR BLD: 8.7 % (ref 4–15)
NEUTROPHILS # BLD AUTO: 4.6 K/UL (ref 1.8–7.7)
NEUTROPHILS NFR BLD: 76.4 % (ref 38–73)
NITRITE UR QL STRIP: NEGATIVE
NRBC BLD-RTO: 0 /100 WBC
PH UR STRIP: 6 [PH] (ref 5–8)
PLATELET # BLD AUTO: 260 K/UL (ref 150–450)
PMV BLD AUTO: 9.4 FL (ref 9.2–12.9)
POTASSIUM SERPL-SCNC: 3.8 MMOL/L (ref 3.5–5.1)
PROT SERPL-MCNC: 7.5 G/DL (ref 6–8.4)
PROT UR QL STRIP: NEGATIVE
RBC # BLD AUTO: 4.18 M/UL (ref 4–5.4)
RBC #/AREA URNS HPF: 2 /HPF (ref 0–4)
SODIUM SERPL-SCNC: 136 MMOL/L (ref 136–145)
SP GR UR STRIP: 1.01 (ref 1–1.03)
SQUAMOUS #/AREA URNS HPF: 14 /HPF
URN SPEC COLLECT METH UR: ABNORMAL
UROBILINOGEN UR STRIP-ACNC: NEGATIVE EU/DL
WBC # BLD AUTO: 5.99 K/UL (ref 3.9–12.7)
WBC #/AREA URNS HPF: 5 /HPF (ref 0–5)

## 2023-12-28 PROCEDURE — 93010 ELECTROCARDIOGRAM REPORT: CPT | Mod: ,,, | Performed by: INTERNAL MEDICINE

## 2023-12-28 PROCEDURE — 81000 URINALYSIS NONAUTO W/SCOPE: CPT | Performed by: STUDENT IN AN ORGANIZED HEALTH CARE EDUCATION/TRAINING PROGRAM

## 2023-12-28 PROCEDURE — 96361 HYDRATE IV INFUSION ADD-ON: CPT

## 2023-12-28 PROCEDURE — 83690 ASSAY OF LIPASE: CPT | Performed by: STUDENT IN AN ORGANIZED HEALTH CARE EDUCATION/TRAINING PROGRAM

## 2023-12-28 PROCEDURE — 96372 THER/PROPH/DIAG INJ SC/IM: CPT | Mod: 59 | Performed by: EMERGENCY MEDICINE

## 2023-12-28 PROCEDURE — 96374 THER/PROPH/DIAG INJ IV PUSH: CPT

## 2023-12-28 PROCEDURE — 80053 COMPREHEN METABOLIC PANEL: CPT | Performed by: STUDENT IN AN ORGANIZED HEALTH CARE EDUCATION/TRAINING PROGRAM

## 2023-12-28 PROCEDURE — 99284 EMERGENCY DEPT VISIT MOD MDM: CPT | Mod: 25

## 2023-12-28 PROCEDURE — 83735 ASSAY OF MAGNESIUM: CPT | Performed by: EMERGENCY MEDICINE

## 2023-12-28 PROCEDURE — 93005 ELECTROCARDIOGRAM TRACING: CPT

## 2023-12-28 PROCEDURE — 85025 COMPLETE CBC W/AUTO DIFF WBC: CPT | Performed by: STUDENT IN AN ORGANIZED HEALTH CARE EDUCATION/TRAINING PROGRAM

## 2023-12-28 PROCEDURE — 63600175 PHARM REV CODE 636 W HCPCS: Performed by: EMERGENCY MEDICINE

## 2023-12-28 PROCEDURE — 96375 TX/PRO/DX INJ NEW DRUG ADDON: CPT

## 2023-12-28 RX ORDER — PROCHLORPERAZINE EDISYLATE 5 MG/ML
10 INJECTION INTRAMUSCULAR; INTRAVENOUS
Status: COMPLETED | OUTPATIENT
Start: 2023-12-28 | End: 2023-12-28

## 2023-12-28 RX ORDER — DICYCLOMINE HYDROCHLORIDE 10 MG/ML
20 INJECTION INTRAMUSCULAR
Status: COMPLETED | OUTPATIENT
Start: 2023-12-28 | End: 2023-12-28

## 2023-12-28 RX ORDER — CEPHALEXIN 500 MG/1
1000 CAPSULE ORAL EVERY 12 HOURS
Qty: 28 CAPSULE | Refills: 0 | Status: SHIPPED | OUTPATIENT
Start: 2023-12-28 | End: 2024-01-04

## 2023-12-28 RX ORDER — DIPHENHYDRAMINE HYDROCHLORIDE 50 MG/ML
50 INJECTION, SOLUTION INTRAMUSCULAR; INTRAVENOUS
Status: COMPLETED | OUTPATIENT
Start: 2023-12-28 | End: 2023-12-28

## 2023-12-28 RX ORDER — CEPHALEXIN 500 MG/1
1000 CAPSULE ORAL EVERY 12 HOURS
Qty: 28 CAPSULE | Refills: 0 | Status: SHIPPED | OUTPATIENT
Start: 2023-12-28 | End: 2023-12-28

## 2023-12-28 RX ORDER — DICYCLOMINE HYDROCHLORIDE 10 MG/ML
20 INJECTION INTRAMUSCULAR
Status: DISCONTINUED | OUTPATIENT
Start: 2023-12-28 | End: 2023-12-28

## 2023-12-28 RX ADMIN — PROCHLORPERAZINE EDISYLATE 10 MG: 5 INJECTION INTRAMUSCULAR; INTRAVENOUS at 10:12

## 2023-12-28 RX ADMIN — DIPHENHYDRAMINE HYDROCHLORIDE 50 MG: 50 INJECTION, SOLUTION INTRAMUSCULAR; INTRAVENOUS at 10:12

## 2023-12-28 RX ADMIN — DICYCLOMINE HYDROCHLORIDE 20 MG: 10 INJECTION, SOLUTION INTRAMUSCULAR at 11:12

## 2023-12-28 RX ADMIN — DEXTROSE AND SODIUM CHLORIDE 2000 ML: 5; 900 INJECTION, SOLUTION INTRAVENOUS at 10:12

## 2023-12-28 NOTE — ED NOTES
Pt to ER with reports of N/V x 2 days. Pt reports is currently 12 weeks pregnant. Pt with hx of hyperemesis with pregnancy

## 2023-12-28 NOTE — ED PROVIDER NOTES
Encounter Date: 2023    SCRIBE #1 NOTE: I, Tia Diana, am scribing for, and in the presence of,  Gatito Daigle MD. I have scribed the following portions of the note - Other sections scribed: HPI, ROS.       History     Chief Complaint   Patient presents with    Vomiting     Currently pregnant with 2 days of nausea and vomiting,  abdominal cramping, denies vaginal bleeding; took BC powder, zofran & phenergan, unasyn, suppository and multiple herbal interventions     This A1 12 weeks pregnant 33 y.o. female, with a medical history of Alopecia, Hay fever, and Oral herpes, presents to the ED c/o nausea, emesis and lower abdominal cramping. Pt reports that she has been unable to keep anything down, noting that she has experienced about 18 episodes of emesis in the last 24 hours. She states that the abdominal cramping mainly occurs when she is vomiting. She reports that she has been seen for the symptoms weekly for the last couple of weeks and was prescribed Reglan for treatment without any relief. She adds that she was also prescribed an antacid medication as she has also been experiencing chest pressure, but has again had no improvement. The vomiting is presently persisting and is now accompanied by streaks of blood. She denies a history of similar symptoms with her previous pregnancies. Pt notes that she has not yet seen her OBGYN yet, but will be followed by Dr. Gutierres at [facility]. Additionally, pt c/o a frontal headache and an achy pain behind the bilateral eyes. Pt denies vaginal bleeding, shortness of breath or any other associated symptoms.      The history is provided by the patient.     Review of patient's allergies indicates:   Allergen Reactions    Fluoride Swelling     Oral swelling    Unclassified drug Anaphylaxis     toothpaste    Grass pollen- grass standard     Msg [glutamic acid hcl]      Past Medical History:   Diagnosis Date    Alopecia     Hay fever     Oral herpes      Past  Surgical History:   Procedure Laterality Date     SECTION       SECTION WITH TUBAL LIGATION N/A 2022    Procedure:  SECTION, WITH TUBAL LIGATION;  Surgeon: Armani Britton MD;  Location: [facility] L&D OR;  Service: OB/GYN;  Laterality: N/A;    DILATION AND CURETTAGE OF UTERUS       Family History   Problem Relation Age of Onset    Breast cancer Maternal Grandmother     Ovarian cancer Neg Hx     Colon cancer Neg Hx      Social History     Tobacco Use    Smoking status: Never    Smokeless tobacco: Never   Substance Use Topics    Alcohol use: Yes     Comment: rare    Drug use: No     Review of Systems   Constitutional:  Negative for fever.   HENT:  Negative for sore throat.    Eyes:  Positive for pain (achy; behind the bilateral eyes).   Respiratory:  Negative for shortness of breath.    Cardiovascular:  Positive for chest pain (pressure).   Gastrointestinal:  Positive for abdominal pain (lower; cramping) and vomiting. Negative for nausea.   Genitourinary:  Negative for dysuria and vaginal bleeding.   Musculoskeletal:  Negative for back pain.   Skin:  Negative for rash.   Neurological:  Positive for headaches (frontal). Negative for weakness.       Physical Exam     Initial Vitals [23 0848]   BP Pulse Resp Temp SpO2   (!) 142/90 102 18 98.2 °F (36.8 °C) 100 %      MAP       --         Physical Exam  The patient was examined specifically for the following:   General:No significant distress, Good color, Warm and dry. Head and neck:Scalp atraumatic, Neck supple. Neurological:Appropriate conversation, Gross motor deficits. Eyes:Conjugate gaze, Clear corneas. ENT: No epistaxis. Cardiac: Regular rate and rhythm, Grossly normal heart tones. Pulmonary: Wheezing, Rales. Gastrointestinal: Abdominal tenderness, Abdominal distention. Musculoskeletal: Extremity deformity, Apparent pain with range of motion of the joints. Skin: Rash.   The findings on examination were normal except for the following:  The  patient has an elevated BMI.  She is exquisite tenderness of the left lower sternal border the chest wall.  Palpation there reproduces the pain of the chief complaint.  There is no clinical evidence of respiratory distress.  Oxygen saturations are 100%.  There is no low pelvic tenderness.  ED Course   Procedures  Labs Reviewed   CBC W/ AUTO DIFFERENTIAL - Abnormal; Notable for the following components:       Result Value    Hemoglobin 11.6 (*)     Hematocrit 36.4 (*)     MCHC 31.9 (*)     Lymph # 0.8 (*)     Gran % 76.4 (*)     Lymph % 13.2 (*)     All other components within normal limits   COMPREHENSIVE METABOLIC PANEL - Abnormal; Notable for the following components:    BUN 4 (*)     ALT 54 (*)     All other components within normal limits   URINALYSIS, REFLEX TO URINE CULTURE - Abnormal; Notable for the following components:    Appearance, UA Hazy (*)     Ketones, UA 2+ (*)     Leukocytes, UA Trace (*)     All other components within normal limits    Narrative:     Specimen Source->Urine   URINALYSIS MICROSCOPIC - Abnormal; Notable for the following components:    Bacteria Many (*)     All other components within normal limits    Narrative:     Specimen Source->Urine   LIPASE   MAGNESIUM   POCT URINE PREGNANCY     EKG Readings: (Independently Interpreted)   This patient is in a normal sinus rhythm heart rate in 95 there are no significant ST segment or T-wave changes.  There is no evidence of acute myocardial infarction or malignant arrhythmia.  This is doctor Daigle dictating an I independently interpreted this EKG.       Imaging Results    None          Medications   dextrose 5 % and 0.9% NaCl 5-0.9 % bolus 2,000 mL (0 mLs Intravenous Stopped 12/28/23 1212)   prochlorperazine injection Soln 10 mg (10 mg Intravenous Given 12/28/23 1010)   diphenhydrAMINE injection 50 mg (50 mg Intravenous Given 12/28/23 1010)   dicyclomine injection 20 mg (20 mg Intramuscular Given 12/28/23 1142)     Medical Decision  Making  Risk  Prescription drug management.    Given the above this patient presents emergency room complaining of vomiting during pregnancy.  The patient is vomiting up to 12 times a day.  She also has crampy pelvic pain.  There is no significant abdominal or pelvic tenderness.  Previous ultrasounds reveal boil viable intrauterine pregnancy.  The patient denies vaginal bleeding.  There is no diarrhea.  Believe this is bad morning sickness.  Chemistries failed to reveal any significant electrolyte abnormalities.  The patient has a low BUN she appears to be well-hydrated.  There is a trivial elevation of the ALT that is not significant there is no elevated lipase.  The patient has a bacteriuria.  IV Rocephin.  The patient declined wishing instead to go home now.  She has Phenergan suppositories at home.  She will continue that treatment.  I will have her return if she gets worse or if new problems develop.  She has a OBGYN appointment on the 3rd.  I believe she is well-hydrated.  There are some ketones in the urine but the patient is well-hydrated.  I considered and doubt peritonitis bowel obstruction ectopic pregnancy dehydration.  The patient has chest pain with significant left lower sternal border chest tenderness.  I believe this is chest wall pain likely from vomiting.  I doubt cardiac causes of pain.  The patient is not short of breath.  I doubt pulmonary embolus there is no leg swelling.        Scribe Attestation:   Scribe #1: I performed the above scribed service and the documentation accurately describes the services I performed. I attest to the accuracy of the note.                         Please note that the documentation on this chart was provided by the scribe above on the date of service noted above, and that the documentation in the chart accurately reflects the work and decisions made by me alone.  Signed, Dr. Daigle      Clinical Impression:  Final diagnoses:  [R10.9] Abdominal pain  [O26.891,  R10.2] Pelvic pain affecting pregnancy in first trimester, antepartum (Primary)  [R82.71] Bacteriuria  [O21.0] Morning sickness  [Z34.90] Pregnancy, unspecified gestational age  [R07.89] Left-sided chest wall pain          ED Disposition Condition    Discharge Stable          ED Prescriptions       Medication Sig Dispense Start Date End Date Auth. Provider    cephALEXin (KEFLEX) 500 MG capsule Take 2 capsules (1,000 mg total) by mouth every 12 (twelve) hours. for 7 days 28 capsule 12/28/2023 1/4/2024 Gatito Daigle MD          Follow-up Information       Follow up With Specialties Details Why Contact Info    Armani Britton MD Obstetrics and Gynecology, Obstetrics and Gynecology In 3 days  [address]  [address]  [facility] [id number] [phone]               Gatito Daigle MD  12/28/23 1311       Gatito Daigle MD  12/28/23 5425

## 2023-12-28 NOTE — DISCHARGE INSTRUCTIONS
Lots of clear liquids with sugar.  Continue your Phenergan suppositories.  Please return immediately if you get worse or if new problems develop.  Tylenol for discomfort.  Please follow-up with your OBGYN doctor on the 3rd as scheduled.  Gatorade popsicles Jell-O.

## 2024-01-03 ENCOUNTER — INITIAL PRENATAL (OUTPATIENT)
Dept: OBSTETRICS AND GYNECOLOGY | Facility: CLINIC | Age: 34
End: 2024-01-03
Payer: MEDICAID

## 2024-01-03 ENCOUNTER — LAB VISIT (OUTPATIENT)
Dept: LAB | Facility: OTHER | Age: 34
End: 2024-01-03
Attending: OBSTETRICS & GYNECOLOGY
Payer: MEDICAID

## 2024-01-03 ENCOUNTER — PATIENT MESSAGE (OUTPATIENT)
Dept: ADMINISTRATIVE | Facility: OTHER | Age: 34
End: 2024-01-03
Payer: MEDICAID

## 2024-01-03 VITALS
DIASTOLIC BLOOD PRESSURE: 80 MMHG | BODY MASS INDEX: 43.96 KG/M2 | SYSTOLIC BLOOD PRESSURE: 122 MMHG | WEIGHT: 225.06 LBS

## 2024-01-03 DIAGNOSIS — O99.210 OBESITY IN PREGNANCY: ICD-10-CM

## 2024-01-03 DIAGNOSIS — Z98.891 HISTORY OF CESAREAN DELIVERY: ICD-10-CM

## 2024-01-03 DIAGNOSIS — O21.9 NAUSEA AND VOMITING DURING PREGNANCY PRIOR TO 22 WEEKS GESTATION: ICD-10-CM

## 2024-01-03 DIAGNOSIS — O09.91 SUPERVISION OF HIGH RISK PREGNANCY IN FIRST TRIMESTER: Primary | ICD-10-CM

## 2024-01-03 LAB
ALBUMIN SERPL BCP-MCNC: 3.7 G/DL (ref 3.5–5.2)
ALP SERPL-CCNC: 73 U/L (ref 55–135)
ALT SERPL W/O P-5'-P-CCNC: 80 U/L (ref 10–44)
ANION GAP SERPL CALC-SCNC: 11 MMOL/L (ref 8–16)
AST SERPL-CCNC: 42 U/L (ref 10–40)
BILIRUB SERPL-MCNC: 0.7 MG/DL (ref 0.1–1)
BUN SERPL-MCNC: 3 MG/DL (ref 6–20)
CALCIUM SERPL-MCNC: 9.8 MG/DL (ref 8.7–10.5)
CHLORIDE SERPL-SCNC: 102 MMOL/L (ref 95–110)
CO2 SERPL-SCNC: 23 MMOL/L (ref 23–29)
CREAT SERPL-MCNC: 0.7 MG/DL (ref 0.5–1.4)
EST. GFR  (NO RACE VARIABLE): >60 ML/MIN/1.73 M^2
ESTIMATED AVG GLUCOSE: 100 MG/DL (ref 68–131)
GLUCOSE SERPL-MCNC: 81 MG/DL (ref 70–110)
HBA1C MFR BLD: 5.1 % (ref 4–5.6)
POTASSIUM SERPL-SCNC: 3.4 MMOL/L (ref 3.5–5.1)
PROT SERPL-MCNC: 8 G/DL (ref 6–8.4)
SODIUM SERPL-SCNC: 136 MMOL/L (ref 136–145)

## 2024-01-03 PROCEDURE — 80053 COMPREHEN METABOLIC PANEL: CPT | Performed by: OBSTETRICS & GYNECOLOGY

## 2024-01-03 PROCEDURE — 99999 PR PBB SHADOW E&M-EST. PATIENT-LVL II: CPT | Mod: PBBFAC,,, | Performed by: OBSTETRICS & GYNECOLOGY

## 2024-01-03 PROCEDURE — 99212 OFFICE O/P EST SF 10 MIN: CPT | Mod: PBBFAC,TH | Performed by: OBSTETRICS & GYNECOLOGY

## 2024-01-03 PROCEDURE — 99214 OFFICE O/P EST MOD 30 MIN: CPT | Mod: TH,S$PBB,, | Performed by: OBSTETRICS & GYNECOLOGY

## 2024-01-03 PROCEDURE — 36415 COLL VENOUS BLD VENIPUNCTURE: CPT | Performed by: OBSTETRICS & GYNECOLOGY

## 2024-01-03 PROCEDURE — 83036 HEMOGLOBIN GLYCOSYLATED A1C: CPT | Performed by: OBSTETRICS & GYNECOLOGY

## 2024-01-03 RX ORDER — SCOLOPAMINE TRANSDERMAL SYSTEM 1 MG/1
1 PATCH, EXTENDED RELEASE TRANSDERMAL
Qty: 10 PATCH | Refills: 1 | Status: SHIPPED | OUTPATIENT
Start: 2024-01-03 | End: 2024-01-12

## 2024-01-03 RX ORDER — OMEPRAZOLE 40 MG/1
40 CAPSULE, DELAYED RELEASE ORAL
Status: ON HOLD | COMMUNITY
Start: 2023-12-26 | End: 2024-01-08

## 2024-01-03 RX ORDER — ASPIRIN 81 MG/1
81 TABLET ORAL DAILY
Refills: 0 | Status: ON HOLD | COMMUNITY
Start: 2024-01-03 | End: 2024-01-08 | Stop reason: HOSPADM

## 2024-01-04 ENCOUNTER — PATIENT MESSAGE (OUTPATIENT)
Dept: OBSTETRICS AND GYNECOLOGY | Facility: CLINIC | Age: 34
End: 2024-01-04
Payer: MEDICAID

## 2024-01-04 DIAGNOSIS — O21.0 HYPEREMESIS AFFECTING PREGNANCY, ANTEPARTUM: Primary | ICD-10-CM

## 2024-01-05 ENCOUNTER — PATIENT MESSAGE (OUTPATIENT)
Dept: MATERNAL FETAL MEDICINE | Facility: CLINIC | Age: 34
End: 2024-01-05
Payer: MEDICAID

## 2024-01-05 ENCOUNTER — TELEPHONE (OUTPATIENT)
Dept: MATERNAL FETAL MEDICINE | Facility: CLINIC | Age: 34
End: 2024-01-05
Payer: MEDICAID

## 2024-01-05 NOTE — TELEPHONE ENCOUNTER
Called patient and scheduled her.      ----- Message from Chauncey Gomez MA sent at 1/5/2024  1:37 PM CST -----  Patient called regards to scheduling her appointment, she mentioned she have a new number.    Patient contact: [phone]

## 2024-01-05 NOTE — PROGRESS NOTES
OB 1  1/3/2024    Chief Complaint   Patient presents with    Initial Prenatal Visit         HISTORY OF PRESENT ILLNESS:  Pt is a  33 y.o.  alert female who presents today for her first OB visit at 12w2d by 8+1 week US.  Her JULIA (estimated date of delivery) is ,Estimated Date of Delivery: 7/15/24. Her pregnancy is complicated by obesity (Prepregnancy BMI 46), history of  section x 2, nausea and vomiting of pregnancy.  She denies hx of HTN or preeclampsia. Denies hx of gestational diabetes.     Her main concern today is persistent nausea and vomiting. She has had persistent nausea and vomiting. She is understandably emotional today surrounding her inability to eat and feeling unwell. She has tried B6, Unisom, Reglan, Zofran, Phenergen as well as what sound like a scopolamine patch that she got off of [employer]. She notes that she has lost about 15 pounds since her pregnancy confirmation visit. She is able to keep down some fluids and jello. She has been to the ED three times for nausea and vomiting.     She notes this is an unplanned but desired pregnancy. She desires a tubal ligation at the end of this pregnancy.     NO fetal movement. Denies VB, LOF, contractions. NO fevers. NO abdominal pain.     Patient's last menstrual period was 10/04/2023 (approximate).    Review of patient's allergies indicates:   Allergen Reactions    Fluoride Swelling     Oral swelling    Unclassified drug Anaphylaxis     toothpaste    Grass pollen- grass standard     Msg [glutamic acid hcl]      Current Outpatient Medications on File Prior to Visit   Medication Sig Dispense Refill    acetaminophen (TYLENOL) 500 MG tablet Take 1 tablet (500 mg total) by mouth every 4 (four) hours as needed (Fever). 30 tablet 0    cephALEXin (KEFLEX) 500 MG capsule Take 2 capsules (1,000 mg total) by mouth every 12 (twelve) hours. for 7 days 28 capsule 0    omeprazole (PRILOSEC) 40 MG capsule Take 40 mg by mouth.      ondansetron (ZOFRAN-ODT)  4 MG TbDL Take 1 tablet (4 mg total) by mouth every 6 (six) hours as needed (nausea). 15 tablet 0    valACYclovir (VALTREX) 500 MG tablet Take 2 tablets (1,000 mg total) by mouth once daily. 30 tablet 1    valACYclovir (VALTREX) 500 MG tablet Take 2 tablets (1,000 mg total) by mouth once daily. 30 tablet 1     No current facility-administered medications on file prior to visit.   Reglan, B6, Unisom, Phenergen      Past Medical History:   Diagnosis Date    Alopecia     Hay fever     Oral herpes     Suppurative hidradenitis     18 YEARS OLD    Obesity        Past Surgical History:   Procedure Laterality Date     SECTION       SECTION  N/A 2022     section only. She did NOT Have tubal ligation.    DILATION AND CURETTAGE OF UTERUS         Social History     Socioeconomic History    Marital status: Long term partner   Tobacco Use    Smoking status: Never    Smokeless tobacco: Never   Substance and Sexual Activity    Alcohol use: Yes     Comment: rare    Drug use: No    Sexual activity: Yes     Partners: Male     Birth control/protection: None   Social History Narrative                     Family History   Problem Relation Age of Onset    Breast cancer Maternal Grandmother     Ovarian cancer Neg Hx     Colon cancer Neg Hx        OB History    Para Term  AB Living   5 2 2   2 2   SAB IAB Ectopic Multiple Live Births   2       2      # Outcome Date GA Lbr Parish/2nd Weight Sex Delivery Anes PTL Lv   5 Current            4 Term 22 39w0d  3.23 kg (7 lb 1.9 oz) M CS-LTranv Spinal  LEILA   3 Term 14 41w2d  3.331 kg (7 lb 5.5 oz) F CS-LTranv EPI N LEILA   2 SAB            1 SAB              Gynecological History:     Declines pelvic exam and pap smear.   Remote history of gonorrhea. Denies history of genital herpes. Has been on valtrex for oral cold sores. 19 NILM.     REVIEW OF SYSTEMS:  Negative except as above.       PHYSICAL EXAM  /80   Wt 102.1 kg (225 lb 1.4 oz)    LMP 10/04/2023 (Approximate)   BMI 43.96 kg/m²   GENERAL APPEARANCE:  The patient is a pleasant, normal appearing female with normal affect and in no distress.  ABDOMEN: Soft, non-tender, non-distended.    SKIN:  Warm and dry to touch.  No lesions or rashes noted.    PSYCHIATRIC/NEUROLOGIC:  Appropriate mood and affect, normal recall, alert and oriented x 3  EXTREMITIES:  Warm and well perfused.  No edema noted.   : Patient declines.   Bedside US shows  bpm and fetal movement seen    ASSESSMENT/PLAN:  Pt is a  33 y.o.  alert female who presents today for her first OB visit at 12w2d by 8+1 week US.  Her JULIA (estimated date of delivery) is ,Estimated Date of Delivery: 7/15/24. Her pregnancy is complicated by obesity (Prepregnancy BMI 46), history of  section x 2, nausea and vomiting of pregnancy.    Prenatal care-  labor and bleeding precautions reviewed.  Prenatal labs: Reviewed as normal.   Continue prenatal vitamin  Genetic Screen: discussed options. She elects for materniT 21. Lab slip given on 1/3/24  Anatomy US: ordered  ? infant, ? feeding, ?circ?  PPBC: Desires: She is strongly considering tubal ligation  28 week labs:  Tdap  Flu  GBS   Pediatrician  Labor and transfusion consents    History of  section x 2. Plan for repeat  section at 39 weeks unless indicated sooner clinically.     N/V of pregnancy with weight loss: She has tried B6, Unisom, Reglan, Zofran, Phenergen as well as what sound like a scopolamine patch. She is able to keep down some fluids and jello but no solid foods. Recommended she consider presenting to the ED as she may desire admission for IV fluids and IV antiemetics. Will trial scopolamine patch as I'm unsure of the effectiveness of the patches she got off [employer]. IF no improvement, will refer to [facility] for consult for further management recommendations. Send CMP today.     Morbid obesity with prepregnancy BMI of >40. Recommended ASA 81 mg  daily for preeclampsia prevention. Recommended weight gain of 10-20 lbs this pregnancy. A1c sent today. Weekly prenatal testing at 34 weeks gestation. Enrolled in [employer].     Family history of 22q11.2 syndrome (DiGeorge): ON Maternal grandmothers side. Recommended genetics counseling. She declines for now.     Follow Up: Pt to follow up in 4 weeks    Strict labor, bleeding, abdominal pain, N/V, leaking of fluid precautions reviewed.       Roger Gutierres  1/3/2024

## 2024-01-07 ENCOUNTER — HOSPITAL ENCOUNTER (OUTPATIENT)
Facility: HOSPITAL | Age: 34
Discharge: HOME | End: 2024-01-08
Attending: EMERGENCY MEDICINE | Admitting: STUDENT IN AN ORGANIZED HEALTH CARE EDUCATION/TRAINING PROGRAM
Payer: MEDICAID

## 2024-01-07 DIAGNOSIS — R10.9 ABDOMINAL CRAMPING AFFECTING PREGNANCY: ICD-10-CM

## 2024-01-07 DIAGNOSIS — R07.9 CHEST PAIN: ICD-10-CM

## 2024-01-07 DIAGNOSIS — K92.0 HEMATEMESIS: ICD-10-CM

## 2024-01-07 DIAGNOSIS — O26.899 ABDOMINAL CRAMPING AFFECTING PREGNANCY: ICD-10-CM

## 2024-01-07 DIAGNOSIS — O21.0 HYPEREMESIS GRAVIDARUM: Primary | ICD-10-CM

## 2024-01-07 DIAGNOSIS — R11.2 NAUSEA AND VOMITING, UNSPECIFIED VOMITING TYPE: ICD-10-CM

## 2024-01-07 LAB
ABO + RH BLD: NORMAL
ALBUMIN SERPL BCP-MCNC: 3.6 G/DL (ref 3.5–5.2)
ALP SERPL-CCNC: 81 U/L (ref 55–135)
ALT SERPL W/O P-5'-P-CCNC: 167 U/L (ref 10–44)
AMORPH CRY URNS QL MICRO: ABNORMAL
ANION GAP SERPL CALC-SCNC: 12 MMOL/L (ref 8–16)
APTT PPP: 24.2 SEC (ref 21–32)
AST SERPL-CCNC: 91 U/L (ref 10–40)
B-HCG UR QL: POSITIVE
BACTERIA #/AREA URNS HPF: ABNORMAL /HPF
BASOPHILS # BLD AUTO: 0.01 K/UL (ref 0–0.2)
BASOPHILS NFR BLD: 0.2 % (ref 0–1.9)
BILIRUB SERPL-MCNC: 0.6 MG/DL (ref 0.1–1)
BILIRUB UR QL STRIP: NEGATIVE
BUN SERPL-MCNC: 3 MG/DL (ref 6–20)
CALCIUM SERPL-MCNC: 9.7 MG/DL (ref 8.7–10.5)
CHLORIDE SERPL-SCNC: 105 MMOL/L (ref 95–110)
CLARITY UR: ABNORMAL
CO2 SERPL-SCNC: 19 MMOL/L (ref 23–29)
COLOR UR: YELLOW
CREAT SERPL-MCNC: 0.7 MG/DL (ref 0.5–1.4)
CTP QC/QA: YES
DIFFERENTIAL METHOD BLD: ABNORMAL
EOSINOPHIL # BLD AUTO: 0.1 K/UL (ref 0–0.5)
EOSINOPHIL NFR BLD: 0.9 % (ref 0–8)
ERYTHROCYTE [DISTWIDTH] IN BLOOD BY AUTOMATED COUNT: 14.1 % (ref 11.5–14.5)
EST. GFR  (NO RACE VARIABLE): >60 ML/MIN/1.73 M^2
GLUCOSE SERPL-MCNC: 91 MG/DL (ref 70–110)
GLUCOSE UR QL STRIP: NEGATIVE
HCG INTACT+B SERPL-ACNC: NORMAL MIU/ML
HCT VFR BLD AUTO: 36.9 % (ref 37–48.5)
HGB BLD-MCNC: 12.2 G/DL (ref 12–16)
HGB UR QL STRIP: ABNORMAL
HYALINE CASTS #/AREA URNS LPF: 1 /LPF
IMM GRANULOCYTES # BLD AUTO: 0.02 K/UL (ref 0–0.04)
IMM GRANULOCYTES NFR BLD AUTO: 0.3 % (ref 0–0.5)
INR PPP: 1 (ref 0.8–1.2)
KETONES UR QL STRIP: ABNORMAL
LEUKOCYTE ESTERASE UR QL STRIP: ABNORMAL
LYMPHOCYTES # BLD AUTO: 1.2 K/UL (ref 1–4.8)
LYMPHOCYTES NFR BLD: 18.8 % (ref 18–48)
MCH RBC QN AUTO: 28.1 PG (ref 27–31)
MCHC RBC AUTO-ENTMCNC: 33.1 G/DL (ref 32–36)
MCV RBC AUTO: 85 FL (ref 82–98)
MICROSCOPIC COMMENT: ABNORMAL
MONOCYTES # BLD AUTO: 0.5 K/UL (ref 0.3–1)
MONOCYTES NFR BLD: 7 % (ref 4–15)
NEUTROPHILS # BLD AUTO: 4.8 K/UL (ref 1.8–7.7)
NEUTROPHILS NFR BLD: 72.8 % (ref 38–73)
NITRITE UR QL STRIP: NEGATIVE
NRBC BLD-RTO: 0 /100 WBC
PH UR STRIP: 6 [PH] (ref 5–8)
PLATELET # BLD AUTO: 289 K/UL (ref 150–450)
PMV BLD AUTO: 9.5 FL (ref 9.2–12.9)
POTASSIUM SERPL-SCNC: 3.8 MMOL/L (ref 3.5–5.1)
PROT SERPL-MCNC: 7.8 G/DL (ref 6–8.4)
PROT UR QL STRIP: ABNORMAL
PROTHROMBIN TIME: 10.9 SEC (ref 9–12.5)
RBC # BLD AUTO: 4.34 M/UL (ref 4–5.4)
RBC #/AREA URNS HPF: 4 /HPF (ref 0–4)
RH BLD: NORMAL
SODIUM SERPL-SCNC: 136 MMOL/L (ref 136–145)
SP GR UR STRIP: 1.01 (ref 1–1.03)
SQUAMOUS #/AREA URNS HPF: 32 /HPF
URN SPEC COLLECT METH UR: ABNORMAL
UROBILINOGEN UR STRIP-ACNC: NEGATIVE EU/DL
WBC # BLD AUTO: 6.55 K/UL (ref 3.9–12.7)
WBC #/AREA URNS HPF: 7 /HPF (ref 0–5)

## 2024-01-07 PROCEDURE — 96365 THER/PROPH/DIAG IV INF INIT: CPT

## 2024-01-07 PROCEDURE — 87086 URINE CULTURE/COLONY COUNT: CPT

## 2024-01-07 PROCEDURE — 25000003 PHARM REV CODE 250

## 2024-01-07 PROCEDURE — 86901 BLOOD TYPING SEROLOGIC RH(D): CPT | Mod: 91

## 2024-01-07 PROCEDURE — 85025 COMPLETE CBC W/AUTO DIFF WBC: CPT

## 2024-01-07 PROCEDURE — G0378 HOSPITAL OBSERVATION PER HR: HCPCS

## 2024-01-07 PROCEDURE — 63600175 PHARM REV CODE 636 W HCPCS

## 2024-01-07 PROCEDURE — 87088 URINE BACTERIA CULTURE: CPT

## 2024-01-07 PROCEDURE — 81000 URINALYSIS NONAUTO W/SCOPE: CPT

## 2024-01-07 PROCEDURE — 87186 SC STD MICRODIL/AGAR DIL: CPT

## 2024-01-07 PROCEDURE — 96376 TX/PRO/DX INJ SAME DRUG ADON: CPT

## 2024-01-07 PROCEDURE — 96361 HYDRATE IV INFUSION ADD-ON: CPT

## 2024-01-07 PROCEDURE — 84702 CHORIONIC GONADOTROPIN TEST: CPT

## 2024-01-07 PROCEDURE — 87077 CULTURE AEROBIC IDENTIFY: CPT

## 2024-01-07 PROCEDURE — 80053 COMPREHEN METABOLIC PANEL: CPT

## 2024-01-07 PROCEDURE — 63600175 PHARM REV CODE 636 W HCPCS: Performed by: PHYSICIAN ASSISTANT

## 2024-01-07 PROCEDURE — C9113 INJ PANTOPRAZOLE SODIUM, VIA: HCPCS

## 2024-01-07 PROCEDURE — C9113 INJ PANTOPRAZOLE SODIUM, VIA: HCPCS | Performed by: PHYSICIAN ASSISTANT

## 2024-01-07 PROCEDURE — 96375 TX/PRO/DX INJ NEW DRUG ADDON: CPT

## 2024-01-07 PROCEDURE — 99285 EMERGENCY DEPT VISIT HI MDM: CPT | Mod: 25

## 2024-01-07 PROCEDURE — 99284 EMERGENCY DEPT VISIT MOD MDM: CPT | Mod: ,,, | Performed by: INTERNAL MEDICINE

## 2024-01-07 PROCEDURE — 85610 PROTHROMBIN TIME: CPT

## 2024-01-07 PROCEDURE — 86901 BLOOD TYPING SEROLOGIC RH(D): CPT

## 2024-01-07 PROCEDURE — 85730 THROMBOPLASTIN TIME PARTIAL: CPT

## 2024-01-07 PROCEDURE — 25000003 PHARM REV CODE 250: Performed by: PHYSICIAN ASSISTANT

## 2024-01-07 PROCEDURE — 81025 URINE PREGNANCY TEST: CPT

## 2024-01-07 RX ORDER — DIPHENHYDRAMINE HYDROCHLORIDE 50 MG/ML
25 INJECTION, SOLUTION INTRAMUSCULAR; INTRAVENOUS
Status: COMPLETED | OUTPATIENT
Start: 2024-01-07 | End: 2024-01-07

## 2024-01-07 RX ORDER — PANTOPRAZOLE SODIUM 40 MG/10ML
40 INJECTION, POWDER, LYOPHILIZED, FOR SOLUTION INTRAVENOUS 2 TIMES DAILY
Status: DISCONTINUED | OUTPATIENT
Start: 2024-01-07 | End: 2024-01-08 | Stop reason: HOSPADM

## 2024-01-07 RX ORDER — SODIUM CHLORIDE 9 MG/ML
INJECTION, SOLUTION INTRAVENOUS CONTINUOUS
Status: DISCONTINUED | OUTPATIENT
Start: 2024-01-07 | End: 2024-01-08

## 2024-01-07 RX ORDER — TALC
6 POWDER (GRAM) TOPICAL NIGHTLY PRN
Status: DISCONTINUED | OUTPATIENT
Start: 2024-01-07 | End: 2024-01-08 | Stop reason: HOSPADM

## 2024-01-07 RX ORDER — GLUCAGON 1 MG
1 KIT INJECTION
Status: DISCONTINUED | OUTPATIENT
Start: 2024-01-07 | End: 2024-01-08 | Stop reason: HOSPADM

## 2024-01-07 RX ORDER — AMOXICILLIN 250 MG
1 CAPSULE ORAL DAILY PRN
Status: DISCONTINUED | OUTPATIENT
Start: 2024-01-07 | End: 2024-01-08 | Stop reason: HOSPADM

## 2024-01-07 RX ORDER — ACETAMINOPHEN 325 MG/1
650 TABLET ORAL EVERY 4 HOURS PRN
Status: DISCONTINUED | OUTPATIENT
Start: 2024-01-07 | End: 2024-01-08 | Stop reason: HOSPADM

## 2024-01-07 RX ORDER — PANTOPRAZOLE SODIUM 40 MG/10ML
40 INJECTION, POWDER, LYOPHILIZED, FOR SOLUTION INTRAVENOUS
Status: COMPLETED | OUTPATIENT
Start: 2024-01-07 | End: 2024-01-07

## 2024-01-07 RX ORDER — PROCHLORPERAZINE EDISYLATE 5 MG/ML
10 INJECTION INTRAMUSCULAR; INTRAVENOUS
Status: COMPLETED | OUTPATIENT
Start: 2024-01-07 | End: 2024-01-07

## 2024-01-07 RX ORDER — IBUPROFEN 200 MG
16 TABLET ORAL
Status: DISCONTINUED | OUTPATIENT
Start: 2024-01-07 | End: 2024-01-08 | Stop reason: HOSPADM

## 2024-01-07 RX ORDER — SODIUM CHLORIDE 0.9 % (FLUSH) 0.9 %
10 SYRINGE (ML) INJECTION
Status: DISCONTINUED | OUTPATIENT
Start: 2024-01-07 | End: 2024-01-08 | Stop reason: HOSPADM

## 2024-01-07 RX ORDER — DIPHENHYDRAMINE HYDROCHLORIDE 50 MG/ML
25 INJECTION, SOLUTION INTRAMUSCULAR; INTRAVENOUS
Status: CANCELLED | OUTPATIENT
Start: 2024-01-07 | End: 2024-01-07

## 2024-01-07 RX ORDER — LANOLIN ALCOHOL/MO/W.PET/CERES
800 CREAM (GRAM) TOPICAL
Status: DISCONTINUED | OUTPATIENT
Start: 2024-01-07 | End: 2024-01-08 | Stop reason: HOSPADM

## 2024-01-07 RX ORDER — NALOXONE HCL 0.4 MG/ML
0.02 VIAL (ML) INJECTION
Status: DISCONTINUED | OUTPATIENT
Start: 2024-01-07 | End: 2024-01-08 | Stop reason: HOSPADM

## 2024-01-07 RX ORDER — ACETAMINOPHEN 500 MG
500 TABLET ORAL
Status: COMPLETED | OUTPATIENT
Start: 2024-01-07 | End: 2024-01-07

## 2024-01-07 RX ORDER — ONDANSETRON 2 MG/ML
4 INJECTION INTRAMUSCULAR; INTRAVENOUS
Status: COMPLETED | OUTPATIENT
Start: 2024-01-07 | End: 2024-01-07

## 2024-01-07 RX ORDER — METOCLOPRAMIDE HYDROCHLORIDE 5 MG/ML
5 INJECTION INTRAMUSCULAR; INTRAVENOUS
Status: CANCELLED | OUTPATIENT
Start: 2024-01-07 | End: 2024-01-07

## 2024-01-07 RX ORDER — SODIUM CHLORIDE 0.9 % (FLUSH) 0.9 %
10 SYRINGE (ML) INJECTION EVERY 8 HOURS
Status: DISCONTINUED | OUTPATIENT
Start: 2024-01-07 | End: 2024-01-07

## 2024-01-07 RX ORDER — IBUPROFEN 200 MG
24 TABLET ORAL
Status: DISCONTINUED | OUTPATIENT
Start: 2024-01-07 | End: 2024-01-08 | Stop reason: HOSPADM

## 2024-01-07 RX ORDER — ONDANSETRON 2 MG/ML
4 INJECTION INTRAMUSCULAR; INTRAVENOUS EVERY 6 HOURS PRN
Status: DISCONTINUED | OUTPATIENT
Start: 2024-01-07 | End: 2024-01-08 | Stop reason: HOSPADM

## 2024-01-07 RX ADMIN — SODIUM CHLORIDE 1000 ML: 9 INJECTION, SOLUTION INTRAVENOUS at 07:01

## 2024-01-07 RX ADMIN — ONDANSETRON 4 MG: 2 INJECTION INTRAMUSCULAR; INTRAVENOUS at 08:01

## 2024-01-07 RX ADMIN — ACETAMINOPHEN 500 MG: 500 TABLET ORAL at 08:01

## 2024-01-07 RX ADMIN — PANTOPRAZOLE SODIUM 40 MG: 40 INJECTION, POWDER, FOR SOLUTION INTRAVENOUS at 09:01

## 2024-01-07 RX ADMIN — PANTOPRAZOLE SODIUM 40 MG: 40 INJECTION, POWDER, FOR SOLUTION INTRAVENOUS at 11:01

## 2024-01-07 RX ADMIN — DIPHENHYDRAMINE HYDROCHLORIDE 25 MG: 50 INJECTION, SOLUTION INTRAMUSCULAR; INTRAVENOUS at 11:01

## 2024-01-07 RX ADMIN — CEFTRIAXONE 1 G: 1 INJECTION, POWDER, FOR SOLUTION INTRAMUSCULAR; INTRAVENOUS at 01:01

## 2024-01-07 RX ADMIN — PROCHLORPERAZINE EDISYLATE 10 MG: 5 INJECTION INTRAMUSCULAR; INTRAVENOUS at 11:01

## 2024-01-07 NOTE — CONSULTS
Gastroenterology Consultation Note  [facility]    Requesting service:  Emergency medicine  Reason for Consult:  Nausea, vomiting and hematemesis      HPI:  Brock Alfaro is a 33 y.o. woman who presented to the hospital with intractable nausea, vomiting, and several episodes of bloody streaks in her emesis.  She is 13 weeks pregnant and has been having difficulties with nausea and vomiting since the beginning of her pregnancy in October.  She has been followed by obstetrics reporting hyperemesis gravidarum difficult to manage with p.o. medicines.  During multiple episodes of vomiting, she saw red streaks on a few occasions.  This has happened before as well.  She reports significantly worsened acid reflux with heartburn since the beginning of pregnancy.  She is unable to eat much.  She has been eating a brat diet, but still having nausea and vomiting.  She has an appetite, and wants to eat, but can not keep food down.  She feels dehydrated at times.  She is lost some weight since the beginning of pregnancy.    She was seen outpatient by [facility] at [facility], last seen 12/26/2023.  This was for reflux and preop evaluation for gastric bypass.  Omeprazole was added to her regimen, with recommendations for EGD once pregnancy is complete.  She picked up the omeprazole a few days ago and has been taking it for few days, once every morning.  She had also been taking Carafate regularly.  She has not found that these have helped yet.    She is tried multiple medications over the last few months for nausea.  She has sublingual Zofran 8 mg tablets at home which has not been effective.  She has also been taking Phenergan which helps for maybe 30 minutes.  She alternates the Zofran with Phenergan every couple of hours.  Early in the pregnancy she was taking Reglan 4 times daily, but can not recall the exact dose.  This also was not helping.  She was given a scopolamine patch a few days  ago, but this caused severe dizziness and she felt drugged prompting her to stop the medication.  It did not help the nausea.  She also tried B6 without improvement.          Review of Systems:  Constitutional: no fever, chills, as per HPI  Eyes: no visual changes ; no icterus  Cardiovascular: no palpitations   Gastrointestinal: as per HPI        Past Medical History:   Diagnosis Date    Alopecia     Hay fever     Oral herpes     Suppurative hidradenitis     18 YEARS OLD       Past Surgical History:   Procedure Laterality Date     SECTION       SECTION WITH TUBAL LIGATION N/A 2022     section only. She did NOT Have tubal ligation.    DILATION AND CURETTAGE OF UTERUS         Family History   Problem Relation Age of Onset    Breast cancer Maternal Grandmother     Ovarian cancer Neg Hx     Colon cancer Neg Hx        Review of patient's allergies indicates:   Allergen Reactions    Fluoride Swelling     Oral swelling    Unclassified drug Anaphylaxis     toothpaste    Grass pollen- grass standard     Msg [glutamic acid hcl]        Social History     Socioeconomic History    Marital status: Single   Tobacco Use    Smoking status: Never    Smokeless tobacco: Never   Substance and Sexual Activity    Alcohol use: Yes     Comment: rare    Drug use: No    Sexual activity: Yes     Partners: Male     Birth control/protection: None   Social History Narrative    [employer] for [employer]    He works for [employer]    She works for a non-profit organization.       Medications:       cefTRIAXone (ROCEPHIN) IVPB  1 g Intravenous Q24H    pantoprazole  40 mg Intravenous BID       Physical exam:  /78 (BP Location: Right arm, Patient Position: Sitting)   Pulse 88   Temp 98.3 °F (36.8 °C) (Oral)   Resp 18   Wt 100.7 kg (222 lb)   LMP 10/04/2023 (Approximate)   SpO2 99%   Breastfeeding No   BMI 43.36 kg/m²     Gen: Well developed, well nourished, obese; pleasant; NAD  HEENT:  normocephalic; sclera non-icteric; PERRL  Chest: non-labored breathing; symmetrical expansion  Abd: non-distended  Pscyh: appropriate mood, congruent affect  Neuro: alert, oriented x 3; no focal deficits noted      Recent Labs   Lab 01/07/24  0750   WBC 6.55   RBC 4.34   HGB 12.2   HCT 36.9*      MCV 85   MCH 28.1   MCHC 33.1     Recent Labs   Lab 01/07/24  0750   CALCIUM 9.7   ALBUMIN 3.6   PROT 7.8      K 3.8   CO2 19*      BUN 3*   CREATININE 0.7   ALKPHOS 81   *   AST 91*   BILITOT 0.6     Recent Labs   Lab 01/07/24  0750   INR 1.0   APTT 24.2                 Assessment:  Brock Alfaro is a 33 y.o. female in 13th week of pregnancy presenting to the hospital with intractable nausea and vomiting associated with a few episodes of small volume hematemesis.  She has hyperemesis gravidarum and is followed by obstetrics.  She also has significantly worsened acid reflux since beginning of pregnancy.  I have low concern for clinically significant upper GI bleed.  Suspect hematemesis secondary to either esophagitis, Twyla-Hartman, or gastric mucosal trauma during retching.  The overlying problem is continued nausea and vomiting from hyperemesis gravidarum and worsened acid reflux.    She just started omeprazole 40 mg a few days ago, and it may be too soon tell how well this will work.  She is taking it in the morning.  She is not using any other acid medications.  She is on Carafate, which may have limited benefit for acid reflux.    She is tried several over-the-counter medications as well as prescriptions for nausea vomiting.  Reportedly tried Reglan q.i.d. earlier in the pregnancy without benefit.  He is currently using Zofran sublingual tablet 8 mg alternating with 25 mg Phenergan, which is only providing short term relief of about 30 minutes.  She also tried B6.  Scopolamine patch prescribed recently caused significant side effects and was not beneficial regarding nausea.    Also  noted elevated AST and ALT with normal bilirubin and alkaline phosphatase.  These are likely secondary to pregnancy.  No evidence of liver dysfunction, and platelets normal.  Previous acute hepatitis panel negative.    Case discussed with emergency medicine provider.      Recs:  We will get ultrasound of the right upper quadrant to rule out biliary disease  Aggressive management of acid reflux.  Increase omeprazole to 40 mg b.i.d., add Pepcid 40 mg q.h.s., and may use Tums as needed.  Can consider intravenous equivalents while hospitalized.  Try adding Benadryl 25 mg every 8 hours and Compazine 10 mg q.6 hours.  May use IV forms of this as well while hospitalized.      Thank you for the consult.     Brian Gutierres MD  Staff Gastroenterologist  [facility]

## 2024-01-07 NOTE — SUBJECTIVE & OBJECTIVE
Past Medical History:   Diagnosis Date    Alopecia     Hay fever     Oral herpes     Suppurative hidradenitis     18 YEARS OLD       Past Surgical History:   Procedure Laterality Date     SECTION       SECTION WITH TUBAL LIGATION N/A 2022     section only. She did NOT Have tubal ligation.    DILATION AND CURETTAGE OF UTERUS         Review of patient's allergies indicates:   Allergen Reactions    Fluoride Swelling     Oral swelling    Unclassified drug Anaphylaxis     toothpaste    Grass pollen- grass standard     Msg [glutamic acid hcl]        No current facility-administered medications on file prior to encounter.     Current Outpatient Medications on File Prior to Encounter   Medication Sig    acetaminophen (TYLENOL) 500 MG tablet Take 1 tablet (500 mg total) by mouth every 4 (four) hours as needed (Fever).    aspirin (ECOTRIN) 81 MG EC tablet Take 1 tablet (81 mg total) by mouth once daily.    omeprazole (PRILOSEC) 40 MG capsule Take 40 mg by mouth.    ondansetron (ZOFRAN-ODT) 4 MG TbDL Take 1 tablet (4 mg total) by mouth every 6 (six) hours as needed (nausea).    scopolamine (TRANSDERM-SCOP) 1.3-1.5 mg (1 mg over 3 days) Place 1 patch onto the skin every 72 hours.    valACYclovir (VALTREX) 500 MG tablet Take 2 tablets (1,000 mg total) by mouth once daily.    valACYclovir (VALTREX) 500 MG tablet Take 2 tablets (1,000 mg total) by mouth once daily.     Family History       Problem Relation (Age of Onset)    Breast cancer Maternal Grandmother          Tobacco Use    Smoking status: Never    Smokeless tobacco: Never   Substance and Sexual Activity    Alcohol use: Yes     Comment: rare    Drug use: No    Sexual activity: Yes     Partners: Male     Birth control/protection: None     Review of Systems  Objective:     Vital Signs (Most Recent):  Temp: 98.2 °F (36.8 °C) (24 1028)  Pulse: 90 (24 1028)  Resp: 18 (24 1028)  BP: 132/80 (24 1028)  SpO2: 100 % (24  1028) Vital Signs (24h Range):  Temp:  [98.1 °F (36.7 °C)-98.2 °F (36.8 °C)] 98.2 °F (36.8 °C)  Pulse:  [90-99] 90  Resp:  [18] 18  SpO2:  [100 %] 100 %  BP: (132-136)/(77-80) 132/80     Weight: 100.7 kg (222 lb)  Body mass index is 43.36 kg/m².     Physical Exam           Significant Labs: CBC:   Recent Labs   Lab 01/07/24  0750   WBC 6.55   HGB 12.2   HCT 36.9*        CMP:   Recent Labs   Lab 01/07/24  0750      K 3.8      CO2 19*   GLU 91   BUN 3*   CREATININE 0.7   CALCIUM 9.7   PROT 7.8   ALBUMIN 3.6   BILITOT 0.6   ALKPHOS 81   AST 91*   *   ANIONGAP 12     Coagulation:   Recent Labs   Lab 01/07/24  0750   INR 1.0   APTT 24.2     Urine Studies:   Recent Labs   Lab 01/07/24  0850   COLORU Yellow   APPEARANCEUA Hazy*   PHUR 6.0   SPECGRAV 1.010   PROTEINUA Trace*   GLUCUA Negative   KETONESU 2+*   BILIRUBINUA Negative   OCCULTUA Trace*   NITRITE Negative   UROBILINOGEN Negative   LEUKOCYTESUR 1+*   RBCUA 4   WBCUA 7*   BACTERIA Many*   SQUAMEPITHEL 32   HYALINECASTS 1       Significant Imaging:   Imaging Results              US OB <14 Wks, TransAbd, Single Gestation (Final result)  Result time 01/07/24 09:09:10      Final result by Westley Thompson Jr., MD (01/07/24 09:09:10)                   Impression:      Single live intrauterine pregnancy with estimated gestational age 12 weeks 6 days. and an JULIA of is 07/15/2024.      Electronically signed by: Westley Vázquez Jr  Date:    01/07/2024  Time:    09:09               Narrative:    EXAMINATION:  US OB <14 WEEKS TRANSABDOM, SINGLE GESTATION    CLINICAL HISTORY:  Other specified pregnancy related conditions, unspecified trimester    TECHNIQUE:  Transabdominal sonography of the pelvis was performed.  Patient refused transvaginal imaging.    COMPARISON:  None.    FINDINGS:  Intrauterine gestation(s): Single    Mean gestational sac diameter: 6.2 cm    Yolk sac: Present    Crown-rump length (CRL): 6.3 cm    Cardiac activity: 156  bpm    Subchorionic hemorrhage: 6    Right ovary: Normal.    Left ovary: Normal.    Miscellaneous: No Free Fluid.                                       X-Ray Chest AP Portable (Final result)  Result time 01/07/24 08:20:42      Final result by Katja Madison MD (01/07/24 08:20:42)                   Impression:      No acute abnormality.      Electronically signed by: Katja Madison MD  Date:    01/07/2024  Time:    08:20               Narrative:    EXAMINATION:  XR CHEST AP PORTABLE    CLINICAL HISTORY:  Hematemesis    TECHNIQUE:  Single frontal view of the chest was performed.    COMPARISON:  None    FINDINGS:  The lungs are clear with normal appearance of pulmonary vasculature. No pleural effusion. No evident pneumothorax.    The cardiac silhouette is normal in size. The hilar and mediastinal contours are unremarkable.    Bones are intact.

## 2024-01-07 NOTE — H&P
[facility]  [facility] Medicine  History & Physical    Patient Name: Brock Alfaro  MRN: [id number]  Patient Class: OP- Observation  Admission Date: 2024  Attending Physician: Jose Pereyra MD   Primary Care Provider: [facility] Primary Doctor         Patient information was obtained from patient, past medical records, and ER records.     Subjective:     Principal Problem:Hyperemesis gravidarum    Chief Complaint:   Chief Complaint   Patient presents with    Nausea    Vomiting     Pt c/o N/V x 13 weeks. LMP [id number]. Pt states she has been vomiting since she became pregnant. Pt's OBGYN is aware of situation. Pt has also been to ED for same. No other complaints. ABCs intact, NAD. VSS.         HPI: Brock Alfaro 33 y.o. female  the hospital chief complaint of nausea and vomiting.  She has been seen by her OBGYN and seen in the emergency room 3 times in the past for similar symptoms.  She has tried B6, Unisom, Reglan, Zofran, Phenergen as well as what sound like a scopolamine patch that she got off of [employer].  At her most recent OBGYN appointment she was prescribed scopolamine patches without improvement.  Today she noticed her emesis head bright red streaks of blood causing presentation in the emergency room.  She denies any blood thinners or NSAID use.  She finds her nausea is improved with treatment in the emergency room.  She denies fever chest pain diarrhea leg swelling melena hematuria abdominal pain and shortness of breath.     In the ED, UPT positive hCG 199,000 bilirubin within normal limits AST ALT 78684 intrauterine ultrasound with live intrauterine pregnancy chest x-ray without acute abnormality seen by GI recommending observation.    Past Medical History:   Diagnosis Date    Alopecia     Hay fever     Oral herpes     Suppurative hidradenitis     18 YEARS OLD       Past Surgical History:   Procedure Laterality Date     SECTION       SECTION WITH TUBAL  LIGATION N/A 2022     section only. She did NOT Have tubal ligation.    DILATION AND CURETTAGE OF UTERUS         Review of patient's allergies indicates:   Allergen Reactions    Fluoride Swelling     Oral swelling    Unclassified drug Anaphylaxis     toothpaste    Grass pollen- grass standard     Msg [glutamic acid hcl]        No current facility-administered medications on file prior to encounter.     Current Outpatient Medications on File Prior to Encounter   Medication Sig    acetaminophen (TYLENOL) 500 MG tablet Take 1 tablet (500 mg total) by mouth every 4 (four) hours as needed (Fever).    aspirin (ECOTRIN) 81 MG EC tablet Take 1 tablet (81 mg total) by mouth once daily.    omeprazole (PRILOSEC) 40 MG capsule Take 40 mg by mouth.    ondansetron (ZOFRAN-ODT) 4 MG TbDL Take 1 tablet (4 mg total) by mouth every 6 (six) hours as needed (nausea).    scopolamine (TRANSDERM-SCOP) 1.3-1.5 mg (1 mg over 3 days) Place 1 patch onto the skin every 72 hours.    valACYclovir (VALTREX) 500 MG tablet Take 2 tablets (1,000 mg total) by mouth once daily.    valACYclovir (VALTREX) 500 MG tablet Take 2 tablets (1,000 mg total) by mouth once daily.     Family History       Problem Relation (Age of Onset)    Breast cancer Maternal Grandmother          Tobacco Use    Smoking status: Never    Smokeless tobacco: Never   Substance and Sexual Activity    Alcohol use: Yes     Comment: rare    Drug use: No    Sexual activity: Yes     Partners: Male     Birth control/protection: None     Review of Systems  Objective:     Vital Signs (Most Recent):  Temp: 98.2 °F (36.8 °C) (24 1028)  Pulse: 90 (24 1028)  Resp: 18 (24 1028)  BP: 132/80 (24 1028)  SpO2: 100 % (24 1028) Vital Signs (24h Range):  Temp:  [98.1 °F (36.7 °C)-98.2 °F (36.8 °C)] 98.2 °F (36.8 °C)  Pulse:  [90-99] 90  Resp:  [18] 18  SpO2:  [100 %] 100 %  BP: (132-136)/(77-80) 132/80     Weight: 100.7 kg (222 lb)  Body mass index is  43.36 kg/m².     Physical Exam           Significant Labs: CBC:   Recent Labs   Lab 01/07/24  0750   WBC 6.55   HGB 12.2   HCT 36.9*        CMP:   Recent Labs   Lab 01/07/24  0750      K 3.8      CO2 19*   GLU 91   BUN 3*   CREATININE 0.7   CALCIUM 9.7   PROT 7.8   ALBUMIN 3.6   BILITOT 0.6   ALKPHOS 81   AST 91*   *   ANIONGAP 12     Coagulation:   Recent Labs   Lab 01/07/24  0750   INR 1.0   APTT 24.2     Urine Studies:   Recent Labs   Lab 01/07/24  0850   COLORU Yellow   APPEARANCEUA Hazy*   PHUR 6.0   SPECGRAV 1.010   PROTEINUA Trace*   GLUCUA Negative   KETONESU 2+*   BILIRUBINUA Negative   OCCULTUA Trace*   NITRITE Negative   UROBILINOGEN Negative   LEUKOCYTESUR 1+*   RBCUA 4   WBCUA 7*   BACTERIA Many*   SQUAMEPITHEL 32   HYALINECASTS 1       Significant Imaging:   Imaging Results              US OB <14 Wks, TransAbd, Single Gestation (Final result)  Result time 01/07/24 09:09:10      Final result by Westley Thompson Jr., MD (01/07/24 09:09:10)                   Impression:      Single live intrauterine pregnancy with estimated gestational age 12 weeks 6 days. and an JULIA of is 07/15/2024.      Electronically signed by: Westley Vázquez Jr  Date:    01/07/2024  Time:    09:09               Narrative:    EXAMINATION:  US OB <14 WEEKS TRANSABDOM, SINGLE GESTATION    CLINICAL HISTORY:  Other specified pregnancy related conditions, unspecified trimester    TECHNIQUE:  Transabdominal sonography of the pelvis was performed.  Patient refused transvaginal imaging.    COMPARISON:  None.    FINDINGS:  Intrauterine gestation(s): Single    Mean gestational sac diameter: 6.2 cm    Yolk sac: Present    Crown-rump length (CRL): 6.3 cm    Cardiac activity: 156 bpm    Subchorionic hemorrhage: 6    Right ovary: Normal.    Left ovary: Normal.    Miscellaneous: No Free Fluid.                                       X-Ray Chest AP Portable (Final result)  Result time 01/07/24 08:20:42      Final result  by Katja Madison MD (01/07/24 08:20:42)                   Impression:      No acute abnormality.      Electronically signed by: Katja Madison MD  Date:    01/07/2024  Time:    08:20               Narrative:    EXAMINATION:  XR CHEST AP PORTABLE    CLINICAL HISTORY:  Hematemesis    TECHNIQUE:  Single frontal view of the chest was performed.    COMPARISON:  None    FINDINGS:  The lungs are clear with normal appearance of pulmonary vasculature. No pleural effusion. No evident pneumothorax.    The cardiac silhouette is normal in size. The hilar and mediastinal contours are unremarkable.    Bones are intact.                                      Assessment/Plan:     * Hyperemesis gravidarum  Presents with intractable nausea and vomiting. Has attempted treatment outpatient with PO medications and has been followed by OBGYN for similar symptoms. Evaluated by GI in the ED  NPO  Started on IVF  Continue protonix given GERD symptoms  PRN zofran    Intrauterine pregnancy  Followed by OBGYN outpatient. US today with 12 week and 6 intrauterine pregnancy. Continue outpatient OBGYN follow up  LFTs mildly elevated but hemoglobin and PLTs within normal limits do not suspect HELLP  UA with many bacteria without urinary symptoms. As pregnant will start rocephin with urine culture pending.    BMI 40.0-44.9, adult  Body mass index is 43.36 kg/m². Morbid obesity complicates all aspects of disease management from diagnostic modalities to treatment. Weight loss encouraged and health benefits explained to patient.      VTE Risk Mitigation (From admission, onward)           Ordered     IP VTE HIGH RISK PATIENT  Once         01/07/24 1238     Place sequential compression device  Until discontinued         01/07/24 1238     Place KVNG hose  Until discontinued         01/07/24 1238                     Discussed with ED physician.    VTE: KVNG/SCD  Code: Full  Diet: NPO  Dispo: pending GI recommendations and tolerating PO      On 01/07/2024,  patient should be placed in hospital observation services under my care in collaboration with Merrill Rowley MD.      AdmissionCare    Guideline: Gastrointestinal Bleeding (Upper) - OBS, Observation    Based on the indications selected for the patient, the bed status of Admit to Observation was determined to be MET    The following indications were selected as present at the time of evaluation of the patient:      - Active bleeding suspected    AdmissionCare documentation entered by: Lg Clark    [facility] [employer], 27th edition, Copyright © 2023 [facility] [employer] All Rights Reserved.  [id number]:00    Josh Grace PA-C  Department of Hospital Medicine  [facility] - Emergency Dept

## 2024-01-07 NOTE — ASSESSMENT & PLAN NOTE
Presents with intractable nausea and vomiting. Has attempted treatment outpatient with PO medications and has been followed by OBGYN for similar symptoms. Evaluated by GI in the ED  NPO  Started on IVF  Continue protonix given GERD symptoms  PRN zofran

## 2024-01-07 NOTE — ED PROVIDER NOTES
Encounter Date: 2024       History     Chief Complaint   Patient presents with    Nausea    Vomiting     Pt c/o N/V x 13 weeks. LMP [id number]. Pt states she has been vomiting since she became pregnant. Pt's OBGYN is aware of situation. Pt has also been to ED for same. No other complaints. ABCs intact, NAD. VSS.      33-year-old female approximately 13 weeks gravid A2 presents to ED for 10 week history of nausea, vomiting.  Patient reports 5 episodes of emesis today.  She reports intermittent streaks of bright red blood in her emesis.  She is also complaining of generalized abdominal cramping that started today.  Her OBGYN is Dr. Gutierres.  She had a confirmed IUP via ultrasound at 5 weeks gravid.  Her next appointment with OBGYN is next month.  She denies any attempted treatment.  She denies any vaginal bleeding, vaginal discharge, diarrhea, hematuria, dysuria, fever, chills, chest pain, shortness of breath.  No other symptoms reported.    The history is provided by the patient. No  was used.     Review of patient's allergies indicates:   Allergen Reactions    Fluoride Swelling     Oral swelling    Unclassified drug Anaphylaxis     toothpaste    Grass pollen- grass standard     Msg [glutamic acid hcl]      Past Medical History:   Diagnosis Date    Alopecia     Hay fever     Oral herpes     Suppurative hidradenitis     18 YEARS OLD     Past Surgical History:   Procedure Laterality Date     SECTION       SECTION WITH TUBAL LIGATION N/A 2022     section only. She did NOT Have tubal ligation.    DILATION AND CURETTAGE OF UTERUS       Family History   Problem Relation Age of Onset    Breast cancer Maternal Grandmother     Ovarian cancer Neg Hx     Colon cancer Neg Hx      Social History     Tobacco Use    Smoking status: Never    Smokeless tobacco: Never   Substance Use Topics    Alcohol use: Yes     Comment: rare    Drug use: No     Review of Systems    Constitutional:  Negative for chills and fever.   HENT:  Negative for congestion, ear pain, rhinorrhea and sore throat.    Eyes:  Negative for redness.   Respiratory:  Negative for cough and shortness of breath.    Cardiovascular:  Negative for chest pain.   Gastrointestinal:  Positive for abdominal pain, nausea and vomiting. Negative for diarrhea.        (+) hematemesis   Genitourinary:  Negative for decreased urine volume, difficulty urinating, dysuria, frequency, hematuria, pelvic pain, urgency, vaginal bleeding, vaginal discharge and vaginal pain.   Musculoskeletal:  Negative for back pain and neck pain.   Skin:  Negative for rash.   Neurological:  Negative for headaches.   Psychiatric/Behavioral:  Negative for confusion.        Physical Exam     Initial Vitals [01/07/24 0600]   BP Pulse Resp Temp SpO2   136/77 99 18 98.1 °F (36.7 °C) 100 %      MAP       --         Physical Exam    Nursing note and vitals reviewed.  Constitutional: She appears well-developed and well-nourished.  Non-toxic appearance. She does not appear ill.   HENT:   Head: Normocephalic and atraumatic.   Right Ear: Hearing, tympanic membrane, external ear and ear canal normal. Tympanic membrane is not perforated, not erythematous and not bulging.   Left Ear: Hearing, tympanic membrane, external ear and ear canal normal. Tympanic membrane is not perforated, not erythematous and not bulging.   Nose: Nose normal.   Mouth/Throat: Uvula is midline, oropharynx is clear and moist and mucous membranes are normal.   Eyes: Conjunctivae and EOM are normal.   Neck: Neck supple.   Normal range of motion.   Full passive range of motion without pain.     Cardiovascular:  Normal rate and regular rhythm.           Pulses:       Radial pulses are 2+ on the right side and 2+ on the left side.   Pulmonary/Chest: Effort normal and breath sounds normal. No accessory muscle usage. No respiratory distress. She has no decreased breath sounds.   Abdominal: Abdomen is  soft. Bowel sounds are normal. She exhibits no distension. There is no abdominal tenderness.   No right CVA tenderness.  No left CVA tenderness. There is no rebound and no guarding.   Musculoskeletal:         General: Normal range of motion.      Cervical back: Full passive range of motion without pain, normal range of motion and neck supple. No rigidity.     Neurological: She is alert. No cranial nerve deficit.   Neuro intact.  Strength and sensation intact bilateral upper and lower extremities.   Skin: Skin is warm and dry.   Psychiatric: She has a normal mood and affect.         ED Course   Procedures  Labs Reviewed   URINALYSIS, REFLEX TO URINE CULTURE - Abnormal; Notable for the following components:       Result Value    Appearance, UA Hazy (*)     Protein, UA Trace (*)     Ketones, UA 2+ (*)     Occult Blood UA Trace (*)     Leukocytes, UA 1+ (*)     All other components within normal limits    Narrative:     Specimen Source->Urine   CBC W/ AUTO DIFFERENTIAL - Abnormal; Notable for the following components:    Hematocrit 36.9 (*)     All other components within normal limits    Narrative:     Release to patient->Immediate   COMPREHENSIVE METABOLIC PANEL - Abnormal; Notable for the following components:    CO2 19 (*)     BUN 3 (*)     AST 91 (*)      (*)     All other components within normal limits    Narrative:     Release to patient->Immediate   URINALYSIS MICROSCOPIC - Abnormal; Notable for the following components:    WBC, UA 7 (*)     Bacteria Many (*)     All other components within normal limits    Narrative:     Specimen Source->Urine   POCT URINE PREGNANCY - Abnormal; Notable for the following components:    POC Preg Test, Ur Positive (*)     All other components within normal limits   CULTURE, URINE   CULTURE, URINE   HCG, QUANTITATIVE    Narrative:     Release to patient->Immediate   APTT    Narrative:     Release to patient->Immediate   PROTIME-INR    Narrative:     Release to  patient->Immediate   GROUP & RH   RH TYPING          Imaging Results              US Abdomen Limited (In process)                      US OB <14 Wks, TransAbd, Single Gestation (Final result)  Result time 01/07/24 09:09:10      Final result by Westley Thompson Jr., MD (01/07/24 09:09:10)                   Impression:      Single live intrauterine pregnancy with estimated gestational age 12 weeks 6 days. and an JULIA of is 07/15/2024.      Electronically signed by: Westley Vázquez Jr  Date:    01/07/2024  Time:    09:09               Narrative:    EXAMINATION:  US OB <14 WEEKS TRANSABDOM, SINGLE GESTATION    CLINICAL HISTORY:  Other specified pregnancy related conditions, unspecified trimester    TECHNIQUE:  Transabdominal sonography of the pelvis was performed.  Patient refused transvaginal imaging.    COMPARISON:  None.    FINDINGS:  Intrauterine gestation(s): Single    Mean gestational sac diameter: 6.2 cm    Yolk sac: Present    Crown-rump length (CRL): 6.3 cm    Cardiac activity: 156 bpm    Subchorionic hemorrhage: 6    Right ovary: Normal.    Left ovary: Normal.    Miscellaneous: No Free Fluid.                                       X-Ray Chest AP Portable (Final result)  Result time 01/07/24 08:20:42      Final result by Katja Madison MD (01/07/24 08:20:42)                   Impression:      No acute abnormality.      Electronically signed by: Katja Madison MD  Date:    01/07/2024  Time:    08:20               Narrative:    EXAMINATION:  XR CHEST AP PORTABLE    CLINICAL HISTORY:  Hematemesis    TECHNIQUE:  Single frontal view of the chest was performed.    COMPARISON:  None    FINDINGS:  The lungs are clear with normal appearance of pulmonary vasculature. No pleural effusion. No evident pneumothorax.    The cardiac silhouette is normal in size. The hilar and mediastinal contours are unremarkable.    Bones are intact.                                       Medications   melatonin tablet 6 mg (has no  administration in time range)   senna-docusate 8.6-50 mg per tablet 1 tablet (has no administration in time range)   acetaminophen tablet 650 mg (has no administration in time range)   naloxone 0.4 mg/mL injection 0.02 mg (has no administration in time range)   magnesium oxide tablet 800 mg (has no administration in time range)   magnesium oxide tablet 800 mg (has no administration in time range)   glucose chewable tablet 16 g (has no administration in time range)   glucose chewable tablet 24 g (has no administration in time range)   glucagon (human recombinant) injection 1 mg (has no administration in time range)   0.9%  NaCl infusion (has no administration in time range)   sodium chloride 0.9% flush 10 mL (has no administration in time range)   pantoprazole injection 40 mg (has no administration in time range)   ondansetron injection 4 mg (has no administration in time range)   cefTRIAXone (ROCEPHIN) 1 g in dextrose 5 % in water (D5W) 100 mL IVPB (MB+) (has no administration in time range)   sodium chloride 0.9% bolus 1,000 mL 1,000 mL (0 mLs Intravenous Stopped 24 0852)   acetaminophen tablet 500 mg (500 mg Oral Given 24 0858)   ondansetron injection 4 mg (4 mg Intravenous Given 24 0804)   prochlorperazine injection Soln 10 mg (10 mg Intravenous Given 24 1136)   diphenhydrAMINE injection 25 mg (25 mg Intravenous Given 24 1136)   pantoprazole injection 40 mg (40 mg Intravenous Given 24 1136)     Medical Decision Making  This is a 33-year-old female approximately 13 weeks gravid A2 presents to ED for 10 week history of nausea, vomiting.  Patient reports 5 episodes of emesis today.  She reports intermittent streaks of bright red blood in her emesis.  She is also complaining of generalized abdominal cramping that started today.  On physical exam, patient is well-appearing and in no acute distress.  Nontoxic appearing.  Lungs are clear to auscultation bilaterally.  Abdomen is soft and  nontender.  No guarding, rigidity, rebound.  2+ radial pulses bilaterally.  Posterior oropharynx is not erythematous.  No edema or exudate.  Uvula midline.  Bilateral tympanic membrane is normal.  No erythema, bulging, or perforations.  Neuro intact.  Strength and sensation intact bilateral upper and lower extremities.  No CVA tenderness bilaterally.  Fluids, Zofran, Tylenol ordered.  Will reassess.  CBC without evidence of any leukocytosis or anemia.  UPT positive.  UA revealed 1+ leukocytes, 4 red blood cells, 7 white blood cells, many bacteria, 32 squamous epithelial cells.  Urine culture pending.  Will discharge patient on Keflex given pregnancy.  CMP revealed CO2 19, BUN 3, AST 91, .  Otherwise no other electrolyte abnormality.  Quantitative hCG is [id number].  PTT and PT INR unremarkable.  Patient is B-positive.  Ultrasound revealed   Single live intrauterine pregnancy with estimated gestational age 12 weeks 6 days. and an JULIA of is 07/15/2024.  Chest x-ray revealed no acute cardiopulmonary processes.  No evidence of any pleural effusions, focal consolidations, pneumothorax.  Upon reassessment, patient reports relief to her symptoms.  She is able to tolerate p.o. challenge.    Case discussed with GI, Dr. Gutierres, at 9:23 a.m..  He will see the patient in the ER.  Inpatient consult ordered.    Dr. Gutierres advised acid reflux controlled with 40 mg pantoprazole and trying Compazine and Benadryl.  He advised admitting the patient for observation given hyperemesis gravidarum.  He also advised getting an ultrasound to rule out gallbladder etiology given elevated LFTs.    After review of the patient's physical exam, ED testing, and history/symptoms, the patient requires additional care in the hospital overnight. Dr. Rowley with hospital medicine will accept the patient and any labs, imaging, or procedures were discussed. The diagnosis, treatment and plan were discussed with the patient. All questions or concerns have been  addressed.       Amount and/or Complexity of Data Reviewed  Labs: ordered.  Radiology: ordered.    Risk  OTC drugs.  Prescription drug management.                                      Clinical Impression:  Final diagnoses:  [O26.899, R10.9] Abdominal cramping affecting pregnancy  [K92.0] Hematemesis  [O21.0] Hyperemesis gravidarum (Primary)  [R11.2] Nausea and vomiting, unspecified vomiting type  [R07.9] Chest pain          ED Disposition Condition    Observation                 Elgin Calderon PA-C  01/07/24 7057

## 2024-01-07 NOTE — ASSESSMENT & PLAN NOTE
Followed by OBGYN outpatient. US today with 12 week and 6 intrauterine pregnancy. Continue outpatient OBGYN follow up  LFTs mildly elevated but hemoglobin and PLTs within normal limits do not suspect HELLP  UA with many bacteria without urinary symptoms. As pregnant will start rocephin with urine culture pending.

## 2024-01-07 NOTE — ASSESSMENT & PLAN NOTE
Body mass index is 43.36 kg/m². Morbid obesity complicates all aspects of disease management from diagnostic modalities to treatment. Weight loss encouraged and health benefits explained to patient.

## 2024-01-07 NOTE — ADMISSIONCARE
AdmissionCare    Guideline: Gastrointestinal Bleeding (Upper) - OBS, Observation    Based on the indications selected for the patient, the bed status of Admit to Observation was determined to be MET    The following indications were selected as present at the time of evaluation of the patient:      - Active bleeding suspected    AdmissionCare documentation entered by: Lg Clark    [facility] [employer], 27th edition, Copyright © 2023 [facility] [employer], [facility] All Rights Reserved.  [id number]:21:55-06:00

## 2024-01-07 NOTE — HPI
Brock Alfaro 33 y.o. female  the hospital chief complaint of nausea and vomiting.  She has been seen by her OBGYN and seen in the emergency room 3 times in the past for similar symptoms.  She has tried B6, Unisom, Reglan, Zofran, Phenergen as well as what sound like a scopolamine patch that she got off of [employer].  At her most recent OBGYN appointment she was prescribed scopolamine patches without improvement.  Today she noticed her emesis head bright red streaks of blood causing presentation in the emergency room.  She denies any blood thinners or NSAID use.  She finds her nausea is improved with treatment in the emergency room.  She denies fever chest pain diarrhea leg swelling melena hematuria abdominal pain and shortness of breath.     In the ED, UPT positive hCG 199,000 bilirubin within normal limits AST ALT 18587 intrauterine ultrasound with live intrauterine pregnancy chest x-ray without acute abnormality seen by GI recommending observation.

## 2024-01-08 VITALS
BODY MASS INDEX: 43.19 KG/M2 | TEMPERATURE: 98 F | HEART RATE: 84 BPM | HEIGHT: 60 IN | WEIGHT: 220 LBS | DIASTOLIC BLOOD PRESSURE: 55 MMHG | SYSTOLIC BLOOD PRESSURE: 110 MMHG | OXYGEN SATURATION: 97 % | RESPIRATION RATE: 18 BRPM

## 2024-01-08 LAB
ALBUMIN SERPL BCP-MCNC: 2.9 G/DL (ref 3.5–5.2)
ALP SERPL-CCNC: 63 U/L (ref 55–135)
ALT SERPL W/O P-5'-P-CCNC: 148 U/L (ref 10–44)
ANION GAP SERPL CALC-SCNC: 9 MMOL/L (ref 8–16)
AST SERPL-CCNC: 73 U/L (ref 10–40)
BASOPHILS # BLD AUTO: 0.01 K/UL (ref 0–0.2)
BASOPHILS NFR BLD: 0.2 % (ref 0–1.9)
BILIRUB SERPL-MCNC: 0.6 MG/DL (ref 0.1–1)
BUN SERPL-MCNC: 2 MG/DL (ref 6–20)
CALCIUM SERPL-MCNC: 8.4 MG/DL (ref 8.7–10.5)
CHLORIDE SERPL-SCNC: 110 MMOL/L (ref 95–110)
CO2 SERPL-SCNC: 19 MMOL/L (ref 23–29)
CREAT SERPL-MCNC: 0.6 MG/DL (ref 0.5–1.4)
DIFFERENTIAL METHOD BLD: ABNORMAL
EOSINOPHIL # BLD AUTO: 0.1 K/UL (ref 0–0.5)
EOSINOPHIL NFR BLD: 1.1 % (ref 0–8)
ERYTHROCYTE [DISTWIDTH] IN BLOOD BY AUTOMATED COUNT: 14.1 % (ref 11.5–14.5)
EST. GFR  (NO RACE VARIABLE): >60 ML/MIN/1.73 M^2
GLUCOSE SERPL-MCNC: 75 MG/DL (ref 70–110)
HCT VFR BLD AUTO: 32.7 % (ref 37–48.5)
HGB BLD-MCNC: 10.3 G/DL (ref 12–16)
IMM GRANULOCYTES # BLD AUTO: 0.02 K/UL (ref 0–0.04)
IMM GRANULOCYTES NFR BLD AUTO: 0.4 % (ref 0–0.5)
LYMPHOCYTES # BLD AUTO: 1.2 K/UL (ref 1–4.8)
LYMPHOCYTES NFR BLD: 21.8 % (ref 18–48)
MCH RBC QN AUTO: 27.5 PG (ref 27–31)
MCHC RBC AUTO-ENTMCNC: 31.5 G/DL (ref 32–36)
MCV RBC AUTO: 87 FL (ref 82–98)
MONOCYTES # BLD AUTO: 0.4 K/UL (ref 0.3–1)
MONOCYTES NFR BLD: 6.9 % (ref 4–15)
NEUTROPHILS # BLD AUTO: 3.7 K/UL (ref 1.8–7.7)
NEUTROPHILS NFR BLD: 69.6 % (ref 38–73)
NRBC BLD-RTO: 0 /100 WBC
PLATELET # BLD AUTO: 234 K/UL (ref 150–450)
PMV BLD AUTO: 9.4 FL (ref 9.2–12.9)
POTASSIUM SERPL-SCNC: 3.6 MMOL/L (ref 3.5–5.1)
PROT SERPL-MCNC: 6.1 G/DL (ref 6–8.4)
RBC # BLD AUTO: 3.74 M/UL (ref 4–5.4)
SODIUM SERPL-SCNC: 138 MMOL/L (ref 136–145)
WBC # BLD AUTO: 5.37 K/UL (ref 3.9–12.7)

## 2024-01-08 PROCEDURE — 36415 COLL VENOUS BLD VENIPUNCTURE: CPT | Performed by: PHYSICIAN ASSISTANT

## 2024-01-08 PROCEDURE — 80053 COMPREHEN METABOLIC PANEL: CPT | Performed by: PHYSICIAN ASSISTANT

## 2024-01-08 PROCEDURE — 25000003 PHARM REV CODE 250: Performed by: PHYSICIAN ASSISTANT

## 2024-01-08 PROCEDURE — C9113 INJ PANTOPRAZOLE SODIUM, VIA: HCPCS | Performed by: PHYSICIAN ASSISTANT

## 2024-01-08 PROCEDURE — 96375 TX/PRO/DX INJ NEW DRUG ADDON: CPT

## 2024-01-08 PROCEDURE — 63600175 PHARM REV CODE 636 W HCPCS: Performed by: NURSE PRACTITIONER

## 2024-01-08 PROCEDURE — 96361 HYDRATE IV INFUSION ADD-ON: CPT

## 2024-01-08 PROCEDURE — 99214 OFFICE O/P EST MOD 30 MIN: CPT | Mod: ,,, | Performed by: NURSE PRACTITIONER

## 2024-01-08 PROCEDURE — G0378 HOSPITAL OBSERVATION PER HR: HCPCS

## 2024-01-08 PROCEDURE — 85025 COMPLETE CBC W/AUTO DIFF WBC: CPT | Performed by: PHYSICIAN ASSISTANT

## 2024-01-08 PROCEDURE — 63600175 PHARM REV CODE 636 W HCPCS: Performed by: PHYSICIAN ASSISTANT

## 2024-01-08 PROCEDURE — 96374 THER/PROPH/DIAG INJ IV PUSH: CPT | Mod: 59

## 2024-01-08 RX ORDER — FAMOTIDINE 40 MG/1
40 TABLET, FILM COATED ORAL NIGHTLY
Qty: 30 TABLET | Refills: 4 | Status: SHIPPED | OUTPATIENT
Start: 2024-01-08 | End: 2025-01-07

## 2024-01-08 RX ORDER — ONDANSETRON 4 MG/1
4 TABLET, ORALLY DISINTEGRATING ORAL EVERY 6 HOURS PRN
Qty: 30 TABLET | Refills: 0 | Status: SHIPPED | OUTPATIENT
Start: 2024-01-08 | End: 2024-01-08 | Stop reason: HOSPADM

## 2024-01-08 RX ORDER — PROCHLORPERAZINE EDISYLATE 5 MG/ML
5 INJECTION INTRAMUSCULAR; INTRAVENOUS ONCE
Status: COMPLETED | OUTPATIENT
Start: 2024-01-08 | End: 2024-01-08

## 2024-01-08 RX ORDER — OMEPRAZOLE 40 MG/1
40 CAPSULE, DELAYED RELEASE ORAL
Qty: 60 CAPSULE | Refills: 11 | Status: SHIPPED | OUTPATIENT
Start: 2024-01-08 | End: 2025-01-07

## 2024-01-08 RX ORDER — ASPIRIN 81 MG/1
81 TABLET ORAL DAILY
Qty: 90 TABLET | Refills: 0
Start: 2024-01-08 | End: 2025-01-07

## 2024-01-08 RX ORDER — CALCIUM CARBONATE 200(500)MG
1 TABLET,CHEWABLE ORAL EVERY 6 HOURS PRN
Qty: 30 TABLET | Refills: 4 | Status: SHIPPED | OUTPATIENT
Start: 2024-01-08 | End: 2024-02-07

## 2024-01-08 RX ORDER — ONDANSETRON 4 MG/1
4 TABLET, ORALLY DISINTEGRATING ORAL EVERY 6 HOURS PRN
Qty: 30 TABLET | Refills: 0 | Status: SHIPPED | OUTPATIENT
Start: 2024-01-08 | End: 2024-02-21

## 2024-01-08 RX ORDER — CEPHALEXIN 500 MG/1
500 CAPSULE ORAL EVERY 8 HOURS
Qty: 30 CAPSULE | Refills: 0 | Status: SHIPPED | OUTPATIENT
Start: 2024-01-08 | End: 2024-02-07

## 2024-01-08 RX ORDER — ASPIRIN 81 MG/1
81 TABLET ORAL DAILY
Qty: 90 TABLET | Refills: 0 | Status: SHIPPED | OUTPATIENT
Start: 2024-01-08 | End: 2024-01-08

## 2024-01-08 RX ADMIN — PROCHLORPERAZINE EDISYLATE 5 MG: 5 INJECTION INTRAMUSCULAR; INTRAVENOUS at 01:01

## 2024-01-08 RX ADMIN — SODIUM CHLORIDE: 9 INJECTION, SOLUTION INTRAVENOUS at 05:01

## 2024-01-08 RX ADMIN — PANTOPRAZOLE SODIUM 40 MG: 40 INJECTION, POWDER, FOR SOLUTION INTRAVENOUS at 08:01

## 2024-01-08 RX ADMIN — ONDANSETRON 4 MG: 2 INJECTION INTRAMUSCULAR; INTRAVENOUS at 11:01

## 2024-01-08 NOTE — PROGRESS NOTES
Gastroenterology Progress Note  [facility]    Requesting service:  Emergency medicine  Reason for Consult:  Nausea, vomiting and hematemesis      HPI:  Brock Alfaro is a 33 y.o. woman who presented to the hospital with intractable nausea, vomiting, and several episodes of bloody streaks in her emesis.  She is 13 weeks pregnant and has been having difficulties with nausea and vomiting since the beginning of her pregnancy in October.  She has been followed by obstetrics reporting hyperemesis gravidarum difficult to manage with p.o. medicines.  During multiple episodes of vomiting, she saw red streaks on a few occasions.  This has happened before as well.  She reports significantly worsened acid reflux with heartburn since the beginning of pregnancy.  She is unable to eat much.  She has been eating a brat diet, but still having nausea and vomiting.  She has an appetite, and wants to eat, but can not keep food down.  She feels dehydrated at times.  She is lost some weight since the beginning of pregnancy.    She was seen outpatient by [facility] at [facility], last seen 12/26/2023.  This was for reflux and preop evaluation for gastric bypass.  Omeprazole was added to her regimen, with recommendations for EGD once pregnancy is complete.  She picked up the omeprazole a few days ago and has been taking it for few days, once every morning.  She had also been taking Carafate regularly.  She has not found that these have helped yet.    She is tried multiple medications over the last few months for nausea.  She has sublingual Zofran 8 mg tablets at home which has not been effective.  She has also been taking Phenergan which helps for maybe 30 minutes.  She alternates the Zofran with Phenergan every couple of hours.  Early in the pregnancy she was taking Reglan 4 times daily, but can not recall the exact dose.  This also was not helping.  She was given a scopolamine patch a few days ago,  but this caused severe dizziness and she felt drugged prompting her to stop the medication.  It did not help the nausea.  She also tried B6 without improvement.    Interval hx  No n/v since yesterday, requesting diet, in good spirits      Review of Systems:  Constitutional: no fever, chills, as per HPI  Eyes: no visual changes ; no icterus  Cardiovascular: no palpitations   Gastrointestinal: as per HPI        Past Medical History:   Diagnosis Date    Alopecia     Hay fever     Oral herpes     Suppurative hidradenitis     18 YEARS OLD       Past Surgical History:   Procedure Laterality Date     SECTION       SECTION WITH TUBAL LIGATION N/A 2022     section only. She did NOT Have tubal ligation.    DILATION AND CURETTAGE OF UTERUS         Family History   Problem Relation Age of Onset    Breast cancer Maternal Grandmother     Ovarian cancer Neg Hx     Colon cancer Neg Hx        Review of patient's allergies indicates:   Allergen Reactions    Fluoride Swelling     Oral swelling    Unclassified drug Anaphylaxis     toothpaste    Grass pollen- grass standard     Msg [glutamic acid hcl]        Social History     Socioeconomic History    Marital status: Single   Tobacco Use    Smoking status: Never    Smokeless tobacco: Never   Substance and Sexual Activity    Alcohol use: Yes     Comment: rare    Drug use: No    Sexual activity: Yes     Partners: Male     Birth control/protection: None   Social History Narrative    Together for [employer]    He works for [employer]    She works for a non-profit organization.       Medications:       cefTRIAXone (ROCEPHIN) IVPB  1 g Intravenous Q24H    pantoprazole  40 mg Intravenous BID       Physical exam:  BP (!) 110/55 (Patient Position: Lying)   Pulse 84   Temp 98.4 °F (36.9 °C) (Oral)   Resp 18   Ht 5' (1.524 m)   Wt 99.8 kg (220 lb 0.3 oz)   LMP 10/04/2023 (Approximate)   SpO2 97%   Breastfeeding No   BMI 42.97 kg/m²      Gen: Well developed, well nourished, obese; pleasant; NAD  HEENT: normocephalic; sclera non-icteric; PERRL  Chest: non-labored breathing; symmetrical expansion  Abd: non-distended  Pscyh: appropriate mood, congruent affect  Neuro: alert, oriented x 3; no focal deficits noted      Recent Labs   Lab 01/08/24  0501   WBC 5.37   RBC 3.74*   HGB 10.3*   HCT 32.7*      MCV 87   MCH 27.5   MCHC 31.5*     Recent Labs   Lab 01/08/24  0501   CALCIUM 8.4*   ALBUMIN 2.9*   PROT 6.1      K 3.6   CO2 19*      BUN 2*   CREATININE 0.6   ALKPHOS 63   *   AST 73*   BILITOT 0.6     Recent Labs   Lab 01/07/24  0750   INR 1.0   APTT 24.2                 Assessment:  Brock Alfaro is a 33 y.o. female in 13th week of pregnancy presenting to the hospital with intractable nausea and vomiting associated with a few episodes of small volume hematemesis.  She has hyperemesis gravidarum and is followed by obstetrics.  She also has significantly worsened acid reflux since beginning of pregnancy.  I have low concern for clinically significant upper GI bleed.  Suspect hematemesis secondary to either esophagitis, Twyla-Hartman, or gastric mucosal trauma during retching.  The overlying problem is continued nausea and vomiting from hyperemesis gravidarum and worsened acid reflux.    She just started omeprazole 40 mg a few days ago, and it may be too soon tell how well this will work.  She is taking it in the morning.  She is not using any other acid medications.  She is on Carafate, which may have limited benefit for acid reflux.    She is tried several over-the-counter medications as well as prescriptions for nausea vomiting.  Reportedly tried Reglan q.i.d. earlier in the pregnancy without benefit.  He is currently using Zofran sublingual tablet 8 mg alternating with 25 mg Phenergan, which is only providing short term relief of about 30 minutes.  She also tried B6.  Scopolamine patch prescribed recently caused  significant side effects and was not beneficial regarding nausea.    Also noted elevated AST and ALT with normal bilirubin and alkaline phosphatase.  These are likely secondary to pregnancy.  No evidence of liver dysfunction, and platelets normal.  Previous acute hepatitis panel negative.    Case discussed with emergency medicine provider.      Recs:  US negative for suspicion for biliary dx  Continue ppi bid and antiemetics as needed  Ok to d/c from GI standpoint      Will sign off    Ruth Burton NP  Gastroenterology  [facility]

## 2024-01-08 NOTE — HOSPITAL COURSE
33 y.o. female  the hospital chief complaint of nausea and vomiting.  She has been seen by her OBGYN and seen in the emergency room 3 times in the past for similar symptoms.  She has tried B6, Unisom, Reglan, Zofran, Phenergen as well as what sound like a scopolamine patch that she got off of [employer].  At her most recent OBGYN appointment she was prescribed scopolamine patches without improvement.  Today she noticed her emesis head bright red streaks of blood causing presentation in the emergency room. GI consulted. ultrasound of the right upper quadrant to rule out biliary disease showed Biliary sludge.  Aggressive management of acid reflux.  Increase omeprazole to 40 mg b.i.d., add Pepcid 40 mg q.h.s., and may use Tums as needed.  Can consider intravenous equivalents while hospitalized. Try adding Benadryl 25 mg every 8 hours and Compazine 10 mg q.6 hours.  May use IV forms of this as well while hospitalized. should continue ppi and have antiemetics prn. maybe a little something salty in the am upon rising like crackers/pretzels should help. ok to go from gi standpoint. Urinalysis abnormal was started on Rocephin inpatient. Urine culture pending but preliminary showed GRAM NEGATIVE LENNY, NON-LACTOSE   10,000 - 49,999 cfu/ml  Identification and susceptibility pending. Will discharge on Keflex for now 500 mg tid for 10 day until final culture result. Follow up outpatient with PCP and OB-GYN in 1 to 2 weeks.

## 2024-01-08 NOTE — DISCHARGE SUMMARY
[facility]  [facility] Medicine  Discharge Summary      Patient Name: Brock Alfaro  MRN: [id number]  FREDI: [id number]  Patient Class: OP- Observation  Admission Date: 2024  Hospital Length of Stay: 0 days  Discharge Date and Time:  2024 11:09 AM  Attending Physician: Merrill Rowley MD   Discharging Provider: Eboni Jimenez NP  Primary Care Provider: Latesha, Primary Doctor    Primary Care Team: Networked reference to record PCT     HPI:   Brock Alfaro 33 y.o. female  the hospital chief complaint of nausea and vomiting.  She has been seen by her OBGYN and seen in the emergency room 3 times in the past for similar symptoms.  She has tried B6, Unisom, Reglan, Zofran, Phenergen as well as what sound like a scopolamine patch that she got off of [employer].  At her most recent OBGYN appointment she was prescribed scopolamine patches without improvement.  Today she noticed her emesis head bright red streaks of blood causing presentation in the emergency room.  She denies any blood thinners or NSAID use.  She finds her nausea is improved with treatment in the emergency room.  She denies fever chest pain diarrhea leg swelling melena hematuria abdominal pain and shortness of breath.     In the ED, UPT positive hCG 199,000 bilirubin within normal limits AST ALT 02324 intrauterine ultrasound with live intrauterine pregnancy chest x-ray without acute abnormality seen by GI recommending observation.    * No surgery found *      Hospital Course:   33 y.o. female  the hospital chief complaint of nausea and vomiting.  She has been seen by her OBGYN and seen in the emergency room 3 times in the past for similar symptoms.  She has tried B6, Unisom, Reglan, Zofran, Phenergen as well as what sound like a scopolamine patch that she got off of [employer].  At her most recent OBGYN appointment she was prescribed scopolamine patches without improvement.  Today she noticed her emesis head bright red  streaks of blood causing presentation in the emergency room. GI consulted. ultrasound of the right upper quadrant to rule out biliary disease showed Biliary sludge.  Aggressive management of acid reflux.  Increase omeprazole to 40 mg b.i.d., add Pepcid 40 mg q.h.s., and may use Tums as needed.  Can consider intravenous equivalents while hospitalized. Try adding Benadryl 25 mg every 8 hours and Compazine 10 mg q.6 hours.  May use IV forms of this as well while hospitalized. should continue ppi and have antiemetics prn. maybe a little something salty in the am upon rising like crackers/pretzels should help. ok to go from gi standpoint. Urinalysis abnormal was started on Rocephin inpatient. Urine culture pending but preliminary showed GRAM NEGATIVE LENNY, NON-LACTOSE   10,000 - 49,999 cfu/ml  Identification and susceptibility pending. Will discharge on Keflex for now 500 mg tid for 10 day until final culture result. Follow up outpatient with PCP and OB-GYN in 1 to 2 weeks.      Goals of Care Treatment Preferences:  Code Status: Full Code      Consults:   Consults (From admission, onward)          Status Ordering Provider     Case Management/  Once        Provider:  (Not yet assigned)    GERTRUDE Puente     Inpatient consult to Gastroenterology  Once        Provider:  Brian Gutierres MD    Completed BERNABE MORATAYA            No new Assessment & Plan notes have been filed under this hospital service since the last note was generated.  Service: Hospital Medicine    Final Active Diagnoses:    Diagnosis Date Noted POA    PRINCIPAL PROBLEM:  Hyperemesis gravidarum [O21.0] 01/07/2024 Unknown    Intrauterine pregnancy [Z34.90] 04/16/2014 Not Applicable    BMI 40.0-44.9, adult [Z68.41] 08/26/2013 Not Applicable      Problems Resolved During this Admission:       Discharged Condition: stable    Disposition: Home or Self Care    Follow Up:   Follow-up Information       No, Primary Doctor  "Follow up.                           Patient Instructions:      Diet Adult Regular   Order Comments: a little something salty in the am upon rising like crackers/pretzels should help   Scheduling Instructions: Advance as tolerated     Activity as tolerated       Significant Diagnostic Studies: Labs: BMP:   Recent Labs   Lab 01/07/24  0750 01/08/24  0501   GLU 91 75    138   K 3.8 3.6    110   CO2 19* 19*   BUN 3* 2*   CREATININE 0.7 0.6   CALCIUM 9.7 8.4*   , CMP   Recent Labs   Lab 01/07/24  0750 01/08/24  0501    138   K 3.8 3.6    110   CO2 19* 19*   GLU 91 75   BUN 3* 2*   CREATININE 0.7 0.6   CALCIUM 9.7 8.4*   PROT 7.8 6.1   ALBUMIN 3.6 2.9*   BILITOT 0.6 0.6   ALKPHOS 81 63   AST 91* 73*   * 148*   ANIONGAP 12 9   , CBC   Recent Labs   Lab 01/07/24  0750 01/08/24  0501   WBC 6.55 5.37   HGB 12.2 10.3*   HCT 36.9* 32.7*    234   , INR   Lab Results   Component Value Date    INR 1.0 01/07/2024    INR 1.1 11/11/2019   , Lipid Panel   Lab Results   Component Value Date    CHOL 179 09/30/2019    HDL 50 09/30/2019    LDLCALC 120.2 09/30/2019    TRIG 44 09/30/2019    CHOLHDL 27.9 09/30/2019   , Troponin No results for input(s): "TROPONINI" in the last 168 hours., A1C:   Recent Labs   Lab 01/03/24  1532   HGBA1C 5.1   , and All labs within the past 24 hours have been reviewed    Pending Diagnostic Studies:       None           Medications:  Reconciled Home Medications:      Medication List        START taking these medications      calcium carbonate 200 mg calcium (500 mg) chewable tablet  Commonly known as: TUMS  Take 1 tablet (500 mg total) by mouth every 6 (six) hours as needed for Heartburn (nausea and vomiting).     cephALEXin 500 MG capsule  Commonly known as: KEFLEX  Take 1 capsule (500 mg total) by mouth every 8 (eight) hours.     famotidine 40 MG tablet  Commonly known as: PEPCID  Take 1 tablet (40 mg total) by mouth every evening.            CHANGE how you take these " medications      omeprazole 40 MG capsule  Commonly known as: PRILOSEC  Take 1 capsule (40 mg total) by mouth 2 (two) times daily before meals.  What changed: when to take this     ondansetron 4 MG Tbdl  Commonly known as: ZOFRAN-ODT  Take 1 tablet (4 mg total) by mouth every 6 (six) hours as needed (nausea and vomiting).  What changed: reasons to take this            CONTINUE taking these medications      acetaminophen 500 MG tablet  Commonly known as: TYLENOL  Take 1 tablet (500 mg total) by mouth every 4 (four) hours as needed (Fever).     scopolamine 1.3-1.5 mg (1 mg over 3 days)  Commonly known as: TRANSDERM-SCOP  Place 1 patch onto the skin every 72 hours.     * valACYclovir 500 MG tablet  Commonly known as: VALTREX  Take 2 tablets (1,000 mg total) by mouth once daily.     * valACYclovir 500 MG tablet  Commonly known as: VALTREX  Take 2 tablets (1,000 mg total) by mouth once daily.           * This list has 2 medication(s) that are the same as other medications prescribed for you. Read the directions carefully, and ask your doctor or other care provider to review them with you.                STOP taking these medications      aspirin 81 MG EC tablet  Commonly known as: ECOTRIN              Indwelling Lines/Drains at time of discharge:   Lines/Drains/Airways       None                   Time spent on the discharge of patient: 70 minutes         Eboni Jimenez NP  Department of Hospital Medicine  [facility]

## 2024-01-08 NOTE — PROGRESS NOTES
Patient discharged per order. VS stable with no signs of distress. Discharge instructions reviewed with patient. Reviewed urgent warning signs and instructed to go to ER or call physician if symptoms occur.  Instructed on follow-up appointment with physician. Patient voiced understanding of all.

## 2024-01-09 ENCOUNTER — ROUTINE PRENATAL (OUTPATIENT)
Dept: OBSTETRICS AND GYNECOLOGY | Facility: CLINIC | Age: 34
End: 2024-01-09
Payer: MEDICAID

## 2024-01-09 VITALS
WEIGHT: 222.69 LBS | SYSTOLIC BLOOD PRESSURE: 120 MMHG | BODY MASS INDEX: 43.49 KG/M2 | DIASTOLIC BLOOD PRESSURE: 70 MMHG

## 2024-01-09 DIAGNOSIS — Z3A.13 13 WEEKS GESTATION OF PREGNANCY: Primary | ICD-10-CM

## 2024-01-09 DIAGNOSIS — O34.219 PREVIOUS CESAREAN SECTION COMPLICATING PREGNANCY, ANTEPARTUM CONDITION OR COMPLICATION: ICD-10-CM

## 2024-01-09 DIAGNOSIS — Z34.91 FIRST TRIMESTER PREGNANCY: ICD-10-CM

## 2024-01-09 DIAGNOSIS — O21.0 HYPEREMESIS GRAVIDARUM: ICD-10-CM

## 2024-01-09 LAB — BACTERIA UR CULT: ABNORMAL

## 2024-01-09 PROCEDURE — 99204 OFFICE O/P NEW MOD 45 MIN: CPT | Mod: TH,S$PBB,, | Performed by: OBSTETRICS & GYNECOLOGY

## 2024-01-09 PROCEDURE — 99213 OFFICE O/P EST LOW 20 MIN: CPT | Mod: PBBFAC,TH | Performed by: OBSTETRICS & GYNECOLOGY

## 2024-01-09 PROCEDURE — 99999 PR PBB SHADOW E&M-EST. PATIENT-LVL III: CPT | Mod: PBBFAC,,, | Performed by: OBSTETRICS & GYNECOLOGY

## 2024-01-09 NOTE — PROGRESS NOTES
13w1d  Patient comes in today with her  to begin care with us  Had troubles getting on our schedule  Seen our [facility]  Ultrasound done    Still with nausea.  On Zofran    Past Medical History:   Diagnosis Date    Alopecia     Hay fever     Oral herpes     Suppurative hidradenitis     18 YEARS OLD       Past Surgical History:   Procedure Laterality Date     SECTION       SECTION WITH TUBAL LIGATION N/A 2022     section only. She did NOT Have tubal ligation.    DILATION AND CURETTAGE OF UTERUS         Family History   Problem Relation Age of Onset    Breast cancer Maternal Grandmother     Ovarian cancer Neg Hx     Colon cancer Neg Hx        Social History     Socioeconomic History    Marital status: Single   Tobacco Use    Smoking status: Never    Smokeless tobacco: Never   Substance and Sexual Activity    Alcohol use: Yes     Comment: rare    Drug use: No    Sexual activity: Yes     Partners: Male     Birth control/protection: None   Social History Narrative    Together for [employer]    He works for [employer]    She works for a non-profit organization.       Current Outpatient Medications   Medication Sig Dispense Refill    acetaminophen (TYLENOL) 500 MG tablet Take 1 tablet (500 mg total) by mouth every 4 (four) hours as needed (Fever). 30 tablet 0    aspirin (ECOTRIN) 81 MG EC tablet Take 1 tablet (81 mg total) by mouth once daily. 90 tablet 0    calcium carbonate (TUMS) 200 mg calcium (500 mg) chewable tablet Take 1 tablet (500 mg total) by mouth every 6 (six) hours as needed for Heartburn (nausea and vomiting). 30 tablet 4    cephALEXin (KEFLEX) 500 MG capsule Take 1 capsule (500 mg total) by mouth every 8 (eight) hours. 30 capsule 0    famotidine (PEPCID) 40 MG tablet Take 1 tablet (40 mg total) by mouth every evening. 30 tablet 4    omeprazole (PRILOSEC) 40 MG capsule Take 1 capsule (40 mg total) by mouth 2 (two) times daily before meals. 60 capsule  11    ondansetron (ZOFRAN-ODT) 4 MG TbDL Take 1 tablet (4 mg total) by mouth every 6 (six) hours as needed (nausea and vomiting). 30 tablet 0    scopolamine (TRANSDERM-SCOP) 1.3-1.5 mg (1 mg over 3 days) Place 1 patch onto the skin every 72 hours. 10 patch 1    valACYclovir (VALTREX) 500 MG tablet Take 2 tablets (1,000 mg total) by mouth once daily. 30 tablet 1    valACYclovir (VALTREX) 500 MG tablet Take 2 tablets (1,000 mg total) by mouth once daily. 30 tablet 1     No current facility-administered medications for this visit.       Review of patient's allergies indicates:   Allergen Reactions    Fluoride Swelling     Oral swelling    Unclassified drug Anaphylaxis     toothpaste    Grass pollen-joey grass standard     Msg [glutamic acid hcl]        Review of Systems   Constitutional: Positive for fatigue. Negative for fever, chills, appetite change and unexpected weight change.   HENT: Positive for congestion. Negative for hearing loss, ear pain, sore throat, rhinorrhea, sneezing, mouth sores, neck pain, neck stiffness, voice change and postnasal drip.   Eyes: Negative for photophobia, itching and visual disturbance.   Respiratory: Negative for cough, chest tightness, shortness of breath and wheezing.   Cardiovascular: Negative for chest pain, palpitations and leg swelling.   Gastrointestinal: Positive for nausea, vomiting, abdominal pain and constipation. Negative for diarrhea, blood in stool and abdominal distention.   Genitourinary: Positive for vaginal discharge and pelvic pain. Negative for dysuria, urgency, frequency, hematuria, flank pain, decreased urine volume, vaginal bleeding, difficulty urinating, genital sores, vaginal pain, menstrual problem and dyspareunia.   Musculoskeletal: Positive for back pain. Negative for myalgias and joint swelling.   Skin: Negative for rash and wound.   Neurological: Positive for headaches. Negative for dizziness, tremors, syncope, speech difficulty, weakness,  light-headedness and numbness.   Hematological: Negative for adenopathy. Does not bruise/bleed easily.   Psychiatric/Behavioral: Negative for suicidal ideas, hallucinations, confusion, sleep disturbance, dysphoric mood, decreased concentration and agitation. The patient is not nervous/anxious and is not hyperactive.     Exam normal    A quick ultrasound performed.  Normal active male fetus.      Prenatal profile reviewed  Prenatal vitamins  Continue with Zofran  Ok to take omeprazole for heartburn  Start baby aspirin  Encourage Connected MOM  Discussed repeat  section.  Patient would like tubal ligation  Discussed PIH and BP monitoring  Back in 4 weeks.  Sooner if she would need us.    Vitals signs, FHTs, urine dip, and PE findings documented, reviewed and available in OB flow chart.   I spent a total of 20 minutes on the day of the visit. This includes face to face time and non-face to face time preparing to see the patient (eg, review of tests and charts), Obtaining and/or reviewing separately obtained history, Documenting clinical information in the electronic or other health record, Independently interpreting results and communicating results to the patient/family/caregiver, or Care coordination.

## 2024-01-12 ENCOUNTER — PATIENT MESSAGE (OUTPATIENT)
Dept: MATERNAL FETAL MEDICINE | Facility: CLINIC | Age: 34
End: 2024-01-12
Payer: MEDICAID

## 2024-01-12 ENCOUNTER — OFFICE VISIT (OUTPATIENT)
Dept: MATERNAL FETAL MEDICINE | Facility: CLINIC | Age: 34
End: 2024-01-12
Payer: MEDICAID

## 2024-01-12 DIAGNOSIS — O99.891 ASYMPTOMATIC BACTERIURIA DURING PREGNANCY: ICD-10-CM

## 2024-01-12 DIAGNOSIS — A60.04 HERPES SIMPLEX VULVOVAGINITIS: ICD-10-CM

## 2024-01-12 DIAGNOSIS — R82.71 ASYMPTOMATIC BACTERIURIA DURING PREGNANCY: ICD-10-CM

## 2024-01-12 DIAGNOSIS — R74.01 TRANSAMINITIS: ICD-10-CM

## 2024-01-12 DIAGNOSIS — Z36.89 ENCOUNTER FOR FETAL ANATOMIC SURVEY: Primary | ICD-10-CM

## 2024-01-12 DIAGNOSIS — O21.0 HYPEREMESIS GRAVIDARUM: ICD-10-CM

## 2024-01-12 DIAGNOSIS — O21.0 HYPEREMESIS AFFECTING PREGNANCY, ANTEPARTUM: Primary | ICD-10-CM

## 2024-01-12 PROCEDURE — 3044F HG A1C LEVEL LT 7.0%: CPT | Mod: CPTII,95,, | Performed by: OBSTETRICS & GYNECOLOGY

## 2024-01-12 PROCEDURE — 1159F MED LIST DOCD IN RCRD: CPT | Mod: CPTII,95,, | Performed by: OBSTETRICS & GYNECOLOGY

## 2024-01-12 PROCEDURE — 99215 OFFICE O/P EST HI 40 MIN: CPT | Mod: TH,95,, | Performed by: OBSTETRICS & GYNECOLOGY

## 2024-01-12 PROCEDURE — 1160F RVW MEDS BY RX/DR IN RCRD: CPT | Mod: CPTII,95,, | Performed by: OBSTETRICS & GYNECOLOGY

## 2024-01-12 RX ORDER — VALACYCLOVIR HYDROCHLORIDE 500 MG/1
1000 TABLET, FILM COATED ORAL DAILY PRN
Qty: 30 TABLET | Refills: 1 | Status: SHIPPED | OUTPATIENT
Start: 2024-01-12 | End: 2024-02-21 | Stop reason: SDUPTHER

## 2024-01-12 NOTE — PROGRESS NOTES
MATERNAL-FETAL MEDICINE   CONSULT NOTE    Provider requesting consultation: Dr. Gutierres/Dr. Britton    SUBJECTIVE:     Ms. Brock Alfaro is a 33 y.o.  female with IUP at 13w4d who is seen in consultation by MFM for evaluation and management of:  Problem   Transaminitis   Hyperemesis Gravidarum     The patient location is: [facility]  The chief complaint leading to consultation is: N/V pregnancy    Visit type: audiovisual    Face to Face time with patient: 25 mins  51 minutes of total time spent on the encounter, which includes face to face time and non-face to face time preparing to see the patient (eg, review of tests), Obtaining and/or reviewing separately obtained history, Documenting clinical information in the electronic or other health record, Independently interpreting results (not separately reported) and communicating results to the patient/family/caregiver, or Care coordination (not separately reported).         Each patient to whom he or she provides medical services by telemedicine is:  (1) informed of the relationship between the physician and patient and the respective role of any other health care provider with respect to management of the patient; and (2) notified that he or she may decline to receive medical services by telemedicine and may withdraw from such care at any time.    Notes:          Medication List with Changes/Refills   Current Medications    ASPIRIN (ECOTRIN) 81 MG EC TABLET    Take 1 tablet (81 mg total) by mouth once daily.    CALCIUM CARBONATE (TUMS) 200 MG CALCIUM (500 MG) CHEWABLE TABLET    Take 1 tablet (500 mg total) by mouth every 6 (six) hours as needed for Heartburn (nausea and vomiting).    CEPHALEXIN (KEFLEX) 500 MG CAPSULE    Take 1 capsule (500 mg total) by mouth every 8 (eight) hours.    FAMOTIDINE (PEPCID) 40 MG TABLET    Take 1 tablet (40 mg total) by mouth every evening.    OMEPRAZOLE (PRILOSEC) 40 MG CAPSULE    Take 1 capsule (40 mg total) by mouth 2 (two) times  daily before meals.    ONDANSETRON (ZOFRAN-ODT) 4 MG TBDL    Take 1 tablet (4 mg total) by mouth every 6 (six) hours as needed (nausea and vomiting).   Changed and/or Refilled Medications    Modified Medication Previous Medication    VALACYCLOVIR (VALTREX) 500 MG TABLET valACYclovir (VALTREX) 500 MG tablet       Take 2 tablets (1,000 mg total) by mouth daily as needed (Fever Blisters).    Take 2 tablets (1,000 mg total) by mouth once daily.   Discontinued Medications    ACETAMINOPHEN (TYLENOL) 500 MG TABLET    Take 1 tablet (500 mg total) by mouth every 4 (four) hours as needed (Fever).    SCOPOLAMINE (TRANSDERM-SCOP) 1.3-1.5 MG (1 MG OVER 3 DAYS)    Place 1 patch onto the skin every 72 hours.    VALACYCLOVIR (VALTREX) 500 MG TABLET    Take 2 tablets (1,000 mg total) by mouth once daily.       Review of patient's allergies indicates:   Allergen Reactions    Fluoride Swelling     Oral swelling    Unclassified drug Anaphylaxis     toothpaste    Grass pollen- grass standard     Msg [glutamic acid hcl]        PMH:  Past Medical History:   Diagnosis Date    Alopecia     Hay fever     Oral herpes     Suppurative hidradenitis     18 YEARS OLD       PObHx:  OB History    Para Term  AB Living   5 2 2   2 2   SAB IAB Ectopic Multiple Live Births   2       2      # Outcome Date GA Lbr Parish/2nd Weight Sex Delivery Anes PTL Lv   5 Current            4 Term 22 39w0d  3.23 kg (7 lb 1.9 oz) M CS-LTranv Spinal  LEILA   3 Term 14 41w2d  3.331 kg (7 lb 5.5 oz) F CS-LTranv EPI N LEILA   2 SAB            1 SAB                PSH:  Past Surgical History:   Procedure Laterality Date     SECTION       SECTION WITH TUBAL LIGATION N/A 2022     section only. She did NOT Have tubal ligation.    DILATION AND CURETTAGE OF UTERUS         Family history:family history includes Breast cancer in her maternal grandmother.    Social history: reports that she has never smoked. She has never used  smokeless tobacco. She reports that she does not currently use alcohol. She reports that she does not use drugs.      Objective:   LMP 10/04/2023 (Approximate)     Gen: Well appearing woman in no acute distress    Significant labs/imaging:  CMP  Sodium   Date Value Ref Range Status   01/08/2024 138 136 - 145 mmol/L Final     Potassium   Date Value Ref Range Status   01/08/2024 3.6 3.5 - 5.1 mmol/L Final     Chloride   Date Value Ref Range Status   01/08/2024 110 95 - 110 mmol/L Final     CO2   Date Value Ref Range Status   01/08/2024 19 (L) 23 - 29 mmol/L Final     Glucose   Date Value Ref Range Status   01/08/2024 75 70 - 110 mg/dL Final     BUN   Date Value Ref Range Status   01/08/2024 2 (L) 6 - 20 mg/dL Final     Creatinine   Date Value Ref Range Status   01/08/2024 0.6 0.5 - 1.4 mg/dL Final     Calcium   Date Value Ref Range Status   01/08/2024 8.4 (L) 8.7 - 10.5 mg/dL Final     Total Protein   Date Value Ref Range Status   01/08/2024 6.1 6.0 - 8.4 g/dL Final     Albumin   Date Value Ref Range Status   01/08/2024 2.9 (L) 3.5 - 5.2 g/dL Final   10/19/2018 4.6 3.6 - 5.1 g/dL Final     Comment:     Age and Gender Specific Reference Interval Applied     Interpretive Information:     Total Testosterone Reference Interval (ng/dL)  Premenopausal      9 - 55  Postmenopausal     5 - 32     Free Testosterone Reference Interval (pg/mL)  Postmenopausal     0.6 - 3.8     Free testosterone determined by calculation. This method  significantly underestimates free testosterone in pregnant  women (up to 30% or more) due to increased SHBG and  estradiol levels. Alternate testing may be appropriate  during pregnancy.     This test was developed and its performance characteristics  determined by [facility] ([facility]). It has not been  cleared or approved by the U.S. Food and Drug Administration (FDA).  The FDA has determined that such clearance or approval is not  necessary. This test is used for clinical purposes and  should not be  regarded as investigational or for research. [facility] is qualified to  perform high complexity testing under the Clinical Laboratory  Improvement Amendments (CLIA).             TESTING PERFORMED AT [facility]        [address], [address], [place] [address]                          CLIA NO: [id number]                         Total Bilirubin   Date Value Ref Range Status   2024 0.6 0.1 - 1.0 mg/dL Final     Comment:     For infants and newborns, interpretation of results should be based  on gestational age, weight and in agreement with clinical  observations.    Premature Infant recommended reference ranges:  Up to 24 hours.............<8.0 mg/dL  Up to 48 hours............<12.0 mg/dL  3-5 days..................<15.0 mg/dL  6-29 days.................<15.0 mg/dL       Alkaline Phosphatase   Date Value Ref Range Status   2024 63 55 - 135 U/L Final     AST   Date Value Ref Range Status   2024 73 (H) 10 - 40 U/L Final     ALT   Date Value Ref Range Status   2024 148 (H) 10 - 44 U/L Final     Anion Gap   Date Value Ref Range Status   2024 9 8 - 16 mmol/L Final     eGFR   Date Value Ref Range Status   2024 >60 >60 mL/min/1.73 m^2 Final         ASSESSMENT/PLAN:     33 y.o.  female with IUP at 13w4d     Hyperemesis gravidarum  Hx of severe NV of pregnancy with P2 that improved in second trimester  Multiple ED visits this pregnancy  Concurrent GERD  Transaminitis without electrolyte perturbations  Gastroenterology: Ruth Burton NP ([facility])  Baseline wt: 231 lb  Last wt (24): 222 lb  Current regimen:   - Ondansetron 4 mg every 6 hours  - Promethazine 25 mg every 6 hours  - Omeprazole 40 mg BID  - Famotidine 40 mg nightly    Today we discussed her course, as well as typical course of HEG. No THC use, cannabinoid element not in play. Zofran barely works and phenergan only helps in the short term. B6/doxylamine and Reglan ineffective. Could  not tolerate scope patch. Her concurrent GERD symptoms have gotten much worse, just now getting some relief from recent omeprazole addition, Tums dependent. She is justifiably frustrated and is despairing over the course ahead. I discussed that her mgmt thus far has well reflected national guidelines from ACOG. I also stressed that her prior course, with improvement in the second trimester, strongly suggests that her condition will improve in coming weeks. We discussed possible admission for IV antiemetics - she does not have reliable childcare. So long as she is able to keep fluids down and electrolytes remain stable, outpatient management is a reasonable option. She may be a candidate for scheduled outpatient IV hydration.     Recommendations:  1. Continue current regimen  2. Consider inpatient mgmt for intractable N/V or electrolyte perturbations  3. Continue care with GI    Transaminitis  Gastroenterology: Ruth Burton NP  Baseline AST/ALT (12/3/23): 18/25  Last AST/ALT (1/7/24): 91/167  Hepatitis panel (12/27/23): negative    Likely attributable to N/V    Recommendations:  1. Continue to trend, recommend follow up assessment in a week  2. Continue care with GI      FOLLOW UP:   F/u in 4 weeks for US/MFM visit      J Luis Barragan III  Maternal-Fetal Medicine    Electronically Signed by J Luis Barragan III January 12, 2024

## 2024-01-12 NOTE — ASSESSMENT & PLAN NOTE
UCx (1/7/24): Proteus  Current regimen (01/12/2024):   - Cephalexin 500 mg PO TID    Recommendations:  1. Continue current regimen  2. Initiate nightly suppression once therapeutic course completed, continue through 6w pospartum  3. Urine culture each trimester

## 2024-01-12 NOTE — ASSESSMENT & PLAN NOTE
Gastroenterology: Ruth Burton NP  Baseline AST/ALT (12/3/23): 18/25  Last AST/ALT (1/7/24): 91/167  Hepatitis panel (12/27/23): negative    Likely attributable to N/V    Recommendations:  1. Continue to trend, recommend follow up assessment in a week  2. Continue care with GI

## 2024-01-19 ENCOUNTER — PATIENT MESSAGE (OUTPATIENT)
Dept: OBSTETRICS AND GYNECOLOGY | Facility: CLINIC | Age: 34
End: 2024-01-19
Payer: MEDICAID

## 2024-01-19 ENCOUNTER — TELEPHONE (OUTPATIENT)
Dept: OBSTETRICS AND GYNECOLOGY | Facility: CLINIC | Age: 34
End: 2024-01-19
Payer: MEDICAID

## 2024-01-19 NOTE — TELEPHONE ENCOUNTER
Spoke to pt about results. Pt VU  ----- Message from Yesenia Mayorga sent at 1/19/2024  8:04 AM CST -----  Regarding: lab results  Name of Who is Calling:JUVENTINO SIMON [[id number]]          What is the request in detail:  Pt is calling to ask if she can get the results from her Maternity 21 results and her other lab results. Please call to assist         Can the clinic reply by [facility]:          What Number to Call Back if not in [facility]: [phone]

## 2024-01-29 ENCOUNTER — PATIENT MESSAGE (OUTPATIENT)
Dept: OTHER | Facility: OTHER | Age: 34
End: 2024-01-29
Payer: MEDICAID

## 2024-01-31 ENCOUNTER — PATIENT MESSAGE (OUTPATIENT)
Dept: OBSTETRICS AND GYNECOLOGY | Facility: CLINIC | Age: 34
End: 2024-01-31
Payer: MEDICAID

## 2024-01-31 ENCOUNTER — TELEPHONE (OUTPATIENT)
Dept: OBSTETRICS AND GYNECOLOGY | Facility: CLINIC | Age: 34
End: 2024-01-31
Payer: MEDICAID

## 2024-01-31 NOTE — TELEPHONE ENCOUNTER
I called to check on patient  Bad headache at 16 weeks  Feeling better after a Tylenol    Pre-eclampsia unlikely at 16 weeks  She was told to come in if she feels like she needs to be seen    She will continue to monitor her headache and take Tylenol as needed  Continue using Connected MOM for blood pressure monitoring

## 2024-02-05 ENCOUNTER — PATIENT MESSAGE (OUTPATIENT)
Dept: OTHER | Facility: OTHER | Age: 34
End: 2024-02-05
Payer: MEDICAID

## 2024-02-07 ENCOUNTER — ROUTINE PRENATAL (OUTPATIENT)
Dept: OBSTETRICS AND GYNECOLOGY | Facility: CLINIC | Age: 34
End: 2024-02-07
Payer: MEDICAID

## 2024-02-07 VITALS
SYSTOLIC BLOOD PRESSURE: 128 MMHG | DIASTOLIC BLOOD PRESSURE: 80 MMHG | WEIGHT: 222.69 LBS | BODY MASS INDEX: 43.49 KG/M2

## 2024-02-07 DIAGNOSIS — Z3A.17 17 WEEKS GESTATION OF PREGNANCY: Primary | ICD-10-CM

## 2024-02-07 DIAGNOSIS — O34.219 PREVIOUS CESAREAN SECTION COMPLICATING PREGNANCY, ANTEPARTUM CONDITION OR COMPLICATION: ICD-10-CM

## 2024-02-07 DIAGNOSIS — Z34.92 SECOND TRIMESTER PREGNANCY: ICD-10-CM

## 2024-02-07 PROCEDURE — 99213 OFFICE O/P EST LOW 20 MIN: CPT | Mod: PBBFAC,TH | Performed by: OBSTETRICS & GYNECOLOGY

## 2024-02-07 PROCEDURE — 99213 OFFICE O/P EST LOW 20 MIN: CPT | Mod: TH,S$PBB,, | Performed by: OBSTETRICS & GYNECOLOGY

## 2024-02-07 PROCEDURE — 99999 PR PBB SHADOW E&M-EST. PATIENT-LVL III: CPT | Mod: PBBFAC,,, | Performed by: OBSTETRICS & GYNECOLOGY

## 2024-02-07 NOTE — PROGRESS NOTES
"17w2d  Patient comes in today for follow-up prenatal care  No new complaint.  No vaginal bleeding, contractions, or rupture of membranes.  Good fetal movements.    Eating well without significant nausea/vomiting  Not gaining weight  Taking prenatal vitamins, iron supplement  Occasional "heartburn medicine"    Has not been doing Connected MOM    Discussed with patient MFM's findings and recommendations  Discussed Connected MOM  Discussed quad screen. She did [id number].  No result yet  Will order quad now  Start baby aspirin now  RCSBTL scheduled for 7/8/2024    Back in 4 weeks    Vitals signs, FHTs, urine dip, and PE findings documented, reviewed and available in OB flow chart.   I spent a total of 20 minutes on the day of the visit. This includes face to face time and non-face to face time preparing to see the patient (eg, review of tests and charts), Obtaining and/or reviewing separately obtained history, Documenting clinical information in the electronic or other health record, Independently interpreting results and communicating results to the patient/family/caregiver, or Care coordination.             "

## 2024-02-08 ENCOUNTER — PATIENT MESSAGE (OUTPATIENT)
Dept: OBSTETRICS AND GYNECOLOGY | Facility: CLINIC | Age: 34
End: 2024-02-08
Payer: MEDICAID

## 2024-02-08 ENCOUNTER — TELEPHONE (OUTPATIENT)
Dept: OBSTETRICS AND GYNECOLOGY | Facility: CLINIC | Age: 34
End: 2024-02-08
Payer: MEDICAID

## 2024-02-08 NOTE — TELEPHONE ENCOUNTER
Patient would like to deliver her baby on 7/15/24 instead of on 7/8/24 when she would be 39 weeks.  She would not be able to take off the time since she would not be at her job for at least a year.

## 2024-02-21 ENCOUNTER — LAB VISIT (OUTPATIENT)
Dept: LAB | Facility: OTHER | Age: 34
End: 2024-02-21
Attending: OBSTETRICS & GYNECOLOGY
Payer: MEDICAID

## 2024-02-21 ENCOUNTER — OFFICE VISIT (OUTPATIENT)
Dept: MATERNAL FETAL MEDICINE | Facility: CLINIC | Age: 34
End: 2024-02-21
Payer: MEDICAID

## 2024-02-21 ENCOUNTER — PROCEDURE VISIT (OUTPATIENT)
Dept: MATERNAL FETAL MEDICINE | Facility: CLINIC | Age: 34
End: 2024-02-21
Payer: MEDICAID

## 2024-02-21 VITALS
HEIGHT: 60 IN | WEIGHT: 223.69 LBS | BODY MASS INDEX: 43.91 KG/M2 | DIASTOLIC BLOOD PRESSURE: 79 MMHG | SYSTOLIC BLOOD PRESSURE: 134 MMHG

## 2024-02-21 DIAGNOSIS — Z36.2 ENCOUNTER FOR FOLLOW-UP ULTRASOUND OF FETAL ANATOMY: Primary | ICD-10-CM

## 2024-02-21 DIAGNOSIS — B00.1 HERPES SIMPLEX LABIALIS: ICD-10-CM

## 2024-02-21 DIAGNOSIS — Z36.89 ENCOUNTER FOR FETAL ANATOMIC SURVEY: ICD-10-CM

## 2024-02-21 DIAGNOSIS — R74.01 TRANSAMINITIS: ICD-10-CM

## 2024-02-21 DIAGNOSIS — O21.0 HYPEREMESIS GRAVIDARUM: Primary | ICD-10-CM

## 2024-02-21 PROBLEM — N91.2 AMENORRHEA: Status: RESOLVED | Noted: 2023-12-05 | Resolved: 2024-02-21

## 2024-02-21 LAB
ALBUMIN SERPL BCP-MCNC: 3.3 G/DL (ref 3.5–5.2)
ALP SERPL-CCNC: 66 U/L (ref 55–135)
ALT SERPL W/O P-5'-P-CCNC: 11 U/L (ref 10–44)
ANION GAP SERPL CALC-SCNC: 9 MMOL/L (ref 8–16)
AST SERPL-CCNC: 12 U/L (ref 10–40)
BILIRUB SERPL-MCNC: 0.2 MG/DL (ref 0.1–1)
BUN SERPL-MCNC: 7 MG/DL (ref 6–20)
CALCIUM SERPL-MCNC: 9.5 MG/DL (ref 8.7–10.5)
CHLORIDE SERPL-SCNC: 105 MMOL/L (ref 95–110)
CO2 SERPL-SCNC: 24 MMOL/L (ref 23–29)
CREAT SERPL-MCNC: 0.7 MG/DL (ref 0.5–1.4)
EST. GFR  (NO RACE VARIABLE): >60 ML/MIN/1.73 M^2
GLUCOSE SERPL-MCNC: 75 MG/DL (ref 70–110)
POTASSIUM SERPL-SCNC: 4.1 MMOL/L (ref 3.5–5.1)
PROT SERPL-MCNC: 7.3 G/DL (ref 6–8.4)
SODIUM SERPL-SCNC: 138 MMOL/L (ref 136–145)

## 2024-02-21 PROCEDURE — 76811 OB US DETAILED SNGL FETUS: CPT | Mod: PBBFAC | Performed by: OBSTETRICS & GYNECOLOGY

## 2024-02-21 PROCEDURE — 3078F DIAST BP <80 MM HG: CPT | Mod: CPTII,,, | Performed by: OBSTETRICS & GYNECOLOGY

## 2024-02-21 PROCEDURE — 3075F SYST BP GE 130 - 139MM HG: CPT | Mod: CPTII,,, | Performed by: OBSTETRICS & GYNECOLOGY

## 2024-02-21 PROCEDURE — 99213 OFFICE O/P EST LOW 20 MIN: CPT | Mod: PBBFAC,TH | Performed by: OBSTETRICS & GYNECOLOGY

## 2024-02-21 PROCEDURE — 3008F BODY MASS INDEX DOCD: CPT | Mod: CPTII,,, | Performed by: OBSTETRICS & GYNECOLOGY

## 2024-02-21 PROCEDURE — 36415 COLL VENOUS BLD VENIPUNCTURE: CPT | Performed by: OBSTETRICS & GYNECOLOGY

## 2024-02-21 PROCEDURE — 99215 OFFICE O/P EST HI 40 MIN: CPT | Mod: S$PBB,TH,, | Performed by: OBSTETRICS & GYNECOLOGY

## 2024-02-21 PROCEDURE — 1159F MED LIST DOCD IN RCRD: CPT | Mod: CPTII,,, | Performed by: OBSTETRICS & GYNECOLOGY

## 2024-02-21 PROCEDURE — 80053 COMPREHEN METABOLIC PANEL: CPT | Performed by: OBSTETRICS & GYNECOLOGY

## 2024-02-21 PROCEDURE — 3044F HG A1C LEVEL LT 7.0%: CPT | Mod: CPTII,,, | Performed by: OBSTETRICS & GYNECOLOGY

## 2024-02-21 PROCEDURE — 99999 PR PBB SHADOW E&M-EST. PATIENT-LVL III: CPT | Mod: PBBFAC,,, | Performed by: OBSTETRICS & GYNECOLOGY

## 2024-02-21 RX ORDER — VALACYCLOVIR HYDROCHLORIDE 500 MG/1
1000 TABLET, FILM COATED ORAL DAILY PRN
Qty: 30 TABLET | Refills: 3 | Status: SHIPPED | OUTPATIENT
Start: 2024-02-21 | End: 2024-06-20

## 2024-02-21 NOTE — ASSESSMENT & PLAN NOTE
Gastroenterology: Ruth Burton NP  Baseline AST/ALT (12/3/23): 18/25  Last AST/ALT (1/7/24): 91/167  Hepatitis panel (12/27/23): negative    Pt yet to get follow up CMP as ordered by Dr. Britton    Recommendations:  1. Pt to lab now for repeat CMP

## 2024-02-21 NOTE — PROGRESS NOTES
Maternal Fetal Medicine follow up consult    SUBJECTIVE:     Brock Alfaro is a 33 y.o.  female with IUP at 19w2d who is seen in follow up consultation by MFM.  Pregnancy complications include:   No problems updated.    Previous notes reviewed.   No changes to medical, surgical, family, social, or obstetric history.    Interval history since last MFM visit: N/V has resolved! Doing well. Still with occasional heartburn but well controlled prn with current regimen    Medications:  Current Outpatient Medications   Medication Instructions    aspirin (ECOTRIN) 81 mg, Oral, Daily    famotidine (PEPCID) 40 mg, Oral, Nightly    omeprazole (PRILOSEC) 40 mg, Oral, 2 times daily before meals    valACYclovir (VALTREX) 1,000 mg, Oral, Daily PRN       Care team members:  Dr. Britton - Primary OB     OBJECTIVE:   /79 (BP Location: Left arm, Patient Position: Sitting, BP Method: Large (Automatic))   Ht 5' (1.524 m)   Wt 101.5 kg (223 lb 10.5 oz)   LMP 10/04/2023 (Approximate)   BMI 43.68 kg/m²     Ultrasound performed. See viewpoint for full ultrasound report.  1. A detailed fetal anatomic ultrasound examination was performed for the following high risk indication: BMI   2. No fetal structural malformations are identified; however, fetal imaging is incomplete today with limited nose/lips, 4CH/septum, RVOT, AA/DA & diaphragm. A follow-up study will be scheduled to complete the fetal anatomic survey.   3. Fetal size today is consistent with established gestational age.  4. Transabdominal cervical length is normal at 38.2 mm.  5. Placental location is posterior without evidence of previa.  6. Amniotic fluid volume appears to be a normal amount.     Significant labs/imaging:  Cell free DNA: low risk    Sodium   Date Value Ref Range Status   2024 138 136 - 145 mmol/L Final     Potassium   Date Value Ref Range Status   2024 3.6 3.5 - 5.1 mmol/L Final     Chloride   Date Value Ref Range Status    01/08/2024 110 95 - 110 mmol/L Final     CO2   Date Value Ref Range Status   01/08/2024 19 (L) 23 - 29 mmol/L Final     Glucose   Date Value Ref Range Status   01/08/2024 75 70 - 110 mg/dL Final     BUN   Date Value Ref Range Status   01/08/2024 2 (L) 6 - 20 mg/dL Final     Creatinine   Date Value Ref Range Status   01/08/2024 0.6 0.5 - 1.4 mg/dL Final     Calcium   Date Value Ref Range Status   01/08/2024 8.4 (L) 8.7 - 10.5 mg/dL Final     Total Protein   Date Value Ref Range Status   01/08/2024 6.1 6.0 - 8.4 g/dL Final     Albumin   Date Value Ref Range Status   01/08/2024 2.9 (L) 3.5 - 5.2 g/dL Final   10/19/2018 4.6 3.6 - 5.1 g/dL Final     Comment:     Age and Gender Specific Reference Interval Applied     Interpretive Information:     Total Testosterone Reference Interval (ng/dL)  Premenopausal      9 - 55  Postmenopausal     5 - 32     Free Testosterone Reference Interval (pg/mL)  Postmenopausal     0.6 - 3.8     Free testosterone determined by calculation. This method  significantly underestimates free testosterone in pregnant  women (up to 30% or more) due to increased SHBG and  estradiol levels. Alternate testing may be appropriate  during pregnancy.     This test was developed and its performance characteristics  determined by [facility] ([facility]). It has not been  cleared or approved by the U.S. Food and Drug Administration (FDA).  The FDA has determined that such clearance or approval is not  necessary. This test is used for clinical purposes and should not be  regarded as investigational or for research. [facility] is qualified to  perform high complexity testing under the Clinical Laboratory  Improvement Amendments (CLIA).             TESTING PERFORMED AT [facility]        [address] [place] [address], [address], [place] [address]                          CLIA NO: [id number]                         Total Bilirubin   Date Value Ref Range Status   01/08/2024 0.6 0.1 - 1.0  mg/dL Final     Comment:     For infants and newborns, interpretation of results should be based  on gestational age, weight and in agreement with clinical  observations.    Premature Infant recommended reference ranges:  Up to 24 hours.............<8.0 mg/dL  Up to 48 hours............<12.0 mg/dL  3-5 days..................<15.0 mg/dL  6-29 days.................<15.0 mg/dL       Alkaline Phosphatase   Date Value Ref Range Status   2024 63 55 - 135 U/L Final     AST   Date Value Ref Range Status   2024 73 (H) 10 - 40 U/L Final     ALT   Date Value Ref Range Status   2024 148 (H) 10 - 44 U/L Final     Anion Gap   Date Value Ref Range Status   2024 9 8 - 16 mmol/L Final     eGFR   Date Value Ref Range Status   2024 >60 >60 mL/min/1.73 m^2 Final       ASSESSMENT/PLAN:     33 y.o.  female with IUP at 19w2d    Hyperemesis gravidarum  Hx of severe NV of pregnancy with P2 that improved in second trimester  Multiple ED visits this pregnancy  Concurrent GERD  Baseline wt: 231 lb  Last wt (24): 223 lb  Current regimen:   - Omeprazole 40 mg BID PRN  - Famotidine 40 mg nightly PRN    Symptoms much improved at 14 weeks  Only with GERD sx here and there, managed with above meds    Recommendations:  1. Continue current mgmt    Transaminitis  Gastroenterology: Ruth Burton NP  Baseline AST/ALT (12/3/23):   Last AST/ALT (24): 91/167  Hepatitis panel (23): negative    Pt yet to get follow up CMP as ordered by Dr. Britton    Recommendations:  1. Pt to lab now for repeat CMP    BMI 40.0-44.9, adult   Obesity is associated with numerous adverse pregnancy outcomes, correlated in a dose-dependent fashion. These include miscarriage, poor ultrasound visualization, increase in congenital malformations (especially NTD's ),  birth, ascending infections, gestational diabetes, hypertensive diseases of pregnancy, macrosomia, shoulder dystocia, cerebral palsy and surgical complications  such as wound infections, dehiscence and thrombosis. Macrosomia and the incidence of intrapartum complications related to macrosomia, such as shoulder dystocia, malpresentation, hemorrhage, and fourth degree laceration are also increased in obese gravidas. Obese women also have a high rate of  delivery and are more likely to have surgical, anesthetic, or postpartum complications.  birth in obese gravidas is primarily associated with obesity-related medical and  complications, rather than an intrinsic predisposition to spontaneous  labor.     Recommendations:  1. Recommended gestational weight gain of 11-20 pounds. Consider dietary counseling.  2. ASA 81 mg daily  3. With the increased risk of gestational diabetes, early 1st trimester screening is often advocated, however, there are no data demonstrating the effectiveness of this early screening in improving pregnancy outcomes  4. Screen for signs/symptoms of obstructive sleep apnea  5. Fetal growth assessment at 32 weeks  6. Initiate  fetal surveillance at 34 weeks  7. Patients with obesity are at increased risk for cardiac and pulmonary complications including sleep apnea, hypertension and cardiomyopathy. Providers should have a low threshold to seek further evaluation of the cardiac status--echocardiogram, EKG--in patients who present with symptoms suggestive of cardiac or pulmonary compromise. In addition, given the risk of anesthetic difficulties, consultation with an anesthesiologist is suggested for patients with a BMI ? 50, maternal weight ?400 lb, or patients with sleep apnea.  8. Obese patients should be followed closely especially in the third trimester to screen for hypertensive complications  9. Lovenox 40 mg BID for VTE prophylaxis while admitted to the hospital (antepartum or postpartum) if BMI >= 40  10. Patients should be counseled on weight loss in the postpartum period, including the positive effects of  breastfeeding      F/u at 32  weeks for M visit  F/u in 4 weeks for US    Thank you for the opportunity to participate in the care of Brock. Today I spent 40 minutes in the care of Ms. Alfaro. This includes face to face time and non-face to face time preparing to see the patient (eg, review of tests), obtaining and/or reviewing separately obtained history, documenting clinical information in the electronic or other health record, independently interpreting results and communicating results to the patient/family/caregiver, or care coordination.       GALEN Barragan MD/MPH  Maternal Fetal Medicine

## 2024-02-21 NOTE — ASSESSMENT & PLAN NOTE
Obesity is associated with numerous adverse pregnancy outcomes, correlated in a dose-dependent fashion. These include miscarriage, poor ultrasound visualization, increase in congenital malformations (especially NTD's ),  birth, ascending infections, gestational diabetes, hypertensive diseases of pregnancy, macrosomia, shoulder dystocia, cerebral palsy and surgical complications such as wound infections, dehiscence and thrombosis. Macrosomia and the incidence of intrapartum complications related to macrosomia, such as shoulder dystocia, malpresentation, hemorrhage, and fourth degree laceration are also increased in obese gravidas. Obese women also have a high rate of  delivery and are more likely to have surgical, anesthetic, or postpartum complications.  birth in obese gravidas is primarily associated with obesity-related medical and  complications, rather than an intrinsic predisposition to spontaneous  labor.     Recommendations:  1. Recommended gestational weight gain of 11-20 pounds. Consider dietary counseling.  2. ASA 81 mg daily  3. With the increased risk of gestational diabetes, early 1st trimester screening is often advocated, however, there are no data demonstrating the effectiveness of this early screening in improving pregnancy outcomes  4. Screen for signs/symptoms of obstructive sleep apnea  5. Fetal growth assessment at 32 weeks  6. Initiate  fetal surveillance at 34 weeks  7. Patients with obesity are at increased risk for cardiac and pulmonary complications including sleep apnea, hypertension and cardiomyopathy. Providers should have a low threshold to seek further evaluation of the cardiac status--echocardiogram, EKG--in patients who present with symptoms suggestive of cardiac or pulmonary compromise. In addition, given the risk of anesthetic difficulties, consultation with an anesthesiologist is suggested for patients with a BMI ? 50, maternal  weight ?400 lb, or patients with sleep apnea.  8. Obese patients should be followed closely especially in the third trimester to screen for hypertensive complications  9. Lovenox 40 mg BID for VTE prophylaxis while admitted to the hospital (antepartum or postpartum) if BMI >= 40  10. Patients should be counseled on weight loss in the postpartum period, including the positive effects of breastfeeding

## 2024-02-21 NOTE — ASSESSMENT & PLAN NOTE
Hx of severe NV of pregnancy with P2 that improved in second trimester  Multiple ED visits this pregnancy  Concurrent GERD  Baseline wt: 231 lb  Last wt (2/21/24): 223 lb  Current regimen:   - Omeprazole 40 mg BID PRN  - Famotidine 40 mg nightly PRN    Symptoms much improved at 14 weeks  Only with GERD sx here and there, managed with above meds    Recommendations:  1. Continue current mgmt

## 2024-02-26 ENCOUNTER — PATIENT MESSAGE (OUTPATIENT)
Dept: OTHER | Facility: OTHER | Age: 34
End: 2024-02-26
Payer: MEDICAID

## 2024-02-28 ENCOUNTER — PATIENT MESSAGE (OUTPATIENT)
Dept: OBSTETRICS AND GYNECOLOGY | Facility: CLINIC | Age: 34
End: 2024-02-28
Payer: MEDICAID

## 2024-03-06 ENCOUNTER — ROUTINE PRENATAL (OUTPATIENT)
Dept: OBSTETRICS AND GYNECOLOGY | Facility: CLINIC | Age: 34
End: 2024-03-06
Payer: MEDICAID

## 2024-03-06 VITALS
SYSTOLIC BLOOD PRESSURE: 122 MMHG | WEIGHT: 224.88 LBS | BODY MASS INDEX: 43.92 KG/M2 | DIASTOLIC BLOOD PRESSURE: 70 MMHG

## 2024-03-06 DIAGNOSIS — O34.219 PREVIOUS CESAREAN SECTION COMPLICATING PREGNANCY, ANTEPARTUM CONDITION OR COMPLICATION: ICD-10-CM

## 2024-03-06 DIAGNOSIS — Z34.92 SECOND TRIMESTER PREGNANCY: ICD-10-CM

## 2024-03-06 DIAGNOSIS — Z3A.21 21 WEEKS GESTATION OF PREGNANCY: Primary | ICD-10-CM

## 2024-03-06 PROCEDURE — 99213 OFFICE O/P EST LOW 20 MIN: CPT | Mod: TH,S$PBB,, | Performed by: OBSTETRICS & GYNECOLOGY

## 2024-03-06 PROCEDURE — 99999 PR PBB SHADOW E&M-EST. PATIENT-LVL III: CPT | Mod: PBBFAC,,, | Performed by: OBSTETRICS & GYNECOLOGY

## 2024-03-06 PROCEDURE — 99213 OFFICE O/P EST LOW 20 MIN: CPT | Mod: PBBFAC,TH | Performed by: OBSTETRICS & GYNECOLOGY

## 2024-03-06 NOTE — PROGRESS NOTES
"21w2d  Patient comes in today for follow-up prenatal care  No new complaint.  No vaginal bleeding, contractions, or rupture of membranes.  Good fetal movements.    Eating well without significant nausea/vomiting  Not gaining weight  Taking prenatal vitamins, iron supplement, and baby aspirin  Occasional "heartburn medicine"    Has been doing Connected MOM.  Just not connected    Discussed with patient MFM's findings and recommendations  [id number] XY.    Does not want to get quad/AFP  RCSBTL scheduled for 7/8/2024    Back in 4 weeks    Vitals signs, FHTs, urine dip, and PE findings documented, reviewed and available in OB flow chart.   I spent a total of 20 minutes on the day of the visit. This includes face to face time and non-face to face time preparing to see the patient (eg, review of tests and charts), Obtaining and/or reviewing separately obtained history, Documenting clinical information in the electronic or other health record, Independently interpreting results and communicating results to the patient/family/caregiver, or Care coordination.               "

## 2024-03-20 ENCOUNTER — PROCEDURE VISIT (OUTPATIENT)
Dept: MATERNAL FETAL MEDICINE | Facility: CLINIC | Age: 34
End: 2024-03-20
Payer: MEDICAID

## 2024-03-20 DIAGNOSIS — Z36.2 ENCOUNTER FOR FOLLOW-UP ULTRASOUND OF FETAL ANATOMY: ICD-10-CM

## 2024-03-20 PROCEDURE — 76816 OB US FOLLOW-UP PER FETUS: CPT | Mod: PBBFAC | Performed by: OBSTETRICS & GYNECOLOGY

## 2024-03-25 ENCOUNTER — PATIENT MESSAGE (OUTPATIENT)
Dept: OTHER | Facility: OTHER | Age: 34
End: 2024-03-25
Payer: MEDICAID

## 2024-04-08 ENCOUNTER — PATIENT MESSAGE (OUTPATIENT)
Dept: OTHER | Facility: OTHER | Age: 34
End: 2024-04-08
Payer: MEDICAID

## 2024-04-18 ENCOUNTER — HOSPITAL ENCOUNTER (OUTPATIENT)
Facility: HOSPITAL | Age: 34
Discharge: HOME | End: 2024-04-18
Attending: OBSTETRICS & GYNECOLOGY | Admitting: OBSTETRICS & GYNECOLOGY
Payer: MEDICAID

## 2024-04-18 VITALS
TEMPERATURE: 98 F | HEART RATE: 102 BPM | SYSTOLIC BLOOD PRESSURE: 107 MMHG | RESPIRATION RATE: 16 BRPM | DIASTOLIC BLOOD PRESSURE: 57 MMHG | OXYGEN SATURATION: 99 %

## 2024-04-18 DIAGNOSIS — O36.8190 DECREASED FETAL MOVEMENT: ICD-10-CM

## 2024-04-18 PROCEDURE — 59025 FETAL NON-STRESS TEST: CPT

## 2024-04-18 PROCEDURE — 99211 OFF/OP EST MAY X REQ PHY/QHP: CPT

## 2024-04-18 NOTE — NURSING
Pt arrived on L&D with complaints of decreased fetal movements. Fetal heart tones present on continuous NST. TOCO placed. POC reviewed. NAD noted. Call bell within reach.

## 2024-04-18 NOTE — NURSING
Discharge instructions reviewed with pt and significant other including labor precautions, kick counts, pre-eclampsia signs/symptoms and when to return to hospital. Pt verbalized understanding with good recall noted. Questions answered. Pt ambulated off unit. NAD noted.

## 2024-04-18 NOTE — DISCHARGE INSTRUCTIONS
Home Undelivered Discharge Instructions    After Discharge Orders:    Future Appointments   Date Time Provider Department Center   5/1/2024  4:15 PM Armani Britton MD [facility]   5/15/2024  4:15 PM [facility]   5/22/2024  4:15 PM [facility]   5/29/2024  4:15 PM [facility]   6/5/2024  4:15 PM [facility]   6/12/2024  4:15 PM Armani Britton MD [facility]   6/19/2024  4:15 PM [facility]   6/26/2024  4:15 PM Armani Britton MD [facility]   7/3/2024  4:15 PM Armani Britton MD [facility]   7/10/2024  4:15 PM Armani Britton MD [facility]         Current Discharge Medication List        CONTINUE these medications which have NOT CHANGED    Details   aspirin (ECOTRIN) 81 MG EC tablet Take 1 tablet (81 mg total) by mouth once daily.  Qty: 90 tablet, Refills: 0      famotidine (PEPCID) 40 MG tablet Take 1 tablet (40 mg total) by mouth every evening.  Qty: 30 tablet, Refills: 4      omeprazole (PRILOSEC) 40 MG capsule Take 1 capsule (40 mg total) by mouth 2 (two) times daily before meals.  Qty: 60 capsule, Refills: 11      valACYclovir (VALTREX) 500 MG tablet Take 2 tablets (1,000 mg total) by mouth daily as needed (Fever Blisters).  Qty: 30 tablet, Refills: 3    Associated Diagnoses: Herpes simplex labialis                     Diet:  normal diet as tolerated    Rest: normal activity as tolerated    Other instructions: Do kick counts once a day on your baby. Choose the time of day your baby is most active. Get in a comfortable lying or sitting position and time how long it takes to feel 10 kicks, twists, turns, swishes, or rolls. Call your physician or midwife if there have not been 10 kicks in 2 hours    Call physician or midwife, return to Labor and Delivery, call 911, or go to the nearest Emergency Room if: increased  leakage or fluid, contractions more than  5 per  30 minutes, decreased fetal movement, persistent low back pain or cramping, bleeding from vaginal area, difficulty urinating, pain with urination, difficulty breathing, new calf pain, persistent headache, or vision change

## 2024-04-22 ENCOUNTER — PATIENT MESSAGE (OUTPATIENT)
Dept: OTHER | Facility: OTHER | Age: 34
End: 2024-04-22
Payer: MEDICAID

## 2024-04-28 ENCOUNTER — HOSPITAL ENCOUNTER (OUTPATIENT)
Facility: HOSPITAL | Age: 34
Discharge: HOME | End: 2024-04-28
Attending: OBSTETRICS & GYNECOLOGY | Admitting: OBSTETRICS & GYNECOLOGY
Payer: MEDICAID

## 2024-04-28 VITALS
HEART RATE: 88 BPM | HEIGHT: 60 IN | RESPIRATION RATE: 17 BRPM | TEMPERATURE: 98 F | DIASTOLIC BLOOD PRESSURE: 62 MMHG | OXYGEN SATURATION: 100 % | SYSTOLIC BLOOD PRESSURE: 124 MMHG | BODY MASS INDEX: 43.92 KG/M2

## 2024-04-28 PROCEDURE — 25000003 PHARM REV CODE 250: Performed by: OBSTETRICS & GYNECOLOGY

## 2024-04-28 PROCEDURE — 99211 OFF/OP EST MAY X REQ PHY/QHP: CPT

## 2024-04-28 PROCEDURE — 59025 FETAL NON-STRESS TEST: CPT

## 2024-04-28 RX ORDER — SIMETHICONE 80 MG
1 TABLET,CHEWABLE ORAL ONCE
Status: COMPLETED | OUTPATIENT
Start: 2024-04-28 | End: 2024-04-28

## 2024-04-28 RX ADMIN — SIMETHICONE 80 MG: 80 TABLET, CHEWABLE ORAL at 02:04

## 2024-04-28 NOTE — DISCHARGE INSTRUCTIONS
Home Undelivered Discharge Instructions    After Discharge Orders:    Future Appointments   Date Time Provider Department Center   5/1/2024  4:15 PM Armani Britton MD [facility]   5/15/2024  4:15 PM [facility]   5/22/2024  4:15 PM [facility]   5/29/2024  4:15 PM [facility]   6/5/2024  4:15 PM [facility]   6/12/2024  4:15 PM Armani Britton MD [facility]   6/19/2024  4:15 PM [facility]   6/26/2024  4:15 PM Armani Britton MD [facility]   7/3/2024  4:15 PM Armani Britton MD [facility]   7/10/2024  4:15 PM Armani Britton MD [facility]     Current Discharge Medication List        CONTINUE these medications which have NOT CHANGED    Details   aspirin (ECOTRIN) 81 MG EC tablet Take 1 tablet (81 mg total) by mouth once daily.  Qty: 90 tablet, Refills: 0      famotidine (PEPCID) 40 MG tablet Take 1 tablet (40 mg total) by mouth every evening.  Qty: 30 tablet, Refills: 4      omeprazole (PRILOSEC) 40 MG capsule Take 1 capsule (40 mg total) by mouth 2 (two) times daily before meals.  Qty: 60 capsule, Refills: 11      valACYclovir (VALTREX) 500 MG tablet Take 2 tablets (1,000 mg total) by mouth daily as needed (Fever Blisters).  Qty: 30 tablet, Refills: 3    Associated Diagnoses: Herpes simplex labialis                     Diet:  normal diet as tolerated    Rest: normal activity as tolerated    Other instructions: Do kick counts once a day on your baby. Choose the time of day your baby is most active. Get in a comfortable lying or sitting position and time how long it takes to feel 10 kicks, twists, turns, swishes, or rolls. Call your physician or midwife if there have not been 10 kicks in 2 hours    Call physician or midwife, return to Labor and Delivery, call 911, or go to the nearest Emergency Room if: increased leakage  or fluid, contractions more than  3 per  1 hour, decreased fetal movement, persistent low back pain or cramping, bleeding from vaginal area, difficulty urinating, pain with urination, difficulty breathing, new calf pain, persistent headache, or vision change

## 2024-04-28 NOTE — NURSING
28w6d  arrived to triage with c/o of   Upper bilateral abdominal pain. Describes pain as intermittent pressure with a pain rating of 10/10. Denies any vaginal bleeding, LOF, or ctxs. Abdomen soft and non-tender on palpation.     1546 - Dr. Britton notified of the patient's arrival. Updated on the absence of pain after Simethicone administered. Discharge orders home placed per the MD.     2440 - Discharge instructions given to the patient. Educated on gas pain management and when to return to the hospital. Verbalized understanding. Ambulated off the unit with . NAD noted.

## 2024-05-01 ENCOUNTER — ROUTINE PRENATAL (OUTPATIENT)
Dept: OBSTETRICS AND GYNECOLOGY | Facility: CLINIC | Age: 34
End: 2024-05-01
Payer: MEDICAID

## 2024-05-01 VITALS
BODY MASS INDEX: 44.78 KG/M2 | SYSTOLIC BLOOD PRESSURE: 124 MMHG | DIASTOLIC BLOOD PRESSURE: 66 MMHG | WEIGHT: 229.25 LBS

## 2024-05-01 DIAGNOSIS — Z23 NEED FOR DIPHTHERIA-TETANUS-PERTUSSIS (TDAP) VACCINE: ICD-10-CM

## 2024-05-01 DIAGNOSIS — Z3A.29 29 WEEKS GESTATION OF PREGNANCY: Primary | ICD-10-CM

## 2024-05-01 DIAGNOSIS — O34.219 PREVIOUS CESAREAN SECTION COMPLICATING PREGNANCY, ANTEPARTUM CONDITION OR COMPLICATION: ICD-10-CM

## 2024-05-01 DIAGNOSIS — Z34.93 THIRD TRIMESTER PREGNANCY: ICD-10-CM

## 2024-05-01 PROCEDURE — 99999PBSHW TDAP VACCINE GREATER THAN OR EQUAL TO 7YO IM: Mod: PBBFAC,,,

## 2024-05-01 PROCEDURE — 90471 IMMUNIZATION ADMIN: CPT | Mod: PBBFAC

## 2024-05-01 PROCEDURE — 99213 OFFICE O/P EST LOW 20 MIN: CPT | Mod: TH,S$PBB,, | Performed by: OBSTETRICS & GYNECOLOGY

## 2024-05-01 PROCEDURE — 99213 OFFICE O/P EST LOW 20 MIN: CPT | Mod: PBBFAC,TH,25 | Performed by: OBSTETRICS & GYNECOLOGY

## 2024-05-01 PROCEDURE — 99999 PR PBB SHADOW E&M-EST. PATIENT-LVL III: CPT | Mod: PBBFAC,,, | Performed by: OBSTETRICS & GYNECOLOGY

## 2024-05-01 PROCEDURE — 90715 TDAP VACCINE 7 YRS/> IM: CPT | Mod: PBBFAC

## 2024-05-01 NOTE — PROGRESS NOTES
"29w2d  Patient comes in today for follow-up prenatal care  No new complaint.  No vaginal bleeding, contractions, or rupture of membranes.  Good fetal movements.    Eating well without significant nausea/vomiting  Gaining weight  Taking prenatal vitamins, iron supplement, and baby aspirin  Occasional "heartburn medicine"    Has been doing Connected MOM.  Just not connected    Discussed with patient MFM's findings and recommendations  [id number] XY.    Does not want to get quad/AFP  RCSBTL scheduled for 7/8/2024  Tdap done today  Medicaid tubal consent today  Back in 2 weeks    Vitals signs, FHTs, urine dip, and PE findings documented, reviewed and available in OB flow chart.   I spent a total of 20 minutes on the day of the visit. This includes face to face time and non-face to face time preparing to see the patient (eg, review of tests and charts), Obtaining and/or reviewing separately obtained history, Documenting clinical information in the electronic or other health record, Independently interpreting results and communicating results to the patient/family/caregiver, or Care coordination.                 "

## 2024-05-06 ENCOUNTER — PATIENT MESSAGE (OUTPATIENT)
Dept: OTHER | Facility: OTHER | Age: 34
End: 2024-05-06
Payer: MEDICAID

## 2024-05-15 ENCOUNTER — ROUTINE PRENATAL (OUTPATIENT)
Dept: OBSTETRICS AND GYNECOLOGY | Facility: CLINIC | Age: 34
End: 2024-05-15
Payer: MEDICAID

## 2024-05-15 VITALS — SYSTOLIC BLOOD PRESSURE: 124 MMHG | WEIGHT: 229.5 LBS | DIASTOLIC BLOOD PRESSURE: 74 MMHG | BODY MASS INDEX: 44.82 KG/M2

## 2024-05-15 DIAGNOSIS — Z34.93 THIRD TRIMESTER PREGNANCY: ICD-10-CM

## 2024-05-15 DIAGNOSIS — O34.219 PREVIOUS CESAREAN SECTION COMPLICATING PREGNANCY, ANTEPARTUM CONDITION OR COMPLICATION: ICD-10-CM

## 2024-05-15 DIAGNOSIS — Z3A.31 31 WEEKS GESTATION OF PREGNANCY: Primary | ICD-10-CM

## 2024-05-15 PROCEDURE — 99213 OFFICE O/P EST LOW 20 MIN: CPT | Mod: PBBFAC,TH | Performed by: OBSTETRICS & GYNECOLOGY

## 2024-05-15 PROCEDURE — 99999 PR PBB SHADOW E&M-EST. PATIENT-LVL III: CPT | Mod: PBBFAC,,, | Performed by: OBSTETRICS & GYNECOLOGY

## 2024-05-15 PROCEDURE — 99213 OFFICE O/P EST LOW 20 MIN: CPT | Mod: TH,S$PBB,, | Performed by: OBSTETRICS & GYNECOLOGY

## 2024-05-15 NOTE — PROGRESS NOTES
"31w2d  Patient comes in today for follow-up prenatal care  No new complaint.  No vaginal bleeding, contractions, or rupture of membranes.  Good fetal movements.    Eating well without significant nausea/vomiting  Gaining weight  Taking prenatal vitamins, iron supplement, and baby aspirin  Occasional "heartburn medicine"    Has been doing Connected MOM.  Just not connected  Tdap done previously    Discussed with patient MFM's findings and recommendations  [id number] XY.    Does not want to get quad/AFP  RCSBTL scheduled for 7/8/2024    Medicaid tubal consent today  Back in 2 weeks    Vitals signs, FHTs, urine dip, and PE findings documented, reviewed and available in OB flow chart.   I spent a total of 20 minutes on the day of the visit. This includes face to face time and non-face to face time preparing to see the patient (eg, review of tests and charts), Obtaining and/or reviewing separately obtained history, Documenting clinical information in the electronic or other health record, Independently interpreting results and communicating results to the patient/family/caregiver, or Care coordination.                   "

## 2024-05-20 ENCOUNTER — PATIENT MESSAGE (OUTPATIENT)
Dept: OTHER | Facility: OTHER | Age: 34
End: 2024-05-20
Payer: MEDICAID

## 2024-05-22 ENCOUNTER — ROUTINE PRENATAL (OUTPATIENT)
Dept: OBSTETRICS AND GYNECOLOGY | Facility: CLINIC | Age: 34
End: 2024-05-22
Payer: MEDICAID

## 2024-05-22 VITALS
DIASTOLIC BLOOD PRESSURE: 64 MMHG | SYSTOLIC BLOOD PRESSURE: 122 MMHG | BODY MASS INDEX: 45.04 KG/M2 | WEIGHT: 230.63 LBS

## 2024-05-22 DIAGNOSIS — Z3A.32 32 WEEKS GESTATION OF PREGNANCY: Primary | ICD-10-CM

## 2024-05-22 DIAGNOSIS — O34.219 PREVIOUS CESAREAN SECTION COMPLICATING PREGNANCY, ANTEPARTUM CONDITION OR COMPLICATION: ICD-10-CM

## 2024-05-22 DIAGNOSIS — Z34.93 THIRD TRIMESTER PREGNANCY: ICD-10-CM

## 2024-05-22 PROCEDURE — 99213 OFFICE O/P EST LOW 20 MIN: CPT | Mod: PBBFAC,TH | Performed by: OBSTETRICS & GYNECOLOGY

## 2024-05-22 PROCEDURE — 99999 PR PBB SHADOW E&M-EST. PATIENT-LVL III: CPT | Mod: PBBFAC,,, | Performed by: OBSTETRICS & GYNECOLOGY

## 2024-05-22 PROCEDURE — 99213 OFFICE O/P EST LOW 20 MIN: CPT | Mod: TH,S$PBB,, | Performed by: OBSTETRICS & GYNECOLOGY

## 2024-05-22 NOTE — PROGRESS NOTES
"32w2d  Patient comes in today for follow-up prenatal care  No new complaint.  No vaginal bleeding, contractions, or rupture of membranes.  Good fetal movements.    Eating well without significant nausea/vomiting  Gaining weight  Taking prenatal vitamins, iron supplement, and baby aspirin  Occasional "heartburn medicine"    Has been doing Connected MOM.  Just not connected  Tdap done previously    Discussed with patient MFM's findings and recommendations  [id number] XY.    Does not want to get quad/AFP  RCSBTL scheduled for 7/8/2024    Medicaid tubal consent signed previously  Back in 2 weeks    Vitals signs, FHTs, urine dip, and PE findings documented, reviewed and available in OB flow chart.   I spent a total of 20 minutes on the day of the visit. This includes face to face time and non-face to face time preparing to see the patient (eg, review of tests and charts), Obtaining and/or reviewing separately obtained history, Documenting clinical information in the electronic or other health record, Independently interpreting results and communicating results to the patient/family/caregiver, or Care coordination.                     " Latex:  Patient denies allergy to latex.  Medications reviewed with patient.  Tobacco use verified.  Allergies verified.    REFERRING MD:  Keyla Penny MD    Primary Medical Doctor: Kelya Penny MD   DATE OF INJURY/SURGERY:  n/a  WORK RELATED: no  OCCUPATION: [employer]    Patient is here for Pre op.  All post op medications and instructions covered.  All questions answered.  PDMP reviewed; no aberrant behavior identified, prescription authorized.  Procedure is listed as the left foot but is supposed to be the right foot!!!!!!

## 2024-05-26 ENCOUNTER — HOSPITAL ENCOUNTER (EMERGENCY)
Facility: HOSPITAL | Age: 34
Discharge: HOME | End: 2024-05-26
Attending: STUDENT IN AN ORGANIZED HEALTH CARE EDUCATION/TRAINING PROGRAM
Payer: MEDICAID

## 2024-05-26 VITALS
TEMPERATURE: 99 F | BODY MASS INDEX: 45.04 KG/M2 | WEIGHT: 230.63 LBS | RESPIRATION RATE: 24 BRPM | SYSTOLIC BLOOD PRESSURE: 129 MMHG | DIASTOLIC BLOOD PRESSURE: 85 MMHG | HEART RATE: 95 BPM | OXYGEN SATURATION: 98 %

## 2024-05-26 DIAGNOSIS — Z34.93 THIRD TRIMESTER PREGNANCY: Primary | ICD-10-CM

## 2024-05-26 DIAGNOSIS — R07.9 CHEST PAIN: ICD-10-CM

## 2024-05-26 DIAGNOSIS — R05.9 COUGH: ICD-10-CM

## 2024-05-26 DIAGNOSIS — M79.605 LEFT LEG PAIN: ICD-10-CM

## 2024-05-26 LAB
ALBUMIN SERPL BCP-MCNC: 2.8 G/DL (ref 3.5–5.2)
ALP SERPL-CCNC: 104 U/L (ref 55–135)
ALT SERPL W/O P-5'-P-CCNC: 9 U/L (ref 10–44)
ANION GAP SERPL CALC-SCNC: 13 MMOL/L (ref 8–16)
AST SERPL-CCNC: 14 U/L (ref 10–40)
BASOPHILS # BLD AUTO: 0.02 K/UL (ref 0–0.2)
BASOPHILS NFR BLD: 0.2 % (ref 0–1.9)
BILIRUB SERPL-MCNC: 0.3 MG/DL (ref 0.1–1)
BNP SERPL-MCNC: <10 PG/ML (ref 0–99)
BUN SERPL-MCNC: 4 MG/DL (ref 6–20)
CALCIUM SERPL-MCNC: 9.1 MG/DL (ref 8.7–10.5)
CHLORIDE SERPL-SCNC: 108 MMOL/L (ref 95–110)
CO2 SERPL-SCNC: 17 MMOL/L (ref 23–29)
CREAT SERPL-MCNC: 0.7 MG/DL (ref 0.5–1.4)
CTP QC/QA: YES
D DIMER PPP IA.FEU-MCNC: 1.82 MG/L FEU
DIFFERENTIAL METHOD BLD: ABNORMAL
EOSINOPHIL # BLD AUTO: 0.2 K/UL (ref 0–0.5)
EOSINOPHIL NFR BLD: 2.2 % (ref 0–8)
ERYTHROCYTE [DISTWIDTH] IN BLOOD BY AUTOMATED COUNT: 13.5 % (ref 11.5–14.5)
EST. GFR  (NO RACE VARIABLE): >60 ML/MIN/1.73 M^2
GLUCOSE SERPL-MCNC: 115 MG/DL (ref 70–110)
HCT VFR BLD AUTO: 32.3 % (ref 37–48.5)
HGB BLD-MCNC: 10.1 G/DL (ref 12–16)
IMM GRANULOCYTES # BLD AUTO: 0.03 K/UL (ref 0–0.04)
IMM GRANULOCYTES NFR BLD AUTO: 0.4 % (ref 0–0.5)
LIPASE SERPL-CCNC: 44 U/L (ref 4–60)
LYMPHOCYTES # BLD AUTO: 1.3 K/UL (ref 1–4.8)
LYMPHOCYTES NFR BLD: 15.6 % (ref 18–48)
MAGNESIUM SERPL-MCNC: 1.7 MG/DL (ref 1.6–2.6)
MCH RBC QN AUTO: 26 PG (ref 27–31)
MCHC RBC AUTO-ENTMCNC: 31.3 G/DL (ref 32–36)
MCV RBC AUTO: 83 FL (ref 82–98)
MOLECULAR STREP A: NEGATIVE
MONOCYTES # BLD AUTO: 0.4 K/UL (ref 0.3–1)
MONOCYTES NFR BLD: 5.2 % (ref 4–15)
NEUTROPHILS # BLD AUTO: 6.2 K/UL (ref 1.8–7.7)
NEUTROPHILS NFR BLD: 76.4 % (ref 38–73)
NRBC BLD-RTO: 0 /100 WBC
PLATELET # BLD AUTO: 246 K/UL (ref 150–450)
PMV BLD AUTO: 9.7 FL (ref 9.2–12.9)
POC MOLECULAR INFLUENZA A AGN: NEGATIVE
POC MOLECULAR INFLUENZA B AGN: NEGATIVE
POTASSIUM SERPL-SCNC: 3.6 MMOL/L (ref 3.5–5.1)
PROT SERPL-MCNC: 6.7 G/DL (ref 6–8.4)
RBC # BLD AUTO: 3.88 M/UL (ref 4–5.4)
SARS-COV-2 RDRP RESP QL NAA+PROBE: NEGATIVE
SODIUM SERPL-SCNC: 138 MMOL/L (ref 136–145)
TROPONIN I SERPL DL<=0.01 NG/ML-MCNC: <0.006 NG/ML (ref 0–0.03)
WBC # BLD AUTO: 8.07 K/UL (ref 3.9–12.7)

## 2024-05-26 PROCEDURE — 63600175 PHARM REV CODE 636 W HCPCS: Performed by: STUDENT IN AN ORGANIZED HEALTH CARE EDUCATION/TRAINING PROGRAM

## 2024-05-26 PROCEDURE — 83880 ASSAY OF NATRIURETIC PEPTIDE: CPT | Performed by: STUDENT IN AN ORGANIZED HEALTH CARE EDUCATION/TRAINING PROGRAM

## 2024-05-26 PROCEDURE — 96361 HYDRATE IV INFUSION ADD-ON: CPT

## 2024-05-26 PROCEDURE — 25000003 PHARM REV CODE 250: Performed by: STUDENT IN AN ORGANIZED HEALTH CARE EDUCATION/TRAINING PROGRAM

## 2024-05-26 PROCEDURE — 96374 THER/PROPH/DIAG INJ IV PUSH: CPT

## 2024-05-26 PROCEDURE — 84484 ASSAY OF TROPONIN QUANT: CPT | Performed by: STUDENT IN AN ORGANIZED HEALTH CARE EDUCATION/TRAINING PROGRAM

## 2024-05-26 PROCEDURE — 87502 INFLUENZA DNA AMP PROBE: CPT

## 2024-05-26 PROCEDURE — 93005 ELECTROCARDIOGRAM TRACING: CPT

## 2024-05-26 PROCEDURE — 25500020 PHARM REV CODE 255: Performed by: STUDENT IN AN ORGANIZED HEALTH CARE EDUCATION/TRAINING PROGRAM

## 2024-05-26 PROCEDURE — 83735 ASSAY OF MAGNESIUM: CPT | Performed by: STUDENT IN AN ORGANIZED HEALTH CARE EDUCATION/TRAINING PROGRAM

## 2024-05-26 PROCEDURE — 87635 SARS-COV-2 COVID-19 AMP PRB: CPT | Performed by: STUDENT IN AN ORGANIZED HEALTH CARE EDUCATION/TRAINING PROGRAM

## 2024-05-26 PROCEDURE — 87651 STREP A DNA AMP PROBE: CPT

## 2024-05-26 PROCEDURE — 99285 EMERGENCY DEPT VISIT HI MDM: CPT | Mod: 25

## 2024-05-26 PROCEDURE — 93010 ELECTROCARDIOGRAM REPORT: CPT | Mod: ,,, | Performed by: INTERNAL MEDICINE

## 2024-05-26 PROCEDURE — 80053 COMPREHEN METABOLIC PANEL: CPT | Performed by: STUDENT IN AN ORGANIZED HEALTH CARE EDUCATION/TRAINING PROGRAM

## 2024-05-26 PROCEDURE — 85025 COMPLETE CBC W/AUTO DIFF WBC: CPT | Performed by: STUDENT IN AN ORGANIZED HEALTH CARE EDUCATION/TRAINING PROGRAM

## 2024-05-26 PROCEDURE — 83690 ASSAY OF LIPASE: CPT | Performed by: STUDENT IN AN ORGANIZED HEALTH CARE EDUCATION/TRAINING PROGRAM

## 2024-05-26 PROCEDURE — 85379 FIBRIN DEGRADATION QUANT: CPT | Performed by: STUDENT IN AN ORGANIZED HEALTH CARE EDUCATION/TRAINING PROGRAM

## 2024-05-26 RX ORDER — FAMOTIDINE 40 MG/1
40 TABLET, FILM COATED ORAL NIGHTLY
Qty: 30 TABLET | Refills: 0 | Status: SHIPPED | OUTPATIENT
Start: 2024-05-26 | End: 2024-06-25

## 2024-05-26 RX ORDER — FAMOTIDINE 10 MG/ML
20 INJECTION INTRAVENOUS
Status: COMPLETED | OUTPATIENT
Start: 2024-05-26 | End: 2024-05-26

## 2024-05-26 RX ADMIN — SODIUM CHLORIDE, POTASSIUM CHLORIDE, SODIUM LACTATE AND CALCIUM CHLORIDE 1000 ML: 600; 310; 30; 20 INJECTION, SOLUTION INTRAVENOUS at 01:05

## 2024-05-26 RX ADMIN — IOHEXOL 75 ML: 350 INJECTION, SOLUTION INTRAVENOUS at 06:05

## 2024-05-26 RX ADMIN — FAMOTIDINE 20 MG: 10 INJECTION, SOLUTION INTRAVENOUS at 01:05

## 2024-05-26 NOTE — ED NOTES
34 yo fem to Ed w/ complaints of SOB, CP, cough, sore throat, and fatigue x 1 week. Pt is currently 33 weeks pregnant. Hx of anxiety, GERDS,  x 2, Pre-E. VSS, NAD, AAOx4

## 2024-05-26 NOTE — DISCHARGE INSTRUCTIONS
You were seen in the ER today for your difficulty breathing, cough, and chest pain. Most likely, it is due to a virus as your son is also currently sick with similar symptoms, however your COVID, flu and strep throat tests were negative today. Your d-dimer was elevated past the cut-offs for a third trimester pregnancy, which could suggest that you have a blood clot, but your ultrasounds of your legs were negative for blood clots, and the CT scan done of your lungs was negative for any large blood clots, though small blood clots may have been missed due to the poor timing of the contrast. Please follow up this week with your OBGYN to obtain an echo of your heart, as you have been having chest pain and shortness of breath, or cardiology if you are unable to have one scheduled with your OBGYN. Return immediately to the ER if you have worsening shortness of breath, chest pain, symptoms of passing out, coughing blood.

## 2024-05-26 NOTE — ED PROVIDER NOTES
Encounter Date: 2024    SCRIBE #1 NOTE: I, Jeannie Canseco, am scribing for, and in the presence of,  Francie Sanches MD.       History     Chief Complaint   Patient presents with    Chest Pain    Shortness of Breath     32 yo  F at 32w6d per US, w/obesity, presenting with cough, dyspnea. No complications this pregnancy other than hyperemesis gravidarum which has resolved.  No history of preeclampsia, as opposed to triage note.    Reports she began with SERRANO while on her way to work about 1 week ago, and that night developed URI sx's with rhinorrhea, congestion, sore throat, productive cough with green/yellow sputum. Reports intermittent light-headedness. Also reports BLE swelling and LLE popliteal pain which she has been taking tylenol at night for. She reports associated midsternal CP x6 days that is constant, non-radiating and worse with laying flat and coughing. Reports all symptoms have been progressively worsening. Reports all sx's have been progressively worsening. Reports decreased appetite, reporting she has mostly just been having ice chips recently.     Reports her son and his classmates have been sick recently w/ similar sx's. Reports  that starting 1 mo/ago  she began having CP, SOB and hemoptysis which resolved temporarily.  Had 2 episodes of near-syncope associated with dyspnea 3 weeks ago. Reports taking baby aspirin and prenatal vitamins daily only. Denies Hx of blood clots or asthma. She endorses regular, consistent fetal movement today.        The history is provided by the patient. No  was used.           Review of patient's allergies indicates:   Allergen Reactions    Fluoride Swelling     Oral swelling    Unclassified drug Anaphylaxis     toothpaste    Grass pollen- grass standard     Msg [glutamic acid hcl]      Past Medical History:   Diagnosis Date    Alopecia     Hay fever     Oral herpes     Suppurative hidradenitis     18 YEARS OLD     Past Surgical  History:   Procedure Laterality Date     SECTION       SECTION WITH TUBAL LIGATION N/A 2022     section only. She did NOT Have tubal ligation.    DILATION AND CURETTAGE OF UTERUS       Family History   Problem Relation Name Age of Onset    Breast cancer Maternal Grandmother      Ovarian cancer Neg Hx      Colon cancer Neg Hx       Social History     Tobacco Use    Smoking status: Never    Smokeless tobacco: Never   Substance Use Topics    Alcohol use: Not Currently    Drug use: No     Review of Systems   Constitutional:  Positive for appetite change (<). Negative for fever.   HENT:  Positive for congestion, rhinorrhea and sore throat.    Respiratory:  Positive for cough and shortness of breath.    Cardiovascular:  Positive for chest pain and leg swelling.   Gastrointestinal:  Negative for abdominal pain, diarrhea, nausea and vomiting.   Genitourinary:  Negative for dysuria and hematuria.   Musculoskeletal:  Positive for myalgias (LLE). Negative for back pain and neck pain.   Skin:  Negative for rash.   Neurological:  Positive for light-headedness. Negative for syncope.       Physical Exam     Initial Vitals   BP Pulse Resp Temp SpO2   24 1222 24 1222 24 1223 24 1223 24 1222   126/71 (!) 119 (!) 22 98.5 °F (36.9 °C) 100 %      MAP       --                Physical Exam    Nursing note and vitals reviewed.  Constitutional: She appears well-developed and well-nourished. She is not diaphoretic. No distress.   HENT:   Head: Atraumatic.   Mouth/Throat: Oropharynx is clear and moist and mucous membranes are normal.   Eyes: Conjunctivae are normal. Right conjunctiva is not injected. Left conjunctiva is not injected. No scleral icterus.   Cardiovascular:  Normal rate, regular rhythm, normal heart sounds and intact distal pulses.           No murmur heard.  2+ DP pulses bilaterally.    Pulmonary/Chest: Effort normal and breath sounds normal.   No crackles or wheezing  in bilateral lungs.    Abdominal: She exhibits no distension. There is no abdominal tenderness.   Fundus high consistent with 3rd trimester pregnancy.    Musculoskeletal:      Comments: Left calf and popliteal TTP. Mild pitting edema to bilateral ankles.      Neurological: No cranial nerve deficit. Gait normal.   Skin: Skin is warm and dry. No ecchymosis and no rash noted.         ED Course   Procedures  Labs Reviewed   COMPREHENSIVE METABOLIC PANEL - Abnormal; Notable for the following components:       Result Value    CO2 17 (*)     Glucose 115 (*)     BUN 4 (*)     Albumin 2.8 (*)     ALT 9 (*)     All other components within normal limits   CBC W/ AUTO DIFFERENTIAL - Abnormal; Notable for the following components:    RBC 3.88 (*)     Hemoglobin 10.1 (*)     Hematocrit 32.3 (*)     MCH 26.0 (*)     MCHC 31.3 (*)     Gran % 76.4 (*)     Lymph % 15.6 (*)     All other components within normal limits   D DIMER, QUANTITATIVE - Abnormal; Notable for the following components:    D-Dimer 1.82 (*)     All other components within normal limits   B-TYPE NATRIURETIC PEPTIDE   LIPASE   MAGNESIUM   TROPONIN I   SARS-COV-2 RDRP GENE   POCT INFLUENZA A/B MOLECULAR   POCT STREP A MOLECULAR          Imaging Results              CTA Chest Non-Coronary (PE Studies) (Final result)  Result time 05/26/24 18:31:54      Final result by Jim Solano MD (05/26/24 18:31:54)                   Impression:      1. Bolus timing is insufficient for evaluation of pulmonary thromboembolism.  Allowing for current bolus timing, no large central pulmonary thromboembolism.  Distal embolism cannot be excluded on the basis of this exam.  Correlation of these findings with D-dimer and/or lower extremity ultrasound as warranted.  2. No acute pulmonary process.  3. Please see above for additional findings.      Electronically signed by: Jim Solano MD  Date:    05/26/2024  Time:    18:31               Narrative:    EXAMINATION:  CTA CHEST NON  CORONARY (PE STUDIES)    CLINICAL HISTORY:  Pulmonary embolism (PE) suspected, pregnant;    TECHNIQUE:  Low dose axial images, sagittal and coronal reformations were obtained from the thoracic inlet to the lung bases following the IV administration of 75 mL of Omnipaque 350.  Contrast timing was optimized to evaluate the pulmonary arteries.  MIP images were performed.    COMPARISON:  05/16/2019    FINDINGS:  The structures at the base of the neck are unremarkable.  No significant mediastinal lymphadenopathy.  The heart is not enlarged.  The thoracic aorta tapers normally.  Allowing for bolus timing, the visualized portions of the kidneys, spleen, pancreas, gallbladder and liver are grossly unremarkable.    The airways are patent.  There is a Magalis fissural nodule on the right measuring 3 mm, stable, strongly favoring benign etiology.  There is minimal bilateral basilar dependent atelectasis.  No large focal consolidation.  No pneumothorax.  No pleural effusion.    Bolus timing is inadequate for evaluation of pulmonary thromboembolism given weak opacification of the pulmonary arteries.  Allowing for current bolus timing, no large central pulmonary thromboembolism.  Distal embolism cannot be excluded on the basis of this exam, correlation of these findings with D-dimer and/or lower extremity ultrasound as warranted.    There are degenerative changes of the spine.  No significant axillary lymphadenopathy.                                       X-Ray Chest AP Portable (Final result)  Result time 05/26/24 14:28:10      Final result by Johnny Negron MD (05/26/24 14:28:10)                   Impression:      No acute abnormality.      Electronically signed by: Johnny Negron MD  Date:    05/26/2024  Time:    14:28               Narrative:    EXAMINATION:  XR CHEST AP PORTABLE    CLINICAL HISTORY:  Chest pain, unspecified    TECHNIQUE:  Single frontal view of the chest was performed.    COMPARISON:  Multiple prior  exams, most recent from 2024    FINDINGS:  The lungs are clear, with normal appearance of pulmonary vasculature and no pleural effusion or pneumothorax.    The cardiac silhouette is normal in size. The hilar and mediastinal contours are unremarkable.    Bones are intact.                                       US Lower Extremity Veins Bilateral (Final result)  Result time 24 14:18:15      Final result by Johnny Negron MD (24 14:18:15)                   Impression:      No evidence of deep venous thrombosis in either lower extremity.      Electronically signed by: Johnny Negron MD  Date:    2024  Time:    14:18               Narrative:    EXAMINATION:  US LOWER EXTREMITY VEINS BILATERAL    CLINICAL HISTORY:  Pain in left leg    TECHNIQUE:  Duplex and color flow Doppler and dynamic compression was performed of the bilateral lower extremity veins was performed.    COMPARISON:  None    FINDINGS:  Right thigh veins: The common femoral, femoral, popliteal, upper greater saphenous, and deep femoral veins are patent and free of thrombus. The veins are normally compressible and have normal phasic flow and augmentation response.    Right calf veins: The visualized calf veins are patent.    Left thigh veins: The common femoral, femoral, popliteal, upper greater saphenous, and deep femoral veins are patent and free of thrombus. The veins are normally compressible and have normal phasic flow and augmentation response.    Left calf veins: The visualized calf veins are patent.    Miscellaneous: None                                       Medications   famotidine (PF) injection 20 mg (20 mg Intravenous Given 24 1332)   lactated ringers bolus 1,000 mL (0 mLs Intravenous Stopped 24 1433)   iohexoL (OMNIPAQUE 350) injection 75 mL (75 mLs Intravenous Given 24 1816)     Medical Decision Making  32 yo  F at 32w6d per US, w/obesity, presenting with cough, dyspnea, CP.     Differential  diagnosis include but are not limited to: URI, covid/flu, PE, pneumonia, MSK, GERD    Covid/flu/strep negative in the ED. Pt reporting that chest pain, cough is worse with laying flat, with significant phlegm in the mornings which may be consistent with GERD exacerbated by third trimester pregnancy. Concern however for DVT/PE given patient reporting significant dyspnea over the last month, with a few episodes of near syncope due to dyspnea, prior to URI symptoms. US of bilateral lower extremities negative for DVT. D-dimer elevated over cut off for third trimester pregnancy following YEARS algorithm, discussed risk/benefit of CTA PE with patient in third trimester pregnancy, who consented to CTA. Poor contrast timing to look for segmental PE, however no large PE seen. Discussed possible missed smaller PE with patient, advised immediate return to ED if with worsening chest pain/sob. No tachycardia (for third trimester), satting 98-99% on RA, no signs of right heart strain on EKG. Given mucinex DM and pepcid for cough, burning chest pain symptoms, advised follow up for echo with either OBGYN or cards.     I discussed with the patient/family the diagnosis, treatment plan, indications for return to the emergency department, as well as for expected follow-up. The patient/family verbalized an understanding. The patient/family is asked if there were any questions or concerns, which were addressed to patient/family satisfaction. Patient/family understands and is agreeable to the plan.     DISCLAIMER: This note was prepared with [employer] voice recognition transcription software. Garbled syntax, mangled pronouns, and other bizarre constructions may be attributed to that software system.        Amount and/or Complexity of Data Reviewed  Labs: ordered. Decision-making details documented in ED Course.  Radiology: ordered.    Risk  OTC drugs.  Prescription drug management.            Scribe Attestation:   Scribe #1: I performed the  above scribed service and the documentation accurately describes the services I performed. I attest to the accuracy of the note.        ED Course as of 05/27/24 1957   Sun May 26, 2024   1354 SARS-CoV-2 RNA, Amplification, Qual: Negative [LF]   1354 POC Molecular Influenza B Ag: Negative [LF]   1354 Molecular Strep A, POC: Negative [LF]   1418  EKG independently interpreted by me with sinus tachycardia rate of 115, , no T-wave inversions, normal axis, no S1 Q 3 T3, [LF]   1545  D-dimer is elevated to 1.82, greater than the 1.0 cut off following the years algorithm in pregnancy, discussed risk/benefits of CTA study for PE rule out in 3rd trimester pregnancy with the patient, including radiation to the fetus and 1/81779 increased risk of cancer in the fetus, versus risk of undiagnosed PE causing cardiovascular compromise/ morbidity/mortality, patient consented to receiving the CTA to look for PE [LF]      ED Course User Index  [LF] Francie Sanches MD                           I, Francie Sanches, personally performed the services described in this documentation.  All medical record entries made by the scribe were at my direction and in my presence.  I have reviewed the chart and agree that the record reflects my personal performance and is accurate and complete.    Clinical Impression:  Final diagnoses:  [R07.9] Chest pain  [M79.605] Left leg pain  [R05.9] Cough  [Z34.93] Third trimester pregnancy (Primary)          ED Disposition Condition    Discharge Stable          ED Prescriptions       Medication Sig Dispense Start Date End Date Auth. Provider    famotidine (PEPCID) 40 MG tablet Take 1 tablet (40 mg total) by mouth every evening. 30 tablet 5/26/2024 6/25/2024 Francie Sanches MD    dextromethorphan-guaiFENesin  mg (MUCINEX DM)  mg per 12 hr tablet Take 1 tablet by mouth 2 (two) times daily. for 10 days 20 tablet 5/26/2024 6/5/2024 Francie Sanches MD          Follow-up Information       Follow up With  Specialties Details Why Contact Info    [facility] Cardiology  chest pain/shortness of breath, 3rd trimester [address]  [facility] [place] [id number] [phone]    Armani Britton MD Obstetrics and Gynecology, Obstetrics and Gynecology  chest pain, dyspnea in 3rd trimester [address]  [address]  [facility] [address]  [phone]               Francie Sanches MD  05/27/24 2002

## 2024-05-27 ENCOUNTER — NURSE TRIAGE (OUTPATIENT)
Dept: ADMINISTRATIVE | Facility: CLINIC | Age: 34
End: 2024-05-27
Payer: MEDICAID

## 2024-05-27 LAB
OHS QRS DURATION: 82 MS
OHS QTC CALCULATION: 434 MS

## 2024-05-28 NOTE — TELEPHONE ENCOUNTER
Pt is 33 weeks pregnant, cough for 4 days, seen in the ED yesterday.  Pt states severe difficulty breathing, care advice states to call 911.  Patient verbally understands, all questions answered, advised to call back for any worsening symptoms or further needs.     Reason for Disposition   SEVERE difficulty breathing (e.g., struggling for each breath, speaks in single words)    Protocols used: Cough - Acute Non-Productive-A-AH

## 2024-05-29 ENCOUNTER — ROUTINE PRENATAL (OUTPATIENT)
Dept: OBSTETRICS AND GYNECOLOGY | Facility: CLINIC | Age: 34
End: 2024-05-29
Payer: MEDICAID

## 2024-05-29 VITALS
WEIGHT: 227.06 LBS | BODY MASS INDEX: 44.35 KG/M2 | SYSTOLIC BLOOD PRESSURE: 130 MMHG | DIASTOLIC BLOOD PRESSURE: 84 MMHG

## 2024-05-29 DIAGNOSIS — O34.219 PREVIOUS CESAREAN SECTION COMPLICATING PREGNANCY, ANTEPARTUM CONDITION OR COMPLICATION: ICD-10-CM

## 2024-05-29 DIAGNOSIS — Z3A.33 33 WEEKS GESTATION OF PREGNANCY: Primary | ICD-10-CM

## 2024-05-29 DIAGNOSIS — O99.213 OBESITY AFFECTING PREGNANCY IN THIRD TRIMESTER, UNSPECIFIED OBESITY TYPE: ICD-10-CM

## 2024-05-29 PROCEDURE — 99213 OFFICE O/P EST LOW 20 MIN: CPT | Mod: TH,S$PBB,, | Performed by: OBSTETRICS & GYNECOLOGY

## 2024-05-29 PROCEDURE — 87086 URINE CULTURE/COLONY COUNT: CPT | Performed by: OBSTETRICS & GYNECOLOGY

## 2024-05-29 PROCEDURE — 99212 OFFICE O/P EST SF 10 MIN: CPT | Mod: PBBFAC,TH | Performed by: OBSTETRICS & GYNECOLOGY

## 2024-05-29 PROCEDURE — 99999 PR PBB SHADOW E&M-EST. PATIENT-LVL II: CPT | Mod: PBBFAC,,, | Performed by: OBSTETRICS & GYNECOLOGY

## 2024-05-29 RX ORDER — RSV VACC, PREF A AND PREF B/PF 120MCG/0.5
0.5 VIAL (EA) INTRAMUSCULAR ONCE
Qty: 0.5 ML | Refills: 0 | Status: SHIPPED | OUTPATIENT
Start: 2024-05-29 | End: 2024-05-29

## 2024-05-29 NOTE — PROGRESS NOTES
33 y.o.  at 33w2d here for OB visit.  Pt of Dr. Britton.    Pregnancy complicated by:  Maternal obesity - Body mass index is 44.35 kg/m².   x 2, declined TOLAC    Pt was seen in ED  for SOB.  Workup for VTE was negative.  Doppler of LE negative.  CTA chest was negative.  Pt currently denies SOB, CP, or LE swelling.  Pt well appearing.  Lungs clear normal respiratory effort  RRR no m/g/r  Pt reports today she has allergy like sinus congestion and postnasal drip.  Supportive care measures discussed including Claritin and Flonase OTC.  VTE precautions and parameter to seek urgent evaluation discussed.    Otherwise, pt has no major complaints today.  Reports active fetus.  Denies vaginal bleeding, leakage of fluid, or contractions.    Pt has not completed 1hr glucola.  Timely testing advised.  Implications of GDM discussed.  States will go this week.    Pt was treated asymptomatic bacteriuria in the past.  Denies UTI sxs.  Repeat urine cx today.   UTI precautions.    Discussed implications of obesity on pregnancy.   Start wkly NST next wk until delivery secondary to maternal obesity.  Encourage healthy lifestyle modifications.    Encourage daily low dose ASA for prevention of preE.  Encourage participation in Connective MOM program.  Encourage Tdap and RSV vaccine.    Labor/preE precautions.  Kick counts.  Return q1w for NST/OB visit until delivery or sooner prn with Dr. Britton.  All questions answered, voiced understanding.  Significant other present for visit.      Jason Yanes MD

## 2024-05-30 ENCOUNTER — LAB VISIT (OUTPATIENT)
Dept: LAB | Facility: HOSPITAL | Age: 34
End: 2024-05-30
Attending: OBSTETRICS & GYNECOLOGY
Payer: MEDICAID

## 2024-05-30 DIAGNOSIS — Z3A.33 33 WEEKS GESTATION OF PREGNANCY: ICD-10-CM

## 2024-05-30 LAB — GLUCOSE SERPL-MCNC: 136 MG/DL (ref 70–140)

## 2024-05-30 PROCEDURE — 86593 SYPHILIS TEST NON-TREP QUANT: CPT | Performed by: OBSTETRICS & GYNECOLOGY

## 2024-05-30 PROCEDURE — 36415 COLL VENOUS BLD VENIPUNCTURE: CPT | Performed by: OBSTETRICS & GYNECOLOGY

## 2024-05-30 PROCEDURE — 82950 GLUCOSE TEST: CPT | Performed by: OBSTETRICS & GYNECOLOGY

## 2024-05-30 PROCEDURE — 87389 HIV-1 AG W/HIV-1&-2 AB AG IA: CPT | Performed by: OBSTETRICS & GYNECOLOGY

## 2024-05-30 PROCEDURE — 83036 HEMOGLOBIN GLYCOSYLATED A1C: CPT | Performed by: OBSTETRICS & GYNECOLOGY

## 2024-05-31 LAB
BACTERIA UR CULT: NORMAL
ESTIMATED AVG GLUCOSE: 105 MG/DL (ref 68–131)
HBA1C MFR BLD: 5.3 % (ref 4–5.6)
HIV 1+2 AB+HIV1 P24 AG SERPL QL IA: NORMAL
TREPONEMA PALLIDUM IGG+IGM AB [PRESENCE] IN SERUM OR PLASMA BY IMMUNOASSAY: NONREACTIVE

## 2024-06-03 ENCOUNTER — TELEPHONE (OUTPATIENT)
Dept: OBSTETRICS AND GYNECOLOGY | Facility: CLINIC | Age: 34
End: 2024-06-03
Payer: MEDICAID

## 2024-06-03 ENCOUNTER — HOSPITAL ENCOUNTER (OUTPATIENT)
Facility: HOSPITAL | Age: 34
Discharge: HOME | End: 2024-06-03
Attending: OBSTETRICS & GYNECOLOGY | Admitting: OBSTETRICS & GYNECOLOGY
Payer: MEDICAID

## 2024-06-03 DIAGNOSIS — N85.8 ALTERATION IN COMFORT ASSOCIATED WITH UTERINE CONTRACTIONS: ICD-10-CM

## 2024-06-03 PROCEDURE — 59025 FETAL NON-STRESS TEST: CPT | Mod: 76

## 2024-06-03 PROCEDURE — 99211 OFF/OP EST MAY X REQ PHY/QHP: CPT | Mod: 25

## 2024-06-03 NOTE — DISCHARGE INSTRUCTIONS
Home Undelivered Discharge Instructions    After Discharge Orders:    Future Appointments   Date Time Provider Department Center   6/5/2024  3:00 PM [facility]   6/5/2024  4:15 PM [facility]   6/12/2024  3:00 PM NST [facility]   6/12/2024  4:15 PM Armani Britton MD [facility]   6/19/2024  3:00 PM NST 1 [facility]   6/19/2024  4:15 PM Armani Britton MD [facility]   6/26/2024  3:00 PM [facility]ST [facility]   6/26/2024  4:15 PM Armani Britton MD [facility]   7/1/2024  1:40 PM NST 1 [facility]   7/3/2024  4:15 PM Armani Britton MD [facility]   7/10/2024  3:00 PM NST 1 [facility]   7/10/2024  4:15 PM Armani Britton MD [facility]       Current Discharge Medication List        CONTINUE these medications which have NOT CHANGED    Details   aspirin (ECOTRIN) 81 MG EC tablet Take 1 tablet (81 mg total) by mouth once daily.  Qty: 90 tablet, Refills: 0      dextromethorphan-guaiFENesin  mg (MUCINEX DM)  mg per 12 hr tablet Take 1 tablet by mouth 2 (two) times daily. for 10 days  Qty: 20 tablet, Refills: 0      famotidine (PEPCID) 40 MG tablet Take 1 tablet (40 mg total) by mouth every evening.  Qty: 30 tablet, Refills: 0      omeprazole (PRILOSEC) 40 MG capsule Take 1 capsule (40 mg total) by mouth 2 (two) times daily before meals.  Qty: 60 capsule, Refills: 11      valACYclovir (VALTREX) 500 MG tablet Take 2 tablets (1,000 mg total) by mouth daily as needed (Fever Blisters).  Qty: 30 tablet, Refills: 3    Associated Diagnoses: Herpes simplex labialis                     Diet:  normal diet as tolerated    Rest: normal activity as tolerated    Other instructions: Do kick counts once a day on your baby. Choose the time of day your baby is most active. Get in a comfortable lying or sitting position and time how long it takes to feel 10 kicks, twists,  turns, swishes, or rolls. Call your physician or midwife if there have not been 10 kicks in 2 hours    Call physician or midwife, return to Labor and Delivery, call 911, or go to the nearest Emergency Room if: increased leakage or fluid, contractions more than  5 per  30 minutes, decreased fetal movement, persistent low back pain or cramping, bleeding from vaginal area, difficulty urinating, pain with urination, difficulty breathing, new calf pain, persistent headache, or vision change

## 2024-06-03 NOTE — NURSING
Patient arrived from home with c/o contractions and back pain since 2pm today. Patient appears uncomfortable and difficult to walk from wheelchair. Elvie WILEY RN assisted patient to room. POC discussed.

## 2024-06-03 NOTE — NURSING
RN to bedside. EFMx2 placed. Full assessment done. History and medications reviewed. Pt reports 10/10 lower abdominal cramping and back pain that comes and goes. Pt reports active fetal movement. Denies vaginal bleeding, LOF, or any problems with urination. Abdomen soft non-tender. Gentle SVE performed. Cervix closed/thick/high. No blood or fluid noted during exam. Pt has not drank much water today and states she is thirsty and her lips are dry. PO hydration given. POC reviewed with pt and sig other, both verbalize understanding.

## 2024-06-03 NOTE — NURSING
D/c instructions reviewed with pt and sig other including labor precautions. Ambulated off unit. NAD noted.

## 2024-06-03 NOTE — TELEPHONE ENCOUNTER
----- Message from Nicolasa Hua sent at 6/3/2024 10:49 AM CDT -----  Regarding: call back request  Name of caller: tracey       What is the requesting detail: pt is requesting a call back regarding her stress test. Please advise       Can the clinic reply by [facility]:       What number to call back: [phone]

## 2024-06-05 ENCOUNTER — CLINICAL SUPPORT (OUTPATIENT)
Dept: OBSTETRICS AND GYNECOLOGY | Facility: CLINIC | Age: 34
End: 2024-06-05
Payer: MEDICAID

## 2024-06-05 ENCOUNTER — ROUTINE PRENATAL (OUTPATIENT)
Dept: OBSTETRICS AND GYNECOLOGY | Facility: CLINIC | Age: 34
End: 2024-06-05
Payer: MEDICAID

## 2024-06-05 ENCOUNTER — PATIENT MESSAGE (OUTPATIENT)
Dept: OBSTETRICS AND GYNECOLOGY | Facility: CLINIC | Age: 34
End: 2024-06-05

## 2024-06-05 VITALS
DIASTOLIC BLOOD PRESSURE: 64 MMHG | WEIGHT: 229.25 LBS | SYSTOLIC BLOOD PRESSURE: 110 MMHG | BODY MASS INDEX: 44.78 KG/M2

## 2024-06-05 DIAGNOSIS — Z3A.34 34 WEEKS GESTATION OF PREGNANCY: Primary | ICD-10-CM

## 2024-06-05 DIAGNOSIS — O99.213 OBESITY AFFECTING PREGNANCY IN THIRD TRIMESTER, UNSPECIFIED OBESITY TYPE: ICD-10-CM

## 2024-06-05 DIAGNOSIS — O34.219 PREVIOUS CESAREAN SECTION COMPLICATING PREGNANCY, ANTEPARTUM CONDITION OR COMPLICATION: ICD-10-CM

## 2024-06-05 PROCEDURE — 99999 PR PBB SHADOW E&M-EST. PATIENT-LVL III: CPT | Mod: PBBFAC,,, | Performed by: OBSTETRICS & GYNECOLOGY

## 2024-06-05 PROCEDURE — 99213 OFFICE O/P EST LOW 20 MIN: CPT | Mod: PBBFAC,TH,25 | Performed by: OBSTETRICS & GYNECOLOGY

## 2024-06-05 PROCEDURE — 59025 FETAL NON-STRESS TEST: CPT | Mod: 26,S$PBB,, | Performed by: OBSTETRICS & GYNECOLOGY

## 2024-06-05 PROCEDURE — 99213 OFFICE O/P EST LOW 20 MIN: CPT | Mod: TH,S$PBB,25, | Performed by: OBSTETRICS & GYNECOLOGY

## 2024-06-05 PROCEDURE — 59025 FETAL NON-STRESS TEST: CPT | Mod: PBBFAC | Performed by: OBSTETRICS & GYNECOLOGY

## 2024-06-05 NOTE — PROGRESS NOTES
NST completed in prenatal testing clinic. NST reactive and reassuring. Patient denies cxtn, leaking of fluid or vaginal bleeding. Patient reports + fetal movement.        06/05/24 1559   Non Stress Test - General   Indications/Pt Reported Complaint BMI   Test Duration (min) 21 min   Number of Fetuses 1   Acoustic Stimulator Yes   Response Acceleration   Contractions Not present   Nonstress Test Baby A   Variability Moderate   Decels None   Accels Present   Baseline    Interpretation Baby A   Nonstress Test Interpretation Reactive   Overall Impression Reassuring

## 2024-06-05 NOTE — PROGRESS NOTES
NST NOTES    Patient presents for her office visit and NST.    Indication:    BMI over 40    Interpretation:    Fetal heart tones show a baseline of 150 with at least 15 bpm x 15 seconds accelerations and moderate variability.  This is reactive.  No decelerations noted.  Good fetal activities noted.    Time:    Patient was monitored for 21 minutes for this visit    Armani Britton MD

## 2024-06-05 NOTE — PROGRESS NOTES
"34w2d  Patient comes in today for follow-up prenatal care  No new complaint.  No vaginal bleeding, contractions, or rupture of membranes.  Good fetal movements.    Eating well without significant nausea/vomiting  Gaining weight  Taking prenatal vitamins, iron supplement, and baby aspirin  Occasional "heartburn medicine"    Has been doing Connected MOM.  Just not connected  Tdap done previously    Discussed with patient MFM's findings and recommendations  [id number] XY.    Does not want to get quad/AFP  RCSBTL scheduled for 7/8/2024    Medicaid tubal consent signed previously  NST reactive today  Back in 2 weeks    Vitals signs, FHTs, urine dip, and PE findings documented, reviewed and available in OB flow chart.   I spent a total of 20 minutes on the day of the visit. This includes face to face time and non-face to face time preparing to see the patient (eg, review of tests and charts), Obtaining and/or reviewing separately obtained history, Documenting clinical information in the electronic or other health record, Independently interpreting results and communicating results to the patient/family/caregiver, or Care coordination.                         "

## 2024-06-10 ENCOUNTER — PATIENT MESSAGE (OUTPATIENT)
Dept: OTHER | Facility: OTHER | Age: 34
End: 2024-06-10
Payer: MEDICAID

## 2024-06-12 ENCOUNTER — HOSPITAL ENCOUNTER (OUTPATIENT)
Facility: HOSPITAL | Age: 34
Discharge: HOME | End: 2024-06-12
Attending: OBSTETRICS & GYNECOLOGY | Admitting: OBSTETRICS & GYNECOLOGY
Payer: MEDICAID

## 2024-06-12 ENCOUNTER — CLINICAL SUPPORT (OUTPATIENT)
Dept: OBSTETRICS AND GYNECOLOGY | Facility: CLINIC | Age: 34
End: 2024-06-12
Payer: MEDICAID

## 2024-06-12 ENCOUNTER — ROUTINE PRENATAL (OUTPATIENT)
Dept: OBSTETRICS AND GYNECOLOGY | Facility: CLINIC | Age: 34
End: 2024-06-12
Payer: MEDICAID

## 2024-06-12 VITALS
SYSTOLIC BLOOD PRESSURE: 132 MMHG | WEIGHT: 230.38 LBS | DIASTOLIC BLOOD PRESSURE: 80 MMHG | BODY MASS INDEX: 44.99 KG/M2

## 2024-06-12 VITALS
HEART RATE: 106 BPM | SYSTOLIC BLOOD PRESSURE: 107 MMHG | RESPIRATION RATE: 18 BRPM | TEMPERATURE: 99 F | DIASTOLIC BLOOD PRESSURE: 58 MMHG | OXYGEN SATURATION: 92 %

## 2024-06-12 DIAGNOSIS — Z3A.35 35 WEEKS GESTATION OF PREGNANCY: Primary | ICD-10-CM

## 2024-06-12 DIAGNOSIS — O34.219 PREVIOUS CESAREAN SECTION COMPLICATING PREGNANCY, ANTEPARTUM CONDITION OR COMPLICATION: ICD-10-CM

## 2024-06-12 DIAGNOSIS — O99.213 OBESITY AFFECTING PREGNANCY IN THIRD TRIMESTER, UNSPECIFIED OBESITY TYPE: ICD-10-CM

## 2024-06-12 PROCEDURE — 59025 FETAL NON-STRESS TEST: CPT

## 2024-06-12 PROCEDURE — 99213 OFFICE O/P EST LOW 20 MIN: CPT | Mod: TH,S$PBB,, | Performed by: OBSTETRICS & GYNECOLOGY

## 2024-06-12 PROCEDURE — 99211 OFF/OP EST MAY X REQ PHY/QHP: CPT | Mod: 27

## 2024-06-12 PROCEDURE — 99999 PR PBB SHADOW E&M-EST. PATIENT-LVL III: CPT | Mod: PBBFAC,,, | Performed by: OBSTETRICS & GYNECOLOGY

## 2024-06-12 PROCEDURE — 99213 OFFICE O/P EST LOW 20 MIN: CPT | Mod: PBBFAC,TH,25 | Performed by: OBSTETRICS & GYNECOLOGY

## 2024-06-12 PROCEDURE — 99459 PELVIC EXAMINATION: CPT | Mod: PBBFAC | Performed by: OBSTETRICS & GYNECOLOGY

## 2024-06-12 PROCEDURE — 99213 OFFICE O/P EST LOW 20 MIN: CPT | Mod: TH,,, | Performed by: OBSTETRICS & GYNECOLOGY

## 2024-06-12 NOTE — PROGRESS NOTES
NST NOTES    Patient presents for her office visit and NST.    Indication:    BMI over 40    Interpretation:    Fetal heart tones show a baseline of 130-150 with at least 15 bpm x 15 seconds accelerations and moderate variability.  This is reactive.  No decelerations noted.  Good fetal activities noted.    Time:    Patient was monitored for 30 minutes for this visit    Armani Britton MD

## 2024-06-12 NOTE — PROGRESS NOTES
NST completed in prenatal testing clinic. NST reactive and reassuring. Patient denies regular cxtn, leaking of fluid or vaginal bleeding. Patient reports decreased fetal movement today. Strict FKC. Patient encouraged to go to ED if decreased fetal movement persists. Verbalized understanding.      06/12/24 1606   Non Stress Test - General   Indications/Pt Reported Complaint BMI   Test Duration (min) 27 min   Number of Fetuses 1   Acoustic Stimulator Yes   Response Acceleration   Contractions Not present   Nonstress Test Baby A   Variability Moderate   Decels None   Accels Present   Baseline    Interpretation Baby A   Nonstress Test Interpretation Reactive   Overall Impression Reassuring

## 2024-06-12 NOTE — PROGRESS NOTES
"35w2d  Patient comes in today for follow-up prenatal care  No new complaint.  No vaginal bleeding, contractions, or rupture of membranes.  Good fetal movements.    Eating well without significant nausea/vomiting  Gaining weight  Taking prenatal vitamins, iron supplement, and baby aspirin  Occasional "heartburn medicine"    Has been doing Connected MOM.  Just not connected  Tdap done previously    Discussed with patient MFM's findings and recommendations  [id number] XY.    Does not want to get quad/AFP  RCSBTL scheduled for 7/8/2024    Medicaid tubal consent signed previously  NST reactive today  Labor precautions  Fetal kick count  Back  next week    Vitals signs, FHTs, urine dip, and PE findings documented, reviewed and available in OB flow chart.   I spent a total of 20 minutes on the day of the visit. This includes face to face time and non-face to face time preparing to see the patient (eg, review of tests and charts), Obtaining and/or reviewing separately obtained history, Documenting clinical information in the electronic or other health record, Independently interpreting results and communicating results to the patient/family/caregiver, or Care coordination.                           "

## 2024-06-13 ENCOUNTER — PATIENT MESSAGE (OUTPATIENT)
Dept: OBSTETRICS AND GYNECOLOGY | Facility: CLINIC | Age: 34
End: 2024-06-13
Payer: MEDICAID

## 2024-06-13 NOTE — TREATMENT PLAN
Pt arrived with c/o decreased fetal movement;denies vag bleeding and leaking of fluid;abd soft and nonlabored; fetal movement noted on monitor;pt states she feels it now;poc discussed;water given to pt

## 2024-06-13 NOTE — DISCHARGE INSTRUCTIONS
Home Undelivered Discharge Instructions    After Discharge Orders:    Future Appointments   Date Time Provider Department Center   6/19/2024  3:00 PM [facility]   6/19/2024  4:15 PM [facility]on [facility]   6/26/2024  3:20 PM [facility]ST [facility]   6/26/2024  4:15 PM Armani Britton MD [facility]   7/1/2024  3:20 PM NST 1 [facility]   7/3/2024  4:15 PM Armani Britton MD [facility]   7/10/2024  3:00 PM [facility]ST [facility]   7/10/2024  4:15 PM Armani Britton MD [facility]           Current Discharge Medication List        CONTINUE these medications which have NOT CHANGED    Details   aspirin (ECOTRIN) 81 MG EC tablet Take 1 tablet (81 mg total) by mouth once daily.  Qty: 90 tablet, Refills: 0      famotidine (PEPCID) 40 MG tablet Take 1 tablet (40 mg total) by mouth every evening.  Qty: 30 tablet, Refills: 0      omeprazole (PRILOSEC) 40 MG capsule Take 1 capsule (40 mg total) by mouth 2 (two) times daily before meals.  Qty: 60 capsule, Refills: 11      valACYclovir (VALTREX) 500 MG tablet Take 2 tablets (1,000 mg total) by mouth daily as needed (Fever Blisters).  Qty: 30 tablet, Refills: 3    Associated Diagnoses: Herpes simplex labialis                     Diet:  normal diet as tolerated    Rest: normal activity as tolerated    Other instructions: Do kick counts once a day on your baby. Choose the time of day your baby is most active. Get in a comfortable lying or sitting position and time how long it takes to feel 10 kicks, twists, turns, swishes, or rolls. Call your physician or midwife if there have not been 10 kicks in 1 hours    Call physician or midwife, return to Labor and Delivery, call 911, or go to the nearest Emergency Room if: increased leakage or fluid, contractions more than  6 per  1 hour, decreased fetal movement, persistent low back pain or cramping, bleeding from vaginal area, persistent  headache, or vision change

## 2024-06-13 NOTE — HOSPITAL COURSE
34 yo  at 35w2d  Previous  sections x 2  Undesired fertility  Has been getting NST for BMI > 40  Presented tonight with decreased fetal movements since her visit with us earlier today  NST was reactive after vibroacoustic stimulation    On Labor and Delivery, vitals stable  FHT: 130-140 Category 1.  Reactive  Contraction: Rare  Cervix: Deferred    Will discharge home now  Labor precautions  Fetal kick count  Back next week

## 2024-06-13 NOTE — DISCHARGE SUMMARY
[facility] [place] - Labor & Delivery  Obstetrics  Discharge Summary      Patient Name: Brock Alfaro  MRN: [id number]  Admission Date: 2024  Hospital Length of Stay: 0 days  Discharge Date and Time:  2024 10:33 PM  Attending Physician: Armani Britton MD   Discharging Provider: Armani Britton MD   Primary Care Provider: Latesha, Primary Doctor    HPI: No notes on file    FHT: 130-140 Cat 1 (reassuring)  TOCO:  None    * No surgery found *     Hospital Course:   32 yo  at 35w2d  Previous  sections x 2  Undesired fertility  Has been getting NST for BMI > 40  Presented tonight with decreased fetal movements since her visit with us earlier today  NST was reactive after vibroacoustic stimulation    On Labor and Delivery, vitals stable  FHT: 130-140 Category 1.  Reactive  Contraction: Rare  Cervix: Deferred    Will discharge home now  Labor precautions  Fetal kick count  Back next week         There are no hospital problems to display for this patient.       Significant Diagnostic Studies: NST reactive      Feeding Method: breast    Immunizations       None            This patient has no babies on file.  Pending Diagnostic Studies:       None            Discharged Condition: good    Disposition: Home or Self Care    Follow Up:   Follow-up Information       Armani Britton MD Follow up in 1 week(s).    Specialties: Obstetrics and Gynecology, Obstetrics and Gynecology  Contact information:  [address]  [address] [place] [id number] [phone]                           Patient Instructions:      No dressing needed     Medications:  Current Discharge Medication List        CONTINUE these medications which have NOT CHANGED    Details   aspirin (ECOTRIN) 81 MG EC tablet Take 1 tablet (81 mg total) by mouth once daily.  Qty: 90 tablet, Refills: 0      famotidine (PEPCID) 40 MG tablet Take 1 tablet (40 mg total) by mouth every evening.  Qty: 30 tablet, Refills: 0      omeprazole (PRILOSEC) 40 MG capsule Take 1  capsule (40 mg total) by mouth 2 (two) times daily before meals.  Qty: 60 capsule, Refills: 11      valACYclovir (VALTREX) 500 MG tablet Take 2 tablets (1,000 mg total) by mouth daily as needed (Fever Blisters).  Qty: 30 tablet, Refills: 3    Associated Diagnoses: Herpes simplex labialis             Armani Britton MD  Obstetrics  [facility] [place] - Labor & Delivery

## 2024-06-14 ENCOUNTER — TELEPHONE (OUTPATIENT)
Dept: OBSTETRICS AND GYNECOLOGY | Facility: CLINIC | Age: 34
End: 2024-06-14
Payer: MEDICAID

## 2024-06-19 ENCOUNTER — ROUTINE PRENATAL (OUTPATIENT)
Dept: OBSTETRICS AND GYNECOLOGY | Facility: CLINIC | Age: 34
End: 2024-06-19
Payer: MEDICAID

## 2024-06-19 ENCOUNTER — CLINICAL SUPPORT (OUTPATIENT)
Dept: OBSTETRICS AND GYNECOLOGY | Facility: CLINIC | Age: 34
End: 2024-06-19
Payer: MEDICAID

## 2024-06-19 VITALS
DIASTOLIC BLOOD PRESSURE: 80 MMHG | SYSTOLIC BLOOD PRESSURE: 132 MMHG | WEIGHT: 229.25 LBS | BODY MASS INDEX: 44.78 KG/M2

## 2024-06-19 DIAGNOSIS — O99.213 OBESITY AFFECTING PREGNANCY IN THIRD TRIMESTER, UNSPECIFIED OBESITY TYPE: ICD-10-CM

## 2024-06-19 DIAGNOSIS — O34.219 PREVIOUS CESAREAN SECTION COMPLICATING PREGNANCY, ANTEPARTUM CONDITION OR COMPLICATION: ICD-10-CM

## 2024-06-19 DIAGNOSIS — Z3A.36 36 WEEKS GESTATION OF PREGNANCY: Primary | ICD-10-CM

## 2024-06-19 PROCEDURE — 99999 PR PBB SHADOW E&M-EST. PATIENT-LVL III: CPT | Mod: PBBFAC,,, | Performed by: OBSTETRICS & GYNECOLOGY

## 2024-06-19 PROCEDURE — 99213 OFFICE O/P EST LOW 20 MIN: CPT | Mod: PBBFAC,TH,25 | Performed by: OBSTETRICS & GYNECOLOGY

## 2024-06-19 PROCEDURE — 59025 FETAL NON-STRESS TEST: CPT | Mod: PBBFAC | Performed by: OBSTETRICS & GYNECOLOGY

## 2024-06-19 NOTE — PROGRESS NOTES
"36w2d  Patient comes in today for follow-up prenatal care  No new complaint.  No vaginal bleeding, contractions, or rupture of membranes.  Good fetal movements.    Eating well without significant nausea/vomiting  Gaining weight  Taking prenatal vitamins, iron supplement, and baby aspirin  Occasional "heartburn medicine"    Has been doing Connected MOM.  Just not connected  Tdap done previously    Discussed with patient MFM's findings and recommendations  [id number] XY.    Does not want to get quad/AFP  RCSBTL scheduled for 7/8/2024.  Would like to change to 7/10 or 7/11    Medicaid tubal consent signed previously  NST reactive today  Labor precautions  Fetal kick count  RCSBTL scheduled for 7/11/2024 at 0730  Does not want to be examined today  Back  next week    Vitals signs, FHTs, urine dip, and PE findings documented, reviewed and available in OB flow chart.   I spent a total of 20 minutes on the day of the visit. This includes face to face time and non-face to face time preparing to see the patient (eg, review of tests and charts), Obtaining and/or reviewing separately obtained history, Documenting clinical information in the electronic or other health record, Independently interpreting results and communicating results to the patient/family/caregiver, or Care coordination.                             "

## 2024-06-19 NOTE — PROGRESS NOTES
NST NOTES    Patient presents for her office visit and NST.    Indication:    BMI over 40    Interpretation:    Fetal heart tones show a baseline of 130-150 with at least 15 bpm x 15 seconds accelerations and moderate variability.  This is reactive.  No decelerations noted.  Good fetal activities noted.    Time:    Patient was monitored for 23 minutes for this visit    Armani Britton MD

## 2024-06-19 NOTE — PROGRESS NOTES
NST completed in prenatal testing clinic. NST reactive and reassuring. Patient denies cxtn, leaking of fluid or vaginal bleeding. Patient reports + fetal movement.        06/19/24 1603   Non Stress Test - General   Indications/Pt Reported Complaint BMI   Test Duration (min) 22 min   Number of Fetuses 1   Acoustic Stimulator No   Contractions Irregular   Nonstress Test Baby A   Variability Moderate   Decels None   Accels Present   Baseline    Interpretation Baby A   Nonstress Test Interpretation Reactive   Overall Impression Reassuring

## 2024-06-20 ENCOUNTER — HOSPITAL ENCOUNTER (OUTPATIENT)
Facility: HOSPITAL | Age: 34
Discharge: HOME | End: 2024-06-21
Attending: OBSTETRICS & GYNECOLOGY | Admitting: OBSTETRICS & GYNECOLOGY
Payer: MEDICAID

## 2024-06-20 DIAGNOSIS — R10.9 ABDOMINAL CRAMPING: ICD-10-CM

## 2024-06-20 DIAGNOSIS — Z3A.36 36 WEEKS GESTATION OF PREGNANCY: Primary | ICD-10-CM

## 2024-06-20 PROCEDURE — 63600175 PHARM REV CODE 636 W HCPCS: Performed by: OBSTETRICS & GYNECOLOGY

## 2024-06-20 PROCEDURE — 96366 THER/PROPH/DIAG IV INF ADDON: CPT | Mod: 59

## 2024-06-20 PROCEDURE — 99211 OFF/OP EST MAY X REQ PHY/QHP: CPT | Mod: 25

## 2024-06-20 PROCEDURE — 96360 HYDRATION IV INFUSION INIT: CPT

## 2024-06-20 PROCEDURE — 59025 FETAL NON-STRESS TEST: CPT

## 2024-06-20 PROCEDURE — 25000003 PHARM REV CODE 250: Performed by: OBSTETRICS & GYNECOLOGY

## 2024-06-20 RX ORDER — DIPHENHYDRAMINE HYDROCHLORIDE 50 MG/ML
25 INJECTION INTRAMUSCULAR; INTRAVENOUS ONCE
Status: DISCONTINUED | OUTPATIENT
Start: 2024-06-20 | End: 2024-06-21 | Stop reason: HOSPADM

## 2024-06-20 RX ORDER — MORPHINE SULFATE 4 MG/ML
4 INJECTION, SOLUTION INTRAMUSCULAR; INTRAVENOUS ONCE
Status: DISCONTINUED | OUTPATIENT
Start: 2024-06-20 | End: 2024-06-21 | Stop reason: HOSPADM

## 2024-06-20 RX ORDER — FAMOTIDINE 20 MG/1
20 TABLET, FILM COATED ORAL ONCE
Status: COMPLETED | OUTPATIENT
Start: 2024-06-20 | End: 2024-06-20

## 2024-06-20 RX ORDER — SODIUM CHLORIDE, SODIUM LACTATE, POTASSIUM CHLORIDE, CALCIUM CHLORIDE 600; 310; 30; 20 MG/100ML; MG/100ML; MG/100ML; MG/100ML
INJECTION, SOLUTION INTRAVENOUS CONTINUOUS
Status: DISCONTINUED | OUTPATIENT
Start: 2024-06-20 | End: 2024-06-21 | Stop reason: HOSPADM

## 2024-06-20 RX ORDER — ONDANSETRON 8 MG/1
8 TABLET, ORALLY DISINTEGRATING ORAL EVERY 8 HOURS PRN
Status: DISCONTINUED | OUTPATIENT
Start: 2024-06-20 | End: 2024-06-21 | Stop reason: HOSPADM

## 2024-06-20 RX ADMIN — SODIUM CHLORIDE, POTASSIUM CHLORIDE, SODIUM LACTATE AND CALCIUM CHLORIDE: 600; 310; 30; 20 INJECTION, SOLUTION INTRAVENOUS at 07:06

## 2024-06-20 RX ADMIN — FAMOTIDINE 20 MG: 20 TABLET ORAL at 10:06

## 2024-06-20 RX ADMIN — SODIUM CHLORIDE, POTASSIUM CHLORIDE, SODIUM LACTATE AND CALCIUM CHLORIDE: 600; 310; 30; 20 INJECTION, SOLUTION INTRAVENOUS at 08:06

## 2024-06-21 VITALS
WEIGHT: 229.25 LBS | DIASTOLIC BLOOD PRESSURE: 61 MMHG | OXYGEN SATURATION: 99 % | SYSTOLIC BLOOD PRESSURE: 115 MMHG | HEART RATE: 105 BPM | BODY MASS INDEX: 45.01 KG/M2 | HEIGHT: 60 IN

## 2024-06-21 PROBLEM — Z3A.36 36 WEEKS GESTATION OF PREGNANCY: Status: ACTIVE | Noted: 2024-06-21

## 2024-06-21 PROCEDURE — 63600175 PHARM REV CODE 636 W HCPCS: Performed by: OBSTETRICS & GYNECOLOGY

## 2024-06-21 PROCEDURE — 99213 OFFICE O/P EST LOW 20 MIN: CPT | Mod: TH,,, | Performed by: OBSTETRICS & GYNECOLOGY

## 2024-06-21 RX ORDER — TERBUTALINE SULFATE 1 MG/ML
0.25 INJECTION SUBCUTANEOUS ONCE
Status: COMPLETED | OUTPATIENT
Start: 2024-06-21 | End: 2024-06-21

## 2024-06-21 RX ADMIN — TERBUTALINE SULFATE 0.25 MG: 1 INJECTION SUBCUTANEOUS at 03:06

## 2024-06-21 NOTE — DISCHARGE INSTRUCTIONS
Home Undelivered Discharge Instructions    After Discharge Orders:    Future Appointments   Date Time Provider Department Center   6/26/2024  3:20 PM NST 1 [facility]   6/26/2024  4:15 PM [facility]   7/1/2024  3:20 PM NST 1 [facility]   7/3/2024  4:15 PM Armani Britton MD [facility]   7/10/2024  3:00 PM NST 1 [facility]   7/10/2024  4:15 PM Armani Britton MD [facility]         Current Discharge Medication List        CONTINUE these medications which have NOT CHANGED    Details   aspirin (ECOTRIN) 81 MG EC tablet Take 1 tablet (81 mg total) by mouth once daily.  Qty: 90 tablet, Refills: 0      famotidine (PEPCID) 40 MG tablet Take 1 tablet (40 mg total) by mouth every evening.  Qty: 30 tablet, Refills: 0      omeprazole (PRILOSEC) 40 MG capsule Take 1 capsule (40 mg total) by mouth 2 (two) times daily before meals.  Qty: 60 capsule, Refills: 11      valACYclovir (VALTREX) 500 MG tablet Take 2 tablets (1,000 mg total) by mouth daily as needed (Fever Blisters).  Qty: 30 tablet, Refills: 3    Associated Diagnoses: Herpes simplex labialis                     Diet:  normal diet as tolerated    Rest: normal activity as tolerated    Other instructions: Do kick counts once a day on your baby. Choose the time of day your baby is most active. Get in a comfortable lying or sitting position and time how long it takes to feel 10 kicks, twists, turns, swishes, or rolls. Call your physician or midwife if there have not been 10 kicks in 2 hours    Call physician or midwife, return to Labor and Delivery, call 911, or go to the nearest Emergency Room if: increased leakage or fluid, contractions more than  5 per  30 minutes, decreased fetal movement, persistent low back pain or cramping, bleeding from vaginal area, difficulty urinating, pain with urination, difficulty breathing, new calf pain, persistent headache, or vision change

## 2024-06-21 NOTE — NURSING
2133- Dr. Wakefield phoned. Notified of pt being on unit, OB history, complaint, SVE, ctx pattern, and pain. MD orders to keep pt over night with continuous efm monitoring. MD orders morphine once and benadryl once if pt desires medication for pain.     2144- Plan of care reviewed with pt and significant other. Verbalize understanding. Moved to room 235.

## 2024-06-21 NOTE — NURSING
0315- Pt up to bathroom. Complaining of 8/10 painful contractions and feeling like she needs to throw up. Emesis bag given to pt. Pain medication offered, but declined. SVE unchanged.    0322- Dr. Wakefield phoned. Updated on ctx pattern, pain level, refusal of medication, and unchanged SVE. MD orders for terbutaline.    0350- Pt reports feeling much better since administration of terbutaline.

## 2024-06-21 NOTE — DISCHARGE SUMMARY
[facility] [place] - Labor & Delivery  Obstetrics  Discharge Summary      Patient Name: Brock Alfaro  MRN: [id number]  Admission Date: 2024  Hospital Length of Stay: 0 days  Discharge Date and Time:  2024 7:21 AM  Attending Physician: Armani Britton MD   Discharging Provider: Armani Britton MD   Primary Care Provider: Latesha, Primary Doctor    HPI: No notes on file    FHT: 140 Cat 1 (reassuring)  TOCO:  Q 3-5 minutes    * No surgery found *     Hospital Course:   32 yo  at 36w4d  Previous  sections x 2  Presented with regular, painful contractions last night  No cervical change overnight    Terbutaline SQ given x 1  Feeling better  No longer with regular and painful contractions  Does not want to be delivered at this time    FHT: 140 Category 1 Reactive  Contraction: mild at every 3-5 min  Cervix: 1 cm and long    Will discharge home   Labor precautions  Fetal kick count  Follow-up in the office next week         Final Active Diagnoses:    Diagnosis Date Noted POA    PRINCIPAL PROBLEM:  36 weeks gestation of pregnancy [Z3A.36] 2024 Not Applicable      Problems Resolved During this Admission:        Significant Diagnostic Studies: Labs: All labs within the past 24 hours have been reviewed      Feeding Method: breast    Immunizations       None            This patient has no babies on file.  Pending Diagnostic Studies:       None            Discharged Condition: good    Disposition: Home or Self Care    Follow Up:   Follow-up Information       Armani Britton MD Follow up in 1 week(s).    Specialties: Obstetrics and Gynecology, Obstetrics and Gynecology  Contact information:  [address]  [address] [place], [place] [id number] [phone]                           Patient Instructions:      No dressing needed     Medications:  Current Discharge Medication List        CONTINUE these medications which have NOT CHANGED    Details   aspirin (ECOTRIN) 81 MG EC tablet Take 1 tablet (81 mg total) by mouth once  daily.  Qty: 90 tablet, Refills: 0      famotidine (PEPCID) 40 MG tablet Take 1 tablet (40 mg total) by mouth every evening.  Qty: 30 tablet, Refills: 0      omeprazole (PRILOSEC) 40 MG capsule Take 1 capsule (40 mg total) by mouth 2 (two) times daily before meals.  Qty: 60 capsule, Refills: 11      valACYclovir (VALTREX) 500 MG tablet Take 2 tablets (1,000 mg total) by mouth daily as needed (Fever Blisters).  Qty: 30 tablet, Refills: 3    Associated Diagnoses: Herpes simplex labialis             Vu Mary Grace Britton MD  Obstetrics  [facility] - Labor & Delivery

## 2024-06-21 NOTE — HOSPITAL COURSE
34 yo  at 36w4d  Previous  sections x 2  Presented with regular, painful contractions last night  No cervical change overnight    Terbutaline SQ given x 1  Feeling better  No longer with regular and painful contractions  Does not want to be delivered at this time    FHT: 140 Category 1 Reactive  Contraction: mild at every 3-5 min  Cervix: 1 cm and long    Will discharge home   Labor precautions  Fetal kick count  Follow-up in the office next week

## 2024-06-21 NOTE — NURSING
Dr. Britton at bedside. Discharge orders given and discharge instructions reviewed with pt and sig other. Verbalized understanding with good recall noted.

## 2024-06-21 NOTE — NURSING
"Pt arrived to unit. This RN at bedside. Pt requesting to go to the bathroom. Instructed to collet urine and change into gown.    1855- This RN back at bedside. SVE 1/50/-3. Pt complaining of contractions all day, but getting worse around 1600. Pt reports feeling "like my baby dropped, like a bowling ball between my legs." Denies vaginal bleeding or leaking. Reports positive fetal movement. EFM x2 placed. Abdomen soft and non tender. Plan of care reviewed. Pt and significant other verbalize understanding.   "

## 2024-06-23 ENCOUNTER — HOSPITAL ENCOUNTER (OUTPATIENT)
Facility: HOSPITAL | Age: 34
Discharge: HOME | End: 2024-06-23
Attending: OBSTETRICS & GYNECOLOGY | Admitting: OBSTETRICS & GYNECOLOGY
Payer: MEDICAID

## 2024-06-23 VITALS
TEMPERATURE: 99 F | HEART RATE: 85 BPM | DIASTOLIC BLOOD PRESSURE: 78 MMHG | OXYGEN SATURATION: 99 % | HEIGHT: 60 IN | SYSTOLIC BLOOD PRESSURE: 141 MMHG | RESPIRATION RATE: 20 BRPM | WEIGHT: 229.25 LBS | BODY MASS INDEX: 45.01 KG/M2

## 2024-06-23 DIAGNOSIS — Z34.90 PREGNANCY: ICD-10-CM

## 2024-06-23 LAB
BILIRUB UR QL STRIP: NEGATIVE
CLARITY UR: CLEAR
COLOR UR: YELLOW
GLUCOSE UR QL STRIP: NEGATIVE
HGB UR QL STRIP: NEGATIVE
KETONES UR QL STRIP: ABNORMAL
LEUKOCYTE ESTERASE UR QL STRIP: NEGATIVE
NITRITE UR QL STRIP: NEGATIVE
PH UR STRIP: 6 [PH] (ref 5–8)
PROT UR QL STRIP: NEGATIVE
SP GR UR STRIP: 1.01 (ref 1–1.03)
URN SPEC COLLECT METH UR: ABNORMAL
UROBILINOGEN UR STRIP-ACNC: NEGATIVE EU/DL

## 2024-06-23 PROCEDURE — 81003 URINALYSIS AUTO W/O SCOPE: CPT | Performed by: OBSTETRICS & GYNECOLOGY

## 2024-06-23 PROCEDURE — 59025 FETAL NON-STRESS TEST: CPT

## 2024-06-23 PROCEDURE — 99211 OFF/OP EST MAY X REQ PHY/QHP: CPT | Mod: 25

## 2024-06-23 RX ORDER — SODIUM CHLORIDE, SODIUM LACTATE, POTASSIUM CHLORIDE, CALCIUM CHLORIDE 600; 310; 30; 20 MG/100ML; MG/100ML; MG/100ML; MG/100ML
INJECTION, SOLUTION INTRAVENOUS CONTINUOUS
Status: DISCONTINUED | OUTPATIENT
Start: 2024-06-23 | End: 2024-06-23 | Stop reason: HOSPADM

## 2024-06-23 RX ORDER — ONDANSETRON 8 MG/1
8 TABLET, ORALLY DISINTEGRATING ORAL EVERY 8 HOURS PRN
Status: DISCONTINUED | OUTPATIENT
Start: 2024-06-23 | End: 2024-06-23 | Stop reason: HOSPADM

## 2024-06-23 NOTE — NURSING
0209: Called Dr. Britton to update about the status of patient. MD ordered to discharge patient with discharge instruction.   NST reactive and reassuring.

## 2024-06-23 NOTE — NURSING
Patient came with c/o contraction stating that they were 3 minutes apart. Denies of loss of fluid and vaginal bleeding. +FM. EFM x 2 placed. Abdomen soft and non tender. SVE 1/50/-3

## 2024-06-23 NOTE — NURSING
0212: Provided discharge instruction to patient. Questions answered. Patient verbalized understanding.

## 2024-06-23 NOTE — DISCHARGE INSTRUCTIONS
Home Undelivered Discharge Instructions    After Discharge Orders:    Future Appointments   Date Time Provider Department Center   6/26/2024  3:20 PM NST 1 [facility]   6/26/2024  4:15 PM [facility]   7/1/2024  3:20 PM NST 1 [facility]   7/3/2024  4:15 PM Armani Britton MD [facility]   7/10/2024  3:00 PM NST 1 [facility]   7/10/2024  4:15 PM Armani Britton MD [facility]           Current Discharge Medication List        CONTINUE these medications which have NOT CHANGED    Details   aspirin (ECOTRIN) 81 MG EC tablet Take 1 tablet (81 mg total) by mouth once daily.  Qty: 90 tablet, Refills: 0      famotidine (PEPCID) 40 MG tablet Take 1 tablet (40 mg total) by mouth every evening.  Qty: 30 tablet, Refills: 0      omeprazole (PRILOSEC) 40 MG capsule Take 1 capsule (40 mg total) by mouth 2 (two) times daily before meals.  Qty: 60 capsule, Refills: 11      valACYclovir (VALTREX) 500 MG tablet Take 2 tablets (1,000 mg total) by mouth daily as needed (Fever Blisters).  Qty: 30 tablet, Refills: 3    Associated Diagnoses: Herpes simplex labialis                     Diet:  normal diet as tolerated    Rest: normal activity as tolerated    Other instructions: Do kick counts once a day on your baby. Choose the time of day your baby is most active. Get in a comfortable lying or sitting position and time how long it takes to feel 10 kicks, twists, turns, swishes, or rolls. Call your physician or midwife if there have not been 10 kicks in 1 hours    Call physician or midwife, return to Labor and Delivery, call 911, or go to the nearest Emergency Room if: increased leakage or fluid, contractions more than  5 per  15 minutes, decreased fetal movement, persistent low back pain or cramping, bleeding from vaginal area, difficulty urinating, pain with urination, difficulty breathing, new calf pain, persistent headache, or vision change

## 2024-06-26 ENCOUNTER — CLINICAL SUPPORT (OUTPATIENT)
Dept: OBSTETRICS AND GYNECOLOGY | Facility: CLINIC | Age: 34
End: 2024-06-26
Payer: MEDICAID

## 2024-06-26 ENCOUNTER — ROUTINE PRENATAL (OUTPATIENT)
Dept: OBSTETRICS AND GYNECOLOGY | Facility: CLINIC | Age: 34
End: 2024-06-26
Payer: MEDICAID

## 2024-06-26 VITALS
DIASTOLIC BLOOD PRESSURE: 68 MMHG | WEIGHT: 229.25 LBS | BODY MASS INDEX: 44.78 KG/M2 | SYSTOLIC BLOOD PRESSURE: 132 MMHG

## 2024-06-26 DIAGNOSIS — Z3A.37 37 WEEKS GESTATION OF PREGNANCY: Primary | ICD-10-CM

## 2024-06-26 DIAGNOSIS — O99.213 OBESITY AFFECTING PREGNANCY IN THIRD TRIMESTER, UNSPECIFIED OBESITY TYPE: ICD-10-CM

## 2024-06-26 DIAGNOSIS — O34.219 PREVIOUS CESAREAN SECTION COMPLICATING PREGNANCY, ANTEPARTUM CONDITION OR COMPLICATION: ICD-10-CM

## 2024-06-26 PROCEDURE — 87653 STREP B DNA AMP PROBE: CPT | Performed by: OBSTETRICS & GYNECOLOGY

## 2024-06-26 PROCEDURE — 99213 OFFICE O/P EST LOW 20 MIN: CPT | Mod: PBBFAC,TH,25 | Performed by: OBSTETRICS & GYNECOLOGY

## 2024-06-26 PROCEDURE — 99999 PR PBB SHADOW E&M-EST. PATIENT-LVL III: CPT | Mod: PBBFAC,,, | Performed by: OBSTETRICS & GYNECOLOGY

## 2024-06-26 PROCEDURE — 59025 FETAL NON-STRESS TEST: CPT | Mod: PBBFAC | Performed by: OBSTETRICS & GYNECOLOGY

## 2024-06-26 NOTE — PROGRESS NOTES
NST NOTES    Patient presents for her office visit and NST.    Indication:    BMI over 40    Interpretation:    Fetal heart tones show a baseline of 130-140 with at least 15 bpm x 15 seconds accelerations and moderate variability.  This is reactive.  No decelerations noted.  Good fetal activities noted.    Time:    Patient was monitored for 24 minutes for this visit    Armani Britton MD

## 2024-06-26 NOTE — PROGRESS NOTES
"37w2d  Patient comes in today for follow-up prenatal care  No new complaint.  No vaginal bleeding, contractions, or rupture of membranes.  Good fetal movements.    Eating well without significant nausea/vomiting  Gaining weight  Taking prenatal vitamins, iron supplement, and baby aspirin  Occasional "heartburn medicine"    Has been doing Connected MOM.  Just not connected  Tdap done previously    Discussed with patient MFM's findings and recommendations  [id number] XY.    Does not want to get quad/AFP  RCSBTL scheduled for 7/8/2024.  Would like to change to 7/10 or 7/11    Medicaid tubal consent signed previously  NST reactive today  Labor precautions  Fetal kick count  RCSBTL scheduled for 7/11/2024 at 0730  GBS, HIV/FTA-Abs, and consents   Back  next week    Vitals signs, FHTs, urine dip, and PE findings documented, reviewed and available in OB flow chart.   I spent a total of 20 minutes on the day of the visit. This includes face to face time and non-face to face time preparing to see the patient (eg, review of tests and charts), Obtaining and/or reviewing separately obtained history, Documenting clinical information in the electronic or other health record, Independently interpreting results and communicating results to the patient/family/caregiver, or Care coordination.                               "

## 2024-06-26 NOTE — PROGRESS NOTES
NST completed in prenatal testing clinic. NST reactive and reassuring. Patient denies cxtn, leaking of fluid or vaginal bleeding. Patient reports + fetal movement.        06/26/24 1558   Non Stress Test - General   Indications/Pt Reported Complaint BMI   Test Duration (min) 23 min   Number of Fetuses 1   Acoustic Stimulator No   Contractions Not present   Nonstress Test Baby A   Variability Moderate   Decels None   Accels Present   Baseline    Interpretation Baby A   Nonstress Test Interpretation Reactive   Overall Impression Reassuring

## 2024-06-28 ENCOUNTER — LAB VISIT (OUTPATIENT)
Dept: LAB | Facility: HOSPITAL | Age: 34
End: 2024-06-28
Attending: OBSTETRICS & GYNECOLOGY
Payer: MEDICAID

## 2024-06-28 DIAGNOSIS — O34.219 PREVIOUS CESAREAN SECTION COMPLICATING PREGNANCY, ANTEPARTUM CONDITION OR COMPLICATION: ICD-10-CM

## 2024-06-28 DIAGNOSIS — Z3A.17 17 WEEKS GESTATION OF PREGNANCY: ICD-10-CM

## 2024-06-28 DIAGNOSIS — Z3A.37 37 WEEKS GESTATION OF PREGNANCY: ICD-10-CM

## 2024-06-28 DIAGNOSIS — Z34.92 SECOND TRIMESTER PREGNANCY: ICD-10-CM

## 2024-06-28 LAB
BASOPHILS # BLD AUTO: 0.02 K/UL (ref 0–0.2)
BASOPHILS NFR BLD: 0.2 % (ref 0–1.9)
DIFFERENTIAL METHOD BLD: ABNORMAL
EOSINOPHIL # BLD AUTO: 0.1 K/UL (ref 0–0.5)
EOSINOPHIL NFR BLD: 1.6 % (ref 0–8)
ERYTHROCYTE [DISTWIDTH] IN BLOOD BY AUTOMATED COUNT: 14.2 % (ref 11.5–14.5)
GROUP B STREPTOCOCCUS, PCR: NEGATIVE
HCT VFR BLD AUTO: 31.2 % (ref 37–48.5)
HGB BLD-MCNC: 9.5 G/DL (ref 12–16)
IMM GRANULOCYTES # BLD AUTO: 0.03 K/UL (ref 0–0.04)
IMM GRANULOCYTES NFR BLD AUTO: 0.3 % (ref 0–0.5)
LYMPHOCYTES # BLD AUTO: 1.5 K/UL (ref 1–4.8)
LYMPHOCYTES NFR BLD: 17.2 % (ref 18–48)
MCH RBC QN AUTO: 24.5 PG (ref 27–31)
MCHC RBC AUTO-ENTMCNC: 30.4 G/DL (ref 32–36)
MCV RBC AUTO: 81 FL (ref 82–98)
MONOCYTES # BLD AUTO: 0.6 K/UL (ref 0.3–1)
MONOCYTES NFR BLD: 6.7 % (ref 4–15)
NEUTROPHILS # BLD AUTO: 6.4 K/UL (ref 1.8–7.7)
NEUTROPHILS NFR BLD: 74 % (ref 38–73)
NRBC BLD-RTO: 0 /100 WBC
PLATELET # BLD AUTO: 261 K/UL (ref 150–450)
PMV BLD AUTO: 10.6 FL (ref 9.2–12.9)
RBC # BLD AUTO: 3.87 M/UL (ref 4–5.4)
WBC # BLD AUTO: 8.62 K/UL (ref 3.9–12.7)

## 2024-06-28 PROCEDURE — 85025 COMPLETE CBC W/AUTO DIFF WBC: CPT | Performed by: OBSTETRICS & GYNECOLOGY

## 2024-06-28 PROCEDURE — 87389 HIV-1 AG W/HIV-1&-2 AB AG IA: CPT | Performed by: OBSTETRICS & GYNECOLOGY

## 2024-06-28 PROCEDURE — 36415 COLL VENOUS BLD VENIPUNCTURE: CPT | Performed by: OBSTETRICS & GYNECOLOGY

## 2024-06-28 PROCEDURE — 81511 FTL CGEN ABNOR FOUR ANAL: CPT | Performed by: OBSTETRICS & GYNECOLOGY

## 2024-06-28 PROCEDURE — 86593 SYPHILIS TEST NON-TREP QUANT: CPT | Performed by: OBSTETRICS & GYNECOLOGY

## 2024-06-29 LAB
HIV 1+2 AB+HIV1 P24 AG SERPL QL IA: NORMAL
TREPONEMA PALLIDUM IGG+IGM AB [PRESENCE] IN SERUM OR PLASMA BY IMMUNOASSAY: NONREACTIVE

## 2024-07-03 ENCOUNTER — PATIENT MESSAGE (OUTPATIENT)
Dept: OBSTETRICS AND GYNECOLOGY | Facility: CLINIC | Age: 34
End: 2024-07-03

## 2024-07-03 ENCOUNTER — ROUTINE PRENATAL (OUTPATIENT)
Dept: OBSTETRICS AND GYNECOLOGY | Facility: CLINIC | Age: 34
End: 2024-07-03
Payer: MEDICAID

## 2024-07-03 VITALS
DIASTOLIC BLOOD PRESSURE: 62 MMHG | WEIGHT: 226.44 LBS | BODY MASS INDEX: 44.22 KG/M2 | SYSTOLIC BLOOD PRESSURE: 128 MMHG

## 2024-07-03 DIAGNOSIS — O34.219 PREVIOUS CESAREAN SECTION COMPLICATING PREGNANCY, ANTEPARTUM CONDITION OR COMPLICATION: ICD-10-CM

## 2024-07-03 DIAGNOSIS — O99.213 OBESITY AFFECTING PREGNANCY IN THIRD TRIMESTER, UNSPECIFIED OBESITY TYPE: ICD-10-CM

## 2024-07-03 DIAGNOSIS — Z34.93 THIRD TRIMESTER PREGNANCY: ICD-10-CM

## 2024-07-03 DIAGNOSIS — Z3A.38 38 WEEKS GESTATION OF PREGNANCY: Primary | ICD-10-CM

## 2024-07-03 LAB
# FETUSES US: ABNORMAL
2ND TRIMESTER 4 SCREEN PNL SERPL: ABNORMAL
2ND TRIMESTER 4 SCREEN SERPL-IMP: ABNORMAL
AFP MOM SERPL: ABNORMAL
AFP SERPL-MCNC: 253.9 NG/ML
AGE AT DELIVERY: 34
B-HCG MOM SERPL: ABNORMAL
B-HCG SERPL-ACNC: 44.5 IU/ML
FET TS 21 RISK FROM MAT AGE: ABNORMAL
GA (DAYS): 4 D
GA (WEEKS): 37 WK
GA METHOD: ABNORMAL
IDDM PATIENT QL: ABNORMAL
INHIBIN A MOM SERPL: ABNORMAL
INHIBIN A SERPL-MCNC: 559.1 PG/ML
SMOKING STATUS FTND: NO
TS 18 RISK FETUS: ABNORMAL
TS 21 RISK FETUS: ABNORMAL
U ESTRIOL MOM SERPL: ABNORMAL
U ESTRIOL SERPL-MCNC: 8.57 NG/ML

## 2024-07-03 PROCEDURE — 99213 OFFICE O/P EST LOW 20 MIN: CPT | Mod: PBBFAC,TH | Performed by: OBSTETRICS & GYNECOLOGY

## 2024-07-03 PROCEDURE — 99999 PR PBB SHADOW E&M-EST. PATIENT-LVL III: CPT | Mod: PBBFAC,,, | Performed by: OBSTETRICS & GYNECOLOGY

## 2024-07-03 NOTE — PROGRESS NOTES
"38w2d  Patient comes in today for follow-up prenatal care  No new complaint.  No vaginal bleeding, contractions, or rupture of membranes.  Good fetal movements.    Eating well without significant nausea/vomiting  Gaining weight  Taking prenatal vitamins, iron supplement, and baby aspirin  Occasional "heartburn medicine"    Has been doing Connected MOM.  Just not connected  Tdap done previously    Discussed with patient MFM's findings and recommendations  [id number] XY.    Does not want to get quad/AFP  RCSBTL changed from 7/8/2024 to 7/11/2024    Medicaid tubal consent signed previously  NST reactive today  Labor precautions  Fetal kick count  RCSBTL scheduled for 7/11/2024 at 0730  Back  next week    Vitals signs, FHTs, urine dip, and PE findings documented, reviewed and available in OB flow chart.   I spent a total of 20 minutes on the day of the visit. This includes face to face time and non-face to face time preparing to see the patient (eg, review of tests and charts), Obtaining and/or reviewing separately obtained history, Documenting clinical information in the electronic or other health record, Independently interpreting results and communicating results to the patient/family/caregiver, or Care coordination.                                 "

## 2024-07-08 ENCOUNTER — HOSPITAL ENCOUNTER (OUTPATIENT)
Facility: HOSPITAL | Age: 34
Discharge: HOME | End: 2024-07-08
Attending: OBSTETRICS & GYNECOLOGY | Admitting: OBSTETRICS & GYNECOLOGY
Payer: MEDICAID

## 2024-07-08 ENCOUNTER — HOSPITAL ENCOUNTER (OUTPATIENT)
Dept: PREADMISSION TESTING | Facility: HOSPITAL | Age: 34
Discharge: HOME | End: 2024-07-08
Attending: OBSTETRICS & GYNECOLOGY
Payer: MEDICAID

## 2024-07-08 ENCOUNTER — NURSE TRIAGE (OUTPATIENT)
Dept: ADMINISTRATIVE | Facility: CLINIC | Age: 34
End: 2024-07-08
Payer: MEDICAID

## 2024-07-08 ENCOUNTER — TELEPHONE (OUTPATIENT)
Dept: OBSTETRICS AND GYNECOLOGY | Facility: CLINIC | Age: 34
End: 2024-07-08
Payer: MEDICAID

## 2024-07-08 VITALS — DIASTOLIC BLOOD PRESSURE: 79 MMHG | SYSTOLIC BLOOD PRESSURE: 129 MMHG | OXYGEN SATURATION: 100 % | HEART RATE: 112 BPM

## 2024-07-08 DIAGNOSIS — Z34.90 PREGNANCY: ICD-10-CM

## 2024-07-08 DIAGNOSIS — Z01.818 PREOP TESTING: Primary | ICD-10-CM

## 2024-07-08 LAB
ABO + RH BLD: NORMAL
BLD GP AB SCN CELLS X3 SERPL QL: NORMAL
RH BLD: NORMAL

## 2024-07-08 PROCEDURE — 59025 FETAL NON-STRESS TEST: CPT

## 2024-07-08 PROCEDURE — 86593 SYPHILIS TEST NON-TREP QUANT: CPT | Performed by: OBSTETRICS & GYNECOLOGY

## 2024-07-08 PROCEDURE — 86901 BLOOD TYPING SEROLOGIC RH(D): CPT | Mod: 91 | Performed by: OBSTETRICS & GYNECOLOGY

## 2024-07-08 PROCEDURE — 86901 BLOOD TYPING SEROLOGIC RH(D): CPT | Performed by: OBSTETRICS & GYNECOLOGY

## 2024-07-08 PROCEDURE — 86900 BLOOD TYPING SEROLOGIC ABO: CPT | Performed by: OBSTETRICS & GYNECOLOGY

## 2024-07-08 PROCEDURE — 99211 OFF/OP EST MAY X REQ PHY/QHP: CPT | Mod: 25

## 2024-07-08 NOTE — TELEPHONE ENCOUNTER
Pt currently in L&D as directed by triage nurse.     ----- Message from Sarah Hodge sent at 7/8/2024  2:21 PM CDT -----  Regarding: patient call back  Type: Patient Call Back    Who called: Self     What is the request in detail: asked for a call back to get her arrival time.     Can the clinic reply by [facility]? No     Would the patient rather a call back or a response via [facility]? Call     Best call back number: [phone]

## 2024-07-08 NOTE — DISCHARGE INSTRUCTIONS
Home Undelivered Discharge Instructions    After Discharge Orders:    Future Appointments   Date Time Provider Department Center   7/10/2024  3:00 PM NST 1 [facility]   7/10/2024  4:15 PM Armani Britton MD [facility]       Call physician or midwife's office for instructions.    Current Discharge Medication List        CONTINUE these medications which have NOT CHANGED    Details   aspirin (ECOTRIN) 81 MG EC tablet Take 1 tablet (81 mg total) by mouth once daily.  Qty: 90 tablet, Refills: 0      omeprazole (PRILOSEC) 40 MG capsule Take 1 capsule (40 mg total) by mouth 2 (two) times daily before meals.  Qty: 60 capsule, Refills: 11                     Diet:  normal diet as tolerated    Rest: normal activity as tolerated    Other instructions: Do kick counts once a day on your baby. Choose the time of day your baby is most active. Get in a comfortable lying or sitting position and time how long it takes to feel 10 kicks, twists, turns, swishes, or rolls. Call your physician or midwife if there have not been 10 kicks in 1 hours    Call physician or midwife, return to Labor and Delivery, call 911, or go to the nearest Emergency Room if: increased leakage or fluid, contractions more than  10 per  1 hour, decreased fetal movement, persistent low back pain or cramping, bleeding from vaginal area, difficulty urinating, pain with urination, difficulty breathing, new calf pain, persistent headache, or vision change

## 2024-07-08 NOTE — NURSING
Pt presented with c/o spotting, pt states she went to [facility] and when she wiped she noticed some bleeding on the toilet paper. SVE done no bleeding noted on exam glove, asked pt if she had hemorrhoids and was she trying to have a BM, pt states she is not sure her youngest one was at the door calling for her. Pt denies lof or recent sex.pt states no pain at this time.

## 2024-07-08 NOTE — TELEPHONE ENCOUNTER
"Pt reports that about an hour ago she had an episode of bright red blood when she was wiping. Described it as more than spotting and nothing mucous like or brownish or pinkish. Said it was "like the start of my period". Denies any abd pain or cramping and continues to feel baby kick.     Dispo is to go to L&D now. Care advice given. Pt v/u.     Reason for Disposition   MILD-MODERATE vaginal bleeding (e.g., small to medium clots; like mild menstrual period)  (Exception: Single episode of faint spotting when wiping, or slight spotting after intercourse or pelvic exam.)    Additional Information   Negative: Passed out (e.g., fainted, lost consciousness, blacked out and was not responding)   Negative: Shock suspected (e.g., cold/pale/clammy skin, too weak to stand, low BP, rapid pulse)   Negative: Difficult to awaken or acting confused (e.g., disoriented, slurred speech)   Negative: SEVERE vaginal bleeding (e.g., continuous red blood from vagina, large blood clots)   Negative: [1] SEVERE abdominal pain (e.g., excruciating) AND [2] constant AND [3] present > 1 hour   Negative: Sounds like a life-threatening emergency to the triager   Negative: Abdominal pain or having contractions   Negative: Leakage of fluid from vagina (or caller thinks they have ruptured their bag of oleary)   Negative: Baby moving less today (e.g., kick count < 5 in 1 hour or < 10 in 2 hours)   Negative: [1] Pregnant 24 to 36 weeks () AND [2] pinkish or brownish mucous discharge  (Exception: Single episode of faint spotting when wiping, or slight spotting after intercourse or pelvic exam.)   Negative: Known diagnosis of placenta previa or low-lying placenta   Negative: [1] Pregnant 20 to 23 weeks AND [2] pinkish or brownish mucous discharge  (Exception: Single episode of slight pinkish or brownish mucous discharge.)    Protocols used: Pregnancy - Vaginal Bleeding Greater Than 20 Weeks EG-A-    "

## 2024-07-09 ENCOUNTER — TELEPHONE (OUTPATIENT)
Dept: OBSTETRICS AND GYNECOLOGY | Facility: CLINIC | Age: 34
End: 2024-07-09
Payer: MEDICAID

## 2024-07-09 LAB — TREPONEMA PALLIDUM IGG+IGM AB [PRESENCE] IN SERUM OR PLASMA BY IMMUNOASSAY: NONREACTIVE

## 2024-07-09 NOTE — TELEPHONE ENCOUNTER
Spoke with pt regarding this matter, Pt stated she's scheduled to follow up with Dr. Zamorano on 07/11.

## 2024-07-10 ENCOUNTER — ROUTINE PRENATAL (OUTPATIENT)
Dept: OBSTETRICS AND GYNECOLOGY | Facility: CLINIC | Age: 34
End: 2024-07-10
Payer: MEDICAID

## 2024-07-10 ENCOUNTER — HOSPITAL ENCOUNTER (OUTPATIENT)
Dept: PREADMISSION TESTING | Facility: HOSPITAL | Age: 34
Discharge: HOME | End: 2024-07-10
Payer: MEDICAID

## 2024-07-10 ENCOUNTER — ANESTHESIA EVENT (OUTPATIENT)
Dept: OBSTETRICS AND GYNECOLOGY | Facility: HOSPITAL | Age: 34
End: 2024-07-10
Payer: MEDICAID

## 2024-07-10 VITALS
WEIGHT: 230.81 LBS | BODY MASS INDEX: 45.08 KG/M2 | SYSTOLIC BLOOD PRESSURE: 124 MMHG | DIASTOLIC BLOOD PRESSURE: 68 MMHG

## 2024-07-10 DIAGNOSIS — Z3A.39 39 WEEKS GESTATION OF PREGNANCY: Primary | ICD-10-CM

## 2024-07-10 DIAGNOSIS — Z34.93 THIRD TRIMESTER PREGNANCY: ICD-10-CM

## 2024-07-10 DIAGNOSIS — O99.213 OBESITY AFFECTING PREGNANCY IN THIRD TRIMESTER, UNSPECIFIED OBESITY TYPE: ICD-10-CM

## 2024-07-10 DIAGNOSIS — O34.219 PREVIOUS CESAREAN SECTION COMPLICATING PREGNANCY, ANTEPARTUM CONDITION OR COMPLICATION: ICD-10-CM

## 2024-07-10 PROCEDURE — 99999 PR PBB SHADOW E&M-EST. PATIENT-LVL III: CPT | Mod: PBBFAC,,, | Performed by: OBSTETRICS & GYNECOLOGY

## 2024-07-10 PROCEDURE — 99213 OFFICE O/P EST LOW 20 MIN: CPT | Mod: PBBFAC,TH | Performed by: OBSTETRICS & GYNECOLOGY

## 2024-07-10 RX ORDER — SODIUM CHLORIDE, SODIUM LACTATE, POTASSIUM CHLORIDE, CALCIUM CHLORIDE 600; 310; 30; 20 MG/100ML; MG/100ML; MG/100ML; MG/100ML
INJECTION, SOLUTION INTRAVENOUS CONTINUOUS
Status: CANCELLED | OUTPATIENT
Start: 2024-07-10

## 2024-07-10 RX ORDER — MISOPROSTOL 200 UG/1
800 TABLET ORAL
Status: CANCELLED | OUTPATIENT
Start: 2024-07-10

## 2024-07-10 RX ORDER — OXYTOCIN-SODIUM CHLORIDE 0.9% IV SOLN 30 UNIT/500ML 30-0.9/5 UT/ML-%
95 SOLUTION INTRAVENOUS ONCE
Status: CANCELLED | OUTPATIENT
Start: 2024-07-10 | End: 2024-07-10

## 2024-07-10 RX ORDER — SODIUM CITRATE AND CITRIC ACID MONOHYDRATE 334; 500 MG/5ML; MG/5ML
30 SOLUTION ORAL
Status: CANCELLED | OUTPATIENT
Start: 2024-07-10

## 2024-07-10 RX ORDER — FAMOTIDINE 10 MG/ML
20 INJECTION INTRAVENOUS
Status: CANCELLED | OUTPATIENT
Start: 2024-07-10

## 2024-07-10 RX ORDER — MUPIROCIN 20 MG/G
OINTMENT TOPICAL
Status: CANCELLED | OUTPATIENT
Start: 2024-07-10

## 2024-07-10 RX ORDER — METHYLERGONOVINE MALEATE 0.2 MG/ML
200 INJECTION INTRAVENOUS
Status: CANCELLED | OUTPATIENT
Start: 2024-07-10

## 2024-07-10 RX ORDER — OXYTOCIN-SODIUM CHLORIDE 0.9% IV SOLN 30 UNIT/500ML 30-0.9/5 UT/ML-%
10 SOLUTION INTRAVENOUS ONCE
Status: CANCELLED | OUTPATIENT
Start: 2024-07-10 | End: 2024-07-10

## 2024-07-10 RX ORDER — CARBOPROST TROMETHAMINE 250 UG/ML
250 INJECTION, SOLUTION INTRAMUSCULAR
Status: CANCELLED | OUTPATIENT
Start: 2024-07-10

## 2024-07-10 NOTE — PROGRESS NOTES
"39w2d  Patient comes in today for follow-up prenatal care  No new complaint.  No vaginal bleeding, contractions, or rupture of membranes.  Good fetal movements.    Eating well without significant nausea/vomiting  Gaining weight  Taking prenatal vitamins, iron supplement, and baby aspirin  Occasional "heartburn medicine"    Has been doing Connected MOM.  Just not connected  Tdap done previously    Discussed with patient MFM's findings and recommendations  [id number] XY.    Does not want to get quad/AFP  RCSBTL tomorrow 7/11/2024    Medicaid tubal consent signed previously  NST reactive today  Labor precautions  Fetal kick count  RCSBTL scheduled for 7/11/2024 at 0730  Back  next week    Vitals signs, FHTs, urine dip, and PE findings documented, reviewed and available in OB flow chart.   I spent a total of 20 minutes on the day of the visit. This includes face to face time and non-face to face time preparing to see the patient (eg, review of tests and charts), Obtaining and/or reviewing separately obtained history, Documenting clinical information in the electronic or other health record, Independently interpreting results and communicating results to the patient/family/caregiver, or Care coordination.                                   "

## 2024-07-11 ENCOUNTER — ANESTHESIA (OUTPATIENT)
Dept: OBSTETRICS AND GYNECOLOGY | Facility: HOSPITAL | Age: 34
End: 2024-07-11
Payer: MEDICAID

## 2024-07-11 ENCOUNTER — HOSPITAL ENCOUNTER (INPATIENT)
Facility: HOSPITAL | Age: 34
LOS: 2 days | Discharge: HOME | End: 2024-07-13
Attending: OBSTETRICS & GYNECOLOGY | Admitting: OBSTETRICS & GYNECOLOGY
Payer: MEDICAID

## 2024-07-11 DIAGNOSIS — Z3A.39 39 WEEKS GESTATION OF PREGNANCY: ICD-10-CM

## 2024-07-11 DIAGNOSIS — Z98.891 STATUS POST CESAREAN SECTION: Primary | ICD-10-CM

## 2024-07-11 DIAGNOSIS — O34.219 PREVIOUS CESAREAN SECTION COMPLICATING PREGNANCY, ANTEPARTUM CONDITION OR COMPLICATION: ICD-10-CM

## 2024-07-11 LAB
BASOPHILS # BLD AUTO: 0.02 K/UL (ref 0–0.2)
BASOPHILS NFR BLD: 0.2 % (ref 0–1.9)
DIFFERENTIAL METHOD BLD: ABNORMAL
EOSINOPHIL # BLD AUTO: 0.2 K/UL (ref 0–0.5)
EOSINOPHIL NFR BLD: 1.8 % (ref 0–8)
ERYTHROCYTE [DISTWIDTH] IN BLOOD BY AUTOMATED COUNT: 14.4 % (ref 11.5–14.5)
HCT VFR BLD AUTO: 31.7 % (ref 37–48.5)
HGB BLD-MCNC: 10.1 G/DL (ref 12–16)
IMM GRANULOCYTES # BLD AUTO: 0.05 K/UL (ref 0–0.04)
IMM GRANULOCYTES NFR BLD AUTO: 0.6 % (ref 0–0.5)
LYMPHOCYTES # BLD AUTO: 1.7 K/UL (ref 1–4.8)
LYMPHOCYTES NFR BLD: 20.5 % (ref 18–48)
MCH RBC QN AUTO: 24.5 PG (ref 27–31)
MCHC RBC AUTO-ENTMCNC: 31.9 G/DL (ref 32–36)
MCV RBC AUTO: 77 FL (ref 82–98)
MONOCYTES # BLD AUTO: 0.6 K/UL (ref 0.3–1)
MONOCYTES NFR BLD: 6.9 % (ref 4–15)
NEUTROPHILS # BLD AUTO: 5.8 K/UL (ref 1.8–7.7)
NEUTROPHILS NFR BLD: 70 % (ref 38–73)
NRBC BLD-RTO: 0 /100 WBC
PLATELET # BLD AUTO: 237 K/UL (ref 150–450)
PMV BLD AUTO: 10.2 FL (ref 9.2–12.9)
RBC # BLD AUTO: 4.12 M/UL (ref 4–5.4)
WBC # BLD AUTO: 8.35 K/UL (ref 3.9–12.7)

## 2024-07-11 PROCEDURE — 85025 COMPLETE CBC W/AUTO DIFF WBC: CPT | Performed by: OBSTETRICS & GYNECOLOGY

## 2024-07-11 PROCEDURE — 71000033 HC RECOVERY, INTIAL HOUR: Performed by: OBSTETRICS & GYNECOLOGY

## 2024-07-11 PROCEDURE — 58611 LIGATE OVIDUCT(S) ADD-ON: CPT | Mod: ,,, | Performed by: OBSTETRICS & GYNECOLOGY

## 2024-07-11 PROCEDURE — 63600175 PHARM REV CODE 636 W HCPCS: Performed by: NURSE ANESTHETIST, CERTIFIED REGISTERED

## 2024-07-11 PROCEDURE — 0UB70ZZ EXCISION OF BILATERAL FALLOPIAN TUBES, OPEN APPROACH: ICD-10-PCS | Performed by: OBSTETRICS & GYNECOLOGY

## 2024-07-11 PROCEDURE — 25000003 PHARM REV CODE 250: Performed by: ANESTHESIOLOGY

## 2024-07-11 PROCEDURE — 25000003 PHARM REV CODE 250: Performed by: OBSTETRICS & GYNECOLOGY

## 2024-07-11 PROCEDURE — 63600175 PHARM REV CODE 636 W HCPCS: Mod: JZ,JG | Performed by: ANESTHESIOLOGY

## 2024-07-11 PROCEDURE — 59514 CESAREAN DELIVERY ONLY: CPT | Mod: GB,80,, | Performed by: OBSTETRICS & GYNECOLOGY

## 2024-07-11 PROCEDURE — 36004725 HC OB OR TIME LEV III - EA ADD 15 MIN: Performed by: OBSTETRICS & GYNECOLOGY

## 2024-07-11 PROCEDURE — 36004724 HC OB OR TIME LEV III - 1ST 15 MIN: Performed by: OBSTETRICS & GYNECOLOGY

## 2024-07-11 PROCEDURE — 71000039 HC RECOVERY, EACH ADD'L HOUR: Performed by: OBSTETRICS & GYNECOLOGY

## 2024-07-11 PROCEDURE — 37000008 HC ANESTHESIA 1ST 15 MINUTES: Performed by: OBSTETRICS & GYNECOLOGY

## 2024-07-11 PROCEDURE — 25000003 PHARM REV CODE 250: Performed by: NURSE ANESTHETIST, CERTIFIED REGISTERED

## 2024-07-11 PROCEDURE — 37000009 HC ANESTHESIA EA ADD 15 MINS: Mod: SZN

## 2024-07-11 PROCEDURE — 51702 INSERT TEMP BLADDER CATH: CPT

## 2024-07-11 PROCEDURE — 27201423 OPTIME MED/SURG SUP & DEVICES STERILE SUPPLY: Performed by: OBSTETRICS & GYNECOLOGY

## 2024-07-11 PROCEDURE — 88302 TISSUE EXAM BY PATHOLOGIST: CPT | Mod: 59 | Performed by: PATHOLOGY

## 2024-07-11 PROCEDURE — 59025 FETAL NON-STRESS TEST: CPT

## 2024-07-11 PROCEDURE — 59514 CESAREAN DELIVERY ONLY: CPT | Mod: GB,,, | Performed by: OBSTETRICS & GYNECOLOGY

## 2024-07-11 PROCEDURE — 88302 TISSUE EXAM BY PATHOLOGIST: CPT | Mod: 26,,, | Performed by: PATHOLOGY

## 2024-07-11 PROCEDURE — 63600175 PHARM REV CODE 636 W HCPCS: Performed by: OBSTETRICS & GYNECOLOGY

## 2024-07-11 PROCEDURE — 36004725 HC OB OR TIME LEV III - EA ADD 15 MIN: Mod: SZN

## 2024-07-11 PROCEDURE — 11000001 HC ACUTE MED/SURG PRIVATE ROOM

## 2024-07-11 PROCEDURE — 63600175 PHARM REV CODE 636 W HCPCS: Performed by: ANESTHESIOLOGY

## 2024-07-11 PROCEDURE — 58611 LIGATE OVIDUCT(S) ADD-ON: CPT | Mod: 80,,, | Performed by: OBSTETRICS & GYNECOLOGY

## 2024-07-11 PROCEDURE — 37000009 HC ANESTHESIA EA ADD 15 MINS: Performed by: OBSTETRICS & GYNECOLOGY

## 2024-07-11 RX ORDER — FAMOTIDINE 10 MG/ML
20 INJECTION INTRAVENOUS ONCE
Status: DISCONTINUED | OUTPATIENT
Start: 2024-07-11 | End: 2024-07-11

## 2024-07-11 RX ORDER — OXYTOCIN-SODIUM CHLORIDE 0.9% IV SOLN 30 UNIT/500ML 30-0.9/5 UT/ML-%
10 SOLUTION INTRAVENOUS ONCE
Status: DISCONTINUED | OUTPATIENT
Start: 2024-07-11 | End: 2024-07-11

## 2024-07-11 RX ORDER — ACETAMINOPHEN 10 MG/ML
INJECTION, SOLUTION INTRAVENOUS
Status: DISCONTINUED | OUTPATIENT
Start: 2024-07-11 | End: 2024-07-11

## 2024-07-11 RX ORDER — EPHEDRINE SULFATE 50 MG/ML
INJECTION, SOLUTION INTRAVENOUS
Status: DISCONTINUED | OUTPATIENT
Start: 2024-07-11 | End: 2024-07-11

## 2024-07-11 RX ORDER — FENTANYL CITRATE 50 UG/ML
INJECTION, SOLUTION INTRAMUSCULAR; INTRAVENOUS
Status: DISCONTINUED | OUTPATIENT
Start: 2024-07-11 | End: 2024-07-11

## 2024-07-11 RX ORDER — BISACODYL 10 MG/1
10 SUPPOSITORY RECTAL ONCE AS NEEDED
Status: DISCONTINUED | OUTPATIENT
Start: 2024-07-11 | End: 2024-07-13 | Stop reason: HOSPADM

## 2024-07-11 RX ORDER — OXYTOCIN-SODIUM CHLORIDE 0.9% IV SOLN 30 UNIT/500ML 30-0.9/5 UT/ML-%
95 SOLUTION INTRAVENOUS ONCE
Status: DISCONTINUED | OUTPATIENT
Start: 2024-07-11 | End: 2024-07-11

## 2024-07-11 RX ORDER — ACETAMINOPHEN 325 MG/1
650 TABLET ORAL EVERY 6 HOURS
Status: COMPLETED | OUTPATIENT
Start: 2024-07-11 | End: 2024-07-12

## 2024-07-11 RX ORDER — CARBOPROST TROMETHAMINE 250 UG/ML
250 INJECTION, SOLUTION INTRAMUSCULAR
Status: DISCONTINUED | OUTPATIENT
Start: 2024-07-11 | End: 2024-07-11

## 2024-07-11 RX ORDER — PROCHLORPERAZINE EDISYLATE 5 MG/ML
5 INJECTION INTRAMUSCULAR; INTRAVENOUS EVERY 6 HOURS PRN
Status: DISCONTINUED | OUTPATIENT
Start: 2024-07-11 | End: 2024-07-13 | Stop reason: HOSPADM

## 2024-07-11 RX ORDER — MUPIROCIN 20 MG/G
OINTMENT TOPICAL 2 TIMES DAILY
Status: DISCONTINUED | OUTPATIENT
Start: 2024-07-11 | End: 2024-07-13 | Stop reason: HOSPADM

## 2024-07-11 RX ORDER — METHYLERGONOVINE MALEATE 0.2 MG/ML
200 INJECTION INTRAVENOUS
Status: DISCONTINUED | OUTPATIENT
Start: 2024-07-11 | End: 2024-07-11

## 2024-07-11 RX ORDER — KETOROLAC TROMETHAMINE 30 MG/ML
30 INJECTION, SOLUTION INTRAMUSCULAR; INTRAVENOUS EVERY 6 HOURS
Status: DISCONTINUED | OUTPATIENT
Start: 2024-07-11 | End: 2024-07-11

## 2024-07-11 RX ORDER — VALACYCLOVIR HYDROCHLORIDE 500 MG/1
500 TABLET, FILM COATED ORAL DAILY
Status: DISCONTINUED | OUTPATIENT
Start: 2024-07-11 | End: 2024-07-13 | Stop reason: HOSPADM

## 2024-07-11 RX ORDER — OXYCODONE AND ACETAMINOPHEN 5; 325 MG/1; MG/1
1 TABLET ORAL EVERY 4 HOURS PRN
Status: DISCONTINUED | OUTPATIENT
Start: 2024-07-11 | End: 2024-07-13 | Stop reason: HOSPADM

## 2024-07-11 RX ORDER — ONDANSETRON HYDROCHLORIDE 2 MG/ML
INJECTION, SOLUTION INTRAMUSCULAR; INTRAVENOUS
Status: DISCONTINUED | OUTPATIENT
Start: 2024-07-11 | End: 2024-07-11

## 2024-07-11 RX ORDER — CARBOPROST TROMETHAMINE 250 UG/ML
250 INJECTION, SOLUTION INTRAMUSCULAR
Status: DISCONTINUED | OUTPATIENT
Start: 2024-07-11 | End: 2024-07-13 | Stop reason: HOSPADM

## 2024-07-11 RX ORDER — DEXTROSE, SODIUM CHLORIDE, SODIUM LACTATE, POTASSIUM CHLORIDE, AND CALCIUM CHLORIDE 5; .6; .31; .03; .02 G/100ML; G/100ML; G/100ML; G/100ML; G/100ML
INJECTION, SOLUTION INTRAVENOUS CONTINUOUS PRN
Status: DISCONTINUED | OUTPATIENT
Start: 2024-07-11 | End: 2024-07-11

## 2024-07-11 RX ORDER — SODIUM CITRATE AND CITRIC ACID MONOHYDRATE 334; 500 MG/5ML; MG/5ML
30 SOLUTION ORAL
Status: DISCONTINUED | OUTPATIENT
Start: 2024-07-11 | End: 2024-07-11

## 2024-07-11 RX ORDER — TRANEXAMIC ACID 10 MG/ML
1000 INJECTION, SOLUTION INTRAVENOUS ONCE AS NEEDED
Status: DISCONTINUED | OUTPATIENT
Start: 2024-07-11 | End: 2024-07-13 | Stop reason: HOSPADM

## 2024-07-11 RX ORDER — OXYTOCIN-SODIUM CHLORIDE 0.9% IV SOLN 30 UNIT/500ML 30-0.9/5 UT/ML-%
95 SOLUTION INTRAVENOUS ONCE
Status: DISCONTINUED | OUTPATIENT
Start: 2024-07-11 | End: 2024-07-13 | Stop reason: HOSPADM

## 2024-07-11 RX ORDER — OXYTOCIN 10 [USP'U]/ML
10 INJECTION, SOLUTION INTRAMUSCULAR; INTRAVENOUS ONCE AS NEEDED
Status: DISCONTINUED | OUTPATIENT
Start: 2024-07-11 | End: 2024-07-13 | Stop reason: HOSPADM

## 2024-07-11 RX ORDER — OXYTOCIN-SODIUM CHLORIDE 0.9% IV SOLN 30 UNIT/500ML 30-0.9/5 UT/ML-%
95 SOLUTION INTRAVENOUS ONCE AS NEEDED
Status: COMPLETED | OUTPATIENT
Start: 2024-07-11 | End: 2024-07-11

## 2024-07-11 RX ORDER — METHYLERGONOVINE MALEATE 0.2 MG/ML
200 INJECTION INTRAVENOUS
Status: DISCONTINUED | OUTPATIENT
Start: 2024-07-11 | End: 2024-07-13 | Stop reason: HOSPADM

## 2024-07-11 RX ORDER — OXYCODONE HYDROCHLORIDE 5 MG/1
10 TABLET ORAL EVERY 4 HOURS PRN
Status: ACTIVE | OUTPATIENT
Start: 2024-07-11 | End: 2024-07-12

## 2024-07-11 RX ORDER — MIDAZOLAM HYDROCHLORIDE 1 MG/ML
INJECTION INTRAMUSCULAR; INTRAVENOUS
Status: DISCONTINUED | OUTPATIENT
Start: 2024-07-11 | End: 2024-07-11

## 2024-07-11 RX ORDER — OXYCODONE AND ACETAMINOPHEN 10; 325 MG/1; MG/1
1 TABLET ORAL EVERY 4 HOURS PRN
Status: DISCONTINUED | OUTPATIENT
Start: 2024-07-11 | End: 2024-07-13 | Stop reason: HOSPADM

## 2024-07-11 RX ORDER — DIPHENHYDRAMINE HCL 25 MG
25 CAPSULE ORAL EVERY 4 HOURS PRN
Status: DISCONTINUED | OUTPATIENT
Start: 2024-07-11 | End: 2024-07-13 | Stop reason: HOSPADM

## 2024-07-11 RX ORDER — AMOXICILLIN 250 MG
1 CAPSULE ORAL NIGHTLY PRN
Status: DISCONTINUED | OUTPATIENT
Start: 2024-07-11 | End: 2024-07-13 | Stop reason: HOSPADM

## 2024-07-11 RX ORDER — MUPIROCIN 20 MG/G
OINTMENT TOPICAL
Status: DISCONTINUED | OUTPATIENT
Start: 2024-07-11 | End: 2024-07-11

## 2024-07-11 RX ORDER — BUPIVACAINE HYDROCHLORIDE 7.5 MG/ML
INJECTION, SOLUTION EPIDURAL; RETROBULBAR
Status: COMPLETED | OUTPATIENT
Start: 2024-07-11 | End: 2024-07-11

## 2024-07-11 RX ORDER — LIDOCAINE HYDROCHLORIDE 20 MG/ML
INJECTION INTRAVENOUS
Status: DISCONTINUED | OUTPATIENT
Start: 2024-07-11 | End: 2024-07-11

## 2024-07-11 RX ORDER — MISOPROSTOL 200 UG/1
800 TABLET ORAL ONCE AS NEEDED
Status: DISCONTINUED | OUTPATIENT
Start: 2024-07-11 | End: 2024-07-13 | Stop reason: HOSPADM

## 2024-07-11 RX ORDER — ONDANSETRON HYDROCHLORIDE 2 MG/ML
4 INJECTION, SOLUTION INTRAVENOUS EVERY 6 HOURS PRN
Status: ACTIVE | OUTPATIENT
Start: 2024-07-11 | End: 2024-07-12

## 2024-07-11 RX ORDER — SIMETHICONE 80 MG
1 TABLET,CHEWABLE ORAL EVERY 6 HOURS PRN
Status: DISCONTINUED | OUTPATIENT
Start: 2024-07-11 | End: 2024-07-13 | Stop reason: HOSPADM

## 2024-07-11 RX ORDER — IBUPROFEN 600 MG/1
600 TABLET ORAL EVERY 6 HOURS
Status: DISCONTINUED | OUTPATIENT
Start: 2024-07-11 | End: 2024-07-13 | Stop reason: HOSPADM

## 2024-07-11 RX ORDER — MORPHINE SULFATE 0.5 MG/ML
INJECTION, SOLUTION EPIDURAL; INTRATHECAL; INTRAVENOUS
Status: DISCONTINUED | OUTPATIENT
Start: 2024-07-11 | End: 2024-07-11

## 2024-07-11 RX ORDER — ONDANSETRON 8 MG/1
8 TABLET, ORALLY DISINTEGRATING ORAL EVERY 8 HOURS PRN
Status: DISCONTINUED | OUTPATIENT
Start: 2024-07-11 | End: 2024-07-13 | Stop reason: HOSPADM

## 2024-07-11 RX ORDER — METOCLOPRAMIDE HYDROCHLORIDE 5 MG/ML
10 INJECTION INTRAMUSCULAR; INTRAVENOUS ONCE
Status: COMPLETED | OUTPATIENT
Start: 2024-07-11 | End: 2024-07-11

## 2024-07-11 RX ORDER — DEXAMETHASONE SODIUM PHOSPHATE 4 MG/ML
INJECTION, SOLUTION INTRA-ARTICULAR; INTRALESIONAL; INTRAMUSCULAR; INTRAVENOUS; SOFT TISSUE
Status: DISCONTINUED | OUTPATIENT
Start: 2024-07-11 | End: 2024-07-11

## 2024-07-11 RX ORDER — SODIUM CITRATE AND CITRIC ACID MONOHYDRATE 334; 500 MG/5ML; MG/5ML
30 SOLUTION ORAL ONCE
Status: DISCONTINUED | OUTPATIENT
Start: 2024-07-11 | End: 2024-07-11

## 2024-07-11 RX ORDER — FAMOTIDINE 10 MG/ML
20 INJECTION INTRAVENOUS
Status: DISCONTINUED | OUTPATIENT
Start: 2024-07-11 | End: 2024-07-11

## 2024-07-11 RX ORDER — SODIUM CHLORIDE, SODIUM LACTATE, POTASSIUM CHLORIDE, CALCIUM CHLORIDE 600; 310; 30; 20 MG/100ML; MG/100ML; MG/100ML; MG/100ML
INJECTION, SOLUTION INTRAVENOUS CONTINUOUS
Status: DISCONTINUED | OUTPATIENT
Start: 2024-07-11 | End: 2024-07-11

## 2024-07-11 RX ORDER — ADHESIVE BANDAGE
30 BANDAGE TOPICAL 2 TIMES DAILY PRN
Status: DISCONTINUED | OUTPATIENT
Start: 2024-07-12 | End: 2024-07-13 | Stop reason: HOSPADM

## 2024-07-11 RX ORDER — DOCUSATE SODIUM 100 MG/1
200 CAPSULE, LIQUID FILLED ORAL 2 TIMES DAILY
Status: DISCONTINUED | OUTPATIENT
Start: 2024-07-11 | End: 2024-07-13 | Stop reason: HOSPADM

## 2024-07-11 RX ORDER — OXYTOCIN 10 [USP'U]/ML
INJECTION, SOLUTION INTRAMUSCULAR; INTRAVENOUS
Status: DISCONTINUED | OUTPATIENT
Start: 2024-07-11 | End: 2024-07-11

## 2024-07-11 RX ORDER — OXYTOCIN-SODIUM CHLORIDE 0.9% IV SOLN 30 UNIT/500ML 30-0.9/5 UT/ML-%
10 SOLUTION INTRAVENOUS ONCE AS NEEDED
Status: DISCONTINUED | OUTPATIENT
Start: 2024-07-11 | End: 2024-07-13 | Stop reason: HOSPADM

## 2024-07-11 RX ORDER — OXYCODONE HYDROCHLORIDE 5 MG/1
5 TABLET ORAL EVERY 4 HOURS PRN
Status: ACTIVE | OUTPATIENT
Start: 2024-07-11 | End: 2024-07-12

## 2024-07-11 RX ORDER — PHENYLEPHRINE HYDROCHLORIDE 10 MG/ML
INJECTION INTRAVENOUS
Status: DISCONTINUED | OUTPATIENT
Start: 2024-07-11 | End: 2024-07-11

## 2024-07-11 RX ORDER — PRENATAL WITH FERROUS FUM AND FOLIC ACID 3080; 920; 120; 400; 22; 1.84; 3; 20; 10; 1; 12; 200; 27; 25; 2 [IU]/1; [IU]/1; MG/1; [IU]/1; MG/1; MG/1; MG/1; MG/1; MG/1; MG/1; UG/1; MG/1; MG/1; MG/1; MG/1
1 TABLET ORAL DAILY
Status: DISCONTINUED | OUTPATIENT
Start: 2024-07-11 | End: 2024-07-13 | Stop reason: HOSPADM

## 2024-07-11 RX ORDER — MISOPROSTOL 200 UG/1
800 TABLET ORAL
Status: DISCONTINUED | OUTPATIENT
Start: 2024-07-11 | End: 2024-07-11

## 2024-07-11 RX ADMIN — Medication 95 MILLI-UNITS/MIN: at 10:07

## 2024-07-11 RX ADMIN — FENTANYL CITRATE 90 MCG: 50 INJECTION, SOLUTION INTRAMUSCULAR; INTRAVENOUS at 08:07

## 2024-07-11 RX ADMIN — PHENYLEPHRINE HYDROCHLORIDE 200 MCG: 10 INJECTION INTRAVENOUS at 08:07

## 2024-07-11 RX ADMIN — EPHEDRINE SULFATE 10 MG: 50 INJECTION INTRAVENOUS at 08:07

## 2024-07-11 RX ADMIN — MUPIROCIN: 20 OINTMENT TOPICAL at 08:07

## 2024-07-11 RX ADMIN — DEXAMETHASONE SODIUM PHOSPHATE 4 MG: 4 INJECTION, SOLUTION INTRA-ARTICULAR; INTRALESIONAL; INTRAMUSCULAR; INTRAVENOUS; SOFT TISSUE at 08:07

## 2024-07-11 RX ADMIN — FAMOTIDINE 20 MG: 10 INJECTION, SOLUTION INTRAVENOUS at 06:07

## 2024-07-11 RX ADMIN — MORPHINE SULFATE 0.1 MG: 0.5 INJECTION, SOLUTION EPIDURAL; INTRATHECAL; INTRAVENOUS at 08:07

## 2024-07-11 RX ADMIN — ONDANSETRON 4 MG: 2 INJECTION INTRAMUSCULAR; INTRAVENOUS at 08:07

## 2024-07-11 RX ADMIN — DEXTROSE, SODIUM CHLORIDE, SODIUM LACTATE, POTASSIUM CHLORIDE, AND CALCIUM CHLORIDE: 5; .6; .31; .03; .02 INJECTION, SOLUTION INTRAVENOUS at 08:07

## 2024-07-11 RX ADMIN — DEXTROSE MONOHYDRATE 3 G: 5 INJECTION INTRAVENOUS at 06:07

## 2024-07-11 RX ADMIN — LIDOCAINE HYDROCHLORIDE 20 MG: 20 INJECTION, SOLUTION INTRAVENOUS at 08:07

## 2024-07-11 RX ADMIN — IBUPROFEN 600 MG: 600 TABLET ORAL at 05:07

## 2024-07-11 RX ADMIN — IBUPROFEN 600 MG: 600 TABLET ORAL at 12:07

## 2024-07-11 RX ADMIN — SODIUM CHLORIDE, POTASSIUM CHLORIDE, SODIUM LACTATE AND CALCIUM CHLORIDE: 600; 310; 30; 20 INJECTION, SOLUTION INTRAVENOUS at 06:07

## 2024-07-11 RX ADMIN — DOCUSATE SODIUM 200 MG: 100 CAPSULE, LIQUID FILLED ORAL at 08:07

## 2024-07-11 RX ADMIN — DIPHENHYDRAMINE HYDROCHLORIDE 25 MG: 25 CAPSULE ORAL at 02:07

## 2024-07-11 RX ADMIN — EPHEDRINE SULFATE 5 MG: 50 INJECTION INTRAVENOUS at 08:07

## 2024-07-11 RX ADMIN — PHENYLEPHRINE HYDROCHLORIDE 200 MCG: 10 INJECTION INTRAVENOUS at 09:07

## 2024-07-11 RX ADMIN — OXYTOCIN 40 UNITS: 10 INJECTION, SOLUTION INTRAMUSCULAR; INTRAVENOUS at 08:07

## 2024-07-11 RX ADMIN — MIDAZOLAM HYDROCHLORIDE 2 MG: 1 INJECTION, SOLUTION INTRAMUSCULAR; INTRAVENOUS at 08:07

## 2024-07-11 RX ADMIN — FENTANYL CITRATE 10 MCG: 0.05 INJECTION, SOLUTION INTRAMUSCULAR; INTRAVENOUS at 08:07

## 2024-07-11 RX ADMIN — OXYTOCIN 20 UNITS: 10 INJECTION, SOLUTION INTRAMUSCULAR; INTRAVENOUS at 08:07

## 2024-07-11 RX ADMIN — ACETAMINOPHEN 650 MG: 325 TABLET ORAL at 04:07

## 2024-07-11 RX ADMIN — VALACYCLOVIR HYDROCHLORIDE 500 MG: 500 TABLET, FILM COATED ORAL at 08:07

## 2024-07-11 RX ADMIN — BUPIVACAINE HYDROCHLORIDE 1.6 ML: 7.5 INJECTION, SOLUTION EPIDURAL; RETROBULBAR at 08:07

## 2024-07-11 RX ADMIN — ACETAMINOPHEN 1000 MG: 10 INJECTION, SOLUTION INTRAVENOUS at 09:07

## 2024-07-11 RX ADMIN — METOCLOPRAMIDE HYDROCHLORIDE 10 MG: 5 INJECTION INTRAMUSCULAR; INTRAVENOUS at 06:07

## 2024-07-11 RX ADMIN — SODIUM CHLORIDE, POTASSIUM CHLORIDE, SODIUM LACTATE AND CALCIUM CHLORIDE: 600; 310; 30; 20 INJECTION, SOLUTION INTRAVENOUS at 07:07

## 2024-07-11 RX ADMIN — DIPHENHYDRAMINE HYDROCHLORIDE 25 MG: 25 CAPSULE ORAL at 08:07

## 2024-07-11 RX ADMIN — SODIUM CITRATE AND CITRIC ACID MONOHYDRATE 30 ML: 500; 334 SOLUTION ORAL at 06:07

## 2024-07-11 RX ADMIN — EPHEDRINE SULFATE 5 MG: 50 INJECTION INTRAVENOUS at 09:07

## 2024-07-11 RX ADMIN — MUPIROCIN: 20 OINTMENT TOPICAL at 06:07

## 2024-07-11 NOTE — HPI
34 yo  at 39w3  Previous  sections x 2   Undesired fertility  Obesity  Admitted today, 24 for repeat low transverse  section with tubal ligation  Risks and benefits discussed

## 2024-07-11 NOTE — SUBJECTIVE & OBJECTIVE
Obstetric HPI:  Patient reports None contractions, active fetal movement, No vaginal bleeding , No loss of fluid     This pregnancy has been complicated by previous  section with undesired fertility and obesity    OB History    Para Term  AB Living   5 2 2 0 2 2   SAB IAB Ectopic Multiple Live Births   2 0 0 0 2      # Outcome Date GA Lbr Parish/2nd Weight Sex Type Anes PTL Lv   5 Current            4 Term 22 39w0d  3.23 kg (7 lb 1.9 oz) M CS-LTranv Spinal  LEILA      Name: ALFRED,BOY JUVENTINO      Apgar1: 9  Apgar5: 9   3 Term 14 41w2d  3.331 kg (7 lb 5.5 oz) F CS-LTranv EPI N LEILA      Name: ALFRED,BABY GIRL      Apgar1: 9  Apgar5: 9   2 SAB            1 SAB              Past Medical History:   Diagnosis Date    Alopecia     Hay fever     Oral herpes     Suppurative hidradenitis     18 YEARS OLD     Past Surgical History:   Procedure Laterality Date     SECTION       SECTION WITH TUBAL LIGATION N/A 2022     section only. She did NOT Have tubal ligation.    DILATION AND CURETTAGE OF UTERUS         PTA Medications   Medication Sig    aspirin (ECOTRIN) 81 MG EC tablet Take 1 tablet (81 mg total) by mouth once daily.    omeprazole (PRILOSEC) 40 MG capsule Take 1 capsule (40 mg total) by mouth 2 (two) times daily before meals.       Review of patient's allergies indicates:   Allergen Reactions    Fluoride Swelling     Oral swelling    Unclassified drug Anaphylaxis     toothpaste    Grass pollen- grass standard     Msg [glutamic acid hcl]         Family History       Problem Relation (Age of Onset)    Breast cancer Maternal Grandmother          Tobacco Use    Smoking status: Never    Smokeless tobacco: Never   Substance and Sexual Activity    Alcohol use: Not Currently    Drug use: No    Sexual activity: Yes     Partners: Male     Birth control/protection: None     Review of Systems   Constitutional:  Positive for fatigue. Negative for activity change, appetite  "change, fever and unexpected weight change.   Respiratory:  Negative for cough, shortness of breath and wheezing.    Cardiovascular:  Negative for chest pain and palpitations.   Gastrointestinal:  Positive for abdominal pain and nausea. Negative for vomiting.   Endocrine: Negative for hot flashes.   Genitourinary:  Positive for frequency, pelvic pain and vaginal discharge. Negative for dysmenorrhea, dyspareunia, urgency, vaginal bleeding and postcoital bleeding.   Musculoskeletal:  Positive for back pain. Negative for myalgias.   Integumentary:  Negative for rash, breast mass and nipple discharge.   Neurological:  Positive for headaches. Negative for seizures.   Psychiatric/Behavioral:  Negative for depression and sleep disturbance. The patient is not nervous/anxious.    Breast: Negative for mass, mastodynia and nipple discharge     Objective:     Vital Signs (Most Recent):    Vital Signs (24h Range):  BP: (124)/(68) 124/68     Weight: 104.7 kg (230 lb 13.2 oz)  Body mass index is 45.08 kg/m².    FHT: 130-140 Cat 1 (reassuring)  TOCO:  None     Physical Exam:   Constitutional: She appears well-developed and well-nourished. No distress.    HENT:   Head: Normocephalic and atraumatic.    Eyes: EOM are normal.     Cardiovascular:  Normal rate.             Pulmonary/Chest: Effort normal. No respiratory distress.        Abdominal: Soft. She exhibits no distension. There is no abdominal tenderness. There is no rebound and no guarding.   Gravid             Musculoskeletal: Normal range of motion.       Neurological: She is alert.    Skin: Skin is warm and dry.    Psychiatric: She has a normal mood and affect.        Cervix:  Dilation:  1  Effacement:  50%  Station: -3  Presentation: Vertex     Significant Labs:  Lab Results   Component Value Date    [facility]    HEPBSAG Non-reactive 12/05/2023       CBC: No results for input(s): "WBC", "RBC", "HGB", "HCT", "PLT", "MCV", "MCH", "MCHC" in the last 48 hours.  I " have personallly reviewed all pertinent lab results from the last 24 hours.

## 2024-07-11 NOTE — LACTATION NOTE
07/11/24 1005   Maternal Assessment   Breast Density Bilateral:;soft   Areola Bilateral:;elastic   Nipples Bilateral:;everted   Maternal Infant Feeding   Maternal Emotional State assist needed   Infant Positioning cradle   Signs of Milk Transfer audible swallow;infant jaw motion present   Latch Assistance yes     Baby with FOB now -mother offered assistance with breastfeeding and baby placed back skin to skin -baby latches easily for mother with strong sucking and swallows -mother denies dismcomfort -reinforced importance of skin to skin -review basic breastfeeding information -states was not successful with other children and plans to do breast and formula -reinforced risks of formula and encouraged exclusive breastfeeding in the hospital

## 2024-07-11 NOTE — ASSESSMENT & PLAN NOTE
Admit for repeat low transverse  section with bilateral tubal ligation  Again, procedures explained  Risks and benefits discussed, including risks of hemorrhage, infection, bladder and bowel injuries...  Questions answered  Consents signed previously  She is nervous but eager to proceed

## 2024-07-11 NOTE — PLAN OF CARE
Pt. tolerating pain with scheduled motrin, tylenol, and prn oxycodone. Tolerating diet.  Enc. Pt. To moved in bed frequently, which she has been. Deras draining well, I&o today. poc reviewed with pt. And s.o. Bonding well with infant. Vss. Breastfeeding ad mj. IV SL, SCD removed per pt. Request due to charley horse and sweating. PO intake copious.

## 2024-07-11 NOTE — ANESTHESIA PROCEDURE NOTES
Spinal    Diagnosis: Repeat   Patient location during procedure: OR  Start time: 2024 8:03 AM  Timeout: 2024 8:02 AM  End time: 2024 8:07 AM    Staffing  Authorizing Provider: Jass Ocampo MD  Performing Provider: Jass Ocampo MD    Staffing  Performed by: Jass Ocampo MD  Authorized by: Jass Ocampo MD    Preanesthetic Checklist  Completed: patient identified, IV checked, site marked, risks and benefits discussed, surgical consent, monitors and equipment checked, pre-op evaluation and timeout performed  Spinal Block  Patient position: sitting  Prep: ChloraPrep  Patient monitoring: continuous pulse ox and frequent blood pressure checks  Approach: midline  Location: L3-4  Injection technique: lumbar puncture  CSF Fluid: clear free-flowing CSF  Needle  Needle type: pencil-tip   Needle length: 4 in  Additional Documentation: no paresthesia on injection and negative aspiration for heme  Needle localization: anatomical landmarks  Assessment  Sensory level: T4   Dermatomal levels determined by pinch or prick  Ease of block: moderate  Medications:    Medications: bupivacaine (pf) (MARCAINE) injection 0.75% - Intraspinal   1.6 mL - 2024 8:07:00 AM

## 2024-07-11 NOTE — H&P
[facility] - Labor & Delivery  Obstetrics  History & Physical    Patient Name: Brock Simon  MRN: [id number]  Admission Date: 2024  Primary Care Provider: No, Primary Doctor    Subjective:     Principal Problem:<principal problem not specified>    History of Present Illness:  34 yo  at 39w3  Previous  sections x 2   Undesired fertility  Obesity  Admitted today, 24 for repeat low transverse  section with tubal ligation  Risks and benefits discussed      Obstetric HPI:  Patient reports None contractions, active fetal movement, No vaginal bleeding , No loss of fluid     This pregnancy has been complicated by previous  section with undesired fertility and obesity    OB History    Para Term  AB Living   5 2 2 0 2 2   SAB IAB Ectopic Multiple Live Births   2 0 0 0 2      # Outcome Date GA Lbr Parish/2nd Weight Sex Type Anes PTL Lv   5 Current            4 Term 22 39w0d  3.23 kg (7 lb 1.9 oz) M CS-LTranv Spinal  LEILA      Name: JC SIOMN      Apgar1: 9  Apgar5: 9   3 Term 14 41w2d  3.331 kg (7 lb 5.5 oz) F CS-LTranv EPI N LEILA      Name: ALFRED,BABY GIRL      Apgar1: 9  Apgar5: 9   2 SAB            1 SAB              Past Medical History:   Diagnosis Date    Alopecia     Hay fever     Oral herpes     Suppurative hidradenitis     18 YEARS OLD     Past Surgical History:   Procedure Laterality Date     SECTION       SECTION WITH TUBAL LIGATION N/A 2022     section only. She did NOT Have tubal ligation.    DILATION AND CURETTAGE OF UTERUS         PTA Medications   Medication Sig    aspirin (ECOTRIN) 81 MG EC tablet Take 1 tablet (81 mg total) by mouth once daily.    omeprazole (PRILOSEC) 40 MG capsule Take 1 capsule (40 mg total) by mouth 2 (two) times daily before meals.       Review of patient's allergies indicates:   Allergen Reactions    Fluoride Swelling     Oral swelling    Unclassified drug Anaphylaxis     toothpaste     Grass pollen-june grass standard     Msg [glutamic acid hcl]         Family History       Problem Relation (Age of Onset)    Breast cancer Maternal Grandmother          Tobacco Use    Smoking status: Never    Smokeless tobacco: Never   Substance and Sexual Activity    Alcohol use: Not Currently    Drug use: No    Sexual activity: Yes     Partners: Male     Birth control/protection: None     Review of Systems   Constitutional:  Positive for fatigue. Negative for activity change, appetite change, fever and unexpected weight change.   Respiratory:  Negative for cough, shortness of breath and wheezing.    Cardiovascular:  Negative for chest pain and palpitations.   Gastrointestinal:  Positive for abdominal pain and nausea. Negative for vomiting.   Endocrine: Negative for hot flashes.   Genitourinary:  Positive for frequency, pelvic pain and vaginal discharge. Negative for dysmenorrhea, dyspareunia, urgency, vaginal bleeding and postcoital bleeding.   Musculoskeletal:  Positive for back pain. Negative for myalgias.   Integumentary:  Negative for rash, breast mass and nipple discharge.   Neurological:  Positive for headaches. Negative for seizures.   Psychiatric/Behavioral:  Negative for depression and sleep disturbance. The patient is not nervous/anxious.    Breast: Negative for mass, mastodynia and nipple discharge     Objective:     Vital Signs (Most Recent):    Vital Signs (24h Range):  BP: (124)/(68) 124/68     Weight: 104.7 kg (230 lb 13.2 oz)  Body mass index is 45.08 kg/m².    FHT: 130-140 Cat 1 (reassuring)  TOCO:  None     Physical Exam:   Constitutional: She appears well-developed and well-nourished. No distress.    HENT:   Head: Normocephalic and atraumatic.    Eyes: EOM are normal.     Cardiovascular:  Normal rate.             Pulmonary/Chest: Effort normal. No respiratory distress.        Abdominal: Soft. She exhibits no distension. There is no abdominal tenderness. There is no rebound and no guarding.  "  Gravid             Musculoskeletal: Normal range of motion.       Neurological: She is alert.    Skin: Skin is warm and dry.    Psychiatric: She has a normal mood and affect.        Cervix:  Dilation:  1  Effacement:  50%  Station: -3  Presentation: Vertex     Significant Labs:  Lab Results   Component Value Date    GROUP[facility] B POS 2024    HEPBSAG Non-reactive 2023       CBC: No results for input(s): "WBC", "RBC", "HGB", "HCT", "PLT", "MCV", "MCH", "MCHC" in the last 48 hours.  I have personallly reviewed all pertinent lab results from the last 24 hours.  Assessment/Plan:     33 y.o. female  at 39w3d for:    History of  delivery  Admit for repeat low transverse  section with bilateral tubal ligation  Again, procedures explained  Risks and benefits discussed, including risks of hemorrhage, infection, bladder and bowel injuries...  Questions answered  Consents signed previously  She is nervous but eager to proceed    39 weeks gestation of pregnancy  Admit for repeat low transverse  section with bilateral tubal ligation  Again, procedures explained  Risks and benefits discussed, including risks of hemorrhage, infection, bladder and bowel injuries...  Questions answered  Consents signed previously  She is nervous but eager to proceed        BMI 40.0-44.9, adult  Admit for repeat low transverse  section with bilateral tubal ligation  Again, procedures explained  Risks and benefits discussed, including risks of hemorrhage, infection, bladder and bowel injuries...  Questions answered  Consents signed previously  She is nervous but eager to proceed        Armani Britton MD  Obstetrics  [facility] - Labor & Delivery        "

## 2024-07-11 NOTE — ANESTHESIA PREPROCEDURE EVALUATION
07/11/2024  Brock Alfaro is a 33 y.o., female.      Pre-op Assessment    I have reviewed the Patient Summary Reports.     I have reviewed the Nursing Notes. I have reviewed the NPO Status.   I have reviewed the Medications.     Review of Systems  Anesthesia Hx:  No problems with previous Anesthesia             Denies Family Hx of Anesthesia complications.    Denies Personal Hx of Anesthesia complications.                    Social:  Non-Smoker, No Alcohol Use       Hematology/Oncology:  Hematology Normal   Oncology Normal                                   EENT/Dental:  EENT/Dental Normal           Cardiovascular:  Cardiovascular Normal                                            Renal/:  Renal/ Normal                 Hepatic/GI:  Hepatic/GI Normal                 Musculoskeletal:  Musculoskeletal Normal                Neurological:  Neurology Normal                                      Endocrine:        Morbid Obesity / BMI > 40  Psych:  Psychiatric Normal                    Physical Exam  General: Oriented, Alert and Cooperative    Airway:  Mallampati: II / I  Mouth Opening: Normal  TM Distance: Normal  Neck ROM: Normal ROM    Dental:  Intact        Anesthesia Plan  Type of Anesthesia, risks & benefits discussed:    Anesthesia Type: Spinal  Intra-op Monitoring Plan: Standard ASA Monitors  Post Op Pain Control Plan: intrathecal opioid  Informed Consent: Informed consent signed with the Patient and all parties understand the risks and agree with anesthesia plan.  All questions answered. Patient consented to blood products? Yes  ASA Score: 3    Ready For Surgery From Anesthesia Perspective.     .

## 2024-07-11 NOTE — HOSPITAL COURSE
On Labor and Delivery, vitals stable  FHT: 130 - 140. Category 1.  Reactive  Contraction: None  Cervix: 1 cm    RLTCS + BTL  MD Monica Britton MD  Spinal by MD Terrence  EBL: 500 cc  Viable male infant at 0832 24  Weight: 6#8.4 or 2960 gm  Apgar:   Placenta: manually extracted intact with three vessels  No complication  Specimen: bilateral segments of tubes  Urine: clear.    2024 Doing well.  Breast-feeding. Tolerating oral intake    2024  Postpartum course was benign.  She is breast-feeding well.  Exam was benign with patient afebrile, vitals stable, and minimal bleeding.  Normal activities.  Patient discharged home on postpartum day #2, 24   Discharge medications include Percocet, Motrin, prenatal vitamins, and iron supplement.  Follow-up with me next week for dressing removal and postop follow-up    Armani Britton MD.

## 2024-07-11 NOTE — DISCHARGE INSTRUCTIONS
Breastfeeding discharge instructions given with First Alert form and reviewed. Please complete First Alert within 3-5 days after the baby's birth. ( Please call the Breastfeeding Warmline ( [phone]) or the baby's pediatrician if you have any concerns.     Also discussed:  AAP recommendation of exclusive breastfeeding for the first 6 months of life and continued breastfeeding with the introduction of supplemental foods beyond the first year of life. The AAP recommends breastfeeding for for at least a year or longer. Instructed on the recommendation to delay all bottle and pacifier use until after 4 weeks of age and breastfeeding is well established.  Discussed the benefits of exclusive breastfeeding for both mother and baby.  Discussed the risks of supplementation/pacifier use on the exclusivity of breastfeeding in the first 6 months. Feed the baby at the earliest sign of hunger or comfort  Hands to mouth, sucking motions  Rooting or searching for something to suck on  Dont wait for crying - it is a not a late sign of hunger; it is a sign of distress    The feedings may be 8-12 times per 24hrs and will not follow a schedule  Alternate the breast you start the feeding with, or start with the breast that feels the fullest  Switch breasts when the baby takes himself off the breast or falls asleep  Keep offering breasts until the baby looks full, no longer gives hunger signs, and stays asleep when placed on his back in the crib  If the baby is sleepy and wont wake for a feeding, put the baby skin-to-skin dressed in a diaper against the mothers bare chest  Sleep near your baby  The baby should be positioned and latched on to the breast correctly  Chest-to-chest, chin in the breast  Babys lips are flipped outward  Babys mouth is stretched open wide like a shout  Babys sucking should feel like tugging to the mother  The baby should be drinking at the breast:  You should hear swallowing or gulping throughout  the feeding  You should see milk on the babys lips when he comes off the breast  Your breasts should be softer when the baby is finished feeding  The baby should look relaxed at the end of feedings  After the 4th day and your milk is in:  The babys poop should turn bright yellow and be loose, watery, and seedy  The baby should have at least 3-4 poops the size of the palm of your hand per day  The baby should have at least 6-8 wet diapers per day  The urine should be light yellow in color  You should drink when you are thirsty and eat a healthy diet when you are    hungry.     Take naps to get the rest you need.   Take medications and/or drink alcohol only with permission of your obstetrician    or the babys pediatrician.  You can also call the [facility],   ([phone]), Monday-Friday, 8am-5pm Central time, to get the most   up-to-date evidence-based information on the use of medications during   pregnancy and breastfeeding.      The baby should be examined by a pediatrician at 3-5 days of age and again at 2 weeks of age unless ordered sooner by the pediatrician.  Once your milk production increases the baby should gain at least 1/2-1oz each day and should be back to birth weight no later than 10-14 days of age.If this is not the case, please call the Breastfeeding Warmline ( [phone]) for assistance and support.    Instructed on primary engorgement and precautions.  Discussed:    Typical timing of the onset of engorgement  Signs and symptoms of engorgement  If the milk is flowing, use wet or dry heat applied to the breasts for approximately 10min prior to each feeding as a comfort measure to facilitate the milk ejection reflex    Follow heat treatment with breast massage to soften hard/lumpy areas of the breast    Use unrestricted, frequent, effective feedings    Wake baby to feed if necessary    Avoid pacifier and bottle feedings    Hand express or pump breasts to the point of comfort as  needed    Use cold treatments in the form of ice packs/gel packs/ frozen vegetables wrapped in a soft thin cloth and applied to the breasts for approximately 20min after each feeding until engorgement is resolved    Wear comfortable, supportive bra    Take pain medicine as needed    Use anti-inflammatory medications if prescribed by physician    Preparation and Hygiene:  Shower daily.  Wear a clean bra each day and wash daily in warm soapy water.  Change wet or moist breast pads frequently.  Moist pads can promote growth of germs.  Actively wash your hands, paying close attention to the area around and under your fingernails, thoroughly with soap and water for 15 seconds before pumping or handling your milk.  Re-wash your hands if you touch anything (scratching your nose, answering the phone, etc) while pumping or handling your milk.   Before pumping your breasts, assemble the pump collection kit and have ready the sterile container and labels.  Place these items on a clean surface next to the breast pump.  Each time after you have finished pumping, take apart all of the parts of the breast pump collection kit and place them in a separate cleaning container (do not place them in the sink).  Be sure to remove the yellow valve from the breast shield and separate the white membrane from the yellow valve.  Rinse all of these parts with cool water.  Then use a new sponge and/or bottle brush and dishwashing detergent to clean the parts.  Rinse off the soapy water with cool water and air dry on a clean towel covered with a clean cloth.  All parts may also be washed after each use in the top rack of a .  Once each day, sanitize all of the parts of the breast pump collection kit.  This can be done by boiling the kit parts for 10 minutes or by using a Quick Clean Micro-Steam Bag made by [employer]  If condensation appears in the tubing, continue to run the pump with the tubing attached for 1-2 minutes or until the  tubing is dry.   Notify your babys nurse or doctor if you become ill or need to take any medication, even over-the-counter medicines.  Collection and Storage of Expressed Breast milk:         1. Pump your breasts at least 8 or more times every 24 hours.  Double pump (both breasts at the same time) for at least 15-20 minutes using the most suction that is comfortable.    2. Write the date and time of pumping and the name of any medications you are taking on the babys pre-printed hospital identification label.   3.    Do not touch the inside of the storage containers or lids.  4.        Tightly screw the lid onto the container and place immediately into the refrigerator for daily use.  5.    Expressed breast milk should be refrigerated or frozen within 4 hours of pumping.  6.        Do not store expressed breast milk on the door of your refrigerator or freezer             where the temperature is warmer.   7.        Refrigerated milk may be stored for up to 7 days.  At this point it can be moved to the freezer for 6 -12 months.  8.        Thaw frozen breast milk in overnight in the refrigerator.  Once milk is thawed it must be used within 24 hours.  9.        Refrigerated breast milk needs to be warmed to room temperature.  Warm by leaving unrefrigerated until it reached room temperature, or place sealed bottle into a cup of warm (not boiling) water or use a bottle warmer.               Never warm breast milk in a microwave or boiling water.    For any questions or concerns call the Lactation Department at [phone]      After a Cesearean Birth    General Discharge Instructions  May follow a regular diet, unless otherwise discussed with physician.  Take showers, not baths until instructed by your doctor.   For the next 5 days, continue to use Hibiclens solution when you shower. Always use a clean towel when drying incision.  Incision dressing should be removed 7 days after surgery. If dressing begins to peel up  prior to this day, gently remove.    Activity as tolerated.  No lifting or heavy exercise for 6 weeks, no driving for 2 weeks, no sexual intercourse, douching or tampons for 6 weeks  May return to work/school as discussed with physician  Rhogam injection given  _N/A____  Rubella vaccine given __N/A___  (avoid pregnancy for 3 mos. after injection)  Discuss birth control with physician  Breast care support bra worn at all times  Lactation consultant referral number ( [phone] or [phone])      Call Your Healthcare Provider Right Away If You Have:  A temperature of 100.4°F or higher.  If your blood pressure is over 140/90.  You have difficulty catching your breath or trouble breathing.  Heavy vaginal bleeding, clots, or vaginal discharge with foul odor. (heavier than menses)  Persistent nausea or vomiting.  You gain more than 3 pounds in 3 days.  Severe headaches not relieved by Tylenol (acetaminophen) or Motrin (ibuprofen)  Blurry or double vision, see spots or flashing lights.  Dizziness or fainting.  New onset swelling or worsening of existing swelling.  Burning or pain when you urinate.  No bowel movement for 5 days.  Redness, warmth, swelling, or pain in the lower leg.  Redness, discharge, or pain worse than you had in the hospital.  Burning, pain, red streaks, or lumpy areas in your breasts.  Cracks, blisters, or blood on your nipples.  Feelings of extreme sadness or anxiety, or a feeling that you dont want to be with your baby.  If you have any new or unusual symptoms or have questions or concerns    Incision Care  You will be able to shower and pat the incision dry.  Watch your incision for signs of infection, such as increasing redness or drainage.  For ease of movement, hold a pillow against the incision when you get up from a lying or sitting position, and when you laugh or cough.  Avoid heavy lifting--nothing heavier than your baby until your doctor instructs you otherwise.      Follow-Up  Schedule a  follow-up exam with your healthcare provider for about 6 weeks after delivery. During this exam, your uterus and vaginal area will be checked. Contact your healthcare provider if you think you or your baby are having any problems.

## 2024-07-11 NOTE — L&D DELIVERY NOTE
[facility]   Section   Operative Note    SUMMARY     Date of Procedure: 2024       PREOPERATIVE DIAGNOSES:  1.  Intrauterine pregnancy at 39w3d.  2.  Previous  sections.  3.  Undesired fertility     POSTOPERATIVE DIAGNOSES:  1.  Intrauterine pregnancy at 39w3d.  2.  Previous  sections.  3.  Undesired fertility     PROCEDURE:  Repeat low transverse  section with bilateral partial salpingectomy.     SURGEON:  Armani Britton M.D.     ASSISTANT:  Ravi Anderson M.D.     ANESTHESIA:  Spinal by MD Terrence     BLOOD LOSS:  About 500 mL.     URINE:  Clear.     FINDINGS:  Viable male infant delivered from vertex position at 0832 on 24.  Weight is 6#8.4 or 2960 gm.  Apgar was 9 at 1 minute and 9 at 5 minutes.     OTHER FINDINGS:  Include normal tubes and ovaries bilaterally.     COMPLICATIONS:  None.     SPECIMEN:  None.     HISTORY:  The patient is a 33year-old  at 39w3d consistent with early ultrasound, previous  sections x 2 with undesired fertility.  Admitted for repeat low transverse  section with bilateral tubal ligation.  Risks and benefits discussed.     PROCEDURE IN DETAIL:  After confirming appropriate consent was signed in chart, the patient was then transported to the OR.  Spinal anesthesia per Anesthesia.  The patient was then put in a supine position, prepped and draped.  Adequate anesthesia was verified with negative Allis test to the umbilicus.  A Pfannenstiel skin incision was then made with the scalpel, extended down to the fascia.  Fascia was then entered sharply and extended bilaterally sharply.  Peritoneum was then identified and entered bluntly and sharply.  The lower uterine segment was then identified.  Hysterotomy was performed using the scalpel transversely and amniotomy performed bluntly without any difficulty.  Clear fluid noted.  The uterine incision was then extended bluntly using the surgeon's finger.  A viable male infant  delivered from vertex position without any difficulty, suctioned on the field and handed off to the nurse practitioner in attendance.  Birth time was 0832 on 24.  Weight is 6#8.4 2960 g.  Apgar is 9 at 1 minute and 9 at 5 minutes.  Cord blood was then obtained.  Placenta was then manually extracted intact.  Uterus was then delivered into the abdominal incision.  Endometrial cavity was then wiped clean with wet laps.  Uterine incision was then repaired in 2 layers using #0 Monocryl in a running interlocking fashion with the second layer in an imbricating manner.  Good hemostasis was noted.  Again, normal tubes and ovaries bilaterally.      After confirming again that the patient still would like her tubal ligation, we proceeded with the procedure. The right tube was identified with its fimbriated end.  Sunderland clamp used to  the right tube at fimbriated end.  2.0 plain gut was then used to ligate a large portion of the tube twice.  Metzenbaum scissors were then used to remove the tube and sent to Path.   Good hemostasis noted.  The same procedure was repeated on the left side, again without any difficulty.  We had to put an extra suture in the middle of the left tube to obtain good hemostasis.      Both segments of tube were sent to Pathology.    Uterus was then replaced back to the abdomen.  Good hemostasis was noted.  Peritoneum was then closed using #3-0 Vicryl, fascia was then closed using #1 Vicryl in a running nonlocking fashion.  Subcutaneous tissue was then noted to be in good hemostasis.  It was then reapproximated using #3-0 Vicryl and then the skin was then reapproximated using #3-0 Vicryl on a Sergo needle and a pressure bandage applied with the Aquacel dressing.  The patient was then transferred to the Recovery Room in stable condition.         Specimens:   Specimen (24h ago, onward)       Start     Ordered    24 1035  Specimen to Pathology, Surgery Gynecology and Obstetrics  Once         Comments: Pre-op Diagnosis: Normal pregnancy in third trimester [Z34.93]Procedure(s): SECTION, WITH TUBAL LIGATION Number of specimens: 2Name of specimens: Portion of right and left Fallopian tubes     References:    Click here for ordering Quick Tip   Question Answer Comment   Procedure Type: Gynecology and Obstetrics    Specimen Class: Routine/Screening    Release to patient Immediate        24 1035                    Condition: Good    VTE Risk Mitigation (From admission, onward)      None            Disposition: PACU - hemodynamically stable.    Attestation: Good         Delivery Information for Juan Alfaro    Birth information:  YOB: 2025   Time of birth: 8:32 AM   Sex: male   Head Delivery Date/Time: 2024  8:32 AM   Delivery type: , Low Transverse   Gestational Age: 39w3d        Delivery Providers    Delivering clinician: Armani Britton MD   Provider Role    Radha Marie RN Registered Nurse    Elvie Mendez RN Registered Nurse    Sean, [facility] Surgical Tech    Gatito Brown, CRNA Nurse Anesthetist    Ada Rivas RN Registered Nurse              Measurements    Weight: 2960 g  Weight (lbs): 6 lb 8.4 oz  Length:          Apgars    Living status: Living  Apgar Component Scores:  1 min.:  5 min.:  10 min.:  15 min.:  20 min.:    Skin color:  1  1       Heart rate:  2  2       Reflex irritability:  2  2       Muscle tone:  2  2       Respiratory effort:  2  2       Total:  9  9       Apgars assigned by: WALI LEVI         Operative Delivery    Forceps attempted?: No  Vacuum extractor attempted?: No         Shoulder Dystocia    Shoulder dystocia present?: No                 Interventions/Resuscitation    Method: Bulb Suctioning, Tactile Stimulation, NICU Attended       Cord    Vessels: 3 vessels  Complications: None  Delayed Cord Clamping?: No  Cord Blood Disposition: Sent with Baby  Gases Sent?: No  Stem Cell Collection (by MD): No        Placenta    Placenta delivery date/time: 2024 0832  Placenta removal: Spontaneous  Placenta appearance: Intact  Placenta disposition: Discarded           Labor Events:       labor:       Labor Onset Date/Time:         Dilation Complete Date/Time:         Start Pushing Date/Time:         Start Pushing Date/Time:       Rupture Date/Time:            Rupture type:          Fluid Amount:       Fluid Color:                steroids:       Antibiotics given for GBS:       Induction:       Indications for induction:        Augmentation:       Indications for augmentation:       Labor complications:       Additional complications:          Cervical ripening:                     Delivery:      Episiotomy:       Indication for Episiotomy:       Perineal Lacerations:   Repaired:      Periurethral Laceration:   Repaired:     Labial Laceration:   Repaired:     Sulcus Laceration:   Repaired:     Vaginal Laceration:   Repaired:     Cervical Laceration:   Repaired:     Repair suture:       Repair # of packets:       Last Value - EBL - Nursing (mL):       Sum - EBL - Nursing (mL): 0     Last Value - EBL - Anesthesia (mL):      Calculated QBL (mL): 277      Running total QBL (mL): 277      Vaginal Sweep Performed:       Surgicount Correct:       Vaginal Packing:   Quantity:       Other providers:       Anesthesia    Method: Spinal          Details (if applicable):  Trial of Labor No    Categorization: Repeat    Priority: Routine   Indications for : Repeat Section   Incision Type: low transverse     Additional  information:  Forceps:    Vacuum:    Breech:    Observed anomalies    Other (Comments):

## 2024-07-11 NOTE — TRANSFER OF CARE
Anesthesia Transfer of Care Note    Patient: Brock Alfaro    Procedure(s) Performed: Procedure(s) (LRB):   SECTION, WITH TUBAL LIGATION (N/A)    Patient location: Labor and Delivery    Anesthesia Type: spinal    Transport from OR: Transported from OR on room air with adequate spontaneous ventilation    Post pain: adequate analgesia    Post assessment: no apparent anesthetic complications and tolerated procedure well    Post vital signs: stable    Level of consciousness: awake, alert and oriented    Nausea/Vomiting: no nausea/vomiting    Complications: none    Transfer of care protocol was followed      Last vitals: Visit Vitals  Ht 5' (1.524 m)   Wt 104.7 kg (230 lb 13.2 oz)   LMP 10/04/2023 (Approximate)   Breastfeeding No   BMI 45.08 kg/m²

## 2024-07-12 PROBLEM — R82.71 ASYMPTOMATIC BACTERIURIA DURING PREGNANCY: Status: RESOLVED | Noted: 2024-01-12 | Resolved: 2024-07-12

## 2024-07-12 PROBLEM — R74.01 TRANSAMINITIS: Status: RESOLVED | Noted: 2024-01-12 | Resolved: 2024-07-12

## 2024-07-12 PROBLEM — O21.0 HYPEREMESIS GRAVIDARUM: Status: RESOLVED | Noted: 2024-01-07 | Resolved: 2024-07-12

## 2024-07-12 PROBLEM — O99.891 ASYMPTOMATIC BACTERIURIA DURING PREGNANCY: Status: RESOLVED | Noted: 2024-01-12 | Resolved: 2024-07-12

## 2024-07-12 LAB
BASOPHILS # BLD AUTO: 0.02 K/UL (ref 0–0.2)
BASOPHILS NFR BLD: 0.2 % (ref 0–1.9)
DIFFERENTIAL METHOD BLD: ABNORMAL
EOSINOPHIL # BLD AUTO: 0 K/UL (ref 0–0.5)
EOSINOPHIL NFR BLD: 0.2 % (ref 0–8)
ERYTHROCYTE [DISTWIDTH] IN BLOOD BY AUTOMATED COUNT: 14.3 % (ref 11.5–14.5)
HCT VFR BLD AUTO: 29.8 % (ref 37–48.5)
HGB BLD-MCNC: 9.1 G/DL (ref 12–16)
IMM GRANULOCYTES # BLD AUTO: 0.05 K/UL (ref 0–0.04)
IMM GRANULOCYTES NFR BLD AUTO: 0.4 % (ref 0–0.5)
LYMPHOCYTES # BLD AUTO: 1.8 K/UL (ref 1–4.8)
LYMPHOCYTES NFR BLD: 16 % (ref 18–48)
MCH RBC QN AUTO: 24.2 PG (ref 27–31)
MCHC RBC AUTO-ENTMCNC: 30.5 G/DL (ref 32–36)
MCV RBC AUTO: 79 FL (ref 82–98)
MONOCYTES # BLD AUTO: 0.7 K/UL (ref 0.3–1)
MONOCYTES NFR BLD: 6.2 % (ref 4–15)
NEUTROPHILS # BLD AUTO: 8.7 K/UL (ref 1.8–7.7)
NEUTROPHILS NFR BLD: 77 % (ref 38–73)
NRBC BLD-RTO: 0 /100 WBC
PLATELET # BLD AUTO: 225 K/UL (ref 150–450)
PMV BLD AUTO: 10.3 FL (ref 9.2–12.9)
RBC # BLD AUTO: 3.76 M/UL (ref 4–5.4)
WBC # BLD AUTO: 11.33 K/UL (ref 3.9–12.7)

## 2024-07-12 PROCEDURE — 25000003 PHARM REV CODE 250: Performed by: ANESTHESIOLOGY

## 2024-07-12 PROCEDURE — 25000003 PHARM REV CODE 250: Performed by: OBSTETRICS & GYNECOLOGY

## 2024-07-12 PROCEDURE — 99232 SBSQ HOSP IP/OBS MODERATE 35: CPT | Mod: ,,, | Performed by: OBSTETRICS & GYNECOLOGY

## 2024-07-12 PROCEDURE — 36415 COLL VENOUS BLD VENIPUNCTURE: CPT | Performed by: OBSTETRICS & GYNECOLOGY

## 2024-07-12 PROCEDURE — 11000001 HC ACUTE MED/SURG PRIVATE ROOM

## 2024-07-12 PROCEDURE — 85025 COMPLETE CBC W/AUTO DIFF WBC: CPT | Performed by: OBSTETRICS & GYNECOLOGY

## 2024-07-12 RX ADMIN — IBUPROFEN 600 MG: 600 TABLET ORAL at 11:07

## 2024-07-12 RX ADMIN — DOCUSATE SODIUM 200 MG: 100 CAPSULE, LIQUID FILLED ORAL at 09:07

## 2024-07-12 RX ADMIN — IBUPROFEN 600 MG: 600 TABLET ORAL at 06:07

## 2024-07-12 RX ADMIN — ACETAMINOPHEN 650 MG: 325 TABLET ORAL at 11:07

## 2024-07-12 RX ADMIN — SIMETHICONE 80 MG: 80 TABLET, CHEWABLE ORAL at 11:07

## 2024-07-12 RX ADMIN — MUPIROCIN: 20 OINTMENT TOPICAL at 09:07

## 2024-07-12 RX ADMIN — ACETAMINOPHEN 650 MG: 325 TABLET ORAL at 06:07

## 2024-07-12 RX ADMIN — IBUPROFEN 600 MG: 600 TABLET ORAL at 12:07

## 2024-07-12 RX ADMIN — IBUPROFEN 600 MG: 600 TABLET ORAL at 05:07

## 2024-07-12 RX ADMIN — ACETAMINOPHEN 650 MG: 325 TABLET ORAL at 12:07

## 2024-07-12 RX ADMIN — PRENATAL VIT W/ FE FUMARATE-FA TAB 27-0.8 MG 1 TABLET: 27-0.8 TAB at 09:07

## 2024-07-12 NOTE — PROGRESS NOTES
[facility]  Obstetrics  Postpartum Progress Note    Patient Name: Brock Alfaro  MRN: [id number]  Admission Date: 2024  Hospital Length of Stay: 1 days  Attending Physician: Armani Britton MD  Primary Care Provider: Latesha, Primary Doctor    Subjective:     Principal Problem:Status post  section    Hospital Course:  On Labor and Delivery, vitals stable  FHT: 130 - 140. Category 1.  Reactive  Contraction: None  Cervix: 1 cm    RLTCS + BTL  MD Monica Britton MD  Spinal by MD Terrence  EBL: 500 cc  Viable male infant at 0832 24  Weight: 6#8.4 or 2960 gm  Apgar: 9/9  Placenta: manually extracted intact with three vessels  No complication  Specimen: bilateral segments of tubes  Urine: clear.    2024 Doing well.  Breast-feeding. Tolerating oral intake    Interval History:   Status post repeat low transverse  section with bilateral tubal ligation on 24    She is doing well this morning. She is tolerating a regular diet without nausea or vomiting. She is voiding spontaneously. She is ambulating. She has passed flatus, and has not a BM. Vaginal bleeding is mild. She denies fever or chills. Abdominal pain is mild and controlled with oral medications. She Is breastfeeding. She desires circumcision for her male baby: yes.    Objective:     Vital Signs (Most Recent):  Temp: 98.2 °F (36.8 °C) (24)  Pulse: 85 (24)  Resp: 18 (24)  BP: 122/71 (24)  SpO2: 100 % (24) Vital Signs (24h Range):  Temp:  [97.5 °F (36.4 °C)-98.2 °F (36.8 °C)] 98.2 °F (36.8 °C)  Pulse:  [] 85  Resp:  [17-20] 18  SpO2:  [89 %-100 %] 100 %  BP: (106-161)/(54-75) 122/71     Weight: 104.7 kg (230 lb 13.2 oz)  Body mass index is 45.08 kg/m².      Intake/Output Summary (Last 24 hours) at 2024 0715  Last data filed at 2024 0400  Gross per 24 hour   Intake 100 ml   Output 3352 ml   Net -3252 ml         Significant Labs:  Lab Results   Component  Value Date    GROUPTRH B POS 2024    HEPBSAG Non-reactive 2023     Recent Labs   Lab 24  0618   HGB 9.1*   HCT 29.8*       I have personallly reviewed all pertinent lab results from the last 24 hours.    Physical Exam:   Constitutional: She appears well-developed and well-nourished. No distress.    HENT:   Head: Normocephalic and atraumatic.    Eyes: EOM are normal.     Cardiovascular:  Normal rate.             Pulmonary/Chest: Effort normal. No respiratory distress.        Abdominal: Soft. She exhibits no distension. There is no abdominal tenderness. There is no rebound and no guarding.   Pfannenstiel incision in AquaCel dressing.  No evidence of active bleeding.             Musculoskeletal: Normal range of motion.       Neurological: She is alert.    Skin: Skin is warm and dry.    Psychiatric: She has a normal mood and affect.       Review of Systems  Assessment/Plan:     33 y.o. female  for:    * Status post  section  Routine postpartum care  Watch vaginal bleeding  Pain control  Lactation assistance  Discharge home once milestones are met    Circumcision of male infant to be done prior to discharge      BMI 40.0-44.9, adult  Admit for repeat low transverse  section with bilateral tubal ligation  Again, procedures explained  Risks and benefits discussed, including risks of hemorrhage, infection, bladder and bowel injuries...  Questions answered  Consents signed previously  She is nervous but eager to proceed        Disposition: As patient meets milestones, will plan to discharge home.    Armani Britton MD  Obstetrics  [facility] [place] [facility]

## 2024-07-12 NOTE — ASSESSMENT & PLAN NOTE
Routine postpartum care  Watch vaginal bleeding  Pain control  Lactation assistance  Discharge home once milestones are met    Circumcision of male infant to be done prior to discharge

## 2024-07-12 NOTE — NURSING
Incentive spirometer and Hibiclens given to patient along with instructions on use of each.  Pt verbalized understanding of all instructions.  Pt also demonstrated use of incentive spirometer.

## 2024-07-12 NOTE — SUBJECTIVE & OBJECTIVE
Interval History:   Status post repeat low transverse  section with bilateral tubal ligation on 24    She is doing well this morning. She is tolerating a regular diet without nausea or vomiting. She is voiding spontaneously. She is ambulating. She has passed flatus, and has not a BM. Vaginal bleeding is mild. She denies fever or chills. Abdominal pain is mild and controlled with oral medications. She Is breastfeeding. She desires circumcision for her male baby: yes.    Objective:     Vital Signs (Most Recent):  Temp: 98.2 °F (36.8 °C) (24)  Pulse: 85 (24)  Resp: 18 (24)  BP: 122/71 (24)  SpO2: 100 % (24) Vital Signs (24h Range):  Temp:  [97.5 °F (36.4 °C)-98.2 °F (36.8 °C)] 98.2 °F (36.8 °C)  Pulse:  [] 85  Resp:  [17-20] 18  SpO2:  [89 %-100 %] 100 %  BP: (106-161)/(54-75) 122/71     Weight: 104.7 kg (230 lb 13.2 oz)  Body mass index is 45.08 kg/m².      Intake/Output Summary (Last 24 hours) at 2024 0715  Last data filed at 2024 0400  Gross per 24 hour   Intake 100 ml   Output 3352 ml   Net -3252 ml         Significant Labs:  Lab Results   Component Value Date    GROUPTRH B POS 2024    HEPBSAG Non-reactive 2023     Recent Labs   Lab 24  0618   HGB 9.1*   HCT 29.8*       I have personallly reviewed all pertinent lab results from the last 24 hours.    Physical Exam:   Constitutional: She appears well-developed and well-nourished. No distress.    HENT:   Head: Normocephalic and atraumatic.    Eyes: EOM are normal.     Cardiovascular:  Normal rate.             Pulmonary/Chest: Effort normal. No respiratory distress.        Abdominal: Soft. She exhibits no distension. There is no abdominal tenderness. There is no rebound and no guarding.   Pfannenstiel incision in AquaCel dressing.  No evidence of active bleeding.             Musculoskeletal: Normal range of motion.       Neurological: She is alert.    Skin: Skin is  warm and dry.    Psychiatric: She has a normal mood and affect.       Review of Systems

## 2024-07-12 NOTE — PLAN OF CARE
Problem: Breastfeeding  Goal: Effective Breastfeeding  Outcome: Progressing  Intervention: Promote Breast Care and Comfort  Flowsheets (Taken 7/12/2024 1015)  Breast Care: Breastfeeding: open to air  Intervention: Promote Effective Breastfeeding  Flowsheets (Taken 7/12/2024 1015)  Breastfeeding Assistance:   assisted with positioning   assisted with techniques for flat/inverted nipples   infant latch-on verified   feeding session observed   feeding on demand promoted   feeding cue recognition promoted   infant stimulated to wakeful state   infant suck/swallow verified   support offered  Parent-Child Attachment Promotion:   caring behavior modeled   cue recognition promoted   face-to-face positioning promoted   interaction encouraged   parent/caregiver presence encouraged   strengths emphasized   skin-to-skin contact encouraged   rooming-in promoted   positive reinforcement provided   participation in care promoted  Intervention: Support Exclusive Breastfeeding Success  Flowsheets (Taken 7/12/2024 1015)  Supportive Measures:   active listening utilized   decision-making supported   goal-setting facilitated   self-care encouraged   problem-solving facilitated   positive reinforcement provided   self-responsibility promoted   verbalization of feelings encouraged  Breastfeeding Support:   diary/feeding log utilized   encouragement provided   infant-mother separation minimized   lactation counseling provided   maternal hydration promoted   maternal nutrition promoted   maternal rest encouraged

## 2024-07-12 NOTE — LACTATION NOTE
07/12/24 1015   Maternal Assessment   Breast Density Bilateral:;soft   Areola Bilateral:;elastic   Nipples Bilateral:;flat;graspable   Maternal Infant Feeding   Maternal Emotional State relaxed;independent   Infant Positioning clutch/football   Signs of Milk Transfer audible swallow;infant jaw motion present   Pain with Feeding no   Latch Assistance yes     Patient requests assistance for latch.  Patient has graspable nipple that are slightly flat.  Infant is skin to skin at the right breast in the football/clutch hold.  Was able to latch with two attempts.  Infant nursed well, deep sucks noted.  No pain at the nipple or breasts.  Patient does complain of cramping.  Discussed proper latch and how to determine if latch is effective.  Discussed how often infant should latch and to track voiding and stools.  Verbalized understanding.  Patient is able to latch infant effectively.

## 2024-07-12 NOTE — ANESTHESIA POSTPROCEDURE EVALUATION
Anesthesia Post Evaluation    Patient: Brock Alfaro    Procedure(s) Performed: Procedure(s) (LRB):   SECTION, WITH TUBAL LIGATION (N/A)    Final Anesthesia Type: spinal      Patient location during evaluation: floor  Patient participation: Yes- Able to Participate  Level of consciousness: awake and alert  Post-procedure vital signs: reviewed and stable  Pain management: adequate  Airway patency: patent    PONV status at discharge: No PONV  Anesthetic complications: no      Cardiovascular status: hemodynamically stable and blood pressure returned to baseline  Respiratory status: room air, spontaneous ventilation and unassisted  Hydration status: euvolemic  Follow-up not needed.              Vitals Value Taken Time   /75 24 1406   Temp 36.9 °C (98.5 °F) 24 1406   Pulse 97 24 1406   Resp 17 24 1406   SpO2 100 % 24 1406         Event Time   Out of Recovery 11:04:00         Pain/[place] Score: Pain Rating Prior to Med Admin: 6 (2024 11:36 AM)  Pain Rating Post Med Admin: 0 (2024 12:30 PM)

## 2024-07-12 NOTE — PLAN OF CARE
PT stable. VS WNL. Voiding and ambulating independently. Fundus firm, midline 1 below, light bleeding. Aquacel in place with pressure dressing over.  Abdominal binder in place. IV SL.  Pain managed with ibuprofen 600 and tylenol 650.  Spouse at bedside, attentive to pt and baby.  Bonding well with baby. Breastfeeding with assistance.  Pt doing well.

## 2024-07-13 ENCOUNTER — PATIENT MESSAGE (OUTPATIENT)
Dept: LACTATION | Facility: CLINIC | Age: 34
End: 2024-07-13
Payer: MEDICAID

## 2024-07-13 VITALS
DIASTOLIC BLOOD PRESSURE: 70 MMHG | SYSTOLIC BLOOD PRESSURE: 120 MMHG | WEIGHT: 230.81 LBS | HEART RATE: 84 BPM | RESPIRATION RATE: 17 BRPM | HEIGHT: 60 IN | OXYGEN SATURATION: 100 % | BODY MASS INDEX: 45.31 KG/M2 | TEMPERATURE: 98 F

## 2024-07-13 PROCEDURE — 25000003 PHARM REV CODE 250: Performed by: OBSTETRICS & GYNECOLOGY

## 2024-07-13 PROCEDURE — 99238 HOSP IP/OBS DSCHRG MGMT 30/<: CPT | Mod: ,,, | Performed by: OBSTETRICS & GYNECOLOGY

## 2024-07-13 RX ORDER — OXYCODONE AND ACETAMINOPHEN 5; 325 MG/1; MG/1
1 TABLET ORAL EVERY 4 HOURS PRN
Qty: 15 TABLET | Refills: 0 | Status: SHIPPED | OUTPATIENT
Start: 2024-07-13

## 2024-07-13 RX ORDER — IBUPROFEN 600 MG/1
600 TABLET ORAL EVERY 6 HOURS
Qty: 40 TABLET | Refills: 1 | Status: SHIPPED | OUTPATIENT
Start: 2024-07-13

## 2024-07-13 RX ADMIN — DOCUSATE SODIUM 200 MG: 100 CAPSULE, LIQUID FILLED ORAL at 09:07

## 2024-07-13 RX ADMIN — IBUPROFEN 600 MG: 600 TABLET ORAL at 05:07

## 2024-07-13 RX ADMIN — IBUPROFEN 600 MG: 600 TABLET ORAL at 11:07

## 2024-07-13 RX ADMIN — PRENATAL VIT W/ FE FUMARATE-FA TAB 27-0.8 MG 1 TABLET: 27-0.8 TAB at 09:07

## 2024-07-13 RX ADMIN — OXYCODONE HYDROCHLORIDE AND ACETAMINOPHEN 1 TABLET: 5; 325 TABLET ORAL at 05:07

## 2024-07-13 RX ADMIN — MUPIROCIN: 20 OINTMENT TOPICAL at 09:07

## 2024-07-13 NOTE — PLAN OF CARE
Problem: Adult Inpatient Plan of Care  Goal: Plan of Care Review  Outcome: Met  Goal: Patient-Specific Goal (Individualized)  Outcome: Met  Goal: Absence of Hospital-Acquired Illness or Injury  Outcome: Met  Goal: Optimal Comfort and Wellbeing  Outcome: Met  Goal: Readiness for Transition of Care  Outcome: Met     Problem:  Fall Injury Risk  Goal: Absence of Fall, Infant Drop and Related Injury  Outcome: Met     Problem: Infection  Goal: Absence of Infection Signs and Symptoms  Outcome: Met     Problem: Wound  Goal: Optimal Coping  Outcome: Met  Goal: Optimal Functional Ability  Outcome: Met  Goal: Absence of Infection Signs and Symptoms  Outcome: Met  Goal: Improved Oral Intake  Outcome: Met  Goal: Optimal Pain Control and Function  Outcome: Met  Goal: Skin Health and Integrity  Outcome: Met  Goal: Optimal Wound Healing  Outcome: Met     Problem: Postpartum ( Delivery)  Goal: Successful Parent Role Transition  Outcome: Met  Goal: Hemostasis  Outcome: Met  Goal: Effective Bowel Elimination  Outcome: Met  Goal: Fluid and Electrolyte Balance  Outcome: Met  Goal: Absence of Infection Signs and Symptoms  Outcome: Met  Goal: Anesthesia/Sedation Recovery  Outcome: Met  Goal: Optimal Pain Control and Function  Outcome: Met  Goal: Nausea and Vomiting Relief  Outcome: Met  Goal: Effective Urinary Elimination  Outcome: Met  Goal: Effective Oxygenation and Ventilation  Outcome: Met

## 2024-07-13 NOTE — PLAN OF CARE
Problem: Breastfeeding  Goal: Effective Breastfeeding  Outcome: Met  Intervention: Promote Breast Care and Comfort  Flowsheets (Taken 7/13/2024 0832)  Breast Care: Breastfeeding: open to air  Intervention: Promote Effective Breastfeeding  Flowsheets (Taken 7/13/2024 0832)  Breastfeeding Assistance:   infant latch-on verified   feeding session observed   feeding on demand promoted   feeding cue recognition promoted   infant stimulated to wakeful state   infant suck/swallow verified   support offered  Parent-Child Attachment Promotion:   caring behavior modeled   cue recognition promoted   face-to-face positioning promoted   interaction encouraged   parent/caregiver presence encouraged   strengths emphasized   skin-to-skin contact encouraged   rooming-in promoted   positive reinforcement provided   participation in care promoted  Intervention: Support Exclusive Breastfeeding Success  Flowsheets (Taken 7/13/2024 0832)  Supportive Measures:   active listening utilized   decision-making supported   goal-setting facilitated   self-care encouraged   problem-solving facilitated   positive reinforcement provided   self-responsibility promoted   verbalization of feelings encouraged  Breastfeeding Support:   diary/feeding log utilized   encouragement provided   infant-mother separation minimized   lactation counseling provided   maternal hydration promoted   maternal rest encouraged   maternal nutrition promoted

## 2024-07-13 NOTE — PLAN OF CARE
PT stable. VS WNL. Voiding and ambulating independently. Ambulated in [place] during shift.  Fundus firm, midline 1 below, light bleeding. Abdominal dressing has marked moderate drainage.  Pain managed with ibuprofen and oxycodone-acetaminophen 5.  Breastfeeding well independently.  Bonding well with baby. Pt doing well.

## 2024-07-13 NOTE — LACTATION NOTE
"   07/13/24 0832   Maternal Assessment   Breast Density Bilateral:;soft   Areola Bilateral:;elastic   Nipples Bilateral:;flat;graspable   Maternal Infant Feeding   Maternal Emotional State independent;relaxed   Infant Positioning clutch/football   Signs of Milk Transfer audible swallow;infant jaw motion present   Pain with Feeding no   Latch Assistance no     Patient is not having any pain when breastfeeding and is now able to latch infant without assistance.  States she has not needed assistance in the last 24 hours.  States "feels confident" about breastfeeding successfully when she had been unsuccessful with previous children. Patient being discharged today.  Discussed infants voiding and stool pattern and what stool should look like if exclusively breastfeeding or giving formula.  Instructed patient to continue taking prenatal vitamins and that some medications may cross into breast milk and to check with her physician or pharmacist before taking any medication.  Discussed signs and symptoms of engorgement and mastitis and when to call the physician.  Instructed patient that the baby should only receive breast milk or formula for the first 6 months.  No water or juice.  Instructed the patient to feed the baby or pump 8 or more times in 24 hours. Discussed EBM storage and how to warm/thaw milk.  Verbalized understanding.  All questions answered.  Lactation discharge instructions completed.  "

## 2024-07-13 NOTE — DISCHARGE SUMMARY
[facility]  Obstetrics  Discharge Summary      Patient Name: Brock Alfaro  MRN: [id number]  Admission Date: 2024  Hospital Length of Stay: 2 days  Discharge Date and Time:  2024 8:37 AM  Attending Physician: Armani Britton MD   Discharging Provider: Armani Britton MD   Primary Care Provider: Latesha, Primary Doctor    HPI: 32 yo  at 39w3  Previous  sections x 2   Undesired fertility  Obesity  Admitted today, 24 for repeat low transverse  section with tubal ligation  Risks and benefits discussed          Procedure(s) (LRB):   SECTION, WITH TUBAL LIGATION (N/A)     Hospital Course:   On Labor and Delivery, vitals stable  FHT: 130 - 140. Category 1.  Reactive  Contraction: None  Cervix: 1 cm    RLTCS + BTL  MD Monica Britton MD  Spinal by MD Terrence  EBL: 500 cc  Viable male infant at 0832 24  Weight: 6#8.4 or 2960 gm  Apgar: 9/9  Placenta: manually extracted intact with three vessels  No complication  Specimen: bilateral segments of tubes  Urine: clear.    2024 Doing well.  Breast-feeding. Tolerating oral intake    2024  Postpartum course was benign.  She is breast-feeding well.  Exam was benign with patient afebrile, vitals stable, and minimal bleeding.  Normal activities.  Patient discharged home on postpartum day #2, 24   Discharge medications include Percocet, Motrin, prenatal vitamins, and iron supplement.  Follow-up with me next week for dressing removal and postop follow-up    Armani Britton MD.           Final Active Diagnoses:    Diagnosis Date Noted POA    PRINCIPAL PROBLEM:  Status post  section [Z98.891] 2023 Not Applicable    BMI 40.0-44.9, adult [Z68.41] 2013 Not Applicable      Problems Resolved During this Admission:    Diagnosis Date Noted Date Resolved POA    39 weeks gestation of pregnancy [Z3A.39] 2022 Not Applicable        Significant Diagnostic Studies: Labs: All labs within the past  24 hours have been reviewed      Feeding Method: breast    Immunizations       Date Immunization Status Dose Route/Site Given by    24 1010 MMR Incomplete 0.5 mL Subcutaneous/     24 1010 Tdap Incomplete 0.5 mL Intramuscular/             Delivery:    Episiotomy:     Lacerations:     Repair suture:     Repair # of packets:     Blood loss (ml):       Birth information:  YOB: 2025   Time of birth: 8:32 AM   Sex: male   Delivery type: , Low Transverse   Gestational Age: 39w3d     Measurements    Weight: 2960 g  Weight (lbs): 6 lb 8.4 oz  Length:          Delivery Clinician: Delivery Providers    Delivering clinician: Armani Britton MD   Provider Role    Radha Marie, RN Registered Nurse    Elvie Mendez RN Registered Nurse    Nicolasa Estrada Surgical Tech    Gatito Brown CRNA Nurse Anesthetist    Ada Rivas RN Registered Nurse             Additional  information:  Forceps:    Vacuum:    Breech:    Observed anomalies      Living?:     Apgars    Living status: Living  Apgar Component Scores:  1 min.:  5 min.:  10 min.:  15 min.:  20 min.:    Skin color:  1  1       Heart rate:  2  2       Reflex irritability:  2  2       Muscle tone:  2  2       Respiratory effort:  2  2       Total:  9  9       Apgars assigned by: WALI LEVI         Placenta: Delivered:       appearance  Pending Diagnostic Studies:       Procedure Component Value Units Date/Time    Specimen to Pathology, Surgery Gynecology and Obstetrics [[id number]] Collected: 24 1035    Order Status: Sent Lab Status: In process Updated: 24 1235    Specimen: Tissue             Discharged Condition: good    Disposition: Home or Self Care    Follow Up:    Patient Instructions:      BREAST PUMP FOR HOME USE     Order Specific Question Answer Comments   Type of pump: Electric    Weight: 104.7 kg (230 lb 13.2 oz)    Length of need (1-99 months): 99      Medications:  Current Discharge Medication List         START taking these medications    Details   ibuprofen (ADVIL,MOTRIN) 600 MG tablet Take 1 tablet (600 mg total) by mouth every 6 (six) hours.  Qty: 40 tablet, Refills: 1      oxyCODONE-acetaminophen (PERCOCET) 5-325 mg per tablet Take 1 tablet by mouth every 4 (four) hours as needed for Pain.  Qty: 15 tablet, Refills: 0    Comments: Quantity prescribed more than 7 day supply? Yes, quantity medically necessary           CONTINUE these medications which have NOT CHANGED    Details   aspirin (ECOTRIN) 81 MG EC tablet Take 1 tablet (81 mg total) by mouth once daily.  Qty: 90 tablet, Refills: 0      omeprazole (PRILOSEC) 40 MG capsule Take 1 capsule (40 mg total) by mouth 2 (two) times daily before meals.  Qty: 60 capsule, Refills: 11             Armani Britton MD  Obstetrics  [facility] - [facility]

## 2024-07-16 LAB
FINAL PATHOLOGIC DIAGNOSIS: NORMAL
GROSS: NORMAL
Lab: NORMAL

## 2024-07-24 ENCOUNTER — PATIENT MESSAGE (OUTPATIENT)
Dept: OBSTETRICS AND GYNECOLOGY | Facility: CLINIC | Age: 34
End: 2024-07-24
Payer: MEDICAID

## 2024-07-25 ENCOUNTER — POSTPARTUM VISIT (OUTPATIENT)
Dept: OBSTETRICS AND GYNECOLOGY | Facility: CLINIC | Age: 34
End: 2024-07-25
Payer: MEDICAID

## 2024-07-25 DIAGNOSIS — Z98.891 STATUS POST CESAREAN SECTION: Primary | ICD-10-CM

## 2024-07-25 PROCEDURE — 1111F DSCHRG MED/CURRENT MED MERGE: CPT | Mod: CPTII,,, | Performed by: STUDENT IN AN ORGANIZED HEALTH CARE EDUCATION/TRAINING PROGRAM

## 2024-07-25 PROCEDURE — 99999 PR PBB SHADOW E&M-EST. PATIENT-LVL II: CPT | Mod: PBBFAC,,, | Performed by: STUDENT IN AN ORGANIZED HEALTH CARE EDUCATION/TRAINING PROGRAM

## 2024-07-25 PROCEDURE — 99212 OFFICE O/P EST SF 10 MIN: CPT | Mod: PBBFAC | Performed by: STUDENT IN AN ORGANIZED HEALTH CARE EDUCATION/TRAINING PROGRAM

## 2024-07-25 PROCEDURE — 99213 OFFICE O/P EST LOW 20 MIN: CPT | Mod: S$PBB,,, | Performed by: STUDENT IN AN ORGANIZED HEALTH CARE EDUCATION/TRAINING PROGRAM

## 2024-07-25 RX ORDER — NAPROXEN 500 MG/1
500 TABLET ORAL 2 TIMES DAILY WITH MEALS
Qty: 30 TABLET | Refills: 1 | Status: SHIPPED | OUTPATIENT
Start: 2024-07-25

## 2024-07-25 NOTE — TELEPHONE ENCOUNTER
Note to patient.  Called but no answer  Ok to breast-feed with positive Covid.  She would need to wear a mask if symptomatic

## 2024-07-25 NOTE — PROGRESS NOTES
Subjective     Patient ID: Brock Alfaro is a 34 y.o. female.    Chief Complaint:  Postpartum Care (1wk incision check, severe abd pain. When on meds 6/10 and when not 85/10. To the point where she can't walk.), Abdominal Pain, and Urinary Incontinence      History of Present Illness  35 yo presents for incision check    S/p RLTCS with tubal 24, bandage already removed but says she has a hard time seeing the incision   Using neosporin and hydrogen peroxide on incision   For pain says ibuprofen makes her sweat and she only takes 1/2 of a painkiller when she really needs it   Pain worse with movement and on L side of abdomen    Recently got over COVID infection, says her whole house was positive        GYN & OB History  Patient's last menstrual period was 10/04/2023 (approximate).   Date of Last Pap: 2019    OB History    Para Term  AB Living   5 3 3   2 3   SAB IAB Ectopic Multiple Live Births   2     0 3      # Outcome Date GA Lbr Parish/2nd Weight Sex Type Anes PTL Lv   5 Term 24 39w3d  2.96 kg (6 lb 8.4 oz) M CS-LTranv Spinal  LEILA   4 Term 22 39w0d  3.23 kg (7 lb 1.9 oz) M CS-LTranv Spinal  LEILA   3 Term 14 41w2d  3.331 kg (7 lb 5.5 oz) F CS-LTranv EPI N LEILA   2 SAB            1 SAB                Review of Systems  Review of Systems   Gastrointestinal:  Positive for abdominal pain.   All other systems reviewed and are negative.         Objective   Physical Exam:   Constitutional: She is oriented to person, place, and time. She appears well-developed and well-nourished.    HENT:   Head: Normocephalic and atraumatic.    Eyes: Conjunctivae and EOM are normal.      Pulmonary/Chest: Effort normal.        Abdominal: Soft. A surgical scar is present.   Incision well approximated, no surrounding erythema or induration             Musculoskeletal: Normal range of motion.       Neurological: She is alert and oriented to person, place, and time.    Skin: Skin is warm and dry.     Psychiatric: She has a normal mood and affect.            Assessment and Plan     1. Status post  section        Plan:  1. Status post  section  - Try naproxen instead of ibuprofen.   - Suggested taking pain medication before bed as she is not getting up to take medicine throughout the night, also recommended trying full pain killer and see if that improves pain relief   - naproxen (NAPROSYN) 500 MG tablet; Take 1 tablet (500 mg total) by mouth 2 (two) times daily with meals.  Dispense: 30 tablet; Refill: 1      Close follow up next week to check on pain level     Marlon Younger III, MD  [facility]   [address]  [place] [id number] [phone]      Body Location Override (Optional - Billing Will Still Be Based On Selected Body Map Location If Applicable): mid lower back

## 2024-07-29 ENCOUNTER — POSTPARTUM VISIT (OUTPATIENT)
Dept: OBSTETRICS AND GYNECOLOGY | Facility: CLINIC | Age: 34
End: 2024-07-29
Payer: MEDICAID

## 2024-07-29 VITALS
WEIGHT: 209.44 LBS | BODY MASS INDEX: 41.12 KG/M2 | DIASTOLIC BLOOD PRESSURE: 70 MMHG | SYSTOLIC BLOOD PRESSURE: 120 MMHG | HEIGHT: 60 IN

## 2024-07-29 DIAGNOSIS — Z98.891 STATUS POST CESAREAN SECTION: ICD-10-CM

## 2024-07-29 DIAGNOSIS — Z09 POSTOP CHECK: Primary | ICD-10-CM

## 2024-07-29 PROCEDURE — 99024 POSTOP FOLLOW-UP VISIT: CPT | Mod: ,,, | Performed by: OBSTETRICS & GYNECOLOGY

## 2024-07-29 PROCEDURE — 99213 OFFICE O/P EST LOW 20 MIN: CPT | Mod: PBBFAC | Performed by: OBSTETRICS & GYNECOLOGY

## 2024-07-29 PROCEDURE — 99999 PR PBB SHADOW E&M-EST. PATIENT-LVL III: CPT | Mod: PBBFAC,,, | Performed by: OBSTETRICS & GYNECOLOGY

## 2024-07-29 RX ORDER — FLUTICASONE PROPIONATE 50 MCG
1 SPRAY, SUSPENSION (ML) NASAL
COMMUNITY
Start: 2024-07-19 | End: 2025-07-19

## 2024-07-29 NOTE — PROGRESS NOTES
Subjective     Patient ID: Brock Alfaro is a 34 y.o. female.    Chief Complaint:  Postpartum Follow-up (2 wks PP for RCS +BTL on 2024)      History of Present Illness  HPI  Ms Alfaro is a 34 years old, status post repeat low transverse  section with tubal ligation on 2024.  She comes in today for an exam and followup.  Patient has no current complaints.  No fever or chills.  No nausea or vomiting.  No diarrhea or constipation.  No abdominal or pelvic pain.    She has not resumed normal intercourse.  Patient has begun some walking for exercise. She denies having signs and symptoms of significant depression.  She is breast-feeding well.        GYN & OB History  Patient's last menstrual period was 10/04/2023 (approximate).   Date of Last Pap: 2019    OB History    Para Term  AB Living   5 3 3   2 3   SAB IAB Ectopic Multiple Live Births   2     0 3      # Outcome Date GA Lbr Parish/2nd Weight Sex Type Anes PTL Lv   5 Term 24 39w3d  2.96 kg (6 lb 8.4 oz) M CS-LTranv Spinal  LEILA   4 Term 22 39w0d  3.23 kg (7 lb 1.9 oz) M CS-LTranv Spinal  LEILA   3 Term 14 41w2d  3.331 kg (7 lb 5.5 oz) F CS-LTranv EPI N LEILA   2 SAB            1 SAB              Past Medical History:   Diagnosis Date    Alopecia     Hay fever     Oral herpes     Suppurative hidradenitis     18 YEARS OLD       Past Surgical History:   Procedure Laterality Date     SECTION       SECTION WITH TUBAL LIGATION N/A 2022     section only. She did NOT Have tubal ligation.     SECTION WITH TUBAL LIGATION N/A 2024    Procedure:  SECTION, WITH TUBAL LIGATION;  Surgeon: Armani Britton MD;  Location: [facility] L&D OR;  Service: OB/GYN;  Laterality: N/A;    DILATION AND CURETTAGE OF UTERUS         Family History   Problem Relation Name Age of Onset    Breast cancer Maternal Grandmother      Ovarian cancer Neg Hx      Colon cancer Neg Hx         Social History      Socioeconomic History    Marital status: Single   Tobacco Use    Smoking status: Never    Smokeless tobacco: Never   Substance and Sexual Activity    Alcohol use: Not Currently    Drug use: No    Sexual activity: Yes     Partners: Male     Birth control/protection: None   Social History Narrative    Together for awhile    He works for "[employer]    She works for a non-profit organization.     Social Determinants of Health     Financial Resource Strain: Low Risk  (6/5/2024)    Overall Financial Resource Strain (CARDIA)     Difficulty of Paying Living Expenses: Not very hard   Food Insecurity: No Food Insecurity (6/5/2024)    Hunger Vital Sign     Worried About Running Out of Food in the Last Year: Never true     Ran Out of Food in the Last Year: Never true   Transportation Needs: No Transportation Needs (12/1/2021)    Received [facility]    [facility]     Lack of Transportation (Medical): No     Lack of Transportation (Non-Medical): No   Physical Activity: Inactive (6/5/2024)    Exercise Vital Sign     Days of Exercise per Week: 1 day     Minutes of Exercise per Session: 0 min   Stress: No Stress Concern Present (6/5/2024)    [place] [employer] - Occupational Stress Questionnaire     Feeling of Stress : Only a little   Housing Stability: Unknown (6/5/2024)    Housing Stability Vital Sign     Unable to Pay for Housing in the Last Year: No       Current Outpatient Medications   Medication Sig Dispense Refill    aspirin (ECOTRIN) 81 MG EC tablet Take 1 tablet (81 mg total) by mouth once daily. 90 tablet 0    fluticasone propionate (FLONASE) 50 mcg/actuation nasal spray 1 spray by Nasal route.      naproxen (NAPROSYN) 500 MG tablet Take 1 tablet (500 mg total) by mouth 2 (two) times daily with meals. 30 tablet 1    omeprazole (PRILOSEC) 40 MG capsule Take 1 capsule (40 mg total) by mouth 2 (two) times daily before meals. 60 capsule 11     oxyCODONE-acetaminophen (PERCOCET) 5-325 mg per tablet Take 1 tablet by mouth every 4 (four) hours as needed for Pain. 15 tablet 0     No current facility-administered medications for this visit.       Review of patient's allergies indicates:   Allergen Reactions    Fluoride Swelling     Oral swelling    Unclassified drug Anaphylaxis     toothpaste    Grass pollen-june grass standard     Msg [glutamic acid hcl]          Review of Systems  Review of Systems   Constitutional:  Positive for fatigue. Negative for activity change, appetite change, chills, fever and unexpected weight change.   HENT:  Negative for mouth sores.    Respiratory:  Negative for cough, shortness of breath and wheezing.    Cardiovascular:  Negative for chest pain and palpitations.   Gastrointestinal:  Negative for abdominal pain, bloating, blood in stool, constipation, nausea and vomiting.   Endocrine: Negative for diabetes and hot flashes.   Genitourinary:  Negative for dysmenorrhea, dyspareunia, dysuria, frequency, hematuria, menorrhagia, menstrual problem, pelvic pain, urgency, vaginal bleeding, vaginal discharge, vaginal pain, urinary incontinence, postcoital bleeding and vaginal odor.   Musculoskeletal:  Negative for back pain and myalgias.   Integumentary:  Negative for rash, breast mass and nipple discharge.   Neurological:  Negative for seizures and headaches.   Psychiatric/Behavioral:  Negative for depression and sleep disturbance. The patient is not nervous/anxious.    Breast: Negative for mass, mastodynia and nipple discharge         Objective   Physical Exam:   Constitutional: She appears well-developed and well-nourished. No distress.    HENT:   Head: Normocephalic and atraumatic.    Eyes: EOM are normal.     Cardiovascular:  Normal rate.             Pulmonary/Chest: Effort normal. No respiratory distress.        Abdominal: Soft. She exhibits no distension. There is no abdominal tenderness. There is no rebound and no guarding.    Pfannenstiel scar healing well.  No evidence of infection             Musculoskeletal: Normal range of motion.       Neurological: She is alert.    Skin: Skin is warm and dry.    Psychiatric: She has a normal mood and affect.            Assessment and Plan     1. Postop check    2. Status post  section           Plan:  I have discussed with the patient her condition.  She is doing well with respect to surgery.  She will continue to keep her incision clean and dry to promote proper healing.  She will be back in 4 weeks for follow-up.  She can come back sooner if she needs us.

## 2024-08-20 ENCOUNTER — PATIENT MESSAGE (OUTPATIENT)
Dept: OBSTETRICS AND GYNECOLOGY | Facility: CLINIC | Age: 34
End: 2024-08-20
Payer: MEDICAID

## 2024-08-20 DIAGNOSIS — A60.00 GENITAL HERPES SIMPLEX, UNSPECIFIED SITE: Primary | ICD-10-CM

## 2024-08-20 RX ORDER — VALACYCLOVIR HYDROCHLORIDE 500 MG/1
500 TABLET, FILM COATED ORAL DAILY
Qty: 30 TABLET | Refills: 1 | Status: SHIPPED | OUTPATIENT
Start: 2024-08-20

## 2024-09-16 ENCOUNTER — POSTPARTUM VISIT (OUTPATIENT)
Dept: OBSTETRICS AND GYNECOLOGY | Facility: CLINIC | Age: 34
End: 2024-09-16
Payer: MEDICAID

## 2024-09-16 VITALS
BODY MASS INDEX: 41.46 KG/M2 | HEIGHT: 60 IN | DIASTOLIC BLOOD PRESSURE: 70 MMHG | WEIGHT: 211.19 LBS | SYSTOLIC BLOOD PRESSURE: 124 MMHG

## 2024-09-16 DIAGNOSIS — Z01.419 WELL WOMAN EXAM WITH ROUTINE GYNECOLOGICAL EXAM: Primary | ICD-10-CM

## 2024-09-16 PROCEDURE — 88175 CYTOPATH C/V AUTO FLUID REDO: CPT | Performed by: OBSTETRICS & GYNECOLOGY

## 2024-09-16 PROCEDURE — 99395 PREV VISIT EST AGE 18-39: CPT | Mod: S$PBB,,, | Performed by: OBSTETRICS & GYNECOLOGY

## 2024-09-16 PROCEDURE — 99212 OFFICE O/P EST SF 10 MIN: CPT | Mod: PBBFAC | Performed by: OBSTETRICS & GYNECOLOGY

## 2024-09-16 PROCEDURE — 99999 PR PBB SHADOW E&M-EST. PATIENT-LVL II: CPT | Mod: PBBFAC,,, | Performed by: OBSTETRICS & GYNECOLOGY

## 2024-09-16 PROCEDURE — 87624 HPV HI-RISK TYP POOLED RSLT: CPT | Performed by: OBSTETRICS & GYNECOLOGY

## 2024-09-16 NOTE — PROGRESS NOTES
Subjective:       Patient ID: Brock Alfaro is a 34 y.o. female.    Chief Complaint:  Well Woman (Last normal pap was 2019. )        History of Present Illness  HPI  Annual Exam-Premenopausal  Patient presents for annual exam. The patient has no complaints today. The patient is sexually active. GYN screening history: last pap: Last normal pap was 2019 . The patient wears seatbelts: yes. The patient participates in regular exercise: yes. Has the patient ever been transfused or tattooed?:  no/yes . The patient reports that there is not domestic violence in her life.    Status post repeat low transverse  section with bilateral tubal ligation 2024      GYN & OB History  No LMP recorded.   Date of Last Pap: 2024    OB History    Para Term  AB Living   5 3 3   2 3   SAB IAB Ectopic Multiple Live Births   2     0 3      # Outcome Date GA Lbr Parish/2nd Weight Sex Type Anes PTL Lv   5 Term 24 39w3d  2.96 kg (6 lb 8.4 oz) M CS-LTranv Spinal  LEILA   4 Term 22 39w0d  3.23 kg (7 lb 1.9 oz) M CS-LTranv Spinal  LEILA   3 Term 14 41w2d  3.331 kg (7 lb 5.5 oz) F CS-LTranv EPI N LEILA   2 SAB            1 SAB                Past Medical History:   Diagnosis Date    Alopecia     Hay fever     Oral herpes     Suppurative hidradenitis     18 YEARS OLD       Past Surgical History:   Procedure Laterality Date     SECTION       SECTION WITH TUBAL LIGATION N/A 2022     section only. She did NOT Have tubal ligation.     SECTION WITH TUBAL LIGATION N/A 2024    Procedure:  SECTION, WITH TUBAL LIGATION;  Surgeon: Armani Britton MD;  Location: [facility] L&D OR;  Service: OB/GYN;  Laterality: N/A;    DILATION AND CURETTAGE OF UTERUS         Family History   Problem Relation Name Age of Onset    Breast cancer Maternal Grandmother      Ovarian cancer Neg Hx      Colon cancer Neg Hx         Social History     Socioeconomic History    Marital  status: Single   Tobacco Use    Smoking status: Never    Smokeless tobacco: Never   Substance and Sexual Activity    Alcohol use: Not Currently    Drug use: No    Sexual activity: Yes     Partners: Male     Birth control/protection: None   Social History Narrative    Together for awhile    He works for [employer]    She works for a non-profit organization.     Social Determinants of Health     Financial Resource Strain: Low Risk  (6/5/2024)    Overall Financial Resource Strain (CARDIA)     Difficulty of Paying Living Expenses: Not very hard   Food Insecurity: No Food Insecurity (6/5/2024)    Hunger Vital Sign     Worried About Running Out of Food in the Last Year: Never true     Ran Out of Food in the Last Year: Never true   Transportation Needs: No Transportation Needs (12/1/2021)    [facility]    [facility]     Lack of Transportation (Medical): No     Lack of Transportation (Non-Medical): No   Physical Activity: Inactive (6/5/2024)    Exercise Vital Sign     Days of Exercise per Week: 1 day     Minutes of Exercise per Session: 0 min   Stress: No Stress Concern Present (6/5/2024)    [employer]izen of [place] [employer] - Occupational Stress Questionnaire     Feeling of Stress : Only a little   Housing Stability: Unknown (6/5/2024)    Housing Stability Vital Sign     Unable to Pay for Housing in the Last Year: No       Current Outpatient Medications   Medication Sig Dispense Refill    fluticasone propionate (FLONASE) 50 mcg/actuation nasal spray 1 spray by Nasal route.      omeprazole (PRILOSEC) 40 MG capsule Take 1 capsule (40 mg total) by mouth 2 (two) times daily before meals. 60 capsule 11    valACYclovir (VALTREX) 500 MG tablet Take 1 tablet (500 mg total) by mouth once daily. 30 tablet 1     No current facility-administered medications for this visit.       Review of patient's allergies indicates:   Allergen Reactions    Fluoride Swelling      Oral swelling    Unclassified drug Anaphylaxis     toothpaste    Grass pollen-joey grass standard     Msg [glutamic acid hcl]         Review of Systems  Review of Systems   Constitutional:  Negative for activity change, appetite change, chills, fatigue, fever and unexpected weight change.   HENT:  Negative for mouth sores.    Respiratory:  Negative for cough, shortness of breath and wheezing.    Cardiovascular:  Negative for chest pain and palpitations.   Gastrointestinal:  Negative for abdominal pain, bloating, blood in stool, constipation, nausea and vomiting.   Endocrine: Negative for diabetes and hot flashes.   Genitourinary:  Negative for dysmenorrhea, dyspareunia, dysuria, frequency, hematuria, menorrhagia, menstrual problem, pelvic pain, urgency, vaginal bleeding, vaginal discharge, vaginal pain, urinary incontinence, postcoital bleeding and vaginal odor.   Musculoskeletal:  Negative for back pain and myalgias.   Integumentary:  Negative for rash, breast mass and nipple discharge.   Neurological:  Negative for seizures and headaches.   Psychiatric/Behavioral:  Negative for depression and sleep disturbance. The patient is not nervous/anxious.    Breast: Negative for mass, mastodynia and nipple discharge          Objective:    Physical Exam:   Constitutional: She appears well-developed and well-nourished. No distress.   BMI of 41.25    HENT:   Head: Normocephalic and atraumatic.    Eyes: EOM are normal.      Pulmonary/Chest: Effort normal. No respiratory distress.        Abdominal: Soft. She exhibits no distension. There is no abdominal tenderness. There is no rebound and no guarding.   Pfannenstiel scar       Genitourinary:    Vagina and uterus normal.   No vaginal discharge in the vagina.    Genitourinary Comments: Vulva without any obvious lesions.  Urethral meatus normal size and location without any lesion.  Urethra is non-tender without stricture or discharge.  Bladder is non-tender.  Vaginal vault with  good support.  Minimal white discharge noted.  No obvious lesion.  Normal rugation.  Cervix is without any cervical motion tenderness.  No obvious lesion.  Uterus is small, non-tender, normal contour.  Adnexa is without any masses or tenderness.  Perineum without obvious lesion.               Musculoskeletal: Normal range of motion.       Neurological: She is alert.    Skin: Skin is warm and dry.    Psychiatric: She has a normal mood and affect.          Assessment:        1. Well woman exam with routine gynecological exam              Plan:          I have discussed with the patient her condition.  Monthly breast examination was instructed, discussed, and encouraged.  Patient was encouraged to consume a low-calorie, low fat diet, and to increase of physical activity.  Healthy habits encouraged.  A Pap smear was performed with HR-HPV according to the USPSTF recommendations.  Mammogram was not ordered because of the combination of her age and risk factors, according to ACOG guidelines.  Gonorrhea and Chlamydia testing not performed;  HIV test not offered, again according to guidelines.  Colonoscopy discussed according to ACS guideline.  We also discussed her obstetric history and CV risks.   Care Gaps addressed.  Patient is to continue her medications as prescribed.  She will come back to see me in one year for her annual visit.  She can come back to see me sooner as necessary.  All of her questions were answered appropriately to her satisfaction.        ** A female chaperone, Sri Burgess, was present for the pelvic exam

## 2024-09-19 LAB
FINAL PATHOLOGIC DIAGNOSIS: NORMAL
Lab: NORMAL

## 2024-09-24 ENCOUNTER — PATIENT MESSAGE (OUTPATIENT)
Dept: OBSTETRICS AND GYNECOLOGY | Facility: CLINIC | Age: 34
End: 2024-09-24
Payer: MEDICAID

## 2024-10-07 ENCOUNTER — PATIENT MESSAGE (OUTPATIENT)
Dept: OBSTETRICS AND GYNECOLOGY | Facility: CLINIC | Age: 34
End: 2024-10-07
Payer: MEDICAID

## 2025-01-06 NOTE — PROGRESS NOTES

## 2025-03-02 ENCOUNTER — PATIENT MESSAGE (OUTPATIENT)
Dept: URGENT CARE | Facility: CLINIC | Age: 35
End: 2025-03-02
Payer: MEDICAID

## 2025-06-02 DIAGNOSIS — A60.00 GENITAL HERPES SIMPLEX, UNSPECIFIED SITE: ICD-10-CM

## 2025-06-03 RX ORDER — VALACYCLOVIR HYDROCHLORIDE 500 MG/1
500 TABLET, FILM COATED ORAL DAILY
Qty: 30 TABLET | Refills: 1 | Status: SHIPPED | OUTPATIENT
Start: 2025-06-03

## (undated) DEVICE — RETRACTOR TRAXI PANNICULUS

## (undated) DEVICE — DRESSING AQUACEL AG ADV 3.5X12